# Patient Record
Sex: FEMALE | Race: WHITE | Employment: OTHER | URBAN - NONMETROPOLITAN AREA
[De-identification: names, ages, dates, MRNs, and addresses within clinical notes are randomized per-mention and may not be internally consistent; named-entity substitution may affect disease eponyms.]

---

## 2017-01-31 ENCOUNTER — OFFICE VISIT (OUTPATIENT)
Dept: CARDIOLOGY | Age: 77
End: 2017-01-31

## 2017-01-31 VITALS
DIASTOLIC BLOOD PRESSURE: 70 MMHG | HEIGHT: 58 IN | SYSTOLIC BLOOD PRESSURE: 122 MMHG | RESPIRATION RATE: 16 BRPM | BODY MASS INDEX: 37.41 KG/M2 | HEART RATE: 54 BPM | WEIGHT: 178.2 LBS

## 2017-01-31 DIAGNOSIS — E78.5 HYPERLIPIDEMIA, UNSPECIFIED HYPERLIPIDEMIA TYPE: ICD-10-CM

## 2017-01-31 DIAGNOSIS — I25.10 CORONARY ARTERY DISEASE INVOLVING NATIVE CORONARY ARTERY OF NATIVE HEART WITHOUT ANGINA PECTORIS: Primary | ICD-10-CM

## 2017-01-31 DIAGNOSIS — I10 ESSENTIAL HYPERTENSION: ICD-10-CM

## 2017-01-31 PROCEDURE — 99213 OFFICE O/P EST LOW 20 MIN: CPT | Performed by: INTERNAL MEDICINE

## 2017-01-31 PROCEDURE — 93000 ELECTROCARDIOGRAM COMPLETE: CPT | Performed by: INTERNAL MEDICINE

## 2017-03-20 RX ORDER — ATORVASTATIN CALCIUM 40 MG/1
40 TABLET, FILM COATED ORAL NIGHTLY
Qty: 90 TABLET | Refills: 3 | Status: SHIPPED | OUTPATIENT
Start: 2017-03-20 | End: 2017-08-11 | Stop reason: SDUPTHER

## 2017-08-01 ENCOUNTER — OFFICE VISIT (OUTPATIENT)
Dept: CARDIOLOGY | Age: 77
End: 2017-08-01
Payer: COMMERCIAL

## 2017-08-01 VITALS
WEIGHT: 182.4 LBS | BODY MASS INDEX: 36.77 KG/M2 | DIASTOLIC BLOOD PRESSURE: 76 MMHG | HEIGHT: 59 IN | HEART RATE: 60 BPM | SYSTOLIC BLOOD PRESSURE: 134 MMHG

## 2017-08-01 DIAGNOSIS — I10 ESSENTIAL HYPERTENSION: ICD-10-CM

## 2017-08-01 DIAGNOSIS — I25.10 CORONARY ARTERY DISEASE INVOLVING NATIVE CORONARY ARTERY OF NATIVE HEART WITHOUT ANGINA PECTORIS: Primary | ICD-10-CM

## 2017-08-01 DIAGNOSIS — E78.5 HYPERLIPIDEMIA, UNSPECIFIED HYPERLIPIDEMIA TYPE: ICD-10-CM

## 2017-08-01 PROCEDURE — 99213 OFFICE O/P EST LOW 20 MIN: CPT | Performed by: INTERNAL MEDICINE

## 2017-08-01 PROCEDURE — 93000 ELECTROCARDIOGRAM COMPLETE: CPT | Performed by: INTERNAL MEDICINE

## 2017-08-11 RX ORDER — ATORVASTATIN CALCIUM 40 MG/1
40 TABLET, FILM COATED ORAL NIGHTLY
Qty: 90 TABLET | Refills: 3 | Status: SHIPPED | OUTPATIENT
Start: 2017-08-11 | End: 2018-06-07 | Stop reason: SDUPTHER

## 2017-08-11 RX ORDER — LISINOPRIL 5 MG/1
TABLET ORAL
Qty: 90 TABLET | Refills: 3 | Status: SHIPPED | OUTPATIENT
Start: 2017-08-11 | End: 2017-11-17 | Stop reason: SDUPTHER

## 2017-11-17 RX ORDER — LISINOPRIL 5 MG/1
TABLET ORAL
Qty: 90 TABLET | Refills: 3 | Status: SHIPPED | OUTPATIENT
Start: 2017-11-17 | End: 2018-09-17 | Stop reason: SDUPTHER

## 2018-02-12 ENCOUNTER — OFFICE VISIT (OUTPATIENT)
Dept: CARDIOLOGY | Age: 78
End: 2018-02-12
Payer: COMMERCIAL

## 2018-02-12 VITALS
HEIGHT: 59 IN | BODY MASS INDEX: 35.68 KG/M2 | HEART RATE: 62 BPM | DIASTOLIC BLOOD PRESSURE: 80 MMHG | WEIGHT: 177 LBS | SYSTOLIC BLOOD PRESSURE: 136 MMHG

## 2018-02-12 DIAGNOSIS — I25.10 CORONARY ARTERY DISEASE INVOLVING NATIVE CORONARY ARTERY OF NATIVE HEART WITHOUT ANGINA PECTORIS: Primary | ICD-10-CM

## 2018-02-12 DIAGNOSIS — I10 ESSENTIAL HYPERTENSION: ICD-10-CM

## 2018-02-12 DIAGNOSIS — E78.5 HYPERLIPIDEMIA, UNSPECIFIED HYPERLIPIDEMIA TYPE: ICD-10-CM

## 2018-02-12 PROCEDURE — 93000 ELECTROCARDIOGRAM COMPLETE: CPT | Performed by: INTERNAL MEDICINE

## 2018-02-12 PROCEDURE — 99213 OFFICE O/P EST LOW 20 MIN: CPT | Performed by: INTERNAL MEDICINE

## 2018-02-12 NOTE — PROGRESS NOTES
[facility] Agusto  is doing well from a cardiac standpoint. She takes one flight of stairs in her house several times a day. She also works as at [employer] and walks over there. No chest pain, no dyspnea, no PND, no syncope or pre-syncope, no orthopnea. Past Medical History:   Diagnosis Date    Asteroid hyalosis of right eye     Coronary artery disease     status post acute non-Q wave myocardial infarction 08/23/2012, status post drug eluting stent placement in the LAD and 2 drug eluting stents in the left circumflex artery 08/23/2012.  Diastolic dysfunction     grade 1.     Gastric ulcer with hemorrhage 3/3/15    gastric and esophageal ulcers due to nsaid    Hyperlipidemia     Mild mitral regurgitation     Mild tricuspid regurgitation     Obesity     Vitreous floaters     left eye. Past Surgical History:   Procedure Laterality Date    CARDIAC CATHETERIZATION Left 08/23/2012    left main artery normal, LAD with mid 60 to 70% stenosis, FFR was 0.76, left circumflex artery with mid 90% stenosis with thrombus, RCA with mild irregularities, preserved left ventricular systolic function, ejection fraction estimated around 50%, status post drug eluting stent placement in the LAD and 2 drug eluting stents in the left circumflex artery.  CHOLECYSTECTOMY  1978    ENDOSCOPY, COLON, DIAGNOSTIC  03/05/2015    esophageal/gastric ulcer; few diverticuli    UPPER GASTROINTESTINAL ENDOSCOPY  4/16/2015    healed gastric ulcer       Family History   Problem Relation Age of Onset    Heart Attack Father        ROS: Otherwise 10 systems reviewed and negative. /80   Pulse 62   Ht 4' 11\" (1.499 m)   Wt 177 lb (80.3 kg)   BMI 35.75 kg/m²     Vitals as above. Alert and oriented x 3. No JVD, or carotid bruits. Lungs are clear to auscultation. Heart sounds are regular, normal, no murmur. Abdomen is soft, no tenderness. Extremities No peripheral edema.     EKG: NSR, NL ECG    Meds:    Current Outpatient Prescriptions:     lisinopril (PRINIVIL;ZESTRIL) 5 MG tablet, TAKE 1 TABLET EVERY DAY, Disp: 90 tablet, Rfl: 3    metoprolol tartrate (LOPRESSOR) 25 MG tablet, TAKE 1 TABLET TWICE DAILY, Disp: 180 tablet, Rfl: 3    atorvastatin (LIPITOR) 40 MG tablet, Take 1 tablet by mouth nightly At bedtime. , Disp: 90 tablet, Rfl: 3    aspirin 81 MG tablet, Take 81 mg by mouth daily, Disp: , Rfl:     ferrous sulfate (FE TABS) 325 (65 FE) MG EC tablet, Take 1 tablet by mouth 2 times daily. , Disp: 90 tablet, Rfl: 3    nitroGLYCERIN (NITROSTAT) 0.4 MG SL tablet, Place 1 tablet under the tongue every 5 minutes as needed for Chest pain., Disp: 25 tablet, Rfl: 11    ascorbic acid (VITAMIN C) 500 MG tablet, Take 500 mg by mouth 2 times daily Indications: patient only takes in the Winter With ana paula hips , Disp: , Rfl:     Multiple Vitamins-Minerals (MULTIVITAMIN PO), Take 1 tablet by mouth daily. , Disp: , Rfl:     Recent Labs:  No results for input(s): CHOL, HDL, TRIG in the last 72 hours. Invalid input(s): CHOLHDLR, LDL, LDLCALCU  No results for input(s): NA, K, CL, CO2, BUN, CREATININE in the last 72 hours. Invalid input(s): CA    Assessment and Plan:     -CAD- CINDY to LAD and LCX 08/2012. Continue low dose ASA  -HTN- failry well controlled at this time. Continue current therapy.   -Obesity - encouraged diet, exercise, and discussed weight loss extensively. -Hyperlipidemia- continue statin.  Check Lipid panel and AST/ALT  -History of severe anemia along with gastric and esophageal ulcers 3/2015.  -Patient to see PCP  -RTC 6 months

## 2018-03-09 ENCOUNTER — OFFICE VISIT (OUTPATIENT)
Dept: FAMILY MEDICINE CLINIC | Age: 78
End: 2018-03-09
Payer: MEDICARE

## 2018-03-09 VITALS
HEIGHT: 59 IN | DIASTOLIC BLOOD PRESSURE: 86 MMHG | WEIGHT: 178.4 LBS | HEART RATE: 60 BPM | RESPIRATION RATE: 16 BRPM | BODY MASS INDEX: 35.96 KG/M2 | SYSTOLIC BLOOD PRESSURE: 132 MMHG

## 2018-03-09 DIAGNOSIS — I25.10 CORONARY ARTERY DISEASE INVOLVING NATIVE CORONARY ARTERY OF NATIVE HEART WITHOUT ANGINA PECTORIS: ICD-10-CM

## 2018-03-09 DIAGNOSIS — Z23 NEED FOR PNEUMOCOCCAL VACCINATION: ICD-10-CM

## 2018-03-09 DIAGNOSIS — E66.09 CLASS 1 OBESITY DUE TO EXCESS CALORIES WITHOUT SERIOUS COMORBIDITY IN ADULT, UNSPECIFIED BMI: ICD-10-CM

## 2018-03-09 DIAGNOSIS — Z23 NEED FOR PROPHYLACTIC VACCINATION WITH COMBINED DIPHTHERIA-TETANUS-PERTUSSIS (DTP) VACCINE: ICD-10-CM

## 2018-03-09 DIAGNOSIS — K25.4 CHRONIC GASTRIC ULCER WITH HEMORRHAGE: ICD-10-CM

## 2018-03-09 DIAGNOSIS — E78.00 PURE HYPERCHOLESTEROLEMIA: ICD-10-CM

## 2018-03-09 DIAGNOSIS — I51.89 DIASTOLIC DYSFUNCTION: ICD-10-CM

## 2018-03-09 PROCEDURE — 1090F PRES/ABSN URINE INCON ASSESS: CPT | Performed by: FAMILY MEDICINE

## 2018-03-09 PROCEDURE — 1036F TOBACCO NON-USER: CPT | Performed by: FAMILY MEDICINE

## 2018-03-09 PROCEDURE — G8598 ASA/ANTIPLAT THER USED: HCPCS | Performed by: FAMILY MEDICINE

## 2018-03-09 PROCEDURE — 1123F ACP DISCUSS/DSCN MKR DOCD: CPT | Performed by: FAMILY MEDICINE

## 2018-03-09 PROCEDURE — 4040F PNEUMOC VAC/ADMIN/RCVD: CPT | Performed by: FAMILY MEDICINE

## 2018-03-09 PROCEDURE — G8482 FLU IMMUNIZE ORDER/ADMIN: HCPCS | Performed by: FAMILY MEDICINE

## 2018-03-09 PROCEDURE — G8400 PT W/DXA NO RESULTS DOC: HCPCS | Performed by: FAMILY MEDICINE

## 2018-03-09 PROCEDURE — G8427 DOCREV CUR MEDS BY ELIG CLIN: HCPCS | Performed by: FAMILY MEDICINE

## 2018-03-09 PROCEDURE — 99214 OFFICE O/P EST MOD 30 MIN: CPT | Performed by: FAMILY MEDICINE

## 2018-03-09 PROCEDURE — G8417 CALC BMI ABV UP PARAM F/U: HCPCS | Performed by: FAMILY MEDICINE

## 2018-03-09 ASSESSMENT — ENCOUNTER SYMPTOMS
RESPIRATORY NEGATIVE: 1
EYES NEGATIVE: 1
ALLERGIC/IMMUNOLOGIC NEGATIVE: 1
GASTROINTESTINAL NEGATIVE: 1

## 2018-03-09 ASSESSMENT — PATIENT HEALTH QUESTIONNAIRE - PHQ9
SUM OF ALL RESPONSES TO PHQ9 QUESTIONS 1 & 2: 0
2. FEELING DOWN, DEPRESSED OR HOPELESS: 0
1. LITTLE INTEREST OR PLEASURE IN DOING THINGS: 0
SUM OF ALL RESPONSES TO PHQ QUESTIONS 1-9: 0

## 2018-03-09 NOTE — PROGRESS NOTES
Subjective:      Patient ID: Marge Baker is a 68 y.o. female. HPI  Routine follow up on chronic medical conditions, refills, and review of updated labs. I have reviewed the patient's medical history in detail and updated the computerized patient record. Have not seen her in a few years. No significant medical events, injuries, illness, or surgery. No current concerns or complaints. Past Medical History:   Diagnosis Date    Asteroid hyalosis of right eye     Coronary artery disease     status post acute non-Q wave myocardial infarction 08/23/2012, status post drug eluting stent placement in the LAD and 2 drug eluting stents in the left circumflex artery 08/23/2012.  Diastolic dysfunction     grade 1.     Gastric ulcer with hemorrhage 3/3/15    gastric and esophageal ulcers due to nsaid    Hyperlipidemia     Mild mitral regurgitation     Mild tricuspid regurgitation     Obesity     Vitreous floaters     left eye. Past Surgical History:   Procedure Laterality Date    CARDIAC CATHETERIZATION Left 08/23/2012    left main artery normal, LAD with mid 60 to 70% stenosis, FFR was 0.76, left circumflex artery with mid 90% stenosis with thrombus, RCA with mild irregularities, preserved left ventricular systolic function, ejection fraction estimated around 50%, status post drug eluting stent placement in the LAD and 2 drug eluting stents in the left circumflex artery.      CHOLECYSTECTOMY  1978    ENDOSCOPY, COLON, DIAGNOSTIC  03/05/2015    esophageal/gastric ulcer; few diverticuli    UPPER GASTROINTESTINAL ENDOSCOPY  4/16/2015    healed gastric ulcer       Current Outpatient Prescriptions   Medication Sig Dispense Refill    Multiple Vitamins-Minerals (HAIR SKIN AND NAILS FORMULA PO) Take 3,000 g by mouth      lisinopril (PRINIVIL;ZESTRIL) 5 MG tablet TAKE 1 TABLET EVERY DAY 90 tablet 3    metoprolol tartrate (LOPRESSOR) 25 MG tablet TAKE 1 TABLET TWICE DAILY 180 tablet 3    atorvastatin (LIPITOR) 40 MG tablet Take 1 tablet by mouth nightly At bedtime. 90 tablet 3    aspirin 81 MG tablet Take 81 mg by mouth daily      ferrous sulfate (FE TABS) 325 (65 FE) MG EC tablet Take 1 tablet by mouth 2 times daily. 90 tablet 3    ascorbic acid (VITAMIN C) 500 MG tablet Take 500 mg by mouth 2 times daily Indications: patient only takes in the Winter With ana paula hips       Multiple Vitamins-Minerals (MULTIVITAMIN PO) Take 1 tablet by mouth daily.  nitroGLYCERIN (NITROSTAT) 0.4 MG SL tablet Place 1 tablet under the tongue every 5 minutes as needed for Chest pain. 25 tablet 11     No current facility-administered medications for this visit. Allergies   Allergen Reactions    Codeine Nausea Only     Social History   Substance Use Topics    Smoking status: Never Smoker    Smokeless tobacco: Never Used    Alcohol use No     Family History   Problem Relation Age of Onset    Heart Attack Father            Review of Systems   Constitutional: Negative. HENT: Negative. Eyes: Negative. Respiratory: Negative. Cardiovascular: Negative. Gastrointestinal: Negative. Endocrine: Negative. Genitourinary: Negative. Musculoskeletal: Positive for arthralgias (right shoulder, ). Skin: Negative. Allergic/Immunologic: Negative. Neurological: Negative. Hematological: Negative. Psychiatric/Behavioral: Negative. Objective:   Physical Exam   Constitutional: She is oriented to person, place, and time. She appears well-developed and well-nourished. No distress. HENT:   Head: Normocephalic and atraumatic. Right Ear: External ear normal.   Left Ear: External ear normal.   Mouth/Throat: Oropharynx is clear and moist. No oropharyngeal exudate. Eyes: Conjunctivae and EOM are normal. No scleral icterus. Neck: Neck supple. No thyromegaly present. Cardiovascular: Normal rate, regular rhythm, normal heart sounds and intact distal pulses. No murmur heard.   Pulmonary/Chest: Effort normal and breath sounds normal. No respiratory distress. She has no wheezes. Abdominal: Soft. Bowel sounds are normal. She exhibits no distension. There is no tenderness. There is no rebound. Musculoskeletal: Normal range of motion. She exhibits no edema or tenderness. Neurological: She is alert and oriented to person, place, and time. Skin: Skin is warm and dry. No rash noted. No erythema. Psychiatric: She has a normal mood and affect. Her behavior is normal. Judgment and thought content normal.     /86 (Site: Right Arm, Position: Sitting, Cuff Size: Large Adult)   Pulse 60   Resp 16   Ht 4' 11\" (1.499 m)   Wt 178 lb 6.4 oz (80.9 kg)   LMP  (LMP Unknown)   Breastfeeding? No   BMI 36.03 kg/m²     Assessment:      Encounter Diagnoses   Name Primary?  Need for pneumococcal vaccination     Need for prophylactic vaccination with combined diphtheria-tetanus-pertussis (DTP) vaccine     Chronic gastric ulcer with hemorrhage     Coronary artery disease involving native coronary artery of native heart without angina pectoris     Diastolic dysfunction     Pure hypercholesterolemia     Class 1 obesity due to excess calories without serious comorbidity in adult, unspecified BMI            Plan:      Rec. Pneumonia vaccination/dtap updates. Prior gastric ulcer, likely nsaid related. On asa at present. Asx. No active treatment at present. Will follow up prn concerns. CAD: CINDY to LAD and LCX 08/2012. Plan to cont. Asa, lopressor, statin, ace. Diastolic dysfunction. No sense of decompensation or any recent chf issues. Hyperlipidemia: due for updated levels on lipitor. Cardiologist already has orders for labs. Obesity. Colonoscopy normal 2015    She refuses mammogram.      Discussed dexa scanning, vitamin D and calcium supplementation.

## 2018-03-09 NOTE — PATIENT INSTRUCTIONS
[id number] Healthwise, Incorporated. Care instructions adapted under license by [facility] ([facility]). If you have questions about a medical condition or this instruction, always ask your healthcare professional. [address] any warranty or liability for your use of this information. Patient Education        Learning About High Cholesterol  What is high cholesterol? Cholesterol is a type of fat in your blood. It is needed for many body functions, such as making new cells. Cholesterol is made by your body. It also comes from food you eat. If you have too much cholesterol, it starts to build up in your arteries. This is called hardening of the arteries, or atherosclerosis. High cholesterol raises your risk of a heart attack and stroke. There are different types of cholesterol. LDL is the \"bad\" cholesterol. High LDL can raise your risk for heart disease, heart attack, and stroke. HDL is the \"good\" cholesterol. High HDL is linked with a lower risk for heart disease, heart attack, and stroke. Your cholesterol levels help your doctor find out your risk for having a heart attack or stroke. How can you prevent high cholesterol? A heart-healthy lifestyle can help you prevent high cholesterol. This lifestyle helps lower your risk for a heart attack and stroke. · Eat heart-healthy foods. ¨ Eat fruits, vegetables, whole grains (like oatmeal), dried beans and peas, nuts and seeds, soy products (like tofu), and fat-free or low-fat dairy products. ¨ Replace butter, margarine, and hydrogenated or partially hydrogenated oils with olive and canola oils. (Canola oil margarine without trans fat is fine.)  ¨ Replace red meat with fish, poultry, and soy protein (like tofu). ¨ Limit processed and packaged foods like chips, crackers, and cookies. · Be active. Exercise can improve your cholesterol level. Get at least 30 minutes of exercise on most days of the week. Walking is a good choice.  You also may want to do other activities, such as running, swimming, cycling, or playing tennis or team sports. · Stay at a healthy weight. Lose weight if you need to. · Don't smoke. If you need help quitting, talk to your doctor about stop-smoking programs and medicines. These can increase your chances of quitting for good. How is high cholesterol treated? The goal of treatment is to reduce your chances of having a heart attack or stroke. The goal is not to lower your cholesterol numbers only. · You may make lifestyle changes, such as eating healthy foods, not smoking, losing weight, and being more active. · You may have to take medicine. Follow-up care is a key part of your treatment and safety. Be sure to make and go to all appointments, and call your doctor if you are having problems. It's also a good idea to know your test results and keep a list of the medicines you take. Where can you learn more? Go to https://[email or web address].Citycelebrity. org and sign in to your [employer] account. Enter J807 in the StandDesk box to learn more about \"Learning About High Cholesterol. \"     If you do not have an account, please click on the \"Sign Up Now\" link. Current as of: September 21, 2016  Content Version: 11.5  © 9632-1552 Healthwise, Incorporated. Care instructions adapted under license by [facility] ([facility]). If you have questions about a medical condition or this instruction, always ask your healthcare professional. [place] [address] any warranty or liability for your use of this information.

## 2018-06-07 RX ORDER — ATORVASTATIN CALCIUM 40 MG/1
40 TABLET, FILM COATED ORAL NIGHTLY
Qty: 90 TABLET | Refills: 3 | Status: SHIPPED | OUTPATIENT
Start: 2018-06-07 | End: 2019-03-27 | Stop reason: SDUPTHER

## 2018-07-31 ENCOUNTER — HOSPITAL ENCOUNTER (OUTPATIENT)
Dept: LAB | Age: 78
Setting detail: SPECIMEN
Discharge: HOME | End: 2018-07-31
Payer: MEDICARE

## 2018-07-31 DIAGNOSIS — I25.10 CORONARY ARTERY DISEASE INVOLVING NATIVE CORONARY ARTERY OF NATIVE HEART WITHOUT ANGINA PECTORIS: ICD-10-CM

## 2018-07-31 DIAGNOSIS — E78.00 PURE HYPERCHOLESTEROLEMIA: ICD-10-CM

## 2018-07-31 LAB
ABSOLUTE EOS #: 0.22 K/UL (ref 0–0.4)
ABSOLUTE IMMATURE GRANULOCYTE: NORMAL K/UL (ref 0–0.3)
ABSOLUTE LYMPH #: 2.55 K/UL (ref 1–4.8)
ABSOLUTE MONO #: 0.52 K/UL (ref 0.1–1.2)
ANION GAP SERPL CALCULATED.3IONS-SCNC: 11 MMOL/L (ref 9–17)
BASOPHILS # BLD: 1 % (ref 0–1)
BASOPHILS ABSOLUTE: 0.08 K/UL (ref 0–0.2)
BUN BLDV-MCNC: 16 MG/DL (ref 8–23)
BUN/CREAT BLD: 22 (ref 9–20)
CALCIUM SERPL-MCNC: 9.5 MG/DL (ref 8.6–10.4)
CHLORIDE BLD-SCNC: 103 MMOL/L (ref 98–107)
CHOLESTEROL/HDL RATIO: 2.5
CHOLESTEROL: 128 MG/DL
CO2: 28 MMOL/L (ref 20–31)
CREAT SERPL-MCNC: 0.72 MG/DL (ref 0.5–0.9)
DIFFERENTIAL TYPE: NORMAL
EOSINOPHILS RELATIVE PERCENT: 3 % (ref 1–7)
GFR AFRICAN AMERICAN: >60 ML/MIN
GFR NON-AFRICAN AMERICAN: >60 ML/MIN
GFR SERPL CREATININE-BSD FRML MDRD: ABNORMAL ML/MIN/{1.73_M2}
GFR SERPL CREATININE-BSD FRML MDRD: ABNORMAL ML/MIN/{1.73_M2}
GLUCOSE BLD-MCNC: 109 MG/DL (ref 70–99)
HCT VFR BLD CALC: 39.9 % (ref 36–46)
HDLC SERPL-MCNC: 51 MG/DL
HEMOGLOBIN: 13.7 G/DL (ref 12–16)
IMMATURE GRANULOCYTES: NORMAL %
LDL CHOLESTEROL: 49 MG/DL (ref 0–130)
LYMPHOCYTES # BLD: 37 % (ref 16–46)
MCH RBC QN AUTO: 32.6 PG (ref 26–34)
MCHC RBC AUTO-ENTMCNC: 34.4 G/DL (ref 31–37)
MCV RBC AUTO: 94.9 FL (ref 80–100)
MONOCYTES # BLD: 8 % (ref 4–11)
NRBC AUTOMATED: NORMAL PER 100 WBC
PDW BLD-RTO: 12 % (ref 11–14.5)
PLATELET # BLD: 199 K/UL (ref 140–450)
PLATELET ESTIMATE: NORMAL
PMV BLD AUTO: 8.3 FL (ref 6–12)
POTASSIUM SERPL-SCNC: 4.8 MMOL/L (ref 3.7–5.3)
RBC # BLD: 4.2 M/UL (ref 4–5.2)
RBC # BLD: NORMAL 10*6/UL
SEG NEUTROPHILS: 51 % (ref 43–77)
SEGMENTED NEUTROPHILS ABSOLUTE COUNT: 3.59 K/UL (ref 1.8–7.7)
SODIUM BLD-SCNC: 142 MMOL/L (ref 135–144)
TRIGL SERPL-MCNC: 139 MG/DL
VLDLC SERPL CALC-MCNC: NORMAL MG/DL (ref 1–30)
WBC # BLD: 6.9 K/UL (ref 3.5–11)
WBC # BLD: NORMAL 10*3/UL

## 2018-07-31 PROCEDURE — 80061 LIPID PANEL: CPT

## 2018-07-31 PROCEDURE — 85025 COMPLETE CBC W/AUTO DIFF WBC: CPT

## 2018-07-31 PROCEDURE — 80048 BASIC METABOLIC PNL TOTAL CA: CPT

## 2018-07-31 PROCEDURE — 36415 COLL VENOUS BLD VENIPUNCTURE: CPT

## 2018-09-10 ENCOUNTER — OFFICE VISIT (OUTPATIENT)
Dept: FAMILY MEDICINE CLINIC | Age: 78
End: 2018-09-10
Payer: MEDICARE

## 2018-09-10 VITALS
HEART RATE: 60 BPM | RESPIRATION RATE: 12 BRPM | SYSTOLIC BLOOD PRESSURE: 124 MMHG | WEIGHT: 178.4 LBS | HEIGHT: 59 IN | DIASTOLIC BLOOD PRESSURE: 82 MMHG | BODY MASS INDEX: 35.96 KG/M2

## 2018-09-10 DIAGNOSIS — E78.00 PURE HYPERCHOLESTEROLEMIA: ICD-10-CM

## 2018-09-10 DIAGNOSIS — E66.09 CLASS 1 OBESITY DUE TO EXCESS CALORIES WITHOUT SERIOUS COMORBIDITY IN ADULT, UNSPECIFIED BMI: ICD-10-CM

## 2018-09-10 DIAGNOSIS — Z78.0 MENOPAUSE: ICD-10-CM

## 2018-09-10 DIAGNOSIS — D50.0 IRON DEFICIENCY ANEMIA DUE TO CHRONIC BLOOD LOSS: ICD-10-CM

## 2018-09-10 DIAGNOSIS — I25.10 CORONARY ARTERY DISEASE INVOLVING NATIVE CORONARY ARTERY OF NATIVE HEART WITHOUT ANGINA PECTORIS: ICD-10-CM

## 2018-09-10 DIAGNOSIS — I51.89 DIASTOLIC DYSFUNCTION: ICD-10-CM

## 2018-09-10 DIAGNOSIS — Z23 NEED FOR TDAP VACCINATION: ICD-10-CM

## 2018-09-10 DIAGNOSIS — K25.4 CHRONIC GASTRIC ULCER WITH HEMORRHAGE: Primary | ICD-10-CM

## 2018-09-10 DIAGNOSIS — R73.01 IMPAIRED FASTING BLOOD SUGAR: ICD-10-CM

## 2018-09-10 PROCEDURE — 4040F PNEUMOC VAC/ADMIN/RCVD: CPT | Performed by: FAMILY MEDICINE

## 2018-09-10 PROCEDURE — 99214 OFFICE O/P EST MOD 30 MIN: CPT | Performed by: FAMILY MEDICINE

## 2018-09-10 PROCEDURE — 1123F ACP DISCUSS/DSCN MKR DOCD: CPT | Performed by: FAMILY MEDICINE

## 2018-09-10 PROCEDURE — 1090F PRES/ABSN URINE INCON ASSESS: CPT | Performed by: FAMILY MEDICINE

## 2018-09-10 PROCEDURE — G8598 ASA/ANTIPLAT THER USED: HCPCS | Performed by: FAMILY MEDICINE

## 2018-09-10 PROCEDURE — G8400 PT W/DXA NO RESULTS DOC: HCPCS | Performed by: FAMILY MEDICINE

## 2018-09-10 PROCEDURE — G8427 DOCREV CUR MEDS BY ELIG CLIN: HCPCS | Performed by: FAMILY MEDICINE

## 2018-09-10 PROCEDURE — 1101F PT FALLS ASSESS-DOCD LE1/YR: CPT | Performed by: FAMILY MEDICINE

## 2018-09-10 PROCEDURE — G8417 CALC BMI ABV UP PARAM F/U: HCPCS | Performed by: FAMILY MEDICINE

## 2018-09-10 PROCEDURE — 1036F TOBACCO NON-USER: CPT | Performed by: FAMILY MEDICINE

## 2018-09-10 ASSESSMENT — PATIENT HEALTH QUESTIONNAIRE - PHQ9
2. FEELING DOWN, DEPRESSED OR HOPELESS: 0
1. LITTLE INTEREST OR PLEASURE IN DOING THINGS: 0
SUM OF ALL RESPONSES TO PHQ QUESTIONS 1-9: 0
SUM OF ALL RESPONSES TO PHQ9 QUESTIONS 1 & 2: 0
SUM OF ALL RESPONSES TO PHQ QUESTIONS 1-9: 0

## 2018-09-10 ASSESSMENT — ENCOUNTER SYMPTOMS
EYES NEGATIVE: 1
RESPIRATORY NEGATIVE: 1
ALLERGIC/IMMUNOLOGIC NEGATIVE: 1
GASTROINTESTINAL NEGATIVE: 1

## 2018-09-10 NOTE — PATIENT INSTRUCTIONS
citrus fruits. ¨ Iron pills may cause stomach problems, such as heartburn, nausea, diarrhea, constipation, and cramps. Be sure to drink plenty of fluids, and include fruits, vegetables, and fiber in your diet each day. Iron pills often make your bowel movements dark or green. ¨ If you forget to take an iron pill, do not take a double dose of iron the next time you take a pill. ¨ Keep iron pills out of the reach of small children. An overdose of iron can be very dangerous. · Follow your doctor's advice about eating iron-rich foods. These include red meat, shellfish, poultry, eggs, beans, raisins, whole-grain bread, and leafy green vegetables. · Steam vegetables to help them keep their iron content. When should you call for help? Call 911 anytime you think you may need emergency care. For example, call if:    · You have symptoms of a heart attack. These may include:  ¨ Chest pain or pressure, or a strange feeling in the chest.  ¨ Sweating. ¨ Shortness of breath. ¨ Nausea or vomiting. ¨ Pain, pressure, or a strange feeling in the back, neck, jaw, or upper belly or in one or both shoulders or arms. ¨ Lightheadedness or sudden weakness. ¨ A fast or irregular heartbeat. After you call 911, the  may tell you to chew 1 adult-strength or 2 to 4 low-dose aspirin. Wait for an ambulance. Do not try to drive yourself.     · You passed out (lost consciousness).    Call your doctor now or seek immediate medical care if:    · You have new or increased shortness of breath.     · You are dizzy or lightheaded, or you feel like you may faint.     · Your fatigue and weakness continue or get worse.     · You have any abnormal bleeding, such as:  ¨ Nosebleeds. ¨ Vaginal bleeding that is different (heavier, more frequent, at a different time of the month) than what you are used to. ¨ Bloody or black stools, or rectal bleeding.   ¨ Bloody or pink urine.    Watch closely for changes in your health, and be sure to

## 2018-09-10 NOTE — PROGRESS NOTES
sounds and intact distal pulses. No murmur heard. Pulmonary/Chest: Effort normal and breath sounds normal. No respiratory distress. She has no wheezes. Abdominal: Soft. Bowel sounds are normal. She exhibits no distension. There is no tenderness. There is no rebound. Musculoskeletal: Normal range of motion. She exhibits no edema or tenderness. Neurological: She is alert and oriented to person, place, and time. Skin: Skin is warm and dry. No rash noted. No erythema. Psychiatric: She has a normal mood and affect. Her behavior is normal. Judgment and thought content normal.     /82 (Site: Left Upper Arm, Position: Sitting, Cuff Size: Large Adult)   Pulse 60   Resp 12   Ht 4' 11\" (1.499 m)   Wt 178 lb 6.4 oz (80.9 kg)   LMP  (LMP Unknown)   Breastfeeding?  No   BMI 36.03 kg/m²    Hospital Outpatient Visit on 07/31/2018   Component Date Value Ref Range Status    Cholesterol 07/31/2018 128  <200 mg/dL Final    HDL 07/31/2018 51  >40 mg/dL Final    LDL Cholesterol 07/31/2018 49  0 - 130 mg/dL Final    Chol/HDL Ratio 07/31/2018 2.5  <5 Final    Triglycerides 07/31/2018 139  <150 mg/dL Final    VLDL 07/31/2018 NOT REPORTED  1 - 30 mg/dL Final    WBC 07/31/2018 6.9  3.5 - 11.0 k/uL Final    RBC 07/31/2018 4.20  4.0 - 5.2 m/uL Final    Hemoglobin 07/31/2018 13.7  12.0 - 16.0 g/dL Final    Hematocrit 07/31/2018 39.9  36 - 46 % Final    MCV 07/31/2018 94.9  80 - 100 fL Final    MCH 07/31/2018 32.6  26 - 34 pg Final    MCHC 07/31/2018 34.4  31 - 37 g/dL Final    RDW 07/31/2018 12.0  11.0 - 14.5 % Final    Platelets 90/55/2775 199  140 - 450 k/uL Final    MPV 07/31/2018 8.3  6.0 - 12.0 fL Final    NRBC Automated 07/31/2018 NOT REPORTED  per 100 WBC Final    Differential Type 07/31/2018 NOT REPORTED   Final    Immature Granulocytes 07/31/2018 NOT REPORTED  0 % Final    Absolute Immature Granulocyte 07/31/2018 NOT REPORTED  0.00 - 0.30 k/uL Final    WBC Morphology 07/31/2018 NOT REPORTED present. Will follow up prn concerns. CAD: CINDY to LAD and LCX 08/2012. Plan to cont. Asa, lopressor, statin, ace. Diastolic dysfunction. No sense of decompensation or any recent chf issues. Hyperlipidemia: due for updated levels on lipitor. Cardiologist already has orders for labs. Obesity. Discussed wt. Loss. Trying to avoid worsening bs control. Colonoscopy normal 2015    She refuses mammogram.      Discussed dexa scanning, vitamin D and calcium supplementation. Willing to consider dexa and calcium/vitamin D supplement. History of anemia; more gi bleeding/upper gi ulceration in the past.      Impaired fasting blood sugar.   [facility]

## 2018-09-13 DIAGNOSIS — M85.80 OSTEOPENIA, UNSPECIFIED LOCATION: ICD-10-CM

## 2018-09-13 DIAGNOSIS — M81.0 OSTEOPOROSIS, UNSPECIFIED OSTEOPOROSIS TYPE, UNSPECIFIED PATHOLOGICAL FRACTURE PRESENCE: Primary | ICD-10-CM

## 2018-09-17 ENCOUNTER — OFFICE VISIT (OUTPATIENT)
Dept: CARDIOLOGY | Age: 78
End: 2018-09-17
Payer: MEDICARE

## 2018-09-17 ENCOUNTER — TELEPHONE (OUTPATIENT)
Dept: CARDIOLOGY | Age: 78
End: 2018-09-17

## 2018-09-17 VITALS
HEIGHT: 59 IN | HEART RATE: 54 BPM | SYSTOLIC BLOOD PRESSURE: 170 MMHG | DIASTOLIC BLOOD PRESSURE: 90 MMHG | BODY MASS INDEX: 35.48 KG/M2 | WEIGHT: 176 LBS

## 2018-09-17 DIAGNOSIS — I25.10 CORONARY ARTERY DISEASE INVOLVING NATIVE CORONARY ARTERY OF NATIVE HEART WITHOUT ANGINA PECTORIS: Primary | ICD-10-CM

## 2018-09-17 DIAGNOSIS — I10 ESSENTIAL HYPERTENSION: ICD-10-CM

## 2018-09-17 PROCEDURE — 99213 OFFICE O/P EST LOW 20 MIN: CPT | Performed by: INTERNAL MEDICINE

## 2018-09-17 PROCEDURE — 93000 ELECTROCARDIOGRAM COMPLETE: CPT | Performed by: INTERNAL MEDICINE

## 2018-09-17 RX ORDER — LISINOPRIL 5 MG/1
TABLET ORAL
Qty: 90 TABLET | Refills: 3 | Status: SHIPPED | OUTPATIENT
Start: 2018-09-17 | End: 2019-07-22 | Stop reason: SDUPTHER

## 2018-09-17 RX ORDER — ADHESIVE BANDAGE 3/4"
BANDAGE TOPICAL
Qty: 1 EACH | Refills: 0 | Status: SHIPPED | OUTPATIENT
Start: 2018-09-17 | End: 2019-03-13 | Stop reason: ALTCHOICE

## 2018-09-17 NOTE — TELEPHONE ENCOUNTER
Pt was seen in the office today 9/17/18 with Dr Cora Mendes and she stated that Dr Cora Mendes wanted her to get a home BP machine, so the pt did and the pt took her readings at approximally 12:30pm    137/68- Right Arm   136/73- Left Arm\    Any questions please give pt call back at [phone]

## 2018-09-17 NOTE — PROGRESS NOTES
[address] [place] [address]  is here for follow up. She reports walking 30 minutes on daily basis. No chest pain, no dyspnea, no PND, no syncope or pre-syncope, no orthopnea. BP was normal at PCP 9/10/18. Today it is elevated at 170/90      Past Medical History:   Diagnosis Date    Asteroid hyalosis of right eye     Coronary artery disease     status post acute non-Q wave myocardial infarction 08/23/2012, status post drug eluting stent placement in the LAD and 2 drug eluting stents in the left circumflex artery 08/23/2012.  Diastolic dysfunction     grade 1.     Gastric ulcer with hemorrhage 03/03/2015    gastric and esophageal ulcers due to nsaid    Hyperlipidemia     Mild mitral regurgitation     Mild tricuspid regurgitation     Obesity     Vitreous floaters     left eye. Past Surgical History:   Procedure Laterality Date    CARDIAC CATHETERIZATION Left 08/23/2012    left main artery normal, LAD with mid 60 to 70% stenosis, FFR was 0.76, left circumflex artery with mid 90% stenosis with thrombus, RCA with mild irregularities, preserved left ventricular systolic function, ejection fraction estimated around 50%, status post drug eluting stent placement in the LAD and 2 drug eluting stents in the left circumflex artery.  CHOLECYSTECTOMY  1978    ENDOSCOPY, COLON, DIAGNOSTIC  03/05/2015    esophageal/gastric ulcer; few diverticuli    UPPER GASTROINTESTINAL ENDOSCOPY  4/16/2015    healed gastric ulcer       Family History   Problem Relation Age of Onset    Heart Attack Father        ROS: Otherwise 10 systems reviewed and negative. BP (!) 170/90   Pulse 54   Ht 4' 11\" (1.499 m)   Wt 176 lb (79.8 kg)   LMP  (LMP Unknown)   BMI 35.55 kg/m²     Vitals as above. Alert and oriented x 3. No JVD, or carotid bruits. Lungs are clear to auscultation. Heart sounds are regular, normal, no murmur. Abdomen is soft, no tenderness. Extremities No peripheral edema.     EKG:

## 2018-09-20 ENCOUNTER — HOSPITAL ENCOUNTER (OUTPATIENT)
Dept: BONE DENSITY | Age: 78
Discharge: HOME | End: 2018-09-22
Payer: MEDICARE

## 2018-09-20 ENCOUNTER — NURSE ONLY (OUTPATIENT)
Dept: LAB | Age: 78
End: 2018-09-20

## 2018-09-20 VITALS — SYSTOLIC BLOOD PRESSURE: 172 MMHG | DIASTOLIC BLOOD PRESSURE: 90 MMHG | HEART RATE: 65 BPM

## 2018-09-20 DIAGNOSIS — Z01.30 BP CHECK: Primary | ICD-10-CM

## 2018-09-20 DIAGNOSIS — M81.0 OSTEOPOROSIS, UNSPECIFIED OSTEOPOROSIS TYPE, UNSPECIFIED PATHOLOGICAL FRACTURE PRESENCE: ICD-10-CM

## 2018-09-20 PROCEDURE — 77080 DXA BONE DENSITY AXIAL: CPT

## 2019-03-04 ENCOUNTER — HOSPITAL ENCOUNTER (OUTPATIENT)
Dept: LAB | Age: 79
Discharge: HOME | End: 2019-03-04
Payer: MEDICARE

## 2019-03-04 ENCOUNTER — APPOINTMENT (OUTPATIENT)
Dept: LAB | Age: 79
End: 2019-03-04
Payer: MEDICARE

## 2019-03-04 DIAGNOSIS — R73.01 IMPAIRED FASTING BLOOD SUGAR: ICD-10-CM

## 2019-03-04 DIAGNOSIS — E78.00 PURE HYPERCHOLESTEROLEMIA: ICD-10-CM

## 2019-03-04 DIAGNOSIS — D50.0 IRON DEFICIENCY ANEMIA DUE TO CHRONIC BLOOD LOSS: ICD-10-CM

## 2019-03-04 LAB
ABSOLUTE EOS #: 0.2 K/UL (ref 0–0.4)
ABSOLUTE IMMATURE GRANULOCYTE: NORMAL K/UL (ref 0–0.3)
ABSOLUTE LYMPH #: 2.3 K/UL (ref 1–4.8)
ABSOLUTE MONO #: 0.5 K/UL (ref 0.1–1.2)
ALBUMIN SERPL-MCNC: 4.3 G/DL (ref 3.5–5.2)
ALBUMIN/GLOBULIN RATIO: 1.3 (ref 1–2.5)
ALP BLD-CCNC: 92 U/L (ref 35–104)
ALT SERPL-CCNC: 22 U/L (ref 5–33)
ANION GAP SERPL CALCULATED.3IONS-SCNC: 16 MMOL/L (ref 9–17)
AST SERPL-CCNC: 26 U/L
BASOPHILS # BLD: 1 % (ref 0–1)
BASOPHILS ABSOLUTE: 0.1 K/UL (ref 0–0.2)
BILIRUB SERPL-MCNC: 0.45 MG/DL (ref 0.3–1.2)
BUN BLDV-MCNC: 12 MG/DL (ref 8–23)
BUN/CREAT BLD: 16 (ref 9–20)
CALCIUM SERPL-MCNC: 9.4 MG/DL (ref 8.6–10.4)
CHLORIDE BLD-SCNC: 106 MMOL/L (ref 98–107)
CHOLESTEROL/HDL RATIO: 2.4
CHOLESTEROL: 117 MG/DL
CO2: 24 MMOL/L (ref 20–31)
CREAT SERPL-MCNC: 0.74 MG/DL (ref 0.5–0.9)
DIFFERENTIAL TYPE: NORMAL
EOSINOPHILS RELATIVE PERCENT: 2 % (ref 1–7)
ESTIMATED AVERAGE GLUCOSE: 117 MG/DL
GFR AFRICAN AMERICAN: >60 ML/MIN
GFR NON-AFRICAN AMERICAN: >60 ML/MIN
GFR SERPL CREATININE-BSD FRML MDRD: ABNORMAL ML/MIN/{1.73_M2}
GFR SERPL CREATININE-BSD FRML MDRD: ABNORMAL ML/MIN/{1.73_M2}
GLUCOSE BLD-MCNC: 117 MG/DL (ref 70–99)
HBA1C MFR BLD: 5.7 % (ref 4.8–5.9)
HCT VFR BLD CALC: 43 % (ref 36–46)
HDLC SERPL-MCNC: 49 MG/DL
HEMOGLOBIN: 13.9 G/DL (ref 12–16)
IMMATURE GRANULOCYTES: NORMAL %
LDL CHOLESTEROL: 44 MG/DL (ref 0–130)
LYMPHOCYTES # BLD: 32 % (ref 16–46)
MCH RBC QN AUTO: 31.3 PG (ref 26–34)
MCHC RBC AUTO-ENTMCNC: 32.4 G/DL (ref 31–37)
MCV RBC AUTO: 96.6 FL (ref 80–100)
MONOCYTES # BLD: 7 % (ref 4–11)
NRBC AUTOMATED: NORMAL PER 100 WBC
PDW BLD-RTO: 13.7 % (ref 11–14.5)
PLATELET # BLD: 201 K/UL (ref 140–450)
PLATELET ESTIMATE: NORMAL
PMV BLD AUTO: 7.7 FL (ref 6–12)
POTASSIUM SERPL-SCNC: 4.1 MMOL/L (ref 3.7–5.3)
RBC # BLD: 4.45 M/UL (ref 4–5.2)
RBC # BLD: NORMAL 10*6/UL
SEG NEUTROPHILS: 58 % (ref 43–77)
SEGMENTED NEUTROPHILS ABSOLUTE COUNT: 4.3 K/UL (ref 1.8–7.7)
SODIUM BLD-SCNC: 146 MMOL/L (ref 135–144)
TOTAL PROTEIN: 7.6 G/DL (ref 6.4–8.3)
TRIGL SERPL-MCNC: 119 MG/DL
VLDLC SERPL CALC-MCNC: NORMAL MG/DL (ref 1–30)
WBC # BLD: 7.3 K/UL (ref 3.5–11)
WBC # BLD: NORMAL 10*3/UL

## 2019-03-04 PROCEDURE — 85025 COMPLETE CBC W/AUTO DIFF WBC: CPT

## 2019-03-04 PROCEDURE — 83036 HEMOGLOBIN GLYCOSYLATED A1C: CPT

## 2019-03-04 PROCEDURE — 80053 COMPREHEN METABOLIC PANEL: CPT

## 2019-03-04 PROCEDURE — 80061 LIPID PANEL: CPT

## 2019-03-04 PROCEDURE — 36415 COLL VENOUS BLD VENIPUNCTURE: CPT

## 2019-03-13 ENCOUNTER — OFFICE VISIT (OUTPATIENT)
Dept: FAMILY MEDICINE CLINIC | Age: 79
End: 2019-03-13
Payer: MEDICARE

## 2019-03-13 VITALS
HEIGHT: 59 IN | WEIGHT: 175.93 LBS | BODY MASS INDEX: 35.47 KG/M2 | DIASTOLIC BLOOD PRESSURE: 72 MMHG | HEART RATE: 68 BPM | SYSTOLIC BLOOD PRESSURE: 128 MMHG

## 2019-03-13 DIAGNOSIS — E66.09 CLASS 1 OBESITY DUE TO EXCESS CALORIES WITHOUT SERIOUS COMORBIDITY IN ADULT, UNSPECIFIED BMI: ICD-10-CM

## 2019-03-13 DIAGNOSIS — D50.0 IRON DEFICIENCY ANEMIA DUE TO CHRONIC BLOOD LOSS: ICD-10-CM

## 2019-03-13 DIAGNOSIS — E78.00 PURE HYPERCHOLESTEROLEMIA: ICD-10-CM

## 2019-03-13 DIAGNOSIS — I25.10 CORONARY ARTERY DISEASE INVOLVING NATIVE CORONARY ARTERY OF NATIVE HEART WITHOUT ANGINA PECTORIS: ICD-10-CM

## 2019-03-13 DIAGNOSIS — K25.4 CHRONIC GASTRIC ULCER WITH HEMORRHAGE: Primary | ICD-10-CM

## 2019-03-13 DIAGNOSIS — I51.89 DIASTOLIC DYSFUNCTION: ICD-10-CM

## 2019-03-13 DIAGNOSIS — R73.01 IMPAIRED FASTING BLOOD SUGAR: ICD-10-CM

## 2019-03-13 PROCEDURE — 1036F TOBACCO NON-USER: CPT | Performed by: FAMILY MEDICINE

## 2019-03-13 PROCEDURE — 99214 OFFICE O/P EST MOD 30 MIN: CPT | Performed by: FAMILY MEDICINE

## 2019-03-13 PROCEDURE — G8482 FLU IMMUNIZE ORDER/ADMIN: HCPCS | Performed by: FAMILY MEDICINE

## 2019-03-13 PROCEDURE — 1123F ACP DISCUSS/DSCN MKR DOCD: CPT | Performed by: FAMILY MEDICINE

## 2019-03-13 PROCEDURE — 4040F PNEUMOC VAC/ADMIN/RCVD: CPT | Performed by: FAMILY MEDICINE

## 2019-03-13 PROCEDURE — G8399 PT W/DXA RESULTS DOCUMENT: HCPCS | Performed by: FAMILY MEDICINE

## 2019-03-13 PROCEDURE — 1101F PT FALLS ASSESS-DOCD LE1/YR: CPT | Performed by: FAMILY MEDICINE

## 2019-03-13 PROCEDURE — G8417 CALC BMI ABV UP PARAM F/U: HCPCS | Performed by: FAMILY MEDICINE

## 2019-03-13 PROCEDURE — G8598 ASA/ANTIPLAT THER USED: HCPCS | Performed by: FAMILY MEDICINE

## 2019-03-13 PROCEDURE — 1090F PRES/ABSN URINE INCON ASSESS: CPT | Performed by: FAMILY MEDICINE

## 2019-03-13 PROCEDURE — G8427 DOCREV CUR MEDS BY ELIG CLIN: HCPCS | Performed by: FAMILY MEDICINE

## 2019-03-13 RX ORDER — NITROGLYCERIN 0.4 MG/1
0.4 TABLET SUBLINGUAL EVERY 5 MIN PRN
Qty: 25 TABLET | Refills: 1 | Status: SHIPPED | OUTPATIENT
Start: 2019-03-13 | End: 2022-10-07 | Stop reason: SDUPTHER

## 2019-03-13 ASSESSMENT — ENCOUNTER SYMPTOMS
GASTROINTESTINAL NEGATIVE: 1
RESPIRATORY NEGATIVE: 1
ALLERGIC/IMMUNOLOGIC NEGATIVE: 1
EYES NEGATIVE: 1

## 2019-03-18 ENCOUNTER — OFFICE VISIT (OUTPATIENT)
Dept: CARDIOLOGY | Age: 79
End: 2019-03-18
Payer: MEDICARE

## 2019-03-18 VITALS
HEART RATE: 51 BPM | DIASTOLIC BLOOD PRESSURE: 100 MMHG | WEIGHT: 175 LBS | SYSTOLIC BLOOD PRESSURE: 180 MMHG | HEIGHT: 59 IN | BODY MASS INDEX: 35.28 KG/M2

## 2019-03-18 DIAGNOSIS — I25.10 CORONARY ARTERY DISEASE INVOLVING NATIVE CORONARY ARTERY OF NATIVE HEART WITHOUT ANGINA PECTORIS: Primary | ICD-10-CM

## 2019-03-18 PROCEDURE — G8417 CALC BMI ABV UP PARAM F/U: HCPCS | Performed by: INTERNAL MEDICINE

## 2019-03-18 PROCEDURE — G8399 PT W/DXA RESULTS DOCUMENT: HCPCS | Performed by: INTERNAL MEDICINE

## 2019-03-18 PROCEDURE — G8598 ASA/ANTIPLAT THER USED: HCPCS | Performed by: INTERNAL MEDICINE

## 2019-03-18 PROCEDURE — 1123F ACP DISCUSS/DSCN MKR DOCD: CPT | Performed by: INTERNAL MEDICINE

## 2019-03-18 PROCEDURE — 1101F PT FALLS ASSESS-DOCD LE1/YR: CPT | Performed by: INTERNAL MEDICINE

## 2019-03-18 PROCEDURE — 1036F TOBACCO NON-USER: CPT | Performed by: INTERNAL MEDICINE

## 2019-03-18 PROCEDURE — G8427 DOCREV CUR MEDS BY ELIG CLIN: HCPCS | Performed by: INTERNAL MEDICINE

## 2019-03-18 PROCEDURE — 4040F PNEUMOC VAC/ADMIN/RCVD: CPT | Performed by: INTERNAL MEDICINE

## 2019-03-18 PROCEDURE — 1090F PRES/ABSN URINE INCON ASSESS: CPT | Performed by: INTERNAL MEDICINE

## 2019-03-18 PROCEDURE — G8482 FLU IMMUNIZE ORDER/ADMIN: HCPCS | Performed by: INTERNAL MEDICINE

## 2019-03-18 PROCEDURE — 93000 ELECTROCARDIOGRAM COMPLETE: CPT | Performed by: INTERNAL MEDICINE

## 2019-03-18 PROCEDURE — 99213 OFFICE O/P EST LOW 20 MIN: CPT | Performed by: INTERNAL MEDICINE

## 2019-03-27 RX ORDER — ATORVASTATIN CALCIUM 40 MG/1
TABLET, FILM COATED ORAL
Qty: 90 TABLET | Refills: 3 | Status: SHIPPED | OUTPATIENT
Start: 2019-03-27 | End: 2020-01-15

## 2019-07-22 DIAGNOSIS — I10 ESSENTIAL HYPERTENSION: ICD-10-CM

## 2019-07-22 RX ORDER — LISINOPRIL 5 MG/1
TABLET ORAL
Qty: 90 TABLET | Refills: 3 | Status: SHIPPED | OUTPATIENT
Start: 2019-07-22 | End: 2020-05-11

## 2019-09-09 ENCOUNTER — HOSPITAL ENCOUNTER (OUTPATIENT)
Dept: LAB | Age: 79
Discharge: HOME | End: 2019-09-09
Payer: MEDICARE

## 2019-09-09 DIAGNOSIS — R73.01 IMPAIRED FASTING BLOOD SUGAR: ICD-10-CM

## 2019-09-09 DIAGNOSIS — D50.0 IRON DEFICIENCY ANEMIA DUE TO CHRONIC BLOOD LOSS: ICD-10-CM

## 2019-09-09 DIAGNOSIS — E78.00 PURE HYPERCHOLESTEROLEMIA: ICD-10-CM

## 2019-09-09 LAB
ABSOLUTE EOS #: 0.2 K/UL (ref 0–0.4)
ABSOLUTE IMMATURE GRANULOCYTE: NORMAL K/UL (ref 0–0.3)
ABSOLUTE LYMPH #: 2.5 K/UL (ref 1–4.8)
ABSOLUTE MONO #: 0.5 K/UL (ref 0.1–1.2)
ALBUMIN SERPL-MCNC: 4.5 G/DL (ref 3.5–5.2)
ALBUMIN/GLOBULIN RATIO: 1.4 (ref 1–2.5)
ALP BLD-CCNC: 79 U/L (ref 35–104)
ALT SERPL-CCNC: 22 U/L (ref 5–33)
ANION GAP SERPL CALCULATED.3IONS-SCNC: 12 MMOL/L (ref 9–17)
AST SERPL-CCNC: 24 U/L
BASOPHILS # BLD: 1 % (ref 0–1)
BASOPHILS ABSOLUTE: 0.1 K/UL (ref 0–0.2)
BILIRUB SERPL-MCNC: 0.73 MG/DL (ref 0.3–1.2)
BUN BLDV-MCNC: 12 MG/DL (ref 8–23)
BUN/CREAT BLD: 17 (ref 9–20)
CALCIUM SERPL-MCNC: 9.7 MG/DL (ref 8.6–10.4)
CHLORIDE BLD-SCNC: 105 MMOL/L (ref 98–107)
CHOLESTEROL/HDL RATIO: 2.5
CHOLESTEROL: 127 MG/DL
CO2: 25 MMOL/L (ref 20–31)
CREAT SERPL-MCNC: 0.71 MG/DL (ref 0.5–0.9)
DIFFERENTIAL TYPE: NORMAL
EOSINOPHILS RELATIVE PERCENT: 2 % (ref 1–7)
ESTIMATED AVERAGE GLUCOSE: 114 MG/DL
GFR AFRICAN AMERICAN: >60 ML/MIN
GFR NON-AFRICAN AMERICAN: >60 ML/MIN
GFR SERPL CREATININE-BSD FRML MDRD: ABNORMAL ML/MIN/{1.73_M2}
GFR SERPL CREATININE-BSD FRML MDRD: ABNORMAL ML/MIN/{1.73_M2}
GLUCOSE BLD-MCNC: 119 MG/DL (ref 70–99)
HBA1C MFR BLD: 5.6 % (ref 4.8–5.9)
HCT VFR BLD CALC: 41.8 % (ref 36–46)
HDLC SERPL-MCNC: 51 MG/DL
HEMOGLOBIN: 14 G/DL (ref 12–16)
IMMATURE GRANULOCYTES: NORMAL %
LDL CHOLESTEROL: 45 MG/DL (ref 0–130)
LYMPHOCYTES # BLD: 39 % (ref 16–46)
MCH RBC QN AUTO: 32 PG (ref 26–34)
MCHC RBC AUTO-ENTMCNC: 33.4 G/DL (ref 31–37)
MCV RBC AUTO: 95.9 FL (ref 80–100)
MONOCYTES # BLD: 8 % (ref 4–11)
NRBC AUTOMATED: NORMAL PER 100 WBC
PDW BLD-RTO: 13.3 % (ref 11–14.5)
PLATELET # BLD: 206 K/UL (ref 140–450)
PLATELET ESTIMATE: NORMAL
PMV BLD AUTO: 7.8 FL (ref 6–12)
POTASSIUM SERPL-SCNC: 4.2 MMOL/L (ref 3.7–5.3)
RBC # BLD: 4.36 M/UL (ref 4–5.2)
RBC # BLD: NORMAL 10*6/UL
SEG NEUTROPHILS: 50 % (ref 43–77)
SEGMENTED NEUTROPHILS ABSOLUTE COUNT: 3.2 K/UL (ref 1.8–7.7)
SODIUM BLD-SCNC: 142 MMOL/L (ref 135–144)
TOTAL PROTEIN: 7.7 G/DL (ref 6.4–8.3)
TRIGL SERPL-MCNC: 157 MG/DL
VLDLC SERPL CALC-MCNC: ABNORMAL MG/DL (ref 1–30)
WBC # BLD: 6.4 K/UL (ref 3.5–11)
WBC # BLD: NORMAL 10*3/UL

## 2019-09-09 PROCEDURE — 85025 COMPLETE CBC W/AUTO DIFF WBC: CPT

## 2019-09-09 PROCEDURE — 80053 COMPREHEN METABOLIC PANEL: CPT

## 2019-09-09 PROCEDURE — 36415 COLL VENOUS BLD VENIPUNCTURE: CPT

## 2019-09-09 PROCEDURE — 80061 LIPID PANEL: CPT

## 2019-09-09 PROCEDURE — 83036 HEMOGLOBIN GLYCOSYLATED A1C: CPT

## 2019-09-16 ENCOUNTER — OFFICE VISIT (OUTPATIENT)
Dept: FAMILY MEDICINE CLINIC | Age: 79
End: 2019-09-16
Payer: MEDICARE

## 2019-09-16 VITALS
WEIGHT: 178 LBS | HEIGHT: 59 IN | BODY MASS INDEX: 35.88 KG/M2 | DIASTOLIC BLOOD PRESSURE: 80 MMHG | SYSTOLIC BLOOD PRESSURE: 132 MMHG | HEART RATE: 68 BPM

## 2019-09-16 DIAGNOSIS — E78.00 PURE HYPERCHOLESTEROLEMIA: ICD-10-CM

## 2019-09-16 DIAGNOSIS — I51.89 DIASTOLIC DYSFUNCTION: ICD-10-CM

## 2019-09-16 DIAGNOSIS — E66.09 CLASS 1 OBESITY DUE TO EXCESS CALORIES WITHOUT SERIOUS COMORBIDITY IN ADULT, UNSPECIFIED BMI: ICD-10-CM

## 2019-09-16 DIAGNOSIS — K25.4 CHRONIC GASTRIC ULCER WITH HEMORRHAGE: Primary | ICD-10-CM

## 2019-09-16 DIAGNOSIS — R73.01 IMPAIRED FASTING BLOOD SUGAR: ICD-10-CM

## 2019-09-16 DIAGNOSIS — I25.10 CORONARY ARTERY DISEASE INVOLVING NATIVE CORONARY ARTERY OF NATIVE HEART WITHOUT ANGINA PECTORIS: ICD-10-CM

## 2019-09-16 DIAGNOSIS — D50.9 IRON DEFICIENCY ANEMIA, UNSPECIFIED IRON DEFICIENCY ANEMIA TYPE: ICD-10-CM

## 2019-09-16 PROCEDURE — 1036F TOBACCO NON-USER: CPT | Performed by: FAMILY MEDICINE

## 2019-09-16 PROCEDURE — 1123F ACP DISCUSS/DSCN MKR DOCD: CPT | Performed by: FAMILY MEDICINE

## 2019-09-16 PROCEDURE — 1090F PRES/ABSN URINE INCON ASSESS: CPT | Performed by: FAMILY MEDICINE

## 2019-09-16 PROCEDURE — G8417 CALC BMI ABV UP PARAM F/U: HCPCS | Performed by: FAMILY MEDICINE

## 2019-09-16 PROCEDURE — 4040F PNEUMOC VAC/ADMIN/RCVD: CPT | Performed by: FAMILY MEDICINE

## 2019-09-16 PROCEDURE — G8598 ASA/ANTIPLAT THER USED: HCPCS | Performed by: FAMILY MEDICINE

## 2019-09-16 PROCEDURE — G8427 DOCREV CUR MEDS BY ELIG CLIN: HCPCS | Performed by: FAMILY MEDICINE

## 2019-09-16 PROCEDURE — 99214 OFFICE O/P EST MOD 30 MIN: CPT | Performed by: FAMILY MEDICINE

## 2019-09-16 PROCEDURE — G8399 PT W/DXA RESULTS DOCUMENT: HCPCS | Performed by: FAMILY MEDICINE

## 2019-09-16 RX ORDER — ACETAMINOPHEN 160 MG
1 TABLET,DISINTEGRATING ORAL DAILY
Qty: 90 CAPSULE | Refills: 3 | Status: SHIPPED | OUTPATIENT
Start: 2019-09-16 | End: 2020-10-16 | Stop reason: SDUPTHER

## 2019-09-16 RX ORDER — LANOLIN ALCOHOL/MO/W.PET/CERES
325 CREAM (GRAM) TOPICAL
Qty: 90 TABLET | Refills: 3 | Status: SHIPPED | OUTPATIENT
Start: 2019-09-16 | End: 2020-07-20

## 2019-09-16 RX ORDER — ACETAMINOPHEN 160 MG
TABLET,DISINTEGRATING ORAL
COMMUNITY
End: 2019-09-16 | Stop reason: SDUPTHER

## 2019-09-16 ASSESSMENT — PATIENT HEALTH QUESTIONNAIRE - PHQ9
SUM OF ALL RESPONSES TO PHQ QUESTIONS 1-9: 0
SUM OF ALL RESPONSES TO PHQ9 QUESTIONS 1 & 2: 0
1. LITTLE INTEREST OR PLEASURE IN DOING THINGS: 0
SUM OF ALL RESPONSES TO PHQ QUESTIONS 1-9: 0
2. FEELING DOWN, DEPRESSED OR HOPELESS: 0

## 2019-09-16 ASSESSMENT — ENCOUNTER SYMPTOMS
RESPIRATORY NEGATIVE: 1
GASTROINTESTINAL NEGATIVE: 1
EYES NEGATIVE: 1
ALLERGIC/IMMUNOLOGIC NEGATIVE: 1

## 2019-09-16 NOTE — PROGRESS NOTES
and EOM are normal. No scleral icterus. Neck: Neck supple. No thyromegaly present. Cardiovascular: Normal rate, regular rhythm, normal heart sounds and intact distal pulses. No murmur heard. Pulmonary/Chest: Effort normal and breath sounds normal. No respiratory distress. She has no wheezes. Abdominal: Soft. Bowel sounds are normal. She exhibits no distension. There is no tenderness. There is no rebound. Musculoskeletal: Normal range of motion. She exhibits no edema or tenderness. Neurological: She is alert and oriented to person, place, and time. Skin: Skin is warm and dry. No rash noted. No erythema. Psychiatric: She has a normal mood and affect.  Her behavior is normal. Judgment and thought content normal.     /80 (Site: Right Upper Arm, Position: Sitting, Cuff Size: Large Adult)   Pulse 68   Ht 4' 11.02\" (1.499 m)   Wt 178 lb (80.7 kg)   LMP  (LMP Unknown)   BMI 35.93 kg/m²    Hospital Outpatient Visit on 09/09/2019   Component Date Value Ref Range Status    Hemoglobin A1C 09/09/2019 5.6  4.8 - 5.9 % Final    Estimated Avg Glucose 09/09/2019 114  mg/dL Final    Glucose 09/09/2019 119* 70 - 99 mg/dL Final    BUN 09/09/2019 12  8 - 23 mg/dL Final    CREATININE 09/09/2019 0.71  0.50 - 0.90 mg/dL Final    Bun/Cre Ratio 09/09/2019 17  9 - 20 Final    Calcium 09/09/2019 9.7  8.6 - 10.4 mg/dL Final    Sodium 09/09/2019 142  135 - 144 mmol/L Final    Potassium 09/09/2019 4.2  3.7 - 5.3 mmol/L Final    Chloride 09/09/2019 105  98 - 107 mmol/L Final    CO2 09/09/2019 25  20 - 31 mmol/L Final    Anion Gap 09/09/2019 12  9 - 17 mmol/L Final    Alkaline Phosphatase 09/09/2019 79  35 - 104 U/L Final    ALT 09/09/2019 22  5 - 33 U/L Final    AST 09/09/2019 24  <32 U/L Final    Total Bilirubin 09/09/2019 0.73  0.3 - 1.2 mg/dL Final    Total Protein 09/09/2019 7.7  6.4 - 8.3 g/dL Final    Alb 09/09/2019 4.5  3.5 - 5.2 g/dL Final    Albumin/Globulin Ratio 09/09/2019 1.4  1.0 - 2.5

## 2019-09-30 ENCOUNTER — TELEPHONE (OUTPATIENT)
Dept: FAMILY MEDICINE CLINIC | Age: 79
End: 2019-09-30

## 2019-09-30 ENCOUNTER — OFFICE VISIT (OUTPATIENT)
Dept: CARDIOLOGY | Age: 79
End: 2019-09-30
Payer: MEDICARE

## 2019-09-30 VITALS
WEIGHT: 176 LBS | HEIGHT: 59 IN | BODY MASS INDEX: 35.48 KG/M2 | SYSTOLIC BLOOD PRESSURE: 146 MMHG | DIASTOLIC BLOOD PRESSURE: 86 MMHG | HEART RATE: 56 BPM

## 2019-09-30 DIAGNOSIS — Z98.61 HISTORY OF PTCA: ICD-10-CM

## 2019-09-30 DIAGNOSIS — I25.10 CORONARY ARTERY DISEASE INVOLVING NATIVE CORONARY ARTERY OF NATIVE HEART WITHOUT ANGINA PECTORIS: ICD-10-CM

## 2019-09-30 DIAGNOSIS — I10 ESSENTIAL HYPERTENSION: Primary | ICD-10-CM

## 2019-09-30 DIAGNOSIS — R94.31 ABNORMAL ECG: ICD-10-CM

## 2019-09-30 PROCEDURE — 1036F TOBACCO NON-USER: CPT | Performed by: INTERNAL MEDICINE

## 2019-09-30 PROCEDURE — 1123F ACP DISCUSS/DSCN MKR DOCD: CPT | Performed by: INTERNAL MEDICINE

## 2019-09-30 PROCEDURE — 99214 OFFICE O/P EST MOD 30 MIN: CPT | Performed by: INTERNAL MEDICINE

## 2019-09-30 PROCEDURE — G8417 CALC BMI ABV UP PARAM F/U: HCPCS | Performed by: INTERNAL MEDICINE

## 2019-09-30 PROCEDURE — 4040F PNEUMOC VAC/ADMIN/RCVD: CPT | Performed by: INTERNAL MEDICINE

## 2019-09-30 PROCEDURE — G8427 DOCREV CUR MEDS BY ELIG CLIN: HCPCS | Performed by: INTERNAL MEDICINE

## 2019-09-30 PROCEDURE — G8399 PT W/DXA RESULTS DOCUMENT: HCPCS | Performed by: INTERNAL MEDICINE

## 2019-09-30 PROCEDURE — 93000 ELECTROCARDIOGRAM COMPLETE: CPT | Performed by: INTERNAL MEDICINE

## 2019-09-30 PROCEDURE — G8598 ASA/ANTIPLAT THER USED: HCPCS | Performed by: INTERNAL MEDICINE

## 2019-09-30 PROCEDURE — 1090F PRES/ABSN URINE INCON ASSESS: CPT | Performed by: INTERNAL MEDICINE

## 2019-09-30 RX ORDER — ALENDRONATE SODIUM 70 MG/1
70 TABLET ORAL
Qty: 4 TABLET | Refills: 3 | Status: SHIPPED | OUTPATIENT
Start: 2019-09-30 | End: 2020-10-16 | Stop reason: ALTCHOICE

## 2019-09-30 NOTE — PROGRESS NOTES
bradycardia    Meds:    Current Outpatient Medications:     Cholecalciferol (VITAMIN D3) 2000 units CAPS, Take 1 capsule by mouth daily, Disp: 90 capsule, Rfl: 3    ferrous sulfate (FE TABS) 325 (65 Fe) MG EC tablet, Take 1 tablet by mouth daily (with breakfast), Disp: 90 tablet, Rfl: 3    metoprolol tartrate (LOPRESSOR) 25 MG tablet, TAKE 1 TABLET TWICE DAILY, Disp: 180 tablet, Rfl: 3    lisinopril (PRINIVIL;ZESTRIL) 5 MG tablet, TAKE 1 TABLET EVERY DAY, Disp: 90 tablet, Rfl: 3    atorvastatin (LIPITOR) 40 MG tablet, TAKE 1 TABLET EVERY NIGHT AT BEDTIME, Disp: 90 tablet, Rfl: 3    nitroGLYCERIN (NITROSTAT) 0.4 MG SL tablet, Place 1 tablet under the tongue every 5 minutes as needed for Chest pain, Disp: 25 tablet, Rfl: 1    aspirin 81 MG tablet, Take 81 mg by mouth daily, Disp: , Rfl:     ascorbic acid (VITAMIN C) 500 MG tablet, Take 500 mg by mouth 2 times daily Indications: patient only takes in the Winter With ana paula hips , Disp: , Rfl:     Multiple Vitamins-Minerals (MULTIVITAMIN PO), Take 1 tablet by mouth daily. , Disp: , Rfl:     Recent Labs:  Results for Gunnar Ayers (MRN [id number]) as of 9/30/2019 09:18   Ref. Range 9/9/2019 07:54   Chol/HDL Ratio Latest Ref Range: <5  2.5   Cholesterol Latest Ref Range: <200 mg/dL 127   HDL Cholesterol Latest Ref Range: >40 mg/dL 51   LDL Cholesterol Latest Ref Range: 0 - 130 mg/dL 45   Triglycerides Latest Ref Range: <150 mg/dL 157 (H)   VLDL Latest Ref Range: 1 - 30 mg/dL NOT REPORTED (H)     Assessment and Plan:    -CAD- CINDY to LAD and LCX 08/2012. Continue ASA. Will get Lexiscan stress test to further risk stratify, given she has not had any stress testing since her PCI. She states she cannot walk very far on a treadmill due to hip pain. -HTN- higher in office, but normal at home. Continue current plant.   -Obesity - encouraged diet, exercise, and discussed weight loss extensively.   -Hyperlipidemia- continue statin.   -History of severe anemia along with gastric and esophageal ulcers 3/2015- on iron, with normal hemoglobin on 9/9/19     The patient is to continue heart healthy diet, weight loss and exercise as tolerated. Patient's medications and side effects were discussed. Medication refills were provided if needed. Follow up appointment timing was discussed. All questions and concerns were addressed to patient's satisfaction. The patient is to follow up in 6 months or sooner if necessary. Thank you for allowing me to participate in the care of this patient, please do not hesitate to call if you have any questions. Brielle Kurtz DO, [facility] [place], [address] [facility]  [facility]. [facility]  [phone]

## 2020-01-15 RX ORDER — ATORVASTATIN CALCIUM 40 MG/1
TABLET, FILM COATED ORAL
Qty: 90 TABLET | Refills: 3 | Status: SHIPPED | OUTPATIENT
Start: 2020-01-15 | End: 2020-10-30

## 2020-04-16 ENCOUNTER — OFFICE VISIT (OUTPATIENT)
Dept: FAMILY MEDICINE CLINIC | Age: 80
End: 2020-04-16
Payer: MEDICARE

## 2020-04-16 ENCOUNTER — HOSPITAL ENCOUNTER (OUTPATIENT)
Dept: LAB | Age: 80
Discharge: HOME | End: 2020-04-16
Payer: MEDICARE

## 2020-04-16 VITALS
SYSTOLIC BLOOD PRESSURE: 118 MMHG | HEIGHT: 59 IN | TEMPERATURE: 97.5 F | WEIGHT: 179 LBS | OXYGEN SATURATION: 98 % | HEART RATE: 54 BPM | DIASTOLIC BLOOD PRESSURE: 62 MMHG | BODY MASS INDEX: 36.08 KG/M2

## 2020-04-16 LAB
ABSOLUTE EOS #: 0.13 K/UL (ref 0–0.44)
ABSOLUTE IMMATURE GRANULOCYTE: <0.03 K/UL (ref 0–0.3)
ABSOLUTE LYMPH #: 3.02 K/UL (ref 1.1–3.7)
ABSOLUTE MONO #: 0.61 K/UL (ref 0.1–1.2)
ALBUMIN SERPL-MCNC: 4.5 G/DL (ref 3.5–5.2)
ALBUMIN/GLOBULIN RATIO: 1.4 (ref 1–2.5)
ALP BLD-CCNC: 66 U/L (ref 35–104)
ALT SERPL-CCNC: 20 U/L (ref 5–33)
ANION GAP SERPL CALCULATED.3IONS-SCNC: 12 MMOL/L (ref 9–17)
AST SERPL-CCNC: 26 U/L
BASOPHILS # BLD: 1 % (ref 0–2)
BASOPHILS ABSOLUTE: 0.06 K/UL (ref 0–0.2)
BILIRUB SERPL-MCNC: 0.89 MG/DL (ref 0.3–1.2)
BUN BLDV-MCNC: 13 MG/DL (ref 8–23)
BUN/CREAT BLD: 16 (ref 9–20)
CALCIUM SERPL-MCNC: 9.9 MG/DL (ref 8.6–10.4)
CHLORIDE BLD-SCNC: 103 MMOL/L (ref 98–107)
CHOLESTEROL/HDL RATIO: 2.5
CHOLESTEROL: 125 MG/DL
CO2: 26 MMOL/L (ref 20–31)
CREAT SERPL-MCNC: 0.8 MG/DL (ref 0.5–0.9)
DIFFERENTIAL TYPE: NORMAL
EOSINOPHILS RELATIVE PERCENT: 2 % (ref 1–4)
ESTIMATED AVERAGE GLUCOSE: 117 MG/DL
GFR AFRICAN AMERICAN: >60 ML/MIN
GFR NON-AFRICAN AMERICAN: >60 ML/MIN
GFR SERPL CREATININE-BSD FRML MDRD: ABNORMAL ML/MIN/{1.73_M2}
GFR SERPL CREATININE-BSD FRML MDRD: ABNORMAL ML/MIN/{1.73_M2}
GLUCOSE BLD-MCNC: 115 MG/DL (ref 70–99)
HBA1C MFR BLD: 5.7 % (ref 4.8–5.9)
HCT VFR BLD CALC: 43.5 % (ref 36.3–47.1)
HDLC SERPL-MCNC: 51 MG/DL
HEMOGLOBIN: 14.4 G/DL (ref 11.9–15.1)
IMMATURE GRANULOCYTES: 0 %
LDL CHOLESTEROL: 37 MG/DL (ref 0–130)
LYMPHOCYTES # BLD: 36 % (ref 24–43)
MCH RBC QN AUTO: 32.1 PG (ref 25.2–33.5)
MCHC RBC AUTO-ENTMCNC: 33.1 G/DL (ref 25.2–33.5)
MCV RBC AUTO: 97.1 FL (ref 82.6–102.9)
MONOCYTES # BLD: 7 % (ref 3–12)
NRBC AUTOMATED: 0 PER 100 WBC
PDW BLD-RTO: 12.5 % (ref 11.8–14.4)
PLATELET # BLD: 189 K/UL (ref 138–453)
PLATELET ESTIMATE: NORMAL
PMV BLD AUTO: 9.6 FL (ref 8.1–13.5)
POTASSIUM SERPL-SCNC: 4.8 MMOL/L (ref 3.7–5.3)
RBC # BLD: 4.48 M/UL (ref 3.95–5.11)
RBC # BLD: NORMAL 10*6/UL
SEG NEUTROPHILS: 54 % (ref 36–65)
SEGMENTED NEUTROPHILS ABSOLUTE COUNT: 4.5 K/UL (ref 1.5–8.1)
SODIUM BLD-SCNC: 141 MMOL/L (ref 135–144)
TOTAL PROTEIN: 7.8 G/DL (ref 6.4–8.3)
TRIGL SERPL-MCNC: 184 MG/DL
VLDLC SERPL CALC-MCNC: ABNORMAL MG/DL (ref 1–30)
WBC # BLD: 8.3 K/UL (ref 3.5–11.3)
WBC # BLD: NORMAL 10*3/UL

## 2020-04-16 PROCEDURE — G8417 CALC BMI ABV UP PARAM F/U: HCPCS | Performed by: FAMILY MEDICINE

## 2020-04-16 PROCEDURE — 83036 HEMOGLOBIN GLYCOSYLATED A1C: CPT

## 2020-04-16 PROCEDURE — 1123F ACP DISCUSS/DSCN MKR DOCD: CPT | Performed by: FAMILY MEDICINE

## 2020-04-16 PROCEDURE — 4040F PNEUMOC VAC/ADMIN/RCVD: CPT | Performed by: FAMILY MEDICINE

## 2020-04-16 PROCEDURE — 36415 COLL VENOUS BLD VENIPUNCTURE: CPT

## 2020-04-16 PROCEDURE — G8399 PT W/DXA RESULTS DOCUMENT: HCPCS | Performed by: FAMILY MEDICINE

## 2020-04-16 PROCEDURE — 80061 LIPID PANEL: CPT

## 2020-04-16 PROCEDURE — 85025 COMPLETE CBC W/AUTO DIFF WBC: CPT

## 2020-04-16 PROCEDURE — 80053 COMPREHEN METABOLIC PANEL: CPT

## 2020-04-16 PROCEDURE — G8427 DOCREV CUR MEDS BY ELIG CLIN: HCPCS | Performed by: FAMILY MEDICINE

## 2020-04-16 PROCEDURE — 1090F PRES/ABSN URINE INCON ASSESS: CPT | Performed by: FAMILY MEDICINE

## 2020-04-16 PROCEDURE — 99214 OFFICE O/P EST MOD 30 MIN: CPT | Performed by: FAMILY MEDICINE

## 2020-04-16 PROCEDURE — 1036F TOBACCO NON-USER: CPT | Performed by: FAMILY MEDICINE

## 2020-04-16 PROCEDURE — 99213 OFFICE O/P EST LOW 20 MIN: CPT

## 2020-04-16 ASSESSMENT — PATIENT HEALTH QUESTIONNAIRE - PHQ9
SUM OF ALL RESPONSES TO PHQ9 QUESTIONS 1 & 2: 0
SUM OF ALL RESPONSES TO PHQ QUESTIONS 1-9: 0
1. LITTLE INTEREST OR PLEASURE IN DOING THINGS: 0
SUM OF ALL RESPONSES TO PHQ QUESTIONS 1-9: 0
2. FEELING DOWN, DEPRESSED OR HOPELESS: 0

## 2020-04-16 NOTE — PROGRESS NOTES
Subjective:      Patient ID: Mariel Smith is a 78 y.o. female. Coronary Artery Disease   Risk factors include hyperlipidemia. Hyperlipidemia        Routine follow up on chronic medical conditions, refills, and review of updated labs. I have reviewed the patient's medical history in detail and updated the computerized patient record. No significant medical events, injuries, illness, or surgery. No current concerns or complaints. Compliant with medications. No gi symptoms . No chest pain issues. Good exercise tolerance. Driving actively . Working her own housework. Staying home during covid 19 restrictions. No gi bleeding concerns on review. Past Medical History:   Diagnosis Date    Asteroid hyalosis of right eye     Coronary artery disease     status post acute non-Q wave myocardial infarction 08/23/2012, status post drug eluting stent placement in the LAD and 2 drug eluting stents in the left circumflex artery 08/23/2012.  Diastolic dysfunction     grade 1.     Gastric ulcer with hemorrhage 03/03/2015    gastric and esophageal ulcers due to nsaid    Hyperlipidemia     Mild mitral regurgitation     Mild tricuspid regurgitation     Obesity     Vitreous floaters     left eye. Past Surgical History:   Procedure Laterality Date    CARDIAC CATHETERIZATION Left 08/23/2012    left main artery normal, LAD with mid 60 to 70% stenosis, FFR was 0.76, left circumflex artery with mid 90% stenosis with thrombus, RCA with mild irregularities, preserved left ventricular systolic function, ejection fraction estimated around 50%, status post drug eluting stent placement in the LAD and 2 drug eluting stents in the left circumflex artery.      CHOLECYSTECTOMY  1978    ENDOSCOPY, COLON, DIAGNOSTIC  03/05/2015    esophageal/gastric ulcer; few diverticuli    UPPER GASTROINTESTINAL ENDOSCOPY  4/16/2015    healed gastric ulcer       Current Outpatient Medications   Medication Sig Dispense Refill

## 2020-05-11 RX ORDER — LISINOPRIL 5 MG/1
TABLET ORAL
Qty: 90 TABLET | Refills: 3 | Status: SHIPPED | OUTPATIENT
Start: 2020-05-11 | End: 2021-04-16 | Stop reason: SDUPTHER

## 2020-06-14 ENCOUNTER — OFFICE VISIT (OUTPATIENT)
Dept: PRIMARY CARE CLINIC | Age: 80
End: 2020-06-14
Payer: MEDICARE

## 2020-06-14 VITALS
HEIGHT: 59 IN | OXYGEN SATURATION: 96 % | BODY MASS INDEX: 33.83 KG/M2 | RESPIRATION RATE: 16 BRPM | DIASTOLIC BLOOD PRESSURE: 78 MMHG | HEART RATE: 63 BPM | SYSTOLIC BLOOD PRESSURE: 130 MMHG | TEMPERATURE: 98.8 F | WEIGHT: 167.8 LBS

## 2020-06-14 PROCEDURE — 99212 OFFICE O/P EST SF 10 MIN: CPT

## 2020-06-14 PROCEDURE — 1123F ACP DISCUSS/DSCN MKR DOCD: CPT | Performed by: FAMILY MEDICINE

## 2020-06-14 PROCEDURE — 1090F PRES/ABSN URINE INCON ASSESS: CPT | Performed by: FAMILY MEDICINE

## 2020-06-14 PROCEDURE — G8417 CALC BMI ABV UP PARAM F/U: HCPCS | Performed by: FAMILY MEDICINE

## 2020-06-14 PROCEDURE — 99211 OFF/OP EST MAY X REQ PHY/QHP: CPT

## 2020-06-14 PROCEDURE — G8427 DOCREV CUR MEDS BY ELIG CLIN: HCPCS | Performed by: FAMILY MEDICINE

## 2020-06-14 PROCEDURE — 1036F TOBACCO NON-USER: CPT | Performed by: FAMILY MEDICINE

## 2020-06-14 PROCEDURE — G8399 PT W/DXA RESULTS DOCUMENT: HCPCS | Performed by: FAMILY MEDICINE

## 2020-06-14 PROCEDURE — 99213 OFFICE O/P EST LOW 20 MIN: CPT | Performed by: FAMILY MEDICINE

## 2020-06-14 PROCEDURE — 4040F PNEUMOC VAC/ADMIN/RCVD: CPT | Performed by: FAMILY MEDICINE

## 2020-06-14 NOTE — PATIENT INSTRUCTIONS
Preventing the Spread of Coronavirus Disease 2019 in Homes and Residential Communities   For the most recent information go to [email or web address]    Prevention steps for People with confirmed or suspected COVID-19 (including persons under investigation) who do not need to be hospitalized  and   People with confirmed COVID-19 who were hospitalized and determined to be medically stable to go home    Your healthcare provider and public health staff will evaluate whether you can be cared for at home. If it is determined that you do not need to be hospitalized and can be isolated at home, you will be monitored by staff from your local or state health department. You should follow the prevention steps below until a healthcare provider or local or state health department says you can return to your normal activities. Stay home except to get medical care  People who are mildly ill with COVID-19 are able to isolate at home during their illness. You should restrict activities outside your home, except for getting medical care. Do not go to work, school, or public areas. Avoid using public transportation, ride-sharing, or taxis. Separate yourself from other people and animals in your home  People: As much as possible, you should stay in a specific room and away from other people in your home. Also, you should use a separate bathroom, if available. Animals: You should restrict contact with pets and other animals while you are sick with COVID-19, just like you would around other people. Although there have not been reports of pets or other animals becoming sick with COVID-19, it is still recommended that people sick with COVID-19 limit contact with animals until more information is known about the virus. When possible, have another member of your household care for your animals while you are sick.  If you are sick with COVID-19, avoid contact with your pet, including departments.

## 2020-06-14 NOTE — PROGRESS NOTES
[facility]             [address] [place] [address] [place], [address]                        Telephone [phone]             Fax [phone]       Amaris Alves  1940  MRN:  [id number]  Date of visit:  6/14/2020     Subjective:    Amaris Alves is a [de-identified] y.o.  female who presents to [facility] today (6/14/2020) for evaluation of:  Concern For COVID-19 (Pt advises +exposure to +COVID person Wed; denies any S/S, requesting testing.  )      She states that she took a friend to the doctor on 6/10/2020. He was then admitted to [facility], and he tested positive for Covid-19. She states that she had spent time with this patient over the past couple of weeks also. She denies fever, chills, cough, shortness of breath, nausea, or diarrhea. She denies rash. She denies loss of taste or smell. She denies eye irritation or redness. Current medications are:  Current Outpatient Medications   Medication Sig Dispense Refill    lisinopril (PRINIVIL;ZESTRIL) 5 MG tablet TAKE 1 TABLET EVERY DAY 90 tablet 3    metoprolol tartrate (LOPRESSOR) 25 MG tablet TAKE 1 TABLET TWICE DAILY 180 tablet 3    atorvastatin (LIPITOR) 40 MG tablet TAKE 1 TABLET EVERY NIGHT AT BEDTIME 90 tablet 3    Cholecalciferol (VITAMIN D3) 2000 units CAPS Take 1 capsule by mouth daily 90 capsule 3    ferrous sulfate (FE TABS) 325 (65 Fe) MG EC tablet Take 1 tablet by mouth daily (with breakfast) 90 tablet 3    nitroGLYCERIN (NITROSTAT) 0.4 MG SL tablet Place 1 tablet under the tongue every 5 minutes as needed for Chest pain 25 tablet 1    aspirin 81 MG tablet Take 81 mg by mouth daily      ascorbic acid (VITAMIN C) 500 MG tablet Take 500 mg by mouth 2 times daily Indications: patient only takes in the Winter With ana paula hips       Multiple Vitamins-Minerals (MULTIVITAMIN PO) Take 1 tablet by mouth daily.       alendronate

## 2020-06-15 ENCOUNTER — HOSPITAL ENCOUNTER (OUTPATIENT)
Age: 80
Setting detail: SPECIMEN
Discharge: HOME | End: 2020-06-15
Payer: MEDICARE

## 2020-06-15 ENCOUNTER — TELEPHONE (OUTPATIENT)
Dept: FAMILY MEDICINE CLINIC | Age: 80
End: 2020-06-15

## 2020-06-15 ENCOUNTER — OFFICE VISIT (OUTPATIENT)
Dept: CARDIOLOGY | Age: 80
End: 2020-06-15
Payer: MEDICARE

## 2020-06-15 VITALS
HEART RATE: 58 BPM | HEIGHT: 59 IN | DIASTOLIC BLOOD PRESSURE: 76 MMHG | SYSTOLIC BLOOD PRESSURE: 131 MMHG | BODY MASS INDEX: 33.67 KG/M2 | WEIGHT: 167 LBS

## 2020-06-15 PROCEDURE — U0003 INFECTIOUS AGENT DETECTION BY NUCLEIC ACID (DNA OR RNA); SEVERE ACUTE RESPIRATORY SYNDROME CORONAVIRUS 2 (SARS-COV-2) (CORONAVIRUS DISEASE [COVID-19]), AMPLIFIED PROBE TECHNIQUE, MAKING USE OF HIGH THROUGHPUT TECHNOLOGIES AS DESCRIBED BY CMS-2020-01-R: HCPCS

## 2020-06-15 PROCEDURE — 4040F PNEUMOC VAC/ADMIN/RCVD: CPT | Performed by: INTERNAL MEDICINE

## 2020-06-15 PROCEDURE — 1090F PRES/ABSN URINE INCON ASSESS: CPT | Performed by: INTERNAL MEDICINE

## 2020-06-15 PROCEDURE — 99213 OFFICE O/P EST LOW 20 MIN: CPT | Performed by: INTERNAL MEDICINE

## 2020-06-15 PROCEDURE — 1123F ACP DISCUSS/DSCN MKR DOCD: CPT | Performed by: INTERNAL MEDICINE

## 2020-06-15 PROCEDURE — 99214 OFFICE O/P EST MOD 30 MIN: CPT | Performed by: INTERNAL MEDICINE

## 2020-06-15 PROCEDURE — G8399 PT W/DXA RESULTS DOCUMENT: HCPCS | Performed by: INTERNAL MEDICINE

## 2020-06-15 PROCEDURE — G8427 DOCREV CUR MEDS BY ELIG CLIN: HCPCS | Performed by: INTERNAL MEDICINE

## 2020-06-15 PROCEDURE — 1036F TOBACCO NON-USER: CPT | Performed by: INTERNAL MEDICINE

## 2020-06-15 PROCEDURE — G8417 CALC BMI ABV UP PARAM F/U: HCPCS | Performed by: INTERNAL MEDICINE

## 2020-06-15 NOTE — TELEPHONE ENCOUNTER
Terese from [facility] calling stating they were in touch with a patient who was a close contact with a positive COVID patient who is hospitalized. They sent her out to  to be tested as she said she was developing a cough and  did not complete testing due to limiting testing with asymptomatic patients. Patient states she told doctor she had a cough but notes states no symptoms. Alona Chavez made aware patient can come out to get swabbed through flu clinic.  Flu clinic made aware

## 2020-06-15 NOTE — PROGRESS NOTES
[facility]    CC: Follow up for CAD. HPI:  Tamie Webb  is here for follow up. Admits to Cough. At last visit stress testing was ordered, not done for unclear in clear reason. Tries to walk, but gets hip pain. No active CP. SOB with exertion. Past Medical History:   Diagnosis Date    Asteroid hyalosis of right eye     Coronary artery disease     status post acute non-Q wave myocardial infarction 08/23/2012, status post drug eluting stent placement in the LAD and 2 drug eluting stents in the left circumflex artery 08/23/2012.  Diastolic dysfunction     grade 1.     Gastric ulcer with hemorrhage 03/03/2015    gastric and esophageal ulcers due to nsaid    Hyperlipidemia     Mild mitral regurgitation     Mild tricuspid regurgitation     Obesity     Vitreous floaters     left eye. Past Surgical History:   Procedure Laterality Date    CARDIAC CATHETERIZATION Left 08/23/2012    left main artery normal, LAD with mid 60 to 70% stenosis, FFR was 0.76, left circumflex artery with mid 90% stenosis with thrombus, RCA with mild irregularities, preserved left ventricular systolic function, ejection fraction estimated around 50%, status post drug eluting stent placement in the LAD and 2 drug eluting stents in the left circumflex artery.  CHOLECYSTECTOMY  1978    ENDOSCOPY, COLON, DIAGNOSTIC  03/05/2015    esophageal/gastric ulcer; few diverticuli    UPPER GASTROINTESTINAL ENDOSCOPY  4/16/2015    healed gastric ulcer       Family History   Problem Relation Age of Onset    Heart Attack Father        ROS: Otherwise 10 systems reviewed and negative. /76   Pulse 58   Ht 4' 11\" (1.499 m)   Wt 167 lb (75.8 kg)   LMP  (LMP Unknown)   BMI 33.73 kg/m²     Vitals as above. Alert and oriented x 3. No JVD, or carotid bruits. Lungs are clear to auscultation. Heart sounds are regular, normal, no murmur. Abdomen is soft, no tenderness.   Extremities No peripheral edema.    EKG: previously sinus bradycardia    Meds:    Current Outpatient Medications:     lisinopril (PRINIVIL;ZESTRIL) 5 MG tablet, TAKE 1 TABLET EVERY DAY, Disp: 90 tablet, Rfl: 3    metoprolol tartrate (LOPRESSOR) 25 MG tablet, TAKE 1 TABLET TWICE DAILY, Disp: 180 tablet, Rfl: 3    atorvastatin (LIPITOR) 40 MG tablet, TAKE 1 TABLET EVERY NIGHT AT BEDTIME, Disp: 90 tablet, Rfl: 3    alendronate (FOSAMAX) 70 MG tablet, Take 1 tablet by mouth every 7 days, Disp: 4 tablet, Rfl: 3    Cholecalciferol (VITAMIN D3) 2000 units CAPS, Take 1 capsule by mouth daily, Disp: 90 capsule, Rfl: 3    ferrous sulfate (FE TABS) 325 (65 Fe) MG EC tablet, Take 1 tablet by mouth daily (with breakfast), Disp: 90 tablet, Rfl: 3    nitroGLYCERIN (NITROSTAT) 0.4 MG SL tablet, Place 1 tablet under the tongue every 5 minutes as needed for Chest pain, Disp: 25 tablet, Rfl: 1    aspirin 81 MG tablet, Take 81 mg by mouth daily, Disp: , Rfl:     ascorbic acid (VITAMIN C) 500 MG tablet, Take 500 mg by mouth 2 times daily Indications: patient only takes in the Winter With ana paula hips , Disp: , Rfl:     Multiple Vitamins-Minerals (MULTIVITAMIN PO), Take 1 tablet by mouth daily. , Disp: , Rfl:     Lab Results   Component Value Date    CHOL 125 04/16/2020    CHOL 127 09/09/2019    CHOL 117 03/04/2019     Lab Results   Component Value Date    TRIG 184 (H) 04/16/2020    TRIG 157 (H) 09/09/2019    TRIG 119 03/04/2019     Lab Results   Component Value Date    HDL 51 04/16/2020    HDL 51 09/09/2019    HDL 49 03/04/2019     Lab Results   Component Value Date    LDLCHOLESTEROL 37 04/16/2020    LDLCHOLESTEROL 45 09/09/2019    LDLCHOLESTEROL 44 03/04/2019     Lab Results   Component Value Date    VLDL NOT REPORTED (H) 04/16/2020    VLDL NOT REPORTED (H) 09/09/2019    VLDL NOT REPORTED 03/04/2019     Lab Results   Component Value Date    CHOLHDLRATIO 2.5 04/16/2020    CHOLHDLRATIO 2.5 09/09/2019    CHOLHDLRATIO 2.4 03/04/2019       Assessment and

## 2020-06-18 LAB — SARS-COV-2, NAA: DETECTED

## 2020-06-23 ENCOUNTER — APPOINTMENT (OUTPATIENT)
Dept: GENERAL RADIOLOGY | Age: 80
End: 2020-06-23
Payer: MEDICARE

## 2020-06-23 ENCOUNTER — HOSPITAL ENCOUNTER (EMERGENCY)
Age: 80
Discharge: HOME | End: 2020-06-23
Attending: EMERGENCY MEDICINE
Payer: MEDICARE

## 2020-06-23 VITALS
SYSTOLIC BLOOD PRESSURE: 134 MMHG | DIASTOLIC BLOOD PRESSURE: 63 MMHG | OXYGEN SATURATION: 94 % | WEIGHT: 170 LBS | HEART RATE: 76 BPM | BODY MASS INDEX: 34.34 KG/M2 | TEMPERATURE: 100.1 F | RESPIRATION RATE: 16 BRPM

## 2020-06-23 LAB
-: ABNORMAL
ABSOLUTE EOS #: <0.03 K/UL (ref 0–0.44)
ABSOLUTE IMMATURE GRANULOCYTE: <0.03 K/UL (ref 0–0.3)
ABSOLUTE LYMPH #: 1.55 K/UL (ref 1.1–3.7)
ABSOLUTE MONO #: 0.49 K/UL (ref 0.1–1.2)
AMORPHOUS: ABNORMAL
ANION GAP SERPL CALCULATED.3IONS-SCNC: 16 MMOL/L (ref 9–17)
BACTERIA: ABNORMAL
BASOPHILS # BLD: 0 % (ref 0–2)
BASOPHILS ABSOLUTE: <0.03 K/UL (ref 0–0.2)
BILIRUBIN URINE: NEGATIVE
BUN BLDV-MCNC: 17 MG/DL (ref 8–23)
BUN/CREAT BLD: 22 (ref 9–20)
CALCIUM SERPL-MCNC: 8.4 MG/DL (ref 8.6–10.4)
CASTS UA: ABNORMAL /LPF (ref 0–2)
CHLORIDE BLD-SCNC: 95 MMOL/L (ref 98–107)
CO2: 20 MMOL/L (ref 20–31)
COLOR: ABNORMAL
COMMENT UA: ABNORMAL
CREAT SERPL-MCNC: 0.77 MG/DL (ref 0.5–0.9)
CRYSTALS, UA: ABNORMAL /HPF
DIFFERENTIAL TYPE: ABNORMAL
EOSINOPHILS RELATIVE PERCENT: 0 % (ref 1–4)
EPITHELIAL CELLS UA: ABNORMAL /HPF (ref 0–5)
GFR AFRICAN AMERICAN: >60 ML/MIN
GFR NON-AFRICAN AMERICAN: >60 ML/MIN
GFR SERPL CREATININE-BSD FRML MDRD: ABNORMAL ML/MIN/{1.73_M2}
GFR SERPL CREATININE-BSD FRML MDRD: ABNORMAL ML/MIN/{1.73_M2}
GLUCOSE BLD-MCNC: 133 MG/DL (ref 70–99)
GLUCOSE URINE: NEGATIVE
HCT VFR BLD CALC: 41.5 % (ref 36.3–47.1)
HEMOGLOBIN: 14.1 G/DL (ref 11.9–15.1)
IMMATURE GRANULOCYTES: 0 %
KETONES, URINE: NEGATIVE
LEUKOCYTE ESTERASE, URINE: ABNORMAL
LYMPHOCYTES # BLD: 30 % (ref 24–43)
MCH RBC QN AUTO: 31.4 PG (ref 25.2–33.5)
MCHC RBC AUTO-ENTMCNC: 34 G/DL (ref 25.2–33.5)
MCV RBC AUTO: 92.4 FL (ref 82.6–102.9)
MONOCYTES # BLD: 9 % (ref 3–12)
MUCUS: ABNORMAL
NITRITE, URINE: NEGATIVE
NRBC AUTOMATED: 0 PER 100 WBC
OTHER OBSERVATIONS UA: ABNORMAL
PDW BLD-RTO: 13 % (ref 11.8–14.4)
PH UA: 5.5 (ref 5–6)
PLATELET # BLD: 159 K/UL (ref 138–453)
PLATELET ESTIMATE: ABNORMAL
PMV BLD AUTO: 10 FL (ref 8.1–13.5)
POTASSIUM SERPL-SCNC: 4.4 MMOL/L (ref 3.7–5.3)
PROTEIN UA: ABNORMAL
RBC # BLD: 4.49 M/UL (ref 3.95–5.11)
RBC # BLD: ABNORMAL 10*6/UL
RBC UA: ABNORMAL /HPF (ref 0–4)
RENAL EPITHELIAL, UA: ABNORMAL /HPF
SEG NEUTROPHILS: 61 % (ref 36–65)
SEGMENTED NEUTROPHILS ABSOLUTE COUNT: 3.14 K/UL (ref 1.5–8.1)
SODIUM BLD-SCNC: 131 MMOL/L (ref 135–144)
SPECIFIC GRAVITY UA: 1.01 (ref 1.01–1.02)
TRICHOMONAS: ABNORMAL
TURBIDITY: ABNORMAL
URINE HGB: ABNORMAL
UROBILINOGEN, URINE: NORMAL
WBC # BLD: 5.2 K/UL (ref 3.5–11.3)
WBC # BLD: ABNORMAL 10*3/UL
WBC UA: ABNORMAL /HPF (ref 0–4)
YEAST: ABNORMAL

## 2020-06-23 PROCEDURE — 80048 BASIC METABOLIC PNL TOTAL CA: CPT

## 2020-06-23 PROCEDURE — 81001 URINALYSIS AUTO W/SCOPE: CPT

## 2020-06-23 PROCEDURE — 71045 X-RAY EXAM CHEST 1 VIEW: CPT

## 2020-06-23 PROCEDURE — 87086 URINE CULTURE/COLONY COUNT: CPT

## 2020-06-23 PROCEDURE — 36415 COLL VENOUS BLD VENIPUNCTURE: CPT

## 2020-06-23 PROCEDURE — 99283 EMERGENCY DEPT VISIT LOW MDM: CPT

## 2020-06-23 PROCEDURE — 6370000000 HC RX 637 (ALT 250 FOR IP): Performed by: EMERGENCY MEDICINE

## 2020-06-23 PROCEDURE — 85025 COMPLETE CBC W/AUTO DIFF WBC: CPT

## 2020-06-23 RX ORDER — SULFAMETHOXAZOLE AND TRIMETHOPRIM 800; 160 MG/1; MG/1
1 TABLET ORAL 2 TIMES DAILY
Qty: 6 TABLET | Refills: 0 | Status: ON HOLD | OUTPATIENT
Start: 2020-06-23 | End: 2020-06-30 | Stop reason: HOSPADM

## 2020-06-23 RX ORDER — SULFAMETHOXAZOLE AND TRIMETHOPRIM 800; 160 MG/1; MG/1
1 TABLET ORAL ONCE
Status: COMPLETED | OUTPATIENT
Start: 2020-06-23 | End: 2020-06-23

## 2020-06-23 RX ADMIN — SULFAMETHOXAZOLE AND TRIMETHOPRIM 1 TABLET: 800; 160 TABLET ORAL at 23:22

## 2020-06-24 ENCOUNTER — CARE COORDINATION (OUTPATIENT)
Dept: CARE COORDINATION | Age: 80
End: 2020-06-24

## 2020-06-24 NOTE — ED PROVIDER NOTES
eMERGENCY dEPARTMENT eNCOUnter      Pt Name: Pop Godoy  MRN: [id number]  [place] 1940  Date of evaluation: 6/23/2020      CHIEF COMPLAINT       Chief Complaint   Patient presents with    Fatigue         HISTORY OF PRESENT ILLNESS    Pop Godoy is a [de-identified] y.o. female who presents with not feeling well. Patient states she was tested positive for COVID about a week ago. Says she just does not feel well. No chest pain, no shortness of breath, no belly pain, no fevers no chills         REVIEW OF SYSTEMS       Review of systems are all reviewed and negative except stated above in HPI     [address]    has a past medical history of Asteroid hyalosis of right eye, Coronary artery disease, Diastolic dysfunction, Gastric ulcer with hemorrhage, Hyperlipidemia, Mild mitral regurgitation, Mild tricuspid regurgitation, Obesity, and Vitreous floaters. SURGICAL HISTORY      has a past surgical history that includes Cholecystectomy (1978); Cardiac catheterization (Left, 08/23/2012); Endoscopy, colon, diagnostic (03/05/2015); and Upper gastrointestinal endoscopy (4/16/2015).     CURRENT MEDICATIONS       Discharge Medication List as of 6/23/2020 11:15 PM      CONTINUE these medications which have NOT CHANGED    Details   lisinopril (PRINIVIL;ZESTRIL) 5 MG tablet TAKE 1 TABLET EVERY DAY, Disp-90 tablet, R-3Normal      metoprolol tartrate (LOPRESSOR) 25 MG tablet TAKE 1 TABLET TWICE DAILY, Disp-180 tablet, R-3Normal      atorvastatin (LIPITOR) 40 MG tablet TAKE 1 TABLET EVERY NIGHT AT BEDTIME, Disp-90 tablet, R-3Normal      alendronate (FOSAMAX) 70 MG tablet Take 1 tablet by mouth every 7 days, Disp-4 tablet, R-3Normal      Cholecalciferol (VITAMIN D3) 2000 units CAPS Take 1 capsule by mouth daily, Disp-90 capsule, R-3Normal      ferrous sulfate (FE TABS) 325 (65 Fe) MG EC tablet Take 1 tablet by mouth daily (with breakfast), Disp-90 tablet, R-3Normal      nitroGLYCERIN (NITROSTAT) 0.4 MG SL tablet Place 1 tablet under the tongue every 5 minutes as needed for Chest pain, Disp-25 tablet, R-1Normal      aspirin 81 MG tablet Take 81 mg by mouth daily      ascorbic acid (VITAMIN C) 500 MG tablet Take 500 mg by mouth 2 times daily Indications: patient only takes in the Winter With ana paula hips Historical Med      Multiple Vitamins-Minerals (MULTIVITAMIN PO) Take 1 tablet by mouth daily. ALLERGIES     is allergic to codeine. FAMILY HISTORY     She indicated that the status of her father is unknown.     family history includes Heart Attack in her father. SOCIAL HISTORY      reports that she has never smoked. She has never used smokeless tobacco. She reports current alcohol use. She reports that she does not use drugs. PHYSICAL EXAM     INITIAL VITALS:  weight is 170 lb (77.1 kg). Her tympanic temperature is 100.1 °F (37.8 °C). Her blood pressure is 134/63 and her pulse is 76. Her respiration is 16 and oxygen saturation is 94%. Gen.: Patient is a well-hydrated, nontoxic-appearing elderly female in no apparent distress. HEENT: Head is atraumatic. Mouth shows moist mucous membranes. Neck: Supple. Respiratory: Lung sounds are clear bilateral.  Cardiac: Heart is regular rate and rhythm. GI: Abdomen soft, nontender  Neuro: Patient is no gross focal neurological deficits at bedside exam    DIFFERENTIAL DIAGNOSIS/ MDM:     Viral illness, electrolyte abnormalities, UTI, pneumonia    DIAGNOSTIC RESULTS       RADIOLOGY:   I directly visualized the following  images and reviewed the radiologist interpretations:  XR CHEST PORTABLE   Final Result   Subtle vague peripheral opacities are nonspecific.                LABS:  Labs Reviewed   CBC WITH AUTO DIFFERENTIAL - Abnormal; Notable for the following components:       Result Value    MCHC 34.0 (*)     Eosinophils % 0 (*)     All other components within normal limits   BASIC METABOLIC PANEL - Abnormal; Notable for the following components:    Glucose 133 (*)

## 2020-06-25 LAB
CULTURE: NORMAL
Lab: NORMAL
SPECIMEN DESCRIPTION: NORMAL

## 2020-06-26 ENCOUNTER — HOSPITAL ENCOUNTER (EMERGENCY)
Age: 80
Discharge: ANOTHER HOSPITAL | End: 2020-06-26
Attending: EMERGENCY MEDICINE
Payer: MEDICARE

## 2020-06-26 ENCOUNTER — APPOINTMENT (OUTPATIENT)
Dept: GENERAL RADIOLOGY | Age: 80
End: 2020-06-26
Payer: MEDICARE

## 2020-06-26 ENCOUNTER — TELEPHONE (OUTPATIENT)
Dept: FAMILY MEDICINE CLINIC | Age: 80
End: 2020-06-26

## 2020-06-26 ENCOUNTER — HOSPITAL ENCOUNTER (INPATIENT)
Age: 80
LOS: 4 days | Discharge: HOME | DRG: 177 | End: 2020-06-30
Attending: FAMILY MEDICINE | Admitting: INTERNAL MEDICINE
Payer: MEDICARE

## 2020-06-26 VITALS
HEART RATE: 60 BPM | DIASTOLIC BLOOD PRESSURE: 62 MMHG | HEIGHT: 59 IN | RESPIRATION RATE: 20 BRPM | SYSTOLIC BLOOD PRESSURE: 129 MMHG | TEMPERATURE: 99.1 F | WEIGHT: 170 LBS | OXYGEN SATURATION: 94 % | BODY MASS INDEX: 34.27 KG/M2

## 2020-06-26 PROBLEM — U07.1 PNEUMONIA DUE TO COVID-19 VIRUS: Status: ACTIVE | Noted: 2020-06-26

## 2020-06-26 PROBLEM — J12.82 PNEUMONIA DUE TO COVID-19 VIRUS: Status: ACTIVE | Noted: 2020-06-26

## 2020-06-26 LAB
-: NORMAL
ABSOLUTE EOS #: <0.03 K/UL (ref 0–0.44)
ABSOLUTE IMMATURE GRANULOCYTE: 0.04 K/UL (ref 0–0.3)
ABSOLUTE LYMPH #: 1.48 K/UL (ref 1.1–3.7)
ABSOLUTE MONO #: 0.6 K/UL (ref 0.1–1.2)
ALBUMIN SERPL-MCNC: 3.1 G/DL (ref 3.5–5.2)
ALBUMIN/GLOBULIN RATIO: 1 (ref 1–2.5)
ALP BLD-CCNC: 75 U/L (ref 35–104)
ALT SERPL-CCNC: 63 U/L (ref 5–33)
ANION GAP SERPL CALCULATED.3IONS-SCNC: 15 MMOL/L (ref 9–17)
AST SERPL-CCNC: 83 U/L
BASOPHILS # BLD: 0 % (ref 0–2)
BASOPHILS ABSOLUTE: <0.03 K/UL (ref 0–0.2)
BILIRUB SERPL-MCNC: 0.65 MG/DL (ref 0.3–1.2)
BILIRUBIN DIRECT: 0.25 MG/DL
BILIRUBIN, INDIRECT: 0.4 MG/DL (ref 0–1)
BUN BLDV-MCNC: 11 MG/DL (ref 8–23)
BUN/CREAT BLD: 13 (ref 9–20)
C-REACTIVE PROTEIN: 68.5 MG/L (ref 0–5)
CALCIUM SERPL-MCNC: 8.4 MG/DL (ref 8.6–10.4)
CHLORIDE BLD-SCNC: 98 MMOL/L (ref 98–107)
CO2: 21 MMOL/L (ref 20–31)
CREAT SERPL-MCNC: 0.85 MG/DL (ref 0.5–0.9)
D-DIMER QUANTITATIVE: 2.94 MG/L FEU (ref 0–0.59)
DIFFERENTIAL TYPE: ABNORMAL
EOSINOPHILS RELATIVE PERCENT: 0 % (ref 1–4)
FERRITIN: 4808 UG/L (ref 13–150)
GFR AFRICAN AMERICAN: >60 ML/MIN
GFR NON-AFRICAN AMERICAN: >60 ML/MIN
GFR SERPL CREATININE-BSD FRML MDRD: ABNORMAL ML/MIN/{1.73_M2}
GFR SERPL CREATININE-BSD FRML MDRD: ABNORMAL ML/MIN/{1.73_M2}
GLOBULIN: 3.2 G/DL (ref 1.5–3.8)
GLUCOSE BLD-MCNC: 117 MG/DL (ref 70–99)
HCT VFR BLD CALC: 39.4 % (ref 36.3–47.1)
HEMOGLOBIN: 13.5 G/DL (ref 11.9–15.1)
IMMATURE GRANULOCYTES: 1 %
LACTATE DEHYDROGENASE: 475 U/L (ref 135–214)
LACTIC ACID, SEPSIS WHOLE BLOOD: NORMAL MMOL/L (ref 0.5–1.9)
LACTIC ACID, SEPSIS: 1.8 MMOL/L (ref 0.5–1.9)
LYMPHOCYTES # BLD: 19 % (ref 24–43)
MCH RBC QN AUTO: 31.3 PG (ref 25.2–33.5)
MCHC RBC AUTO-ENTMCNC: 34.3 G/DL (ref 25.2–33.5)
MCV RBC AUTO: 91.4 FL (ref 82.6–102.9)
MONOCYTES # BLD: 8 % (ref 3–12)
NRBC AUTOMATED: 0 PER 100 WBC
PDW BLD-RTO: 13.2 % (ref 11.8–14.4)
PLATELET # BLD: 218 K/UL (ref 138–453)
PLATELET ESTIMATE: ABNORMAL
PMV BLD AUTO: 9.8 FL (ref 8.1–13.5)
POTASSIUM SERPL-SCNC: 4.8 MMOL/L (ref 3.7–5.3)
PROCALCITONIN: 0.06 NG/ML
RBC # BLD: 4.31 M/UL (ref 3.95–5.11)
RBC # BLD: ABNORMAL 10*6/UL
REASON FOR REJECTION: NORMAL
SEG NEUTROPHILS: 73 % (ref 36–65)
SEGMENTED NEUTROPHILS ABSOLUTE COUNT: 5.78 K/UL (ref 1.5–8.1)
SODIUM BLD-SCNC: 134 MMOL/L (ref 135–144)
TOTAL PROTEIN: 6.3 G/DL (ref 6.4–8.3)
TROPONIN INTERP: ABNORMAL
TROPONIN T: ABNORMAL NG/ML
TROPONIN, HIGH SENSITIVITY: 18 NG/L (ref 0–14)
WBC # BLD: 7.9 K/UL (ref 3.5–11.3)
WBC # BLD: ABNORMAL 10*3/UL
ZZ NTE CLEAN UP: ORDERED TEST: NORMAL
ZZ NTE WITH NAME CLEAN UP: SPECIMEN SOURCE: NORMAL

## 2020-06-26 PROCEDURE — 83615 LACTATE (LD) (LDH) ENZYME: CPT

## 2020-06-26 PROCEDURE — 83605 ASSAY OF LACTIC ACID: CPT

## 2020-06-26 PROCEDURE — 36415 COLL VENOUS BLD VENIPUNCTURE: CPT

## 2020-06-26 PROCEDURE — 80048 BASIC METABOLIC PNL TOTAL CA: CPT

## 2020-06-26 PROCEDURE — 6360000002 HC RX W HCPCS: Performed by: FAMILY MEDICINE

## 2020-06-26 PROCEDURE — 2060000000 HC ICU INTERMEDIATE R&B

## 2020-06-26 PROCEDURE — 93005 ELECTROCARDIOGRAM TRACING: CPT | Performed by: EMERGENCY MEDICINE

## 2020-06-26 PROCEDURE — 85379 FIBRIN DEGRADATION QUANT: CPT

## 2020-06-26 PROCEDURE — 80076 HEPATIC FUNCTION PANEL: CPT

## 2020-06-26 PROCEDURE — 99284 EMERGENCY DEPT VISIT MOD MDM: CPT

## 2020-06-26 PROCEDURE — 2580000003 HC RX 258: Performed by: FAMILY MEDICINE

## 2020-06-26 PROCEDURE — 99222 1ST HOSP IP/OBS MODERATE 55: CPT | Performed by: INTERNAL MEDICINE

## 2020-06-26 PROCEDURE — 2580000003 HC RX 258: Performed by: EMERGENCY MEDICINE

## 2020-06-26 PROCEDURE — 99223 1ST HOSP IP/OBS HIGH 75: CPT | Performed by: INTERNAL MEDICINE

## 2020-06-26 PROCEDURE — 82728 ASSAY OF FERRITIN: CPT

## 2020-06-26 PROCEDURE — 6370000000 HC RX 637 (ALT 250 FOR IP): Performed by: EMERGENCY MEDICINE

## 2020-06-26 PROCEDURE — 71045 X-RAY EXAM CHEST 1 VIEW: CPT

## 2020-06-26 PROCEDURE — 84145 PROCALCITONIN (PCT): CPT

## 2020-06-26 PROCEDURE — 84484 ASSAY OF TROPONIN QUANT: CPT

## 2020-06-26 PROCEDURE — 6370000000 HC RX 637 (ALT 250 FOR IP): Performed by: FAMILY MEDICINE

## 2020-06-26 PROCEDURE — 86140 C-REACTIVE PROTEIN: CPT

## 2020-06-26 PROCEDURE — 85025 COMPLETE CBC W/AUTO DIFF WBC: CPT

## 2020-06-26 RX ORDER — ACETAMINOPHEN 325 MG/1
650 TABLET ORAL EVERY 6 HOURS PRN
Status: DISCONTINUED | OUTPATIENT
Start: 2020-06-26 | End: 2020-06-30 | Stop reason: HOSPADM

## 2020-06-26 RX ORDER — ONDANSETRON 2 MG/ML
4 INJECTION INTRAMUSCULAR; INTRAVENOUS EVERY 6 HOURS PRN
Status: DISCONTINUED | OUTPATIENT
Start: 2020-06-26 | End: 2020-06-30 | Stop reason: HOSPADM

## 2020-06-26 RX ORDER — LANOLIN ALCOHOL/MO/W.PET/CERES
325 CREAM (GRAM) TOPICAL
Status: DISCONTINUED | OUTPATIENT
Start: 2020-06-27 | End: 2020-06-30 | Stop reason: HOSPADM

## 2020-06-26 RX ORDER — NICOTINE 21 MG/24HR
1 PATCH, TRANSDERMAL 24 HOURS TRANSDERMAL DAILY PRN
Status: DISCONTINUED | OUTPATIENT
Start: 2020-06-26 | End: 2020-06-30 | Stop reason: HOSPADM

## 2020-06-26 RX ORDER — MAGNESIUM SULFATE 1 G/100ML
1 INJECTION INTRAVENOUS PRN
Status: DISCONTINUED | OUTPATIENT
Start: 2020-06-26 | End: 2020-06-30 | Stop reason: HOSPADM

## 2020-06-26 RX ORDER — ASCORBIC ACID 500 MG
500 TABLET ORAL 2 TIMES DAILY
Status: DISCONTINUED | OUTPATIENT
Start: 2020-06-26 | End: 2020-06-30 | Stop reason: HOSPADM

## 2020-06-26 RX ORDER — POTASSIUM CHLORIDE 20 MEQ/1
40 TABLET, EXTENDED RELEASE ORAL PRN
Status: DISCONTINUED | OUTPATIENT
Start: 2020-06-26 | End: 2020-06-30 | Stop reason: HOSPADM

## 2020-06-26 RX ORDER — LISINOPRIL 5 MG/1
5 TABLET ORAL DAILY
Status: DISCONTINUED | OUTPATIENT
Start: 2020-06-26 | End: 2020-06-30 | Stop reason: HOSPADM

## 2020-06-26 RX ORDER — SODIUM CHLORIDE 0.9 % (FLUSH) 0.9 %
10 SYRINGE (ML) INJECTION PRN
Status: DISCONTINUED | OUTPATIENT
Start: 2020-06-26 | End: 2020-06-30 | Stop reason: HOSPADM

## 2020-06-26 RX ORDER — SODIUM CHLORIDE 0.9 % (FLUSH) 0.9 %
10 SYRINGE (ML) INJECTION EVERY 12 HOURS SCHEDULED
Status: DISCONTINUED | OUTPATIENT
Start: 2020-06-26 | End: 2020-06-30 | Stop reason: HOSPADM

## 2020-06-26 RX ORDER — ACETAMINOPHEN 325 MG/1
650 TABLET ORAL ONCE
Status: COMPLETED | OUTPATIENT
Start: 2020-06-26 | End: 2020-06-26

## 2020-06-26 RX ORDER — ACETAMINOPHEN 650 MG/1
650 SUPPOSITORY RECTAL EVERY 6 HOURS PRN
Status: DISCONTINUED | OUTPATIENT
Start: 2020-06-26 | End: 2020-06-30 | Stop reason: HOSPADM

## 2020-06-26 RX ORDER — DEXAMETHASONE 4 MG/1
4 TABLET ORAL EVERY 12 HOURS SCHEDULED
Status: COMPLETED | OUTPATIENT
Start: 2020-06-27 | End: 2020-06-28

## 2020-06-26 RX ORDER — POLYETHYLENE GLYCOL 3350 17 G/17G
17 POWDER, FOR SOLUTION ORAL DAILY PRN
Status: DISCONTINUED | OUTPATIENT
Start: 2020-06-26 | End: 2020-06-30 | Stop reason: HOSPADM

## 2020-06-26 RX ORDER — OMEGA-3S/DHA/EPA/FISH OIL/D3 300MG-1000
2000 CAPSULE ORAL DAILY
Status: DISCONTINUED | OUTPATIENT
Start: 2020-06-26 | End: 2020-06-30 | Stop reason: HOSPADM

## 2020-06-26 RX ORDER — FUROSEMIDE 10 MG/ML
20 INJECTION INTRAMUSCULAR; INTRAVENOUS ONCE
Status: COMPLETED | OUTPATIENT
Start: 2020-06-27 | End: 2020-06-27

## 2020-06-26 RX ORDER — ATORVASTATIN CALCIUM 40 MG/1
40 TABLET, FILM COATED ORAL DAILY
Status: DISCONTINUED | OUTPATIENT
Start: 2020-06-26 | End: 2020-06-30 | Stop reason: HOSPADM

## 2020-06-26 RX ORDER — POTASSIUM CHLORIDE 7.45 MG/ML
10 INJECTION INTRAVENOUS PRN
Status: DISCONTINUED | OUTPATIENT
Start: 2020-06-26 | End: 2020-06-30 | Stop reason: HOSPADM

## 2020-06-26 RX ORDER — 0.9 % SODIUM CHLORIDE 0.9 %
500 INTRAVENOUS SOLUTION INTRAVENOUS ONCE
Status: COMPLETED | OUTPATIENT
Start: 2020-06-26 | End: 2020-06-26

## 2020-06-26 RX ORDER — PROMETHAZINE HYDROCHLORIDE 25 MG/1
12.5 TABLET ORAL EVERY 6 HOURS PRN
Status: DISCONTINUED | OUTPATIENT
Start: 2020-06-26 | End: 2020-06-30 | Stop reason: HOSPADM

## 2020-06-26 RX ORDER — ASPIRIN 81 MG/1
81 TABLET ORAL DAILY
Status: DISCONTINUED | OUTPATIENT
Start: 2020-06-26 | End: 2020-06-30 | Stop reason: HOSPADM

## 2020-06-26 RX ADMIN — ENOXAPARIN SODIUM 40 MG: 40 INJECTION SUBCUTANEOUS at 22:05

## 2020-06-26 RX ADMIN — CHOLECALCIFEROL TAB 10 MCG (400 UNIT) 2000 UNITS: 10 TAB at 22:07

## 2020-06-26 RX ADMIN — OXYCODONE HYDROCHLORIDE AND ACETAMINOPHEN 500 MG: 500 TABLET ORAL at 22:06

## 2020-06-26 RX ADMIN — Medication 81 MG: at 22:05

## 2020-06-26 RX ADMIN — SODIUM CHLORIDE 500 ML: 9 INJECTION, SOLUTION INTRAVENOUS at 13:30

## 2020-06-26 RX ADMIN — LISINOPRIL 5 MG: 5 TABLET ORAL at 22:06

## 2020-06-26 RX ADMIN — ACETAMINOPHEN 650 MG: 325 TABLET ORAL at 13:26

## 2020-06-26 RX ADMIN — METOPROLOL TARTRATE 25 MG: 25 TABLET ORAL at 22:06

## 2020-06-26 RX ADMIN — DESMOPRESSIN ACETATE 40 MG: 0.2 TABLET ORAL at 22:05

## 2020-06-26 RX ADMIN — SODIUM CHLORIDE, PRESERVATIVE FREE 10 ML: 5 INJECTION INTRAVENOUS at 22:07

## 2020-06-26 ASSESSMENT — ENCOUNTER SYMPTOMS
STRIDOR: 0
DIARRHEA: 0
WHEEZING: 1
VOMITING: 0
NAUSEA: 0
COUGH: 1
BLOOD IN STOOL: 0
ABDOMINAL PAIN: 0
CONSTIPATION: 0
SHORTNESS OF BREATH: 1
EYE DISCHARGE: 0

## 2020-06-26 ASSESSMENT — PAIN SCALES - GENERAL
PAINLEVEL_OUTOF10: 0
PAINLEVEL_OUTOF10: 0

## 2020-06-26 NOTE — ACP (ADVANCE CARE PLANNING)
were unable to breathe on your own, what would your preference be about the use of a ventilator (breathing machine) if it were available to you? \"      Would the patient desire the use of ventilator (breathing machine)?: yes    \"If your health worsens and it becomes clear that your chance of recovery is unlikely, what would your preference be about the use of a ventilator (breathing machine) if it were available to you? \"     Would the patient desire the use of ventilator (breathing machine)?: Yes      Resuscitation  \"CPR works best to restart the heart when there is a sudden event, like a heart attack, in someone who is otherwise healthy. Unfortunately, CPR does not typically restart the heart for people who have serious health conditions or who are very sick. \"    \"In the event your heart stopped as a result of an underlying serious health condition, would you want attempts to be made to restart your heart (answer \"yes\" for attempt to resuscitate) or would you prefer a natural death (answer \"no\" for do not attempt to resuscitate)? \" yes      NOTE: If the patient has a valid advance directive AND now provides care preference(s) that are inconsistent with that prior directive, advise the patient to consider either: creating a new advance directive that complies with state-specific requirements; or, if that is not possible, orally revoking that prior directive in accordance with state-specific requirements, which must be documented in the EHR. [] Yes   [] No   Educated Patient / Christian Hwang regarding differences between Advance Directives and portable DNR orders.     Length of ACP Conversation in minutes:      Conversation Outcomes:  [] ACP discussion completed  [] Existing advance directive reviewed with patient; no changes to patient's previously recorded wishes  [] New Advance Directive completed  [] Portable Do Not Rescitate prepared for Provider review and signature  [] POLST/POST/MOLST/MOST prepared for

## 2020-06-26 NOTE — ED NOTES
Patient was assisted up to [facility]. Patient able to transfer self. Upon return to bed, patient positions self for comfort, SpO2 on room air is noted to be 84%. Within 5 min of returning to bed SpO2 rises to 91% on room air. Patient is aware transport is here for her. Side rails placed up x2, call light is in reach.      Bassam Rowe RN  06/26/20 0016

## 2020-06-26 NOTE — ED PROVIDER NOTES
MCHC 34.3 (H) 25.2 - 33.5 g/dL    RDW 13.2 11.8 - 14.4 %    Platelets 030 925 - 850 k/uL    MPV 9.8 8.1 - 13.5 fL    NRBC Automated 0.0 0.0 per 100 WBC    Differential Type NOT REPORTED     WBC Morphology NOT REPORTED     RBC Morphology NOT REPORTED     Platelet Estimate NOT REPORTED     Seg Neutrophils 73 (H) 36 - 65 %    Lymphocytes 19 (L) 24 - 43 %    Monocytes 8 3 - 12 %    Eosinophils % 0 (L) 1 - 4 %    Basophils 0 0 - 2 %    Immature Granulocytes 1 (H) 0 %    Segs Absolute 5.78 1.50 - 8.10 k/uL    Absolute Lymph # 1.48 1.10 - 3.70 k/uL    Absolute Mono # 0.60 0.10 - 1.20 k/uL    Absolute Eos # <0.03 0.00 - 0.44 k/uL    Basophils Absolute <0.03 0.00 - 0.20 k/uL    Absolute Immature Granulocyte 0.04 0.00 - 0.30 k/uL   D-Dimer, Quantitative   Result Value Ref Range    D-Dimer, Quant 2.94 (H) 0.00 - 0.59 mg/L FEU   Lactate, Sepsis   Result Value Ref Range    Lactic Acid, Sepsis 1.8 0.5 - 1.9 mmol/L    Lactic Acid, Sepsis, Whole Blood NOT REPORTED 0.5 - 1.9 mmol/L   SPECIMEN REJECTION   Result Value Ref Range    Specimen Source ER     Ordered Test BMP,LD,LIVP,TROPI,FERI,PRCAL,CRP     Reason for Rejection Unable to perform testing: Specimen hemolyzed.      - NOT REPORTED    Basic Metabolic Panel   Result Value Ref Range    Glucose 117 (H) 70 - 99 mg/dL    BUN 11 8 - 23 mg/dL    CREATININE 0.85 0.50 - 0.90 mg/dL    Bun/Cre Ratio 13 9 - 20    Calcium 8.4 (L) 8.6 - 10.4 mg/dL    Sodium 134 (L) 135 - 144 mmol/L    Potassium 4.8 3.7 - 5.3 mmol/L    Chloride 98 98 - 107 mmol/L    CO2 21 20 - 31 mmol/L    Anion Gap 15 9 - 17 mmol/L    GFR Non-African American >60 >60 mL/min    GFR African American >60 >60 mL/min    GFR Comment          GFR Staging NOT REPORTED    Lactate Dehydrogenase   Result Value Ref Range     (H) 135 - 214 U/L   Hepatic Function Panel   Result Value Ref Range    Alb 3.1 (L) 3.5 - 5.2 g/dL    Alkaline Phosphatase 75 35 - 104 U/L    ALT 63 (H) 5 - 33 U/L    AST 83 (H) <32 U/L    Total Bilirubin 0.65 0.3 - 1.2 mg/dL    Bilirubin, Direct 0.25 <0.31 mg/dL    Bilirubin, Indirect 0.40 0.00 - 1.00 mg/dL    Total Protein 6.3 (L) 6.4 - 8.3 g/dL    Globulin 3.2 1.5 - 3.8 g/dL    Albumin/Globulin Ratio 1.0 1.0 - 2.5   Troponin   Result Value Ref Range    Troponin, High Sensitivity 18 (H) 0 - 14 ng/L    Troponin T NOT REPORTED <0.03 ng/mL    Troponin Interp NOT REPORTED    EKG 12 Lead   Result Value Ref Range    Ventricular Rate 67 BPM    Atrial Rate 67 BPM    P-R Interval 142 ms    QRS Duration 80 ms    Q-T Interval 394 ms    QTc Calculation (Bazett) 416 ms    P Axis 58 degrees    R Axis 40 degrees    T Axis 28 degrees         EMERGENCY DEPARTMENT COURSE:   Vitals:    Vitals:    06/26/20 1308 06/26/20 1454 06/26/20 1552   BP: 129/62 (!) 112/55 129/62   Pulse: 67 58 60   Resp: 24 20 20   Temp: 100.2 °F (37.9 °C) 99.1 °F (37.3 °C)    TempSrc: Tympanic Tympanic    SpO2: 94% 94% 94%   Weight:  170 lb (77.1 kg)    Height:  4' 11\" (1.499 m)      -------------------------  BP: 129/62, Temp: 99.1 °F (37.3 °C), Pulse: 60, Resp: 20      Re-evaluation Notes    1433  Discussed with pt's daughter. Pt cannot hold up a cup due to weakness. Pt is not eating or taking her medications. Pt lives alone. Family would like her to be transferred to [facility] for possible convalescent blood transfusion. The patient has been hemodynamically stable. There is concerned that she has not been eating or drinking very well and has not been taking her medicines. She could decompensate quickly. I have discussed the case with the hospitalist at [facility]. Nasir, who accepts the patient for transfer. She is transferred in stable condition. CONSULTS:    [address] contacted. [phone]  Discussed with Dr. Trang Andrade at [address] to Covid unit. FINAL IMPRESSION      1.  Pneumonia due to COVID-19 virus          DISPOSITION/PLAN   DISPOSITION        Condition on Disposition    stable    PATIENT

## 2020-06-27 ENCOUNTER — CARE COORDINATION (OUTPATIENT)
Dept: CARE COORDINATION | Age: 80
End: 2020-06-27

## 2020-06-27 LAB
ABO/RH: NORMAL
ANION GAP SERPL CALCULATED.3IONS-SCNC: 13 MMOL/L (ref 9–17)
BUN BLDV-MCNC: 10 MG/DL (ref 8–23)
BUN/CREAT BLD: ABNORMAL (ref 9–20)
CALCIUM SERPL-MCNC: 8.7 MG/DL (ref 8.6–10.4)
CHLORIDE BLD-SCNC: 101 MMOL/L (ref 98–107)
CO2: 21 MMOL/L (ref 20–31)
CREAT SERPL-MCNC: 0.72 MG/DL (ref 0.5–0.9)
GFR AFRICAN AMERICAN: >60 ML/MIN
GFR NON-AFRICAN AMERICAN: >60 ML/MIN
GFR SERPL CREATININE-BSD FRML MDRD: ABNORMAL ML/MIN/{1.73_M2}
GFR SERPL CREATININE-BSD FRML MDRD: ABNORMAL ML/MIN/{1.73_M2}
GLUCOSE BLD-MCNC: 111 MG/DL (ref 70–99)
HCT VFR BLD CALC: 39.7 % (ref 36.3–47.1)
HEMOGLOBIN: 13.5 G/DL (ref 11.9–15.1)
INR BLD: 1
MCH RBC QN AUTO: 31.5 PG (ref 25.2–33.5)
MCHC RBC AUTO-ENTMCNC: 34 G/DL (ref 28.4–34.8)
MCV RBC AUTO: 92.5 FL (ref 82.6–102.9)
NRBC AUTOMATED: 0 PER 100 WBC
PDW BLD-RTO: 13.3 % (ref 11.8–14.4)
PLATELET # BLD: 234 K/UL (ref 138–453)
PMV BLD AUTO: 9.7 FL (ref 8.1–13.5)
POTASSIUM SERPL-SCNC: 4.7 MMOL/L (ref 3.7–5.3)
PROTHROMBIN TIME: 10.3 SEC (ref 9–12)
RBC # BLD: 4.29 M/UL (ref 3.95–5.11)
SODIUM BLD-SCNC: 135 MMOL/L (ref 135–144)
WBC # BLD: 6.3 K/UL (ref 3.5–11.3)

## 2020-06-27 PROCEDURE — 2060000000 HC ICU INTERMEDIATE R&B

## 2020-06-27 PROCEDURE — 6360000002 HC RX W HCPCS: Performed by: INTERNAL MEDICINE

## 2020-06-27 PROCEDURE — 86901 BLOOD TYPING SEROLOGIC RH(D): CPT

## 2020-06-27 PROCEDURE — 86900 BLOOD TYPING SEROLOGIC ABO: CPT

## 2020-06-27 PROCEDURE — 85027 COMPLETE CBC AUTOMATED: CPT

## 2020-06-27 PROCEDURE — 85610 PROTHROMBIN TIME: CPT

## 2020-06-27 PROCEDURE — 80048 BASIC METABOLIC PNL TOTAL CA: CPT

## 2020-06-27 PROCEDURE — 2580000003 HC RX 258: Performed by: FAMILY MEDICINE

## 2020-06-27 PROCEDURE — 99232 SBSQ HOSP IP/OBS MODERATE 35: CPT | Performed by: FAMILY MEDICINE

## 2020-06-27 PROCEDURE — 99233 SBSQ HOSP IP/OBS HIGH 50: CPT | Performed by: INTERNAL MEDICINE

## 2020-06-27 PROCEDURE — 6370000000 HC RX 637 (ALT 250 FOR IP): Performed by: FAMILY MEDICINE

## 2020-06-27 PROCEDURE — 6360000002 HC RX W HCPCS: Performed by: FAMILY MEDICINE

## 2020-06-27 RX ORDER — 0.9 % SODIUM CHLORIDE 0.9 %
20 INTRAVENOUS SOLUTION INTRAVENOUS ONCE
Status: DISCONTINUED | OUTPATIENT
Start: 2020-06-27 | End: 2020-06-30 | Stop reason: HOSPADM

## 2020-06-27 RX ADMIN — FERROUS SULFATE TAB EC 325 MG (65 MG FE EQUIVALENT) 325 MG: 325 (65 FE) TABLET DELAYED RESPONSE at 09:10

## 2020-06-27 RX ADMIN — METOPROLOL TARTRATE 25 MG: 25 TABLET ORAL at 09:11

## 2020-06-27 RX ADMIN — Medication 81 MG: at 09:09

## 2020-06-27 RX ADMIN — OXYCODONE HYDROCHLORIDE AND ACETAMINOPHEN 500 MG: 500 TABLET ORAL at 09:11

## 2020-06-27 RX ADMIN — DESMOPRESSIN ACETATE 40 MG: 0.2 TABLET ORAL at 09:09

## 2020-06-27 RX ADMIN — DEXAMETHASONE 4 MG: 4 TABLET ORAL at 09:10

## 2020-06-27 RX ADMIN — ENOXAPARIN SODIUM 40 MG: 40 INJECTION SUBCUTANEOUS at 09:10

## 2020-06-27 RX ADMIN — CHOLECALCIFEROL TAB 10 MCG (400 UNIT) 2000 UNITS: 10 TAB at 12:16

## 2020-06-27 RX ADMIN — SODIUM CHLORIDE, PRESERVATIVE FREE 10 ML: 5 INJECTION INTRAVENOUS at 09:11

## 2020-06-27 RX ADMIN — METOPROLOL TARTRATE 25 MG: 25 TABLET ORAL at 20:29

## 2020-06-27 RX ADMIN — OXYCODONE HYDROCHLORIDE AND ACETAMINOPHEN 500 MG: 500 TABLET ORAL at 20:29

## 2020-06-27 RX ADMIN — LISINOPRIL 5 MG: 5 TABLET ORAL at 09:10

## 2020-06-27 RX ADMIN — FUROSEMIDE 20 MG: 10 INJECTION, SOLUTION INTRAMUSCULAR; INTRAVENOUS at 06:00

## 2020-06-27 RX ADMIN — DEXAMETHASONE 4 MG: 4 TABLET ORAL at 20:28

## 2020-06-27 RX ADMIN — SODIUM CHLORIDE, PRESERVATIVE FREE 10 ML: 5 INJECTION INTRAVENOUS at 20:29

## 2020-06-27 ASSESSMENT — ENCOUNTER SYMPTOMS
RHINORRHEA: 0
ABDOMINAL PAIN: 0
COUGH: 1
CHEST TIGHTNESS: 0
WHEEZING: 0
NAUSEA: 0
BLOOD IN STOOL: 0
SHORTNESS OF BREATH: 1
VOMITING: 0
DIARRHEA: 0
CONSTIPATION: 0

## 2020-06-27 ASSESSMENT — PAIN SCALES - GENERAL: PAINLEVEL_OUTOF10: 0

## 2020-06-27 NOTE — PROGRESS NOTES
[facility]      Daily Progress Note     Admit Date: 6/26/2020  Bed/Room No.  3027/3027-01  Admitting Physician : Mike Fung MD  Code Status :Full Code  Hospital Day:  LOS: 1 day   Chief Complaint:     Fatigue ,   Malaise     Principal Problem:    Pneumonia due to COVID-19 virus  Active Problems:    Coronary artery disease involving native coronary artery of native heart without angina pectoris    Hyperlipidemia  Resolved Problems:    * No resolved hospital problems. *    Subjective : Interval History/Significant events :  06/27/20    Patient is having fatigue and poor energy and malaise. He is also c/o breathing difficulty . Vitals - Stable afebrile, Tmax 99.1   Labs - normal white count . Nursing notes , Consults notes reviewed. Overnight events and updates discussed with Nursing staff . Background History:         Wilian Cedeno is [de-identified] y.o. female  Who was admitted to the hospital on 6/26/2020 for treatment of Pneumonia due to COVID-19 virus. Patient presented to emergency room at [facility] for fatigue, malaise. She was treated with UTI about 4 days before. She also reported subjective fevers, cough, shortness of breath. Initial evaluation at ER showed temperature 99.1, respiration 20, saturation 94% on room air. Lab evaluation showed elevated d-dimer 1.94, lactic acid 1.8, normal white count, kidney function. Chest x-ray showed bilateral patchy pulmonary opacification concerning for viral Pneumonia. Patient had positive COVID-19 test on 6/15/2020. PMH:  Past Medical History:   Diagnosis Date    Asteroid hyalosis of right eye     Coronary artery disease     status post acute non-Q wave myocardial infarction 08/23/2012, status post drug eluting stent placement in the LAD and 2 drug eluting stents in the left circumflex artery 08/23/2012.     Diastolic dysfunction     grade 1.     Gastric ulcer with hemorrhage 03/03/2015    gastric and esophageal ulcers due to nsaid    Hyperlipidemia     Mild mitral regurgitation     Mild tricuspid regurgitation     Obesity     Vitreous floaters     left eye. Allergies: Allergies   Allergen Reactions    Codeine Nausea Only      Medications :  ascorbic acid, 500 mg, Oral, BID  aspirin, 81 mg, Oral, Daily  atorvastatin, 40 mg, Oral, Daily  vitamin D3, 2,000 Units, Oral, Daily  ferrous sulfate, 325 mg, Oral, Daily with breakfast  lisinopril, 5 mg, Oral, Daily  metoprolol tartrate, 25 mg, Oral, BID  sodium chloride flush, 10 mL, Intravenous, 2 times per day  enoxaparin, 40 mg, Subcutaneous, Daily  dexamethasone, 4 mg, Oral, 2 times per day        Review of Systems   Review of Systems   Constitutional: Positive for appetite change and fatigue. Negative for fever and unexpected weight change. HENT: Negative for congestion, rhinorrhea and sneezing. Eyes: Negative for visual disturbance. Respiratory: Positive for cough and shortness of breath. Negative for chest tightness and wheezing. Cardiovascular: Negative for chest pain and palpitations. Gastrointestinal: Negative for abdominal pain, blood in stool, constipation, diarrhea, nausea and vomiting. Genitourinary: Negative for dysuria, enuresis, frequency and hematuria. Musculoskeletal: Negative for arthralgias and myalgias. Skin: Negative for rash. Neurological: Negative for dizziness, weakness, light-headedness and headaches. Hematological: Does not bruise/bleed easily. Psychiatric/Behavioral: Negative for dysphoric mood and sleep disturbance.      Objective :      Current Vitals : Temp: 99.1 °F (37.3 °C),  Pulse: 63, Resp: 26, BP: (!) 121/55, SpO2: (!) 87 %  Last 24 Hrs Vitals   Patient Vitals for the past 24 hrs:   BP Temp Temp src Pulse Resp SpO2 Height Weight   06/27/20 0438 -- -- -- 63 26 (!) 87 % -- --   06/27/20 0400 (!) 121/55 99.1 °F (37.3 °C) Axillary -- -- -- -- --   06/27/20 0116 -- -- -- 63 23 96 % -- --   06/27/20 0000 (!) 140/58 98.4 °F (36.9 °C) Axillary -- -- -- -- --   06/26/20 2158 (!) 146/61 98.7 °F (37.1 °C) Axillary 69 23 96 % -- --   06/26/20 2045 -- -- -- -- -- -- 4' 11\" (1.499 m) 171 lb 4.8 oz (77.7 kg)     Intake / output   06/26 0701 - 06/27 0700  In: -   Out: 600 [Urine:600]  Physical Exam:  Physical Exam  Vitals signs and nursing note reviewed. Constitutional:       General: She is not in acute distress. Appearance: She is not diaphoretic. Interventions: Nasal cannula in place. HENT:      Head: Normocephalic and atraumatic. Nose:      Right Sinus: No maxillary sinus tenderness or frontal sinus tenderness. Left Sinus: No maxillary sinus tenderness or frontal sinus tenderness. Mouth/Throat:      Pharynx: No oropharyngeal exudate. Eyes:      General: No scleral icterus. Conjunctiva/sclera: Conjunctivae normal.      Pupils: Pupils are equal, round, and reactive to light. Neck:      Musculoskeletal: Full passive range of motion without pain and neck supple. Thyroid: No thyromegaly. Vascular: No JVD. Cardiovascular:      Rate and Rhythm: Normal rate and regular rhythm. Pulses:           Dorsalis pedis pulses are 2+ on the right side and 2+ on the left side. Heart sounds: Normal heart sounds. No murmur. Pulmonary:      Effort: Pulmonary effort is normal.      Breath sounds: Normal breath sounds. No wheezing or rales. Abdominal:      Palpations: Abdomen is soft. There is no mass. Tenderness: There is no abdominal tenderness. Lymphadenopathy:      Head:      Right side of head: No submandibular adenopathy. Left side of head: No submandibular adenopathy. Cervical: No cervical adenopathy. Skin:     General: Skin is warm. Neurological:      Mental Status: She is alert and oriented to person, place, and time. Motor: No tremor. Psychiatric:         Behavior: Behavior is cooperative.            Laboratory findings:    Recent Labs     06/26/20  1318 06/27/20  0600   WBC 7.9 6.3   HGB 13.5 13.5   HCT 39.4 39.7    234   INR  --  1.0     Recent Labs     06/26/20  1400 06/27/20  0600   * 135   K 4.8 4.7   CL 98 101   CO2 21 21   GLUCOSE 117* 111*   BUN 11 10   CREATININE 0.85 0.72   CALCIUM 8.4* 8.7     Recent Labs     06/26/20  1400   PROT 6.3*   LABALBU 3.1*   AST 83*   ALT 63*   *   ALKPHOS 75   BILITOT 0.65   BILIDIR 0.25          Specific Gravity, UA   Date Value Ref Range Status   06/23/2020 1.010 1.010 - 1.025 Final     Protein, UA   Date Value Ref Range Status   06/23/2020 TRACE (A) NEGATIVE Final     RBC, UA   Date Value Ref Range Status   06/23/2020 0 TO 4 0 - 4 /HPF Final     Bacteria, UA   Date Value Ref Range Status   06/23/2020 1+ (A) None Final     Nitrite, Urine   Date Value Ref Range Status   06/23/2020 NEGATIVE NEGATIVE Final     WBC, UA   Date Value Ref Range Status   06/23/2020 10 TO 25 0 - 4 /HPF Final     Leukocyte Esterase, Urine   Date Value Ref Range Status   06/23/2020 1+ (A) NEGATIVE Final       Imaging / Clinical Data :-   Xr Chest Portable    Result Date: 6/26/2020  Patchy bilateral perihilar pulmonary opacification, concerning for pneumonia including viral pneumonia. Xr Chest Portable    Result Date: 6/23/2020  Subtle vague peripheral opacities are nonspecific. Clinical Course : gradually improving  Assessment and Plan  :        1. Pneumonia secondary to COVID-19 with acute resp failure with hypoxia - continue dexamethasone, ID consult. may be candidate for plasma /remdesevir. 2. Hyperlipidemia  3. CAD s/p CINDY x2 in LCx in 2012 - aspirin and lopressor and Lipitor. Continue to monitor vitals , Intake / output ,  Cell count , HGB , Kidney function, oxygenation  as indicated . Plan and updates discussed with patient ,  answers  explained to satisfaction.    Plan discussed with Staff D[place]     (Please note that portions of this note were completed with a voice recognition program. Efforts were

## 2020-06-27 NOTE — CONSULTS
[facility] - Initial Consult Note  Today's Date and Time: 6/26/2020, 8:38 PM    Impression :   · Fever  · COVID 19 infection  · Shortness of breath  · Hypoxia with physical activity  · Diarrhea    Recommendations:   · Monitor off antibiotics  · Will review options for COVID care on 6-27-20    Medical Decision Making/Summary/Discussion:6/26/2020     ·   Infection Control Recommendations   · [place] Precautions  · Contact Isolation   · Airborne isolation  · Droplet plus Isolation    Antimicrobial Stewardship Recommendations     · Discontinuation of therapy  Coordination of Outpatient Care:   · Estimated Length of IV antimicrobials: None  · Patient will need Midline Catheter Insertion: No  · Patient will need PICC line Insertion:No  · Patient will need: Home IV , [place],  SNF,  LTAC:TBD  · Patient will need outpatient wound care:No    Chief complaint/reason for consultation:   · COVID 19  · Diarrhea      History of Present Illness:   Kole Ontiveros is a [de-identified]y.o.-year-old  female who was initially admitted on 6/26/2020. Patient seen at the request of Demar Elliott. INITIAL HISTORY:   Patient previously seen on 6-14-20 in [place] because of exposure to a person with COVID 19. She had taken a friend to a physician's visit on 6-10-20. Later her friend was admitted to [facility] with COVID 19. She was asymptomatic at that point. Her COVID test on 6-15-20 was positive. She remained in home quarantine until she started to feel poorly on 6-23-10 when she was seen in ER at [facility]. She was felt she had a UTI and was given Bactrim. On 6-26-20 she again was seen in the ER at [place] with more SOB and diarrhea. She was then transferred to [facility]. At [facility] she is relatively stable but her 02 sat drops to 84 % with walking to the bathroom on room air. At rest her 02 sat is 91%. . She will likely need 02 per nasal cannula.     CXR: 6-26-20: Patchy bilateral infiltrates compatible with COVID pneumonia      Labs, X rays reviewed: 6/26/2020    BUN: 11  Cr: 0.85    WBC: 7.9  Hb:13.5  Plat: 218    Cultures:  Urine:  ·   Blood:  ·   Sputum :  ·   Wound:  ·   CXR: 6-26-20: Patchy bilateral infiltrates    COVID test:  · 6-15-20: Positive    Discussed with patient, RN,. I have personally reviewed the past medical history, past surgical history, medications, social history, and family history, and I have updated the database accordingly. Past Medical History:     Past Medical History:   Diagnosis Date    Asteroid hyalosis of right eye     Coronary artery disease     status post acute non-Q wave myocardial infarction 08/23/2012, status post drug eluting stent placement in the LAD and 2 drug eluting stents in the left circumflex artery 08/23/2012.  Diastolic dysfunction     grade 1.     Gastric ulcer with hemorrhage 03/03/2015    gastric and esophageal ulcers due to nsaid    Hyperlipidemia     Mild mitral regurgitation     Mild tricuspid regurgitation     Obesity     Vitreous floaters     left eye. Past Surgical  History:     Past Surgical History:   Procedure Laterality Date    CARDIAC CATHETERIZATION Left 08/23/2012    left main artery normal, LAD with mid 60 to 70% stenosis, FFR was 0.76, left circumflex artery with mid 90% stenosis with thrombus, RCA with mild irregularities, preserved left ventricular systolic function, ejection fraction estimated around 50%, status post drug eluting stent placement in the LAD and 2 drug eluting stents in the left circumflex artery.      CHOLECYSTECTOMY  1978    ENDOSCOPY, COLON, DIAGNOSTIC  03/05/2015    esophageal/gastric ulcer; few diverticuli    UPPER GASTROINTESTINAL ENDOSCOPY  4/16/2015    healed gastric ulcer       Medications:      ascorbic acid  500 mg Oral BID    aspirin  81 mg Oral Daily    atorvastatin  40 mg Oral Daily    vitamin D3  2,000 Units Oral Daily    [START ON 6/27/2020] ferrous sulfate  325 mg Oral Daily with breakfast    lisinopril  5 mg Oral Daily    metoprolol tartrate  25 mg Oral BID    sodium chloride flush  10 mL Intravenous 2 times per day    enoxaparin  40 mg Subcutaneous Daily       Social History:     Social History     Socioeconomic History    Marital status: Single     Spouse name: Not on file    Number of children: Not on file    Years of education: Not on file    Highest education level: Not on file   Occupational History    Not on file   Social Needs    Financial resource strain: Not on file    Food insecurity     Worry: Not on file     Inability: Not on file    Transportation needs     Medical: Not on file     Non-medical: Not on file   Tobacco Use    Smoking status: Never Smoker    Smokeless tobacco: Never Used   Substance and Sexual Activity    Alcohol use: Yes     Comment: occasionally    Drug use: No    Sexual activity: Not on file   Lifestyle    Physical activity     Days per week: Not on file     Minutes per session: Not on file    Stress: Not on file   Relationships    Social connections     Talks on phone: Not on file     Gets together: Not on file     Attends Religion service: Not on file     Active member of club or organization: Not on file     Attends meetings of clubs or organizations: Not on file     Relationship status: Not on file    Intimate partner violence     Fear of current or ex partner: Not on file     Emotionally abused: Not on file     Physically abused: Not on file     Forced sexual activity: Not on file   Other Topics Concern    Not on file   Social History Narrative    Not on file       Family History:     Family History   Problem Relation Age of Onset    Heart Attack Father         Allergies:   Codeine     Review of Systems:   Constitutional: No fevers or chills. No systemic complaints  Head: No headaches  Eyes: No double vision or blurry vision. No conjunctival inflammation. ENT: No sore throat or runny nose. . No hearing loss, tinnitus or vertigo. Cardiovascular: No chest pain or palpitations. Shortness of breath. PANG  Lung: shortness of breath, nocough. No sputum production  Abdomen: No nausea, vomiting. Reports diarrhea. Genitourinary: No increased urinary frequency, or dysuria. No hematuria. No suprapubic or CVA pain  Musculoskeletal: No muscle aches or pains. No joint effusions, swelling or deformities  Hematologic: No bleeding or bruising. Neurologic: No headache, weakness, numbness, or tingling. Integument: No rash, no ulcers. Psychiatric: No depression. Endocrine: No polyuria, no polydipsia, no polyphagia. Physical Examination :   No data found. General Appearance: Awake, alert, and in no apparent distress  Head:  Normocephalic, no trauma  Eyes: Pupils equal, round, reactive to light and accommodation; extraocular movements intact; sclera anicteric; conjunctivae pink. No embolic phenomena. ENT: Oropharynx clear, without erythema, exudate, or thrush. No tenderness of sinuses. Mouth/throat: mucosa pink and moist. No lesions. Dentition in good repair. Neck:Supple, without lymphadenopathy. Thyroid normal, No bruits. Pulmonary/Chest: Clear to auscultation, without wheezes, rales, or rhonchi. No dullness to percussion. Desaturates with activity at room air. Cardiovascular: Regular rate and rhythm without murmurs, rubs, or gallops. Abdomen: Soft, non tender. Bowel sounds normal. No organomegaly  All four Extremities: No cyanosis, clubbing, edema, or effusions. Neurologic: No gross sensory or motor deficits. Skin: Warm and dry with good turgor. No signs of peripheral arterial or venous insufficiency. No ulcerations. No open wounds.     Medical Decision Making -Laboratory:   I have independently reviewed/ordered the following labs:    CBC with Differential:   Recent Labs     06/23/20  2245 06/26/20  1318   WBC 5.2 7.9   HGB 14.1 13.5   HCT 41.5 39.4    218   LYMPHOPCT 30 19*   MONOPCT 9 8     BMP:   Recent Labs 06/23/20  2245 06/26/20  1400   * 134*   K 4.4 4.8   CL 95* 98   CO2 20 21   BUN 17 11   CREATININE 0.77 0.85     Hepatic Function Panel:   Recent Labs     06/26/20  1400   PROT 6.3*   LABALBU 3.1*   BILIDIR 0.25   IBILI 0.40   BILITOT 0.65   ALKPHOS 75   ALT 63*   AST 83*     No results for input(s): RPR in the last 72 hours. No results for input(s): HIV in the last 72 hours. No results for input(s): BC in the last 72 hours. Lab Results   Component Value Date    MUCUS NOT REPORTED 06/23/2020    RBC 4.31 06/26/2020    TRICHOMONAS NOT REPORTED 06/23/2020    WBC 7.9 06/26/2020    YEAST NOT REPORTED 06/23/2020    TURBIDITY NOT REPORTED 06/23/2020     Lab Results   Component Value Date    CREATININE 0.85 06/26/2020    GLUCOSE 117 06/26/2020       Medical Decision Making-Imaging:     EXAMINATION:   ONE XRAY VIEW OF THE CHEST       6/26/2020 1:52 pm       COMPARISON:   06/23/2014.       HISTORY:   ORDERING SYSTEM PROVIDED HISTORY: dyspnea   TECHNOLOGIST PROVIDED HISTORY:   dyspnea   Reason for Exam: Fever   Acuity: Acute   Type of Exam: Initial       FINDINGS:   The cardiomediastinal silhouette is stable.  Retrocardiac hiatal hernia is   again noted.  There are stable patchy areas of opacification in the perihilar   region bilaterally.  No new areas of involvement.  No pleural fluid or   evidence of overt failure.           Impression   Patchy bilateral perihilar pulmonary opacification, concerning for pneumonia   including viral pneumonia.          Medical Decision [id number]:       Medical Decision Making-Other:     Note:  · Labs, medications, radiologic studies were reviewed with personal review of films  · Large amounts of data were reviewed  · Discussed with nursing Staff, Discharge planner  · Infection Control and Prevention measures reviewed  · All prior entries were reviewed  · Administer medications as ordered  · Prognosis: Guarded  · Discharge planning reviewed  · Follow up as outpatient. Thank you for allowing us to participate in the care of this patient. Please call with questions.     Perez Rowe MD  Pager: [phone] - Office: [phone]

## 2020-06-27 NOTE — FLOWSHEET NOTE
Missael Jimenez RN from Dr. Dillon العراقي office reviewed with patient's daughter risks and benefits of convalescent plasma donation. All questions asked and answered by patient after blood donation consent and information of trail read to patient as patient was having a difficult time reading forms. Patient made aware that at any time, she could not consent to receiving plasma donation if she chose to do so. Patient verbalized understanding of all risks, potential benefits, and her rights to accept or refuse treatment. Forms signed by patient and witnessed by Mary Fuentes and then given to [facility] for processing. Patient made aware that it could be 12-24 hours before she will receive plasma donation.

## 2020-06-27 NOTE — PROGRESS NOTES
[facility] - Progress Note    Today's Date and Time: 6/27/2020, 3:24 PM    Impression :   · Fever  · COVID 19 infection  · Shortness of breath  · Hypoxia with physical activity  · Diarrhea    Recommendations:   · Monitor off antibiotics  · Will review options for immune plasma with patient    Medical Decision Making/Summary/Discussion:6/27/2020     ·   Infection Control Recommendations   · Universal Precautions  · Airborne isolation  · Droplet plus Isolation    Antimicrobial Stewardship Recommendations     · Discontinuation of therapy  Coordination of Outpatient Care:   · Estimated Length of IV antimicrobials: None  · Patient will need Midline Catheter Insertion: No  · Patient will need PICC line Insertion:No  · Patient will need: Home IV , [place],  SNF,  LTAC:TBD  · Patient will need outpatient wound care:No    Chief complaint/reason for consultation:   · COVID 19  · Diarrhea      History of Present Illness:   Lawson Carranza is a [de-identified]y.o.-year-old  female who was initially admitted on 6/26/2020. Patient seen at the request of Jenniffer Mueller. INITIAL HISTORY:   Patient previously seen on 6-14-20 in [place] because of exposure to a person with COVID 19. She had taken a friend to a physician's visit on 6-10-20. Later her friend was admitted to [facility] with COVID 19. She was asymptomatic at that point. Her COVID test on 6-15-20 was positive. She remained in home quarantine until she started to feel poorly on 6-23-10 when she was seen in ER at [facility]. She was felt she had a UTI and was given Bactrim. On 6-26-20 she again was seen in the ER at [facility] with more SOB and diarrhea. She was then transferred to [facility]. At [facility] she is relatively stable but her 02 sat drops to 84 % with walking to the bathroom on room air. At rest her 02 sat is 91%. . She will likely need 02 per nasal cannula.     CXR: 6-26-20: Patchy bilateral infiltrates compatible with COVID pneumonia    CURRENT EVALUATION :6/27/2020     Patient evaluated and examined in the ICU. Afebrile  VS stable    On nasal 02 3 L/min  RR 20+  O2 sat 88%      Labs, X rays reviewed: 6/27/2020    BUN: 11  Cr: 0.85    WBC: 7.9  Hb:13.5  Plat: 218    Cultures:  Urine:  ·   Blood:  ·   Sputum :  ·   Wound:  ·   CXR: 6-26-20: Patchy bilateral infiltrates    COVID test:  · 6-15-20: Positive    Discussed with patient, RN,. I have personally reviewed the past medical history, past surgical history, medications, social history, and family history, and I have updated the database accordingly. Past Medical History:     Past Medical History:   Diagnosis Date    Asteroid hyalosis of right eye     Coronary artery disease     status post acute non-Q wave myocardial infarction 08/23/2012, status post drug eluting stent placement in the LAD and 2 drug eluting stents in the left circumflex artery 08/23/2012.  Diastolic dysfunction     grade 1.     Gastric ulcer with hemorrhage 03/03/2015    gastric and esophageal ulcers due to nsaid    Hyperlipidemia     Mild mitral regurgitation     Mild tricuspid regurgitation     Obesity     Vitreous floaters     left eye. Past Surgical  History:     Past Surgical History:   Procedure Laterality Date    CARDIAC CATHETERIZATION Left 08/23/2012    left main artery normal, LAD with mid 60 to 70% stenosis, FFR was 0.76, left circumflex artery with mid 90% stenosis with thrombus, RCA with mild irregularities, preserved left ventricular systolic function, ejection fraction estimated around 50%, status post drug eluting stent placement in the LAD and 2 drug eluting stents in the left circumflex artery.      CHOLECYSTECTOMY  1978    ENDOSCOPY, COLON, DIAGNOSTIC  03/05/2015    esophageal/gastric ulcer; few diverticuli    UPPER GASTROINTESTINAL ENDOSCOPY  4/16/2015    healed gastric ulcer       Medications:      ascorbic acid  500 mg Oral BID    aspirin  81 mg Oral Daily    atorvastatin  40 mg Oral Daily    vitamin D3  2,000 Units Oral Daily    ferrous sulfate  325 mg Oral Daily with breakfast    lisinopril  5 mg Oral Daily    metoprolol tartrate  25 mg Oral BID    sodium chloride flush  10 mL Intravenous 2 times per day    enoxaparin  40 mg Subcutaneous Daily    dexamethasone  4 mg Oral 2 times per day       Social History:     Social History     Socioeconomic History    Marital status: Single     Spouse name: Not on file    Number of children: Not on file    Years of education: Not on file    Highest education level: Not on file   Occupational History    Not on file   Social Needs    Financial resource strain: Not on file    Food insecurity     Worry: Not on file     Inability: Not on file    Transportation needs     Medical: Not on file     Non-medical: Not on file   Tobacco Use    Smoking status: Never Smoker    Smokeless tobacco: Never Used   Substance and Sexual Activity    Alcohol use: Yes     Comment: occasionally    Drug use: No    Sexual activity: Not on file   Lifestyle    Physical activity     Days per week: Not on file     Minutes per session: Not on file    Stress: Not on file   Relationships    Social connections     Talks on phone: Not on file     Gets together: Not on file     Attends Mosque service: Not on file     Active member of club or organization: Not on file     Attends meetings of clubs or organizations: Not on file     Relationship status: Not on file    Intimate partner violence     Fear of current or ex partner: Not on file     Emotionally abused: Not on file     Physically abused: Not on file     Forced sexual activity: Not on file   Other Topics Concern    Not on file   Social History Narrative    Not on file       Family History:     Family History   Problem Relation Age of Onset    Heart Attack Father         Allergies:   Codeine     Review of Systems:   Constitutional: No fevers or chills.  No systemic complaints  Head: No headaches  Eyes: No double vision or blurry vision. No conjunctival inflammation. ENT: No sore throat or runny nose. . No hearing loss, tinnitus or vertigo. Cardiovascular: No chest pain or palpitations. Shortness of breath. PANG  Lung: shortness of breath, nocough. No sputum production  Abdomen: No nausea, vomiting. Reports diarrhea. Genitourinary: No increased urinary frequency, or dysuria. No hematuria. No suprapubic or CVA pain  Musculoskeletal: No muscle aches or pains. No joint effusions, swelling or deformities  Hematologic: No bleeding or bruising. Neurologic: No headache, weakness, numbness, or tingling. Integument: No rash, no ulcers. Psychiatric: No depression. Endocrine: No polyuria, no polydipsia, no polyphagia. Physical Examination :     Patient Vitals for the past 8 hrs:   BP Temp Temp src Pulse Resp SpO2   06/27/20 0811 (!) 109/48 97.7 °F (36.5 °C) Oral 66 28 (!) 88 %     General Appearance: Awake, alert, and in no apparent distress  Head:  Normocephalic, no trauma  Eyes: Pupils equal, round, reactive to light and accommodation; extraocular movements intact; sclera anicteric; conjunctivae pink. No embolic phenomena. ENT: Oropharynx clear, without erythema, exudate, or thrush. No tenderness of sinuses. Mouth/throat: mucosa pink and moist. No lesions. Dentition in good repair. Neck:Supple, without lymphadenopathy. Thyroid normal, No bruits. Pulmonary/Chest: Clear to auscultation, without wheezes, rales, or rhonchi. No dullness to percussion. Desaturates with activity at room air. Cardiovascular: Regular rate and rhythm without murmurs, rubs, or gallops. Abdomen: Soft, non tender. Bowel sounds normal. No organomegaly  All four Extremities: No cyanosis, clubbing, edema, or effusions. Neurologic: No gross sensory or motor deficits. Skin: Warm and dry with good turgor. No signs of peripheral arterial or venous insufficiency. No ulcerations. No open wounds.     Medical amounts of data were reviewed  · Discussed with nursing Staff, Discharge planner  · Infection Control and Prevention measures reviewed  · All prior entries were reviewed  · Administer medications as ordered  · Prognosis: Guarded  · Discharge planning reviewed  · Follow up as outpatient. Thank you for allowing us to participate in the care of this patient. Please call with questions.     Emma Nieves MD  Pager: [phone] - Office: [phone]

## 2020-06-27 NOTE — PLAN OF CARE
Problem: Airway Clearance - Ineffective  Goal: Achieve or maintain patent airway  6/27/2020 0952 by Bunny Asencio RN  Outcome: Ongoing  6/26/2020 2315 by Luli Rader RN  Outcome: Ongoing     Problem: Gas Exchange - Impaired  Goal: Absence of hypoxia  6/27/2020 0952 by Bunny Asencio RN  Outcome: Ongoing  6/26/2020 2315 by Luli Rader RN  Outcome: Ongoing  Goal: Promote optimal lung function  6/27/2020 0952 by Bunny Asencio RN  Outcome: Ongoing  6/26/2020 2315 by Luli Rader RN  Outcome: Ongoing     Problem: Breathing Pattern - Ineffective  Goal: Ability to achieve and maintain a regular respiratory rate  6/27/2020 0952 by Bunny Asencio RN  Outcome: Ongoing  6/26/2020 2315 by Luli Rader RN  Outcome: Ongoing     Problem:  Body Temperature -  Risk of, Imbalanced  Goal: Ability to maintain a body temperature within defined limits  6/27/2020 0952 by Bunny Asencio RN  Outcome: Ongoing  6/26/2020 2315 by Luli Rader RN  Outcome: Ongoing  Goal: Will regain or maintain usual level of consciousness  6/27/2020 0952 by Bunny Asencio RN  Outcome: Ongoing  6/26/2020 2315 by Luli Rader RN  Outcome: Ongoing  Goal: Complications related to the disease process, condition or treatment will be avoided or minimized  6/27/2020 0952 by Bunny Asencio RN  Outcome: Ongoing  6/26/2020 2315 by Luli Rader RN  Outcome: Ongoing

## 2020-06-27 NOTE — H&P
[facility]    HISTORY AND PHYSICAL EXAMINATION            Date:   6/26/2020  Patient name:  Grayson Robison  Date of admission:  6/26/2020  7:58 PM  MRN:   [id number]  Account:  [de-identified]  YOB: 1940  PCP:    River Boudreaux MD  Room:   3027/3027-01  Code Status:    Full Code    Chief Complaint:     [id number]-[id number]     History Obtained From:     patient, electronic medical record    History of Present Illness:     Grayson Robison is a [de-identified] y.o. Non-/non  female who presents with COVID-19 and fatigue and is admitted to the hospital for the management of covid-19     Patient returns to [facility] Emergency room for the concern of not feeling well. Patient is known COVID -23 Positive and was just recently treated and released from the emergency room secondary to fatigue and UTI on 6/23/2020. She had presented to the emergency room with her family with subjective fevers, and diarrhea and was starting to feel more short of breath. The family brought her to the emergency room because she was becoming more fatigued. At the time of evaluation in the emergency room she denied chest pain or abdominal pain. The work up in the emergency room showed  Laboratories with a hemogram without acute leukocytosis or anemias, her lymphocytes were 19. Her ddimer was 2.94, her lactic acid was 1.8. Her metabolic panel was  With a sodium of 134, potassium of 4.8, chloride of 98, co2 of 21, bun of 11, creat of 0.85. LD of 475, alt of 63, ast 83. High sensitive troponin was 18. EKG showed Sinus 67 with no ischemia. No morphologic change compared to September 2019. Axis 40, , QRS 80, . Vitals were 129/62-60, resp 20 on room air and temp of 99.1. Family wanted patient admitted secondary to weakness as they are concern she is not eating or drinking or taking her medications. She had taken a friend to a physician's visit on 6-10-20.  Later her friend was admitted to [facility] with COVID 19. She was asymptomatic at that point Her COVID test on 6-15-20 was positive. Past Medical History:     Past Medical History:   Diagnosis Date    Asteroid hyalosis of right eye     Coronary artery disease     status post acute non-Q wave myocardial infarction 08/23/2012, status post drug eluting stent placement in the LAD and 2 drug eluting stents in the left circumflex artery 08/23/2012.  Diastolic dysfunction     grade 1.     Gastric ulcer with hemorrhage 03/03/2015    gastric and esophageal ulcers due to nsaid    Hyperlipidemia     Mild mitral regurgitation     Mild tricuspid regurgitation     Obesity     Vitreous floaters     left eye. Past Surgical History:     Past Surgical History:   Procedure Laterality Date    CARDIAC CATHETERIZATION Left 08/23/2012    left main artery normal, LAD with mid 60 to 70% stenosis, FFR was 0.76, left circumflex artery with mid 90% stenosis with thrombus, RCA with mild irregularities, preserved left ventricular systolic function, ejection fraction estimated around 50%, status post drug eluting stent placement in the LAD and 2 drug eluting stents in the left circumflex artery.  CHOLECYSTECTOMY  1978    ENDOSCOPY, COLON, DIAGNOSTIC  03/05/2015    esophageal/gastric ulcer; few diverticuli    UPPER GASTROINTESTINAL ENDOSCOPY  4/16/2015    healed gastric ulcer        Medications Prior to Admission:     Prior to Admission medications    Medication Sig Start Date End Date Taking?  Authorizing Provider   sulfamethoxazole-trimethoprim (BACTRIM DS) 800-160 MG per tablet Take 1 tablet by mouth 2 times daily for 3 days 6/23/20 6/26/20  Carlos Oro MD   lisinopril (PRINIVIL;ZESTRIL) 5 MG tablet TAKE 1 TABLET EVERY DAY 5/11/20   Emma Martins DO   metoprolol tartrate (LOPRESSOR) 25 MG tablet TAKE 1 TABLET TWICE DAILY 5/11/20   Emma Martins DO   atorvastatin (LIPITOR) 40 MG tablet TAKE 1 TABLET EVERY NIGHT AT BEDTIME 1/15/20   Jose Eduardo Mckeon MD   alendronate (FOSAMAX) 70 MG tablet Take 1 tablet by mouth every 7 days 9/30/19   Makeda Altamirano MD   Cholecalciferol (VITAMIN D3) 2000 units CAPS Take 1 capsule by mouth daily 9/16/19   Makeda Altamirano MD   ferrous sulfate (FE TABS) 325 (65 Fe) MG EC tablet Take 1 tablet by mouth daily (with breakfast) 9/16/19   Makeda Altamirano MD   nitroGLYCERIN (NITROSTAT) 0.4 MG SL tablet Place 1 tablet under the tongue every 5 minutes as needed for Chest pain 3/13/19   Makeda Altamirano MD   aspirin 81 MG tablet Take 81 mg by mouth daily    Historical Provider, MD   ascorbic acid (VITAMIN C) 500 MG tablet Take 500 mg by mouth 2 times daily Indications: patient only takes in the Winter With ana paula hips     Historical Provider, MD   Multiple Vitamins-Minerals (MULTIVITAMIN PO) Take 1 tablet by mouth daily. Historical Provider, MD        Allergies:     Codeine    Social History:     Tobacco:    reports that she has never smoked. She has never used smokeless tobacco.  Alcohol:      reports current alcohol use. Drug Use:  reports no history of drug use. Family History:     Family History   Problem Relation Age of Onset    Heart Attack Father        Review of Systems:     Positive and Negative as described in HPI. Review of Systems   Constitutional: Positive for fatigue. Negative for chills, diaphoresis and fever. HENT: Negative for congestion and hearing loss. Eyes: Negative for discharge. Respiratory: Positive for cough, shortness of breath and wheezing. Negative for stridor. Cardiovascular: Negative for chest pain, palpitations and leg swelling. Gastrointestinal: Negative for abdominal pain, blood in stool, constipation, diarrhea, nausea and vomiting. Endocrine: Negative for cold intolerance. Genitourinary: Negative for dysuria and frequency. Musculoskeletal: Negative for myalgias. Skin: Negative for rash. Neurological: Negative for dizziness, seizures and headaches. Psychiatric/Behavioral: The patient is not nervous/anxious. Physical Exam:   BP (!) 146/61   Pulse 69   Temp 98.7 °F (37.1 °C) (Axillary)   Resp 23   LMP  (LMP Unknown)   SpO2 96%   Temp (24hrs), Av.3 °F (37.4 °C), Min:98.7 °F (37.1 °C), Max:100.2 °F (37.9 °C)    No results for input(s): POCGLU in the last 72 hours. No intake or output data in the 24 hours ending 207    Physical Exam  Vitals signs and nursing note reviewed. Constitutional:       General: She is not in acute distress. Appearance: She is well-developed. She is not diaphoretic. HENT:      Head: Normocephalic and atraumatic. Right Ear: Hearing normal.      Left Ear: Hearing normal.      Nose: Nose normal. No rhinorrhea. Eyes:      General: Lids are normal.      Extraocular Movements:      Right eye: Normal extraocular motion. Left eye: Normal extraocular motion. Conjunctiva/sclera: Conjunctivae normal.      Right eye: Right conjunctiva is not injected. Left eye: Left conjunctiva is not injected. Pupils: Pupils are equal, round, and reactive to light. Pupils are equal.      Right eye: Pupil is reactive. Left eye: Pupil is reactive. Neck:      Musculoskeletal: Neck supple. Thyroid: No thyromegaly. Vascular: No carotid bruit. Trachea: Trachea and phonation normal. No tracheal deviation. Cardiovascular:      Rate and Rhythm: Normal rate and regular rhythm. Pulses: Normal pulses. Heart sounds: Normal heart sounds. No murmur. Pulmonary:      Effort: Pulmonary effort is normal. No respiratory distress. Breath sounds: No stridor. Wheezing, rhonchi and rales present. No decreased breath sounds. Abdominal:      General: Bowel sounds are normal. There is no distension. Palpations: Abdomen is soft. There is no mass. Tenderness: There is no abdominal tenderness. There is no guarding. Musculoskeletal:         General: No tenderness.    Skin: General: Skin is warm and dry. Findings: No erythema, lesion or rash. Neurological:      Mental Status: She is alert and oriented to person, place, and time. She is not disoriented. Cranial Nerves: No cranial nerve deficit. Psychiatric:         Speech: Speech normal.         Behavior: Behavior normal. Behavior is cooperative.          Investigations:      Laboratory Testing:  Recent Results (from the past 24 hour(s))   EKG 12 Lead    Collection Time: 06/26/20  1:06 PM   Result Value Ref Range    Ventricular Rate 67 BPM    Atrial Rate 67 BPM    P-R Interval 142 ms    QRS Duration 80 ms    Q-T Interval 394 ms    QTc Calculation (Bazett) 416 ms    P Axis 58 degrees    R Axis 40 degrees    T Axis 28 degrees   CBC Auto Differential    Collection Time: 06/26/20  1:18 PM   Result Value Ref Range    WBC 7.9 3.5 - 11.3 k/uL    RBC 4.31 3.95 - 5.11 m/uL    Hemoglobin 13.5 11.9 - 15.1 g/dL    Hematocrit 39.4 36.3 - 47.1 %    MCV 91.4 82.6 - 102.9 fL    MCH 31.3 25.2 - 33.5 pg    MCHC 34.3 (H) 25.2 - 33.5 g/dL    RDW 13.2 11.8 - 14.4 %    Platelets 705 429 - 451 k/uL    MPV 9.8 8.1 - 13.5 fL    NRBC Automated 0.0 0.0 per 100 WBC    Differential Type NOT REPORTED     WBC Morphology NOT REPORTED     RBC Morphology NOT REPORTED     Platelet Estimate NOT REPORTED     Seg Neutrophils 73 (H) 36 - 65 %    Lymphocytes 19 (L) 24 - 43 %    Monocytes 8 3 - 12 %    Eosinophils % 0 (L) 1 - 4 %    Basophils 0 0 - 2 %    Immature Granulocytes 1 (H) 0 %    Segs Absolute 5.78 1.50 - 8.10 k/uL    Absolute Lymph # 1.48 1.10 - 3.70 k/uL    Absolute Mono # 0.60 0.10 - 1.20 k/uL    Absolute Eos # <0.03 0.00 - 0.44 k/uL    Basophils Absolute <0.03 0.00 - 0.20 k/uL    Absolute Immature Granulocyte 0.04 0.00 - 0.30 k/uL   D-Dimer, Quantitative    Collection Time: 06/26/20  1:18 PM   Result Value Ref Range    D-Dimer, Quant 2.94 (H) 0.00 - 0.59 mg/L FEU   Lactate, Sepsis    Collection Time: 06/26/20  1:18 PM   Result Value Ref Range Lactic Acid, Sepsis 1.8 0.5 - 1.9 mmol/L    Lactic Acid, Sepsis, Whole Blood NOT REPORTED 0.5 - 1.9 mmol/L   SPECIMEN REJECTION    Collection Time: 06/26/20  1:18 PM   Result Value Ref Range    Specimen Source ER     Ordered Test BMP,LD,LIVP,TROPI,FERI,PRCAL,CRP     Reason for Rejection Unable to perform testing: Specimen hemolyzed.      - NOT REPORTED    Basic Metabolic Panel    Collection Time: 06/26/20  2:00 PM   Result Value Ref Range    Glucose 117 (H) 70 - 99 mg/dL    BUN 11 8 - 23 mg/dL    CREATININE 0.85 0.50 - 0.90 mg/dL    Bun/Cre Ratio 13 9 - 20    Calcium 8.4 (L) 8.6 - 10.4 mg/dL    Sodium 134 (L) 135 - 144 mmol/L    Potassium 4.8 3.7 - 5.3 mmol/L    Chloride 98 98 - 107 mmol/L    CO2 21 20 - 31 mmol/L    Anion Gap 15 9 - 17 mmol/L    GFR Non-African American >60 >60 mL/min    GFR African American >60 >60 mL/min    GFR Comment          GFR Staging NOT REPORTED    C-Reactive Protein    Collection Time: 06/26/20  2:00 PM   Result Value Ref Range    CRP 68.5 (H) 0.0 - 5.0 mg/L   Ferritin    Collection Time: 06/26/20  2:00 PM   Result Value Ref Range    Ferritin 4,808 (H) 13 - 150 ug/L   Lactate Dehydrogenase    Collection Time: 06/26/20  2:00 PM   Result Value Ref Range     (H) 135 - 214 U/L   Hepatic Function Panel    Collection Time: 06/26/20  2:00 PM   Result Value Ref Range    Alb 3.1 (L) 3.5 - 5.2 g/dL    Alkaline Phosphatase 75 35 - 104 U/L    ALT 63 (H) 5 - 33 U/L    AST 83 (H) <32 U/L    Total Bilirubin 0.65 0.3 - 1.2 mg/dL    Bilirubin, Direct 0.25 <0.31 mg/dL    Bilirubin, Indirect 0.40 0.00 - 1.00 mg/dL    Total Protein 6.3 (L) 6.4 - 8.3 g/dL    Globulin 3.2 1.5 - 3.8 g/dL    Albumin/Globulin Ratio 1.0 1.0 - 2.5   Procalcitonin    Collection Time: 06/26/20  2:00 PM   Result Value Ref Range    Procalcitonin 0.06 <0.09 ng/mL   Troponin    Collection Time: 06/26/20  2:00 PM   Result Value Ref Range    Troponin, High Sensitivity 18 (H) 0 - 14 ng/L    Troponin T NOT REPORTED <0.03 ng/mL Troponin Interp NOT REPORTED        Imaging/Diagnostics:  Xr Chest Portable    Result Date: 6/26/2020  Patchy bilateral perihilar pulmonary opacification, concerning for pneumonia including viral pneumonia. Xr Chest Portable    Result Date: 6/23/2020  Subtle vague peripheral opacities are nonspecific. Assessment :      Hospital Problems           Last Modified POA    * (Principal) Pneumonia due to COVID-19 virus 6/26/2020 Yes    Coronary artery disease involving native coronary artery of native heart without angina pectoris 6/26/2020 Yes    Hyperlipidemia 6/26/2020 Yes          Plan:     Patient status inpatient in the Progressive Unit/Step down    1. Acute hypoxemic respiratory failure secondary to CO VID 19 pneumonia continue oxygen monitor off antibiotics per infectious disease,    2. Supportive treatment with the fever. 3. No acute signs of fluid overload or exacerbation of heart failure  4. Continue to trend the troponin. Most likely secondary,   5. GI proph  6. DVT proph- watch closely has she has had bleeding ulcers in the past.   7. Fluid overload give 1 dose of IV Lasix  8. Give oral steroid for CO VID 19 pneumonia    Consultations:   IP CONSULT TO INFECTIOUS DISEASES     Patient is admitted as inpatient status because of co-morbidities listed above, severity of signs and symptoms as outlined, requirement for current medical therapies and most importantly because of direct risk to patient if care not provided in a hospital setting.     Marilu Pro MD  6/26/2020  11:07 PM    Copy sent to Dr. Cyndi Kennedy MD

## 2020-06-27 NOTE — PLAN OF CARE
[facility] 19    Second Visit Note  For more detailed information please refer to the progress note of the day      6/27/2020    5:17 PM    Name:   Quique Harman  MRN:     [id number]     [place]:      [de-identified]   Room:   [facility] Day:  1  Admit Date:  6/26/2020  7:58 PM    PCP:   Kesha Altman MD  Code Status:  Full Code      Pt vitals were reviewed   New labs were reviewed   Patient was seen    Updated plan :     1. Called and talked to patients daughter Laura Harris. Patient has been having malaise and cough and breathing difficulty for 9  days. Daughter was updated. Still waiting for patient to make decision about plasma transfusion. Daughter reports that patients boyfriend has COVID and got plasma and did well. She is going to talk to her mother.        Porfirio Grullon MD  6/27/2020  5:17 PM

## 2020-06-28 LAB
BLD PROD TYP BPU: NORMAL
DISPENSE STATUS BLOOD BANK: NORMAL
EKG ATRIAL RATE: 67 BPM
EKG P AXIS: 58 DEGREES
EKG P-R INTERVAL: 142 MS
EKG Q-T INTERVAL: 394 MS
EKG QRS DURATION: 80 MS
EKG QTC CALCULATION (BAZETT): 416 MS
EKG R AXIS: 40 DEGREES
EKG T AXIS: 28 DEGREES
EKG VENTRICULAR RATE: 67 BPM
TRANSFUSION STATUS: NORMAL
UNIT DIVISION: 0
UNIT NUMBER: NORMAL

## 2020-06-28 PROCEDURE — 6360000002 HC RX W HCPCS: Performed by: FAMILY MEDICINE

## 2020-06-28 PROCEDURE — 99233 SBSQ HOSP IP/OBS HIGH 50: CPT | Performed by: INTERNAL MEDICINE

## 2020-06-28 PROCEDURE — 6360000002 HC RX W HCPCS: Performed by: INTERNAL MEDICINE

## 2020-06-28 PROCEDURE — 2580000003 HC RX 258: Performed by: FAMILY MEDICINE

## 2020-06-28 PROCEDURE — 2060000000 HC ICU INTERMEDIATE R&B

## 2020-06-28 PROCEDURE — 99232 SBSQ HOSP IP/OBS MODERATE 35: CPT | Performed by: FAMILY MEDICINE

## 2020-06-28 PROCEDURE — 6370000000 HC RX 637 (ALT 250 FOR IP): Performed by: FAMILY MEDICINE

## 2020-06-28 RX ADMIN — SODIUM CHLORIDE, PRESERVATIVE FREE 10 ML: 5 INJECTION INTRAVENOUS at 08:27

## 2020-06-28 RX ADMIN — Medication 81 MG: at 08:27

## 2020-06-28 RX ADMIN — CHOLECALCIFEROL TAB 10 MCG (400 UNIT) 2000 UNITS: 10 TAB at 08:25

## 2020-06-28 RX ADMIN — METOPROLOL TARTRATE 25 MG: 25 TABLET ORAL at 08:26

## 2020-06-28 RX ADMIN — ENOXAPARIN SODIUM 40 MG: 40 INJECTION SUBCUTANEOUS at 08:27

## 2020-06-28 RX ADMIN — DESMOPRESSIN ACETATE 40 MG: 0.2 TABLET ORAL at 08:27

## 2020-06-28 RX ADMIN — SODIUM CHLORIDE, PRESERVATIVE FREE 10 ML: 5 INJECTION INTRAVENOUS at 20:32

## 2020-06-28 RX ADMIN — OXYCODONE HYDROCHLORIDE AND ACETAMINOPHEN 500 MG: 500 TABLET ORAL at 20:27

## 2020-06-28 RX ADMIN — LISINOPRIL 5 MG: 5 TABLET ORAL at 08:26

## 2020-06-28 RX ADMIN — METOPROLOL TARTRATE 25 MG: 25 TABLET ORAL at 20:27

## 2020-06-28 RX ADMIN — FERROUS SULFATE TAB EC 325 MG (65 MG FE EQUIVALENT) 325 MG: 325 (65 FE) TABLET DELAYED RESPONSE at 08:25

## 2020-06-28 RX ADMIN — DEXAMETHASONE 4 MG: 4 TABLET ORAL at 08:27

## 2020-06-28 RX ADMIN — OXYCODONE HYDROCHLORIDE AND ACETAMINOPHEN 500 MG: 500 TABLET ORAL at 08:26

## 2020-06-28 ASSESSMENT — ENCOUNTER SYMPTOMS
WHEEZING: 0
VOMITING: 0
CHEST TIGHTNESS: 0
DIARRHEA: 0
SHORTNESS OF BREATH: 1
CONSTIPATION: 0
BLOOD IN STOOL: 0
RHINORRHEA: 0
COUGH: 1
NAUSEA: 0
ABDOMINAL PAIN: 0

## 2020-06-28 NOTE — RESEARCH
Clinical Research Services      Patient Name: Junior Phillips  MRN: [id number]  [place]: 1940  Date of evaluation: 6/27/2020  Time of evaluation: 1830  Reason for evaluation:  Screening    [x]  Asked by Dr. Davis Chakraborty to evaluate the patient for Expanded Access to Convalescent Plasma for the Treatment of Patients with COVID-19     Protocol # [id number]     IND# [id number]       NCT# [id number]  :  James Carias MD through the [address] [place] [address]    [x]  Patient met eligibility criteria  [x]  Patient read study consent. Questions answered. Consent signed by patient. [x]  A copy of the signed consents given to the patient/family  [x]  Blood bank notified  [x]  Patient educated on study medication indication and side effects. [x]  Patient verbalizes understanding. [x]  The patient will receive 1 unit of ABO compatible COVID-19 convalescent plasma over 1-2 hours.      Additional Comments:      [address] [place] [address] Nurse  For questions page or perfect serve Dr. Davis Chakraborty  or Dr. Rayo Appiah or Dr. Christina Walls or the research nurse at [phone]    Saud Dean MD

## 2020-06-28 NOTE — PLAN OF CARE
Problem: Airway Clearance - Ineffective  Goal: Achieve or maintain patent airway  Outcome: Ongoing     Problem: Gas Exchange - Impaired  Goal: Absence of hypoxia  Outcome: Ongoing     Problem: Gas Exchange - Impaired  Goal: Promote optimal lung function  Outcome: Ongoing     Problem: Breathing Pattern - Ineffective  Goal: Ability to achieve and maintain a regular respiratory rate  Outcome: Ongoing     Problem: Body Temperature -  Risk of, Imbalanced  Goal: Ability to maintain a body temperature within defined limits  Outcome: Ongoing     Problem: Body Temperature -  Risk of, Imbalanced  Goal: Will regain or maintain usual level of consciousness  Outcome: Ongoing     Problem:  Body Temperature -  Risk of, Imbalanced  Goal: Complications related to the disease process, condition or treatment will be avoided or minimized  Outcome: Ongoing     Problem: Isolation Precautions - Risk of Spread of Infection  Goal: Prevent transmission of infection  Outcome: Ongoing     Problem: Nutrition Deficits  Goal: Optimize nutrtional status  Outcome: Ongoing     Problem: Risk for Fluid Volume Deficit  Goal: Maintain normal heart rhythm  Outcome: Ongoing     Problem: Risk for Fluid Volume Deficit  Goal: Maintain normal serum potassium, sodium, calcium, phosphorus, and pH  Outcome: Ongoing     Problem: Loneliness or Risk for Loneliness  Goal: Demonstrate positive use of time alone when socialization is not possible  Outcome: Ongoing     Problem: Fatigue  Goal: Verbalize increase energy and improved vitality  Outcome: Ongoing     Problem: Patient Education: Go to Patient Education Activity  Goal: Patient/Family Education  Outcome: Ongoing     Problem: Falls - Risk of:  Goal: Will remain free from falls  Description: Will remain free from falls  Outcome: Ongoing     Problem: Falls - Risk of:  Goal: Absence of physical injury  Description: Absence of physical injury  Outcome: Ongoing

## 2020-06-28 NOTE — PROGRESS NOTES
[facility] - Progress Note    Today's Date and Time: 6/28/2020, 2:35 PM    Impression :   · Fever  · COVID 19 infection  · Shortness of breath  · Hypoxia with physical activity  · Diarrhea  · Patient provided informed consent to receive immune plasma on 6/27/20    Recommendations:   · Monitor off antibiotics    Medical Decision Making/Summary/Discussion:6/28/2020     ·   Infection Control Recommendations   · Universal Precautions  · Airborne isolation  · Droplet plus Isolation    Antimicrobial Stewardship Recommendations     · Discontinuation of therapy  Coordination of Outpatient Care:   · Estimated Length of IV antimicrobials: None  · Patient will need Midline Catheter Insertion: No  · Patient will need PICC line Insertion:No  · Patient will need: Home IV , [place],  SNF,  LTAC:TBD  · Patient will need outpatient wound care:No    Chief complaint/reason for consultation:   · COVID 19  · Diarrhea      History of Present Illness:   Chandrika Medina is a [de-identified]y.o.-year-old  female who was initially admitted on 6/26/2020. Patient seen at the request of Jeremy Miller. INITIAL HISTORY:   Patient previously seen on 6-14-20 in [place] because of exposure to a person with COVID 19. She had taken a friend to a physician's visit on 6-10-20. Later her friend was admitted to [facility] with COVID 19. She was asymptomatic at that point. Her COVID test on 6-15-20 was positive. She remained in home quarantine until she started to feel poorly on 6-23-10 when she was seen in ER at [facility]. She was felt she had a UTI and was given Bactrim. On 6-26-20 she again was seen in the ER at [facility] with more SOB and diarrhea. She was then transferred to [facility]. At [facility] she is relatively stable but her 02 sat drops to 84 % with walking to the bathroom on room air. At rest her 02 sat is 91%. . She will likely need 02 per nasal cannula.     CXR: 6-26-20: Patchy bilateral infiltrates compatible with COVID pneumonia    CURRENT EVALUATION :6/28/2020     Patient evaluated and examined in the ICU. Afebrile  VS stable    On nasal 02 3-->5 L/min  RR 20+  O2 sat 88-->92%    Immune plasma was discussed with the patient on 6/27/20. She provided informed consent for the administration of the immune plasma. Labs, X rays reviewed: 6/28/2020    BUN: 11-->10  Cr: 0.85-->0.72    WBC: 7.9--> 6.3  Hb:13.5  Plat: 218--> 234    Ferritin 4808    Cultures:  Urine:  ·   Blood:  ·   Sputum :  ·   Wound:  ·   CXR: 6-26-20: Patchy bilateral infiltrates    COVID test:  · 6-15-20: Positive    Discussed with patient, RN,. I have personally reviewed the past medical history, past surgical history, medications, social history, and family history, and I have updated the database accordingly. Past Medical History:     Past Medical History:   Diagnosis Date    Asteroid hyalosis of right eye     Coronary artery disease     status post acute non-Q wave myocardial infarction 08/23/2012, status post drug eluting stent placement in the LAD and 2 drug eluting stents in the left circumflex artery 08/23/2012.  Diastolic dysfunction     grade 1.     Gastric ulcer with hemorrhage 03/03/2015    gastric and esophageal ulcers due to nsaid    Hyperlipidemia     Mild mitral regurgitation     Mild tricuspid regurgitation     Obesity     Patient in clinical research study 06/27/2020    Expanded Access to Convalescent Plasma for COVID-19 Estimated date of completion 7/30/2020    Vitreous floaters     left eye.         Past Surgical  History:     Past Surgical History:   Procedure Laterality Date    CARDIAC CATHETERIZATION Left 08/23/2012    left main artery normal, LAD with mid 60 to 70% stenosis, FFR was 0.76, left circumflex artery with mid 90% stenosis with thrombus, RCA with mild irregularities, preserved left ventricular systolic function, ejection fraction estimated around 50%, status post drug eluting stent placement in the LAD and 2 drug eluting stents in the left circumflex artery.      CHOLECYSTECTOMY  1978    ENDOSCOPY, COLON, DIAGNOSTIC  03/05/2015    esophageal/gastric ulcer; few diverticuli    UPPER GASTROINTESTINAL ENDOSCOPY  4/16/2015    healed gastric ulcer       Medications:      sodium chloride  20 mL Intravenous Once    ascorbic acid  500 mg Oral BID    aspirin  81 mg Oral Daily    atorvastatin  40 mg Oral Daily    vitamin D3  2,000 Units Oral Daily    ferrous sulfate  325 mg Oral Daily with breakfast    lisinopril  5 mg Oral Daily    metoprolol tartrate  25 mg Oral BID    sodium chloride flush  10 mL Intravenous 2 times per day    enoxaparin  40 mg Subcutaneous Daily       Social History:     Social History     Socioeconomic History    Marital status: Single     Spouse name: Not on file    Number of children: Not on file    Years of education: Not on file    Highest education level: Not on file   Occupational History    Not on file   Social Needs    Financial resource strain: Not on file    Food insecurity     Worry: Not on file     Inability: Not on file    Transportation needs     Medical: Not on file     Non-medical: Not on file   Tobacco Use    Smoking status: Never Smoker    Smokeless tobacco: Never Used   Substance and Sexual Activity    Alcohol use: Yes     Comment: occasionally    Drug use: No    Sexual activity: Not on file   Lifestyle    Physical activity     Days per week: Not on file     Minutes per session: Not on file    Stress: Not on file   Relationships    Social connections     Talks on phone: Not on file     Gets together: Not on file     Attends Jain service: Not on file     Active member of club or organization: Not on file     Attends meetings of clubs or organizations: Not on file     Relationship status: Not on file    Intimate partner violence     Fear of current or ex partner: Not on file     Emotionally abused: Not on file     Physically abused: Not on file     Forced sexual activity: Not on file   Other Topics Concern    Not on file   Social History Narrative    Not on file       Family History:     Family History   Problem Relation Age of Onset    Heart Attack Father         Allergies:   Codeine     Review of Systems:   Constitutional: No fevers or chills. No systemic complaints  Head: No headaches  Eyes: No double vision or blurry vision. No conjunctival inflammation. ENT: No sore throat or runny nose. . No hearing loss, tinnitus or vertigo. Cardiovascular: No chest pain or palpitations. Shortness of breath. PANG  Lung: shortness of breath, nocough. No sputum production  Abdomen: No nausea, vomiting. Reports diarrhea. Genitourinary: No increased urinary frequency, or dysuria. No hematuria. No suprapubic or CVA pain  Musculoskeletal: No muscle aches or pains. No joint effusions, swelling or deformities  Hematologic: No bleeding or bruising. Neurologic: No headache, weakness, numbness, or tingling. Integument: No rash, no ulcers. Psychiatric: No depression. Endocrine: No polyuria, no polydipsia, no polyphagia. Physical Examination :     Patient Vitals for the past 8 hrs:   BP Temp Temp src Pulse Resp SpO2   06/28/20 1214 (!) 116/54 97.8 °F (36.6 °C) Oral 60 20 92 %   06/28/20 0825 (!) 107/46 -- -- -- -- --   06/28/20 0756 (!) 107/46 97.5 °F (36.4 °C) Oral 70 22 (!) 88 %     General Appearance: Awake, alert, and in no apparent distress  Head:  Normocephalic, no trauma  Eyes: Pupils equal, round, reactive to light and accommodation; extraocular movements intact; sclera anicteric; conjunctivae pink. No embolic phenomena. ENT: Oropharynx clear, without erythema, exudate, or thrush. No tenderness of sinuses. Mouth/throat: mucosa pink and moist. No lesions. Dentition in good repair. Neck:Supple, without lymphadenopathy. Thyroid normal, No bruits. Pulmonary/Chest: Clear to auscultation, without wheezes, rales, or rhonchi.   No dullness to

## 2020-06-28 NOTE — CARE COORDINATION
Case Management Initial Discharge [address],             Spoke with pt over the phone  to discuss discharge plans. Information verified: address, contacts, phone number, , insurance Yes    Emergency Contact/Next of Kin name & number:Terese Puentes, daughter [phone] and Edith Roach , child, [phone]    PCP: Sheila Pike MD  Date of last visit: 6 mo-1 yr ago. Last appt cancelled due to Covid 19    Insurance Provider: [facility]    Discharge Planning    Living Arrangements:  Alone   Support Systems:  Children, Family Members    Home has 1 story  1 stairs to climb to get into front door    Patient able to perform ADL's:Independent    Current Services (outpatient & in home) none  DME equipment: none  DME provider: n/a    Receiving oral anticoagulation therapy? No    If indicated:   Physician managing anticoagulation treatment: n/a  Where does patient obtain lab work for ATC treatment? n/a      Potential Assistance Needed:  N/A    Patient agreeable to home care: No  [place] of choice provided:  n/a    Prior SNF/Rehab Placement and Facility: none  Agreeable to SNF/Rehab: No  [place] of choice provided: n/a     Evaluation: no    Expected Discharge date:  20    Patient expects to be discharged to:  home with family support  Follow Up Appointment: Best Day/ Time:      Transportation provider: family  Transportation arrangements needed for discharge: No    Readmission Risk              Risk of Unplanned Readmission:        12             Does patient have a readmission risk score greater than 14?: No  If yes, follow-up appointment must be made within 7 days of discharge.      Goals of Care: Return home with restored energy,fatigue resolving      Discharge Plan: Home with family support, follow progression and needs, COVID 19 +  Full code        Electronically signed by Rony Lux RN on 20 at 12:39 PM EDT

## 2020-06-28 NOTE — PROGRESS NOTES
[facility]      Daily Progress Note     Admit Date: 6/26/2020  Bed/Room No.  3027/3027-01  Admitting Physician : Randy Robins MD  Code Status :[address] Day:  LOS: 2 days   Chief Complaint:     Fatigue. Malaise     Principal Problem:    Pneumonia due to COVID-19 virus  Active Problems:    Coronary artery disease involving native coronary artery of native heart without angina pectoris    Hyperlipidemia  Resolved Problems:    * No resolved hospital problems. *    Subjective : Interval History/Significant events :  06/28/20    Patient is alert and oriented . she remains on supplemental oxygen @ 4LPM   Vitals - Stable afebrile, Tmax 99.1   Labs - normal white count . Nursing notes , Consults notes reviewed. Overnight events and updates discussed with Nursing staff . Background History:         Paz Chen is [de-identified] y.o. female  Who was admitted to the hospital on 6/26/2020 for treatment of Pneumonia due to COVID-19 virus. Patient presented to emergency room at [facility] for fatigue, malaise. She was treated with UTI about 4 days before. She also reported subjective fevers, cough, shortness of breath. Initial evaluation at ER showed temperature 99.1, respiration 20, saturation 94% on room air. Lab evaluation showed elevated d-dimer 1.94, lactic acid 1.8, normal white count, kidney function. Chest x-ray showed bilateral patchy pulmonary opacification concerning for viral Pneumonia. Patient had positive COVID-19 test on 6/15/2020. PMH:  Past Medical History:   Diagnosis Date    Asteroid hyalosis of right eye     Coronary artery disease     status post acute non-Q wave myocardial infarction 08/23/2012, status post drug eluting stent placement in the LAD and 2 drug eluting stents in the left circumflex artery 08/23/2012.     Diastolic dysfunction     grade 1.     Gastric ulcer with hemorrhage 03/03/2015    gastric and esophageal ulcers due to nsaid    Hyperlipidemia     Mild mitral regurgitation     Mild tricuspid regurgitation     Obesity     Patient in clinical research study 06/27/2020    Expanded Access to Convalescent Plasma for COVID-19 Estimated date of completion 7/30/2020    Vitreous floaters     left eye. Allergies: Allergies   Allergen Reactions    Codeine Nausea Only      Medications :  sodium chloride, 20 mL, Intravenous, Once  ascorbic acid, 500 mg, Oral, BID  aspirin, 81 mg, Oral, Daily  atorvastatin, 40 mg, Oral, Daily  vitamin D3, 2,000 Units, Oral, Daily  ferrous sulfate, 325 mg, Oral, Daily with breakfast  lisinopril, 5 mg, Oral, Daily  metoprolol tartrate, 25 mg, Oral, BID  sodium chloride flush, 10 mL, Intravenous, 2 times per day  enoxaparin, 40 mg, Subcutaneous, Daily        Review of Systems   Review of Systems   Constitutional: Positive for appetite change and fatigue. Negative for fever and unexpected weight change. HENT: Negative for congestion, rhinorrhea and sneezing. Eyes: Negative for visual disturbance. Respiratory: Positive for cough and shortness of breath. Negative for chest tightness and wheezing. Cardiovascular: Negative for chest pain and palpitations. Gastrointestinal: Negative for abdominal pain, blood in stool, constipation, diarrhea, nausea and vomiting. Genitourinary: Negative for dysuria, enuresis, frequency and hematuria. Musculoskeletal: Negative for arthralgias and myalgias. Skin: Negative for rash. Neurological: Negative for dizziness, weakness, light-headedness and headaches. Hematological: Does not bruise/bleed easily. Psychiatric/Behavioral: Negative for dysphoric mood and sleep disturbance.      Objective :      Current Vitals : Temp: 98.4 °F (36.9 °C),  Pulse: 63, Resp: 18, BP: (!) 107/46, SpO2: 93 %  Last 24 Hrs Vitals   Patient Vitals for the past 24 hrs:   BP Temp Temp src Pulse Resp SpO2 Height Weight   06/28/20 0825 (!) 107/46 -- -- -- -- -- -- --   06/28/20 0225 -- -- -- 63 18 93 % -- --   06/28/20 0215 (!) 148/68 98.4 °F (36.9 °C) Oral 64 19 94 % -- --   06/28/20 0145 (!) 153/68 97.8 °F (36.6 °C) Oral 63 17 95 % -- --   06/28/20 0115 (!) 145/65 98 °F (36.7 °C) Oral 65 17 94 % -- --   06/28/20 0100 (!) 149/66 97.9 °F (36.6 °C) Oral 66 18 92 % -- --   06/28/20 0050 (!) 140/60 98.1 °F (36.7 °C) Oral 67 21 91 % -- --   06/28/20 0045 -- 97.9 °F (36.6 °C) Oral -- -- -- -- --   06/28/20 0040 (!) 151/72 97.6 °F (36.4 °C) Oral 68 21 93 % -- --   06/28/20 0035 (!) 151/63 98.1 °F (36.7 °C) Oral 67 22 92 % -- --   06/28/20 0020 (!) 147/74 98 °F (36.7 °C) Oral 60 20 93 % -- --   06/27/20 2045 -- -- -- 63 17 94 % -- --   06/27/20 2025 (!) 115/57 97.9 °F (36.6 °C) Oral 68 19 91 % -- --   06/27/20 2000 -- -- -- 66 -- -- -- --   06/27/20 1822 122/72 97.7 °F (36.5 °C) Oral 73 20 95 % -- --   06/27/20 1600 (!) 100/46 -- -- 59 22 90 % -- --   06/27/20 1536 -- -- -- -- -- -- 4' 11\" (1.499 m) 172 lb 13.5 oz (78.4 kg)   06/27/20 1531 (!) 101/59 -- -- 63 20 92 % -- --   06/27/20 1200 (!) 118/49 -- -- 60 20 90 % -- --     Intake / output   06/27 0701 - 06/28 0700  In: 945.3 [P.O.:580; I.V.:234]  Out: 1550 [Urine:1550]  Physical Exam:  Physical Exam  Vitals signs and nursing note reviewed. Constitutional:       General: She is not in acute distress. Appearance: She is ill-appearing. She is not diaphoretic. Interventions: Nasal cannula in place. HENT:      Head: Normocephalic and atraumatic. Nose:      Right Sinus: No maxillary sinus tenderness or frontal sinus tenderness. Left Sinus: No maxillary sinus tenderness or frontal sinus tenderness. Mouth/Throat:      Pharynx: No oropharyngeal exudate. Eyes:      General: No scleral icterus. Conjunctiva/sclera: Conjunctivae normal.      Pupils: Pupils are equal, round, and reactive to light. Neck:      Musculoskeletal: Full passive range of motion without pain and neck supple. Thyroid: No thyromegaly.       Vascular: No JVD.   Cardiovascular:      Rate and Rhythm: Normal rate and regular rhythm. Pulses:           Dorsalis pedis pulses are 2+ on the right side and 2+ on the left side. Heart sounds: Normal heart sounds. No murmur. Pulmonary:      Effort: Pulmonary effort is normal.      Breath sounds: Normal breath sounds. No wheezing or rales. Abdominal:      Palpations: Abdomen is soft. There is no mass. Tenderness: There is no abdominal tenderness. Lymphadenopathy:      Head:      Right side of head: No submandibular adenopathy. Left side of head: No submandibular adenopathy. Cervical: No cervical adenopathy. Skin:     General: Skin is warm. Neurological:      Mental Status: She is alert and oriented to person, place, and time. Motor: No tremor. Psychiatric:         Behavior: Behavior is cooperative.            Laboratory findings:    Recent Labs     06/26/20  1318 06/27/20  0600   WBC 7.9 6.3   HGB 13.5 13.5   HCT 39.4 39.7    234   INR  --  1.0     Recent Labs     06/26/20  1400 06/27/20  0600   * 135   K 4.8 4.7   CL 98 101   CO2 21 21   GLUCOSE 117* 111*   BUN 11 10   CREATININE 0.85 0.72   CALCIUM 8.4* 8.7     Recent Labs     06/26/20  1400   PROT 6.3*   LABALBU 3.1*   AST 83*   ALT 63*   *   ALKPHOS 75   BILITOT 0.65   BILIDIR 0.25          Specific Gravity, UA   Date Value Ref Range Status   06/23/2020 1.010 1.010 - 1.025 Final     Protein, UA   Date Value Ref Range Status   06/23/2020 TRACE (A) NEGATIVE Final     RBC, UA   Date Value Ref Range Status   06/23/2020 0 TO 4 0 - 4 /HPF Final     Bacteria, UA   Date Value Ref Range Status   06/23/2020 1+ (A) None Final     Nitrite, Urine   Date Value Ref Range Status   06/23/2020 NEGATIVE NEGATIVE Final     WBC, UA   Date Value Ref Range Status   06/23/2020 10 TO 25 0 - 4 /HPF Final     Leukocyte Esterase, Urine   Date Value Ref Range Status   06/23/2020 1+ (A) NEGATIVE Final       Imaging / Clinical Data :-   Xr Chest Portable    Result Date: 6/26/2020  Patchy bilateral perihilar pulmonary opacification, concerning for pneumonia including viral pneumonia. Xr Chest Portable    Result Date: 6/23/2020  Subtle vague peripheral opacities are nonspecific. Clinical Course : gradually improving  Assessment and Plan  :        1. Pneumonia secondary to COVID-19 with acute resp failure with hypoxia - continue dexamethasone, plan for  for plasma. 2. Hyperlipidemia - Lipitor. 3. CAD s/p CINDY x2 in LCx in 2012 - aspirin and lopressor and Lipitor. Continue to monitor vitals , Intake / output ,  Cell count , HGB , Kidney function, oxygenation  as indicated . Plan and updates discussed with patient ,  answers  explained to satisfaction.    Plan discussed with Staff Tr Case RN     (Please note that portions of this note were completed with a voice recognition program. Efforts were made to edit the dictations but occasionally words are mis-transcribed.)      Mayo Faust MD  6/28/2020

## 2020-06-29 LAB
ABSOLUTE EOS #: <0.03 K/UL (ref 0–0.44)
ABSOLUTE IMMATURE GRANULOCYTE: 0.06 K/UL (ref 0–0.3)
ABSOLUTE LYMPH #: 1.51 K/UL (ref 1.1–3.7)
ABSOLUTE MONO #: 0.53 K/UL (ref 0.1–1.2)
ANION GAP SERPL CALCULATED.3IONS-SCNC: 16 MMOL/L (ref 9–17)
BASOPHILS # BLD: 0 % (ref 0–2)
BASOPHILS ABSOLUTE: <0.03 K/UL (ref 0–0.2)
BUN BLDV-MCNC: 21 MG/DL (ref 8–23)
BUN/CREAT BLD: ABNORMAL (ref 9–20)
CALCIUM SERPL-MCNC: 8.9 MG/DL (ref 8.6–10.4)
CHLORIDE BLD-SCNC: 101 MMOL/L (ref 98–107)
CO2: 20 MMOL/L (ref 20–31)
CREAT SERPL-MCNC: 0.65 MG/DL (ref 0.5–0.9)
DIFFERENTIAL TYPE: ABNORMAL
EOSINOPHILS RELATIVE PERCENT: 0 % (ref 1–4)
GFR AFRICAN AMERICAN: >60 ML/MIN
GFR NON-AFRICAN AMERICAN: >60 ML/MIN
GFR SERPL CREATININE-BSD FRML MDRD: ABNORMAL ML/MIN/{1.73_M2}
GFR SERPL CREATININE-BSD FRML MDRD: ABNORMAL ML/MIN/{1.73_M2}
GLUCOSE BLD-MCNC: 130 MG/DL (ref 70–99)
HCT VFR BLD CALC: 40.9 % (ref 36.3–47.1)
HEMOGLOBIN: 13.5 G/DL (ref 11.9–15.1)
IMMATURE GRANULOCYTES: 1 %
LYMPHOCYTES # BLD: 15 % (ref 24–43)
MCH RBC QN AUTO: 30.8 PG (ref 25.2–33.5)
MCHC RBC AUTO-ENTMCNC: 33 G/DL (ref 28.4–34.8)
MCV RBC AUTO: 93.2 FL (ref 82.6–102.9)
MONOCYTES # BLD: 5 % (ref 3–12)
NRBC AUTOMATED: 0 PER 100 WBC
PDW BLD-RTO: 12.5 % (ref 11.8–14.4)
PLATELET # BLD: 330 K/UL (ref 138–453)
PLATELET ESTIMATE: ABNORMAL
PMV BLD AUTO: 9.9 FL (ref 8.1–13.5)
POTASSIUM SERPL-SCNC: 5 MMOL/L (ref 3.7–5.3)
RBC # BLD: 4.39 M/UL (ref 3.95–5.11)
RBC # BLD: ABNORMAL 10*6/UL
SEG NEUTROPHILS: 79 % (ref 36–65)
SEGMENTED NEUTROPHILS ABSOLUTE COUNT: 7.81 K/UL (ref 1.5–8.1)
SODIUM BLD-SCNC: 137 MMOL/L (ref 135–144)
WBC # BLD: 9.9 K/UL (ref 3.5–11.3)
WBC # BLD: ABNORMAL 10*3/UL

## 2020-06-29 PROCEDURE — 99233 SBSQ HOSP IP/OBS HIGH 50: CPT | Performed by: INTERNAL MEDICINE

## 2020-06-29 PROCEDURE — 6370000000 HC RX 637 (ALT 250 FOR IP): Performed by: FAMILY MEDICINE

## 2020-06-29 PROCEDURE — 2060000000 HC ICU INTERMEDIATE R&B

## 2020-06-29 PROCEDURE — 6360000002 HC RX W HCPCS: Performed by: FAMILY MEDICINE

## 2020-06-29 PROCEDURE — 2580000003 HC RX 258: Performed by: FAMILY MEDICINE

## 2020-06-29 PROCEDURE — 85025 COMPLETE CBC W/AUTO DIFF WBC: CPT

## 2020-06-29 PROCEDURE — 80048 BASIC METABOLIC PNL TOTAL CA: CPT

## 2020-06-29 PROCEDURE — 97535 SELF CARE MNGMENT TRAINING: CPT

## 2020-06-29 PROCEDURE — 97162 PT EVAL MOD COMPLEX 30 MIN: CPT

## 2020-06-29 PROCEDURE — 97530 THERAPEUTIC ACTIVITIES: CPT

## 2020-06-29 PROCEDURE — 97166 OT EVAL MOD COMPLEX 45 MIN: CPT

## 2020-06-29 PROCEDURE — 99232 SBSQ HOSP IP/OBS MODERATE 35: CPT | Performed by: FAMILY MEDICINE

## 2020-06-29 RX ORDER — FAMOTIDINE 20 MG/1
20 TABLET, FILM COATED ORAL 2 TIMES DAILY
Status: DISCONTINUED | OUTPATIENT
Start: 2020-06-29 | End: 2020-06-30 | Stop reason: HOSPADM

## 2020-06-29 RX ORDER — DEXAMETHASONE 4 MG/1
4 TABLET ORAL EVERY 12 HOURS SCHEDULED
Status: DISCONTINUED | OUTPATIENT
Start: 2020-06-29 | End: 2020-06-30 | Stop reason: HOSPADM

## 2020-06-29 RX ADMIN — OXYCODONE HYDROCHLORIDE AND ACETAMINOPHEN 500 MG: 500 TABLET ORAL at 09:57

## 2020-06-29 RX ADMIN — FAMOTIDINE 20 MG: 20 TABLET, FILM COATED ORAL at 20:10

## 2020-06-29 RX ADMIN — LISINOPRIL 5 MG: 5 TABLET ORAL at 09:58

## 2020-06-29 RX ADMIN — Medication 81 MG: at 09:56

## 2020-06-29 RX ADMIN — FERROUS SULFATE TAB EC 325 MG (65 MG FE EQUIVALENT) 325 MG: 325 (65 FE) TABLET DELAYED RESPONSE at 09:57

## 2020-06-29 RX ADMIN — ENOXAPARIN SODIUM 40 MG: 40 INJECTION SUBCUTANEOUS at 20:10

## 2020-06-29 RX ADMIN — CHOLECALCIFEROL TAB 10 MCG (400 UNIT) 2000 UNITS: 10 TAB at 09:58

## 2020-06-29 RX ADMIN — ENOXAPARIN SODIUM 40 MG: 40 INJECTION SUBCUTANEOUS at 09:56

## 2020-06-29 RX ADMIN — SODIUM CHLORIDE, PRESERVATIVE FREE 10 ML: 5 INJECTION INTRAVENOUS at 09:58

## 2020-06-29 RX ADMIN — METOPROLOL TARTRATE 25 MG: 25 TABLET ORAL at 20:10

## 2020-06-29 RX ADMIN — OXYCODONE HYDROCHLORIDE AND ACETAMINOPHEN 500 MG: 500 TABLET ORAL at 20:10

## 2020-06-29 RX ADMIN — SODIUM CHLORIDE, PRESERVATIVE FREE 10 ML: 5 INJECTION INTRAVENOUS at 20:15

## 2020-06-29 RX ADMIN — DESMOPRESSIN ACETATE 40 MG: 0.2 TABLET ORAL at 09:56

## 2020-06-29 RX ADMIN — DEXAMETHASONE 4 MG: 4 TABLET ORAL at 20:10

## 2020-06-29 ASSESSMENT — ENCOUNTER SYMPTOMS
DIARRHEA: 0
COUGH: 1
NAUSEA: 0
ABDOMINAL PAIN: 0
VOMITING: 0
CHEST TIGHTNESS: 0
SHORTNESS OF BREATH: 1
BLOOD IN STOOL: 0
CONSTIPATION: 0
WHEEZING: 0
RHINORRHEA: 0

## 2020-06-29 NOTE — PROGRESS NOTES
services?: Yes  Response To Previous Treatment: Not applicable  Family / Caregiver Present: No  Follows Commands: Within Functional Limits  Subjective  Subjective: RN and pt in agreement for PT eval; pt seated in bedside chair upon PT arrival on 5L NC, pt very pleasant and cooperative throughout session  Pain Screening  Patient Currently in Pain: Denies  Vital Signs  Patient Currently in Pain: Denies       Orientation  Orientation  Overall Orientation Status: Within Functional Limits  Social/Functional History  Social/Functional History  Lives With: Alone  Type of Home: House  Home Layout: Two level, Able to Live on Main level with bedroom/bathroom, Laundry in basement  Home Access: Stairs to enter without rails  Entrance Stairs - Number of Steps: 2 small ones  Bathroom Shower/Tub: Walk-in shower  Bathroom Toilet: Handicap height  Bathroom Equipment: Tub transfer bench, Grab bars in shower  Home Equipment: (pt reported no use of DME At baseline)  ADL Assistance: [address] [place] [address] Avenue: Independent  Homemaking Responsibilities: Yes  Meal Prep Responsibility: Primary  Laundry Responsibility: Primary  Cleaning Responsibility: Primary  Ambulation Assistance: Independent  Transfer Assistance: Independent  Active : Yes  Mode of Transportation: Car  Leisure & Hobbies: \"most anything\"  Cognition   Cognition  Overall Cognitive Status: WFL    Objective  Joint Mobility  Spine: WFL  ROM RLE: WFL  ROM LLE: WFL  ROM RUE: See OT assessment- PT/ OT coeval  ROM LUE: See OT assessment- PT/ OT coeval  Strength RLE  Strength RLE: WFL  Strength LLE  Strength LLE: WFL  Strength RUE  Comment: See OT assessment- PT/ OT coeval  Strength LUE  Comment: See OT assessment- PT/ OT coeval      Sensation  Overall Sensation Status: WFL(pt denies numbness/tingling)  Bed mobility  Comment: HEATHER- pt seated in bedside chair upon PT arrival, retired to bedside chair upon writer's exit  Transfers  Sit to Stand: Contact guard assistance  Stand to sit: Contact guard assistance  Ambulation  Ambulation?: Yes  Ambulation 1  Surface: level tile  Device: No Device  Other Apparatus: O2(5L)  Assistance: Contact guard assistance  Quality of Gait: Mild unsteadiness; antalgic gait; decreased step length LLE  Gait Deviations: Slow Margot  Distance: 40ft  Comments: Pt demonstrates shortness of breath throughout mobility. SpO2 to 86% following ambulation. Pt educated on pursed lip breathing with good return demo, SpO2 returning to 90% following 1 minute seated rest break.    Stairs/Curb  Stairs?: No     Balance  Posture: Fair  Sitting - Static: Good;-  Sitting - Dynamic: Fair;+  Standing - Static: Fair;+  Standing - Dynamic: Fair;+  Comments: Standing balance assessed w/ no AD        Plan   Plan  Times per week: 3-5x/week  Current Treatment Recommendations: Strengthening, Transfer Training, Endurance Training, Patient/Caregiver Education & Training, ROM, Balance Training, Gait Training, Home Exercise Program, Functional Mobility Training, Stair training, Safety Education & Training  Safety Devices  Type of devices: Gait belt, Call light within reach, Nurse notified, Patient at risk for falls, Left in chair  Restraints  Initially in place: No  AM-PAC Score     AM-PAC Inpatient Mobility without Stair Climbing Raw Score : 17 (06/29/20 1517)  AM-PAC Inpatient without Stair Climbing T-Scale Score : 48.47 (06/29/20 1517)  Mobility Inpatient CMS 0-100% Score: 32.72 (06/29/20 1517)  Mobility Inpatient without Stair CMS G-Code Modifier : Inell Elli (06/29/20 1517)       Goals  Short term goals  Time Frame for Short term goals: 14  Short term goal 1: Pt to perform bed mobility SBA  Short term goal 2: Demonstrate functional transfers w/ supervision  Short term goal 3: Ambulate 150ft w/ no AD with supervision  Short term goal 4: Tolerate 30 minutes of therapy to demo increased endurance   Short term goal 5: Ascend/descend 2 stairs with no hand rail SBA  Patient Goals Patient goals :  To go home       Therapy Time   Individual Concurrent Group Co-treatment   Time In 1426         Time Out 1448         Minutes 22         Timed Code Treatment Minutes: 9 Minutes       Mil Kay PT

## 2020-06-29 NOTE — PROGRESS NOTES
[facility] - Progress Note    Today's Date and Time: 6/29/2020, 11:05 AM    Impression :   · Fever  · COVID 19 infection  · Shortness of breath  · Hypoxia with physical activity  · Diarrhea  · Patient provided immune plasma on 6/28/20. No AE noted    Recommendations:   · Monitor off antibiotics    Medical Decision Making/Summary/Discussion:6/29/2020     ·   Infection Control Recommendations   · Universal Precautions  · Airborne isolation  · Droplet plus Isolation    Antimicrobial Stewardship Recommendations     · Discontinuation of therapy  Coordination of Outpatient Care:   · Estimated Length of IV antimicrobials: None  · Patient will need Midline Catheter Insertion: No  · Patient will need PICC line Insertion:No  · Patient will need: Home IV , [place],  SNF,  LTAC:TBD  · Patient will need outpatient wound care:No    Chief complaint/reason for consultation:   · COVID 19  · Diarrhea      History of Present Illness:   Evelyne Pinon is a [de-identified]y.o.-year-old  female who was initially admitted on 6/26/2020. Patient seen at the request of Ross Short. INITIAL HISTORY:   Patient previously seen on 6-14-20 in [place] because of exposure to a person with COVID 19. She had taken a friend to a physician's visit on 6-10-20. Later her friend was admitted to [facility] with COVID 19. She was asymptomatic at that point. Her COVID test on 6-15-20 was positive. She remained in home quarantine until she started to feel poorly on 6-23-10 when she was seen in ER at [facility]. She was felt she had a UTI and was given Bactrim. On 6-26-20 she again was seen in the ER at [facility] with more SOB and diarrhea. She was then transferred to [facility]. At [facility] she is relatively stable but her 02 sat drops to 84 % with walking to the bathroom on room air. At rest her 02 sat is 91%. . She will likely need 02 per nasal cannula.     CXR: 6-26-20: Patchy bilateral infiltrates post drug eluting stent placement in the LAD and 2 drug eluting stents in the left circumflex artery.      CHOLECYSTECTOMY  1978    ENDOSCOPY, COLON, DIAGNOSTIC  03/05/2015    esophageal/gastric ulcer; few diverticuli    UPPER GASTROINTESTINAL ENDOSCOPY  4/16/2015    healed gastric ulcer       Medications:      sodium chloride  20 mL Intravenous Once    ascorbic acid  500 mg Oral BID    aspirin  81 mg Oral Daily    atorvastatin  40 mg Oral Daily    vitamin D3  2,000 Units Oral Daily    ferrous sulfate  325 mg Oral Daily with breakfast    lisinopril  5 mg Oral Daily    metoprolol tartrate  25 mg Oral BID    sodium chloride flush  10 mL Intravenous 2 times per day    enoxaparin  40 mg Subcutaneous Daily       Social History:     Social History     Socioeconomic History    Marital status: Single     Spouse name: Not on file    Number of children: Not on file    Years of education: Not on file    Highest education level: Not on file   Occupational History    Not on file   Social Needs    Financial resource strain: Not on file    Food insecurity     Worry: Not on file     Inability: Not on file    Transportation needs     Medical: Not on file     Non-medical: Not on file   Tobacco Use    Smoking status: Never Smoker    Smokeless tobacco: Never Used   Substance and Sexual Activity    Alcohol use: Yes     Comment: occasionally    Drug use: No    Sexual activity: Not on file   Lifestyle    Physical activity     Days per week: Not on file     Minutes per session: Not on file    Stress: Not on file   Relationships    Social connections     Talks on phone: Not on file     Gets together: Not on file     Attends Islam service: Not on file     Active member of club or organization: Not on file     Attends meetings of clubs or organizations: Not on file     Relationship status: Not on file    Intimate partner violence     Fear of current or ex partner: Not on file     Emotionally abused: Not on file     Physically abused: Not on file     Forced sexual activity: Not on file   Other Topics Concern    Not on file   Social History Narrative    Not on file       Family History:     Family History   Problem Relation Age of Onset    Heart Attack Father         Allergies:   Codeine     Review of Systems:   Constitutional: No fevers or chills. No systemic complaints. Reports feeling better  Head: No headaches  Eyes: No double vision or blurry vision. No conjunctival inflammation. ENT: No sore throat or runny nose. . No hearing loss, tinnitus or vertigo. Cardiovascular: No chest pain or palpitations. Shortness of breath. PANG  Lung: shortness of breath, nocough. No sputum production  Abdomen: No nausea, vomiting. Reports diarrhea. Genitourinary: No increased urinary frequency, or dysuria. No hematuria. No suprapubic or CVA pain  Musculoskeletal: No muscle aches or pains. No joint effusions, swelling or deformities  Hematologic: No bleeding or bruising. Neurologic: No headache, weakness, numbness, or tingling. Integument: No rash, no ulcers. Psychiatric: No depression. Endocrine: No polyuria, no polydipsia, no polyphagia. Physical Examination :     Patient Vitals for the past 8 hrs:   Weight   06/29/20 0449 167 lb 8.8 oz (76 kg)     General Appearance: Awake, alert, and in no apparent distress  Head:  Normocephalic, no trauma  Eyes: Pupils equal, round, reactive to light and accommodation; extraocular movements intact; sclera anicteric; conjunctivae pink. No embolic phenomena. ENT: Oropharynx clear, without erythema, exudate, or thrush. No tenderness of sinuses. Mouth/throat: mucosa pink and moist. No lesions. Dentition in good repair. Neck:Supple, without lymphadenopathy. Thyroid normal, No bruits. Pulmonary/Chest: Clear to auscultation, without wheezes, rales, or rhonchi. No dullness to percussion. Desaturates with activity at room air.   Cardiovascular: Regular rate and rhythm without murmurs, rubs, or gallops. Abdomen: Soft, non tender. Bowel sounds normal. No organomegaly  All four Extremities: No cyanosis, clubbing, edema, or effusions. Neurologic: No gross sensory or motor deficits. Skin: Warm and dry with good turgor. No signs of peripheral arterial or venous insufficiency. No ulcerations. No open wounds. Medical Decision Making -Laboratory:   I have independently reviewed/ordered the following labs:    CBC with Differential:   Recent Labs     06/26/20  1318 06/27/20  0600 06/29/20  0458   WBC 7.9 6.3 9.9   HGB 13.5 13.5 13.5   HCT 39.4 39.7 40.9    234 330   LYMPHOPCT 19*  --  15*   MONOPCT 8  --  5     BMP:   Recent Labs     06/27/20  0600 06/29/20  0458    137   K 4.7 5.0    101   CO2 21 20   BUN 10 21   CREATININE 0.72 0.65     Hepatic Function Panel:   Recent Labs     06/26/20  1400   PROT 6.3*   LABALBU 3.1*   BILIDIR 0.25   IBILI 0.40   BILITOT 0.65   ALKPHOS 75   ALT 63*   AST 83*     No results for input(s): RPR in the last 72 hours. No results for input(s): HIV in the last 72 hours. No results for input(s): BC in the last 72 hours.   Lab Results   Component Value Date    MUCUS NOT REPORTED 06/23/2020    RBC 4.39 06/29/2020    TRICHOMONAS NOT REPORTED 06/23/2020    WBC 9.9 06/29/2020    YEAST NOT REPORTED 06/23/2020    TURBIDITY NOT REPORTED 06/23/2020     Lab Results   Component Value Date    CREATININE 0.65 06/29/2020    GLUCOSE 130 06/29/2020       Medical Decision Making-Imaging:     EXAMINATION:   ONE XRAY VIEW OF THE CHEST       6/26/2020 1:52 pm       COMPARISON:   06/23/2014.       HISTORY:   ORDERING SYSTEM PROVIDED HISTORY: dyspnea   TECHNOLOGIST PROVIDED HISTORY:   dyspnea   Reason for Exam: Fever   Acuity: Acute   Type of Exam: Initial       FINDINGS:   The cardiomediastinal silhouette is stable.  Retrocardiac hiatal hernia is   again noted.  There are stable patchy areas of opacification in the perihilar   region bilaterally.  No new areas of involvement.  No pleural fluid or   evidence of overt failure.           Impression   Patchy bilateral perihilar pulmonary opacification, concerning for pneumonia   including viral pneumonia. Medical Decision [id number]-Bbeyw:       Medical Decision Making-Other:     Note:  · Labs, medications, radiologic studies were reviewed with personal review of films  · Large amounts of data were reviewed  · Discussed with nursing Staff, Discharge planner  · Infection Control and Prevention measures reviewed  · All prior entries were reviewed  · Administer medications as ordered  · Prognosis: Guarded  · Discharge planning reviewed  · Follow up as outpatient. Thank you for allowing us to participate in the care of this patient. Please call with questions.     Mayito Wallace MD  Pager: [phone] - Office: [phone]

## 2020-06-29 NOTE — PROGRESS NOTES
Occupational Therapy   Occupational Therapy Initial Assessment  Date: 2020   Patient Name: Grayson Robison  MRN: [id number]     : 1940    Date of Service: 2020    Discharge Recommendations:    No therapy recommended at discharge. Assessment   Performance deficits / Impairments: Decreased functional mobility ; Decreased endurance;Decreased ADL status; Decreased high-level IADLs  Treatment Diagnosis: COVID   Prognosis: Good  Decision Making: Low Complexity  Patient Education: pt ed on POC, purpose of eval, importance of movement, safety during functional transfers/functiona lmobility, pursed lip breathing tech. good return   REQUIRES OT FOLLOW UP: Yes  Activity Tolerance  Activity Tolerance: Patient Tolerated treatment well  Safety Devices  Safety Devices in place: Yes  Type of devices: Gait belt;Call light within reach; Left in chair;Nurse notified  Restraints  Initially in place: No           Patient Diagnosis(es): There were no encounter diagnoses. has a past medical history of Asteroid hyalosis of right eye, Coronary artery disease, Diastolic dysfunction, Gastric ulcer with hemorrhage, Hyperlipidemia, Mild mitral regurgitation, Mild tricuspid regurgitation, Obesity, Patient in clinical research study, and Vitreous floaters. has a past surgical history that includes Cholecystectomy (); Cardiac catheterization (Left, 2012); Endoscopy, colon, diagnostic (2015); and Upper gastrointestinal endoscopy (2015). Treatment Diagnosis: COVID       Restrictions  Restrictions/Precautions  Restrictions/Precautions: General Precautions, Contact Precautions  Required Braces or Orthoses?: No  Position Activity Restriction  Other position/activity restrictions: up with assist     Subjective   General  Patient assessed for rehabilitation services?: Yes  Family / Caregiver Present: No  Diagnosis: COVID   General Comment  Comments: RN ok'd for therapy this afternoon.  pt agreeable to participate in session and cooperative/pleasant throughout   Patient Currently in Pain: Denies    Oxygen Therapy  O2 Device: Nasal cannula  O2 Flow Rate (L/min): 4 L/min    Social/Functional History  Social/Functional History  Lives With: Alone  Type of Home: House  Home Layout: Two level, Able to Live on Main level with bedroom/bathroom, Laundry in basement  Home Access: Stairs to enter without rails  Entrance Stairs - Number of Steps: 2 small ones  Bathroom Shower/Tub: Walk-in shower  Bathroom Toilet: Handicap height  Bathroom Equipment: Tub transfer bench, Grab bars in shower  Home Equipment: (pt reported no use of DME At baseline)  ADL Assistance: [facility] [facility] [address]: Independent  Homemaking Responsibilities: Yes  Meal Prep Responsibility: Primary  Laundry Responsibility: Primary  Cleaning Responsibility: Primary  Ambulation Assistance: Independent  Transfer Assistance: Independent  Active : Yes  Mode of Transportation: [employer]: \"most anything\"       Objective   Vision: Impaired  Vision Exceptions: Wears glasses at all times  Hearing: Within functional limits    Orientation  Overall Orientation Status: Within Functional Limits     Balance  Sitting Balance: Independent(~18 minutes in chair )  Standing Balance: Stand by assistance  Standing Balance  Time: ~3 minutes   Activity: pt completed functional mobility to doorway and back to chair   Comment: pt with no LOB, but O2 dropped to 85% after functional mobility.  once pt seated in chair and focused on breathing O2 increased to 91%  ADL  Feeding: Modified independent   Grooming: Modified independent   UE Bathing: Supervision  LE Bathing: Stand by assistance  UE Dressing: Supervision  LE Dressing: Stand by assistance(pt donned/doffed sock while sitting in chair )  Toileting: Stand by assistance  Tone RUE  RUE Tone: Normotonic  Tone LUE  LUE Tone: Normotonic  Coordination  Movements Are Fluid And Coordinated: Yes     Bed mobility  Comment: pt

## 2020-06-29 NOTE — PROGRESS NOTES
[facility]      Daily Progress Note     Admit Date: 6/26/2020  Bed/Room No.  3027/3027-01  Admitting Physician : Guerry Hodgkins, MD  Code Status :[address] Day:  LOS: 3 days   Chief Complaint:     Fatigue. Malaise     Principal Problem:    Pneumonia due to COVID-19 virus  Active Problems:    Coronary artery disease involving native coronary artery of native heart without angina pectoris    Hyperlipidemia  Resolved Problems:    * No resolved hospital problems. *    Subjective : Interval History/Significant events :  06/29/20    Patient is alert and oriented . she remains on supplemental oxygen @ 5 LPM .  She has no new complaints. Denies any fever, chills. Patient still has fatigue. Appetite poor. She received 1 unit of convalescent plasma on 6/28/2020. Vitals - Stable afebrile, Tmax 99.1   Labs - normal white count . Nursing notes , Consults notes reviewed. Overnight events and updates discussed with Nursing staff . Background History:         Kasia Mix is [de-identified] y.o. female  Who was admitted to the hospital on 6/26/2020 for treatment of Pneumonia due to COVID-19 virus. Patient presented to emergency room at [facility] for fatigue, malaise. She was treated with UTI about 4 days before. She also reported subjective fevers, cough, shortness of breath. Initial evaluation at ER showed temperature 99.1, respiration 20, saturation 94% on room air. Lab evaluation showed elevated d-dimer 1.94, lactic acid 1.8, normal white count, kidney function. Chest x-ray showed bilateral patchy pulmonary opacification concerning for viral Pneumonia. Patient had positive COVID-19 test on 6/15/2020. Patient received 1 unit convalescent plasma on 6/28/2020. She was treated with supplemental oxygen and Decadron for acute respiratory failure with hypoxia secondary to pneumonia.        PMH:  Past Medical History:   Diagnosis Date    Asteroid hyalosis of right eye     Coronary artery disease     status post acute non-Q wave myocardial infarction 08/23/2012, status post drug eluting stent placement in the LAD and 2 drug eluting stents in the left circumflex artery 08/23/2012.  Diastolic dysfunction     grade 1.     Gastric ulcer with hemorrhage 03/03/2015    gastric and esophageal ulcers due to nsaid    Hyperlipidemia     Mild mitral regurgitation     Mild tricuspid regurgitation     Obesity     Patient in clinical research study 06/27/2020    Expanded Access to Convalescent Plasma for COVID-19 Estimated date of completion 7/30/2020    Vitreous floaters     left eye. Allergies: Allergies   Allergen Reactions    Codeine Nausea Only      Medications :  sodium chloride, 20 mL, Intravenous, Once  ascorbic acid, 500 mg, Oral, BID  aspirin, 81 mg, Oral, Daily  atorvastatin, 40 mg, Oral, Daily  vitamin D3, 2,000 Units, Oral, Daily  ferrous sulfate, 325 mg, Oral, Daily with breakfast  lisinopril, 5 mg, Oral, Daily  metoprolol tartrate, 25 mg, Oral, BID  sodium chloride flush, 10 mL, Intravenous, 2 times per day  enoxaparin, 40 mg, Subcutaneous, Daily        Review of Systems   Review of Systems   Constitutional: Positive for appetite change and fatigue. Negative for fever and unexpected weight change. HENT: Negative for congestion, rhinorrhea and sneezing. Eyes: Negative for visual disturbance. Respiratory: Positive for cough and shortness of breath. Negative for chest tightness and wheezing. Cardiovascular: Negative for chest pain and palpitations. Gastrointestinal: Negative for abdominal pain, blood in stool, constipation, diarrhea, nausea and vomiting. Genitourinary: Negative for dysuria, enuresis, frequency and hematuria. Musculoskeletal: Negative for arthralgias and myalgias. Skin: Negative for rash. Neurological: Negative for dizziness, weakness, light-headedness and headaches. Hematological: Does not bruise/bleed easily.    Psychiatric/Behavioral: Negative for dysphoric mood and sleep disturbance. Objective :      Current Vitals : Temp: 98.1 °F (36.7 °C),  Pulse: 62, Resp: 19, BP: (!) 143/64, SpO2: 98 %  Last 24 Hrs Vitals   Patient Vitals for the past 24 hrs:   BP Temp Temp src Pulse Resp SpO2 Weight   06/29/20 0449 -- -- -- -- -- -- 167 lb 8.8 oz (76 kg)   06/29/20 0015 (!) 143/64 98.1 °F (36.7 °C) Oral 62 19 98 % --   06/28/20 2015 (!) 140/74 97.9 °F (36.6 °C) Oral 63 20 95 % --   06/28/20 2000 -- -- -- 62 -- -- --   06/28/20 1528 (!) 120/54 97.8 °F (36.6 °C) Oral 60 19 94 % --   06/28/20 1214 (!) 116/54 97.8 °F (36.6 °C) Oral 60 20 92 % --     Intake / output   06/28 0701 - 06/29 0700  In: 876 [P.O.:876]  Out: 1300 [Urine:1300]  Physical Exam:  Physical Exam  Vitals signs and nursing note reviewed. Constitutional:       General: She is not in acute distress. Appearance: She is ill-appearing. She is not diaphoretic. Interventions: Nasal cannula in place. HENT:      Head: Normocephalic and atraumatic. Nose:      Right Sinus: No maxillary sinus tenderness or frontal sinus tenderness. Left Sinus: No maxillary sinus tenderness or frontal sinus tenderness. Mouth/Throat:      Pharynx: No oropharyngeal exudate. Eyes:      General: No scleral icterus. Conjunctiva/sclera: Conjunctivae normal.      Pupils: Pupils are equal, round, and reactive to light. Neck:      Musculoskeletal: Full passive range of motion without pain and neck supple. Thyroid: No thyromegaly. Vascular: No JVD. Cardiovascular:      Rate and Rhythm: Normal rate and regular rhythm. Pulses:           Dorsalis pedis pulses are 2+ on the right side and 2+ on the left side. Heart sounds: Normal heart sounds. No murmur. Pulmonary:      Effort: Pulmonary effort is normal.      Breath sounds: Normal breath sounds. No wheezing or rales. Abdominal:      Palpations: Abdomen is soft. There is no mass. Tenderness:  There is no abdominal tenderness. Lymphadenopathy:      Head:      Right side of head: No submandibular adenopathy. Left side of head: No submandibular adenopathy. Cervical: No cervical adenopathy. Skin:     General: Skin is warm. Neurological:      Mental Status: She is alert and oriented to person, place, and time. Motor: No tremor. Psychiatric:         Behavior: Behavior is cooperative. Laboratory findings:    Recent Labs     06/26/20  1318 06/27/20  0600 06/29/20  0458   WBC 7.9 6.3 9.9   HGB 13.5 13.5 13.5   HCT 39.4 39.7 40.9    234 330   INR  --  1.0  --      Recent Labs     06/26/20  1400 06/27/20  0600 06/29/20  0458   * 135 137   K 4.8 4.7 5.0   CL 98 101 101   CO2 21 21 20   GLUCOSE 117* 111* 130*   BUN 11 10 21   CREATININE 0.85 0.72 0.65   CALCIUM 8.4* 8.7 8.9     Recent Labs     06/26/20  1400   PROT 6.3*   LABALBU 3.1*   AST 83*   ALT 63*   *   ALKPHOS 75   BILITOT 0.65   BILIDIR 0.25          Specific Gravity, UA   Date Value Ref Range Status   06/23/2020 1.010 1.010 - 1.025 Final     Protein, UA   Date Value Ref Range Status   06/23/2020 TRACE (A) NEGATIVE Final     RBC, UA   Date Value Ref Range Status   06/23/2020 0 TO 4 0 - 4 /HPF Final     Bacteria, UA   Date Value Ref Range Status   06/23/2020 1+ (A) None Final     Nitrite, Urine   Date Value Ref Range Status   06/23/2020 NEGATIVE NEGATIVE Final     WBC, UA   Date Value Ref Range Status   06/23/2020 10 TO 25 0 - 4 /HPF Final     Leukocyte Esterase, Urine   Date Value Ref Range Status   06/23/2020 1+ (A) NEGATIVE Final       Imaging / Clinical Data :-   Xr Chest Portable    Result Date: 6/26/2020  Patchy bilateral perihilar pulmonary opacification, concerning for pneumonia including viral pneumonia. Xr Chest Portable    Result Date: 6/23/2020  Subtle vague peripheral opacities are nonspecific. Clinical Course : gradually improving  Assessment and Plan  :        1.  Pneumonia secondary to COVID-19 with acute resp failure with hypoxia - s/p 1 unit convalescent  plasma transfusion. 2. Hyperlipidemia - Lipitor. 3. CAD s/p CINDY x2 in LCx in 2012 - aspirin and lopressor and Lipitor. PT OT   Continue to monitor vitals , Intake / output ,  Cell count , HGB , Kidney function, oxygenation  as indicated . Plan and updates discussed with patient ,  answers  explained to satisfaction.    Plan discussed with Staff  Kaylee Austin RN     (Please note that portions of this note were completed with a voice recognition program. Efforts were made to edit the dictations but occasionally words are mis-transcribed.)      Sarika Arellano MD  6/29/2020

## 2020-06-29 NOTE — PLAN OF CARE
Problem: Airway Clearance - Ineffective  Goal: Achieve or maintain patent airway  6/28/2020 2110 by Wesley Solano RN  Outcome: Ongoing  6/28/2020 1713 by Yrn Encinas RN  Outcome: Ongoing     Problem: Gas Exchange - Impaired  Goal: Absence of hypoxia  6/28/2020 2110 by Wesley Solano RN  Outcome: Ongoing  6/28/2020 1713 by Yrn Encinas RN  Outcome: Ongoing  Goal: Promote optimal lung function  6/28/2020 2110 by Wesley Solano RN  Outcome: Ongoing  6/28/2020 1713 by Yrn Encinas RN  Outcome: Ongoing     Problem: Breathing Pattern - Ineffective  Goal: Ability to achieve and maintain a regular respiratory rate  6/28/2020 2110 by Wesley Solano RN  Outcome: Ongoing  6/28/2020 1713 by Yrn Encinas RN  Outcome: Ongoing     Problem:  Body Temperature -  Risk of, Imbalanced  Goal: Ability to maintain a body temperature within defined limits  6/28/2020 2110 by Wesley Solano RN  Outcome: Ongoing  6/28/2020 1713 by Yrn Encinas RN  Outcome: Ongoing  Goal: Will regain or maintain usual level of consciousness  6/28/2020 2110 by Wesley Solano RN  Outcome: Ongoing  6/28/2020 1713 by Yrn Encinas RN  Outcome: Ongoing  Goal: Complications related to the disease process, condition or treatment will be avoided or minimized  6/28/2020 2110 by Wesley Solano RN  Outcome: Ongoing  6/28/2020 1713 by Yrn Encinas RN  Outcome: Ongoing     Problem: Isolation Precautions - Risk of Spread of Infection  Goal: Prevent transmission of infection  6/28/2020 2110 by Wesley Solano RN  Outcome: Ongoing  6/28/2020 1713 by Yrn Encinas RN  Outcome: Ongoing     Problem: Nutrition Deficits  Goal: Optimize nutrtional status  6/28/2020 2110 by Wesley Solano RN  Outcome: Ongoing  6/28/2020 1713 by Yrn Encinas RN  Outcome: Ongoing     Problem: Risk for Fluid Volume Deficit  Goal: Maintain normal heart rhythm  6/28/2020 2110 by Wesley Solano RN  Outcome: Ongoing  6/28/2020 1713 by

## 2020-06-29 NOTE — RESEARCH
Clinical Research Services          Patient Name: Chinyere Gonzales  MRN: [id number]  [place]: 1940  Date of evaluation: 06/28/2020  Time of evaluation: 0800  Reason for evaluation:      [x]  Expanded Access to Convalescent Plasma for the Treatment of Patients with COVID-19  Protocol # [id number]     IND# [id number]       NCT# [id number]        [x]  On 06/28/2020 at [id number] [place] [address],[address] the subject received 1 unit of ABO compatible COVID-19 convalescent plasma. [x]  Chart reviewed and after discussion with the subjects RN, no adverse reactions were noted from the convalescent plasma. This was all reviewed with Dr. Javed Soliz.   [x]  Subject continues in follow-up     Rosenda Alves RN     Clinical Research Nurse  For questions page or perfect serve Dr. Javed Soliz, Dr. Sundeep Grossman, Dr. Janie Weston, Dr Haritha Jenkins, Dr. Adele Chauhan or contact the research nurse at [phone]    Rob Heaton MD

## 2020-06-29 NOTE — PROGRESS NOTES
Pharmacy Note  COVID-19 Anticoagulation Adjustment    Lawson Carranza is a [de-identified] y.o. female. Pharmacist assessment of anticoagulation in a SARS-CoV-2 positive patient. Recent Labs     06/27/20  0600 06/29/20  0458   BUN 10 21       Recent Labs     06/27/20  0600 06/29/20  0458   CREATININE 0.72 0.65     No results for input(s): DDIMER in the last 72 hours. CrCl cannot be calculated (Unknown ideal weight.). Height:   Ht Readings from Last 1 Encounters:   06/27/20 4' 11\" (1.499 m)     Weight:  Wt Readings from Last 1 Encounters:   06/29/20 167 lb 8.8 oz (76 kg)     BMI:  Body mass index is 33.84 kg/m².       Per the [facility] Anticoagulation Algorithm for COVID-19 positive or clinically-suspected patients, the following adjustment has been made per P&T Guidelines:             Lovenox 40mg Daily to BID    Thank you,  Que Weldon PharmD BCPS  6/29/2020 2:57 PM

## 2020-06-30 VITALS
TEMPERATURE: 98.9 F | RESPIRATION RATE: 18 BRPM | WEIGHT: 167.55 LBS | DIASTOLIC BLOOD PRESSURE: 63 MMHG | SYSTOLIC BLOOD PRESSURE: 130 MMHG | HEART RATE: 68 BPM | BODY MASS INDEX: 33.78 KG/M2 | OXYGEN SATURATION: 97 % | HEIGHT: 59 IN

## 2020-06-30 LAB
SARS-COV-2, PCR: ABNORMAL
SARS-COV-2, RAPID: ABNORMAL
SARS-COV-2: DETECTED
SOURCE: ABNORMAL

## 2020-06-30 PROCEDURE — 6370000000 HC RX 637 (ALT 250 FOR IP): Performed by: FAMILY MEDICINE

## 2020-06-30 PROCEDURE — 97110 THERAPEUTIC EXERCISES: CPT

## 2020-06-30 PROCEDURE — 99232 SBSQ HOSP IP/OBS MODERATE 35: CPT | Performed by: FAMILY MEDICINE

## 2020-06-30 PROCEDURE — U0003 INFECTIOUS AGENT DETECTION BY NUCLEIC ACID (DNA OR RNA); SEVERE ACUTE RESPIRATORY SYNDROME CORONAVIRUS 2 (SARS-COV-2) (CORONAVIRUS DISEASE [COVID-19]), AMPLIFIED PROBE TECHNIQUE, MAKING USE OF HIGH THROUGHPUT TECHNOLOGIES AS DESCRIBED BY CMS-2020-01-R: HCPCS

## 2020-06-30 PROCEDURE — 97530 THERAPEUTIC ACTIVITIES: CPT

## 2020-06-30 PROCEDURE — 2580000003 HC RX 258: Performed by: FAMILY MEDICINE

## 2020-06-30 PROCEDURE — 99233 SBSQ HOSP IP/OBS HIGH 50: CPT | Performed by: INTERNAL MEDICINE

## 2020-06-30 PROCEDURE — 6360000002 HC RX W HCPCS: Performed by: FAMILY MEDICINE

## 2020-06-30 RX ADMIN — SODIUM CHLORIDE, PRESERVATIVE FREE 10 ML: 5 INJECTION INTRAVENOUS at 08:08

## 2020-06-30 RX ADMIN — Medication 81 MG: at 08:08

## 2020-06-30 RX ADMIN — FAMOTIDINE 20 MG: 20 TABLET, FILM COATED ORAL at 08:08

## 2020-06-30 RX ADMIN — DEXAMETHASONE 4 MG: 4 TABLET ORAL at 08:08

## 2020-06-30 RX ADMIN — METOPROLOL TARTRATE 25 MG: 25 TABLET ORAL at 08:10

## 2020-06-30 RX ADMIN — LISINOPRIL 5 MG: 5 TABLET ORAL at 08:09

## 2020-06-30 RX ADMIN — DESMOPRESSIN ACETATE 40 MG: 0.2 TABLET ORAL at 08:08

## 2020-06-30 RX ADMIN — CHOLECALCIFEROL TAB 10 MCG (400 UNIT) 2000 UNITS: 10 TAB at 08:07

## 2020-06-30 RX ADMIN — ENOXAPARIN SODIUM 40 MG: 40 INJECTION SUBCUTANEOUS at 08:09

## 2020-06-30 RX ADMIN — OXYCODONE HYDROCHLORIDE AND ACETAMINOPHEN 500 MG: 500 TABLET ORAL at 08:07

## 2020-06-30 RX ADMIN — FERROUS SULFATE TAB EC 325 MG (65 MG FE EQUIVALENT) 325 MG: 325 (65 FE) TABLET DELAYED RESPONSE at 08:08

## 2020-06-30 ASSESSMENT — PAIN SCALES - GENERAL
PAINLEVEL_OUTOF10: 0
PAINLEVEL_OUTOF10: 0

## 2020-06-30 ASSESSMENT — ENCOUNTER SYMPTOMS
SHORTNESS OF BREATH: 0
VOMITING: 0
NAUSEA: 0
COUGH: 0
WHEEZING: 0
CHEST TIGHTNESS: 0
CONSTIPATION: 0
DIARRHEA: 0
ABDOMINAL PAIN: 0
RHINORRHEA: 0
BLOOD IN STOOL: 0

## 2020-06-30 NOTE — PROGRESS NOTES
Order received for pt discharge. RN discussed paperwork and medication regimen with pt. All questions and concerns answered by RN. Pt taken off unit via wheelchair, with all belongings.

## 2020-06-30 NOTE — PROGRESS NOTES
Physical Therapy  Facility/Department: [facility]  Daily Treatment Note  NAME: Janet Osborn  : 1940  MRN: [id number]    Date of Service: 2020    Discharge Recommendations:    Further therapy recommended at discharge. PT Equipment Recommendations  Equipment Needed: No(pt has AD's at home)    Assessment   Body structures, Functions, Activity limitations: Decreased functional mobility ; Decreased balance;Decreased endurance  Assessment: Pt ambulated 40ft w/ no AD CGA. Pt would benefit from continued skilled acute care therapy to address endurance deficits and return pt to prior level of independence. Prognosis: Good  PT Education: Goals;PT Role;Plan of Care;General Safety;Home Exercise Program;Pressure Relief; Injury Prevention  Patient Education: Pursed lip breathing  REQUIRES PT FOLLOW UP: Yes  Activity Tolerance  Activity Tolerance: Patient limited by endurance     Patient Diagnosis(es): There were no encounter diagnoses. has a past medical history of Asteroid hyalosis of right eye, Coronary artery disease, Diastolic dysfunction, Gastric ulcer with hemorrhage, Hyperlipidemia, Mild mitral regurgitation, Mild tricuspid regurgitation, Obesity, Patient in clinical research study, and Vitreous floaters. has a past surgical history that includes Cholecystectomy (); Cardiac catheterization (Left, 2012); Endoscopy, colon, diagnostic (2015); and Upper gastrointestinal endoscopy (2015). Restrictions  Restrictions/Precautions  Restrictions/Precautions: General Precautions, Contact Precautions  Required Braces or Orthoses?: No  Position Activity Restriction  Other position/activity restrictions: up with assist      Subjective   General  Chart Reviewed: Yes  Response To Previous Treatment: Patient with no complaints from previous session.   Family / Caregiver Present: No  Subjective  Subjective: RN and pt in agreement for PT today; pt seated in bedside chair upon arrival, pt very pleasant and cooperative throughout session  Pain Screening  Patient Currently in Pain: Denies  Vital Signs  Patient Currently in Pain: Denies       Orientation  Orientation  Overall Orientation Status: Within Normal Limits    Objective   Bed mobility  Supine to Sit: Stand by assistance  Sit to Supine: Stand by assistance  Transfers  Sit to Stand: Contact guard assistance  Stand to sit: Contact guard assistance  Comment: used RW  Ambulation  Ambulation?: Yes  Ambulation 1  Surface: level tile  Device: No Device  Assistance: Contact guard assistance  Quality of Gait: Mild unsteadiness; decreased step length LLE  Gait Deviations: Slow Margot  Distance: 40ft  Stairs/Curb  Stairs?: No  Neuromuscular Education  Neuromuscular Comments: Balance ther ex  Balance  Posture: Good  Sitting - Static: Good;-  Sitting - Dynamic: Fair;+  Standing - Static: Fair;+  Standing - Dynamic: Fair;+  Comments: Standing balance assessed w/ no AD  Exercises  Straight Leg Raise: Standing BLE x5  Hip Flexion: Seated BLE x10  ;  Standing BLE x5  Hip Abduction: Seated BLE x10  Knee Long Arc Quad: Seated BLE x10  Ankle Pumps: Seated BLE x10  Other exercises  Other exercises?: Yes  Other exercises 1: Satnding balance Ex's: tandem stance 30\"x 3,  SLS 10\"x 3       Goals  Short term goals  Time Frame for Short term goals: 14  Short term goal 1: Pt to perform bed mobility SBA  Short term goal 2: Demonstrate functional transfers w/ supervision  Short term goal 3: Ambulate 150ft w/ no AD with supervision  Short term goal 4: Tolerate 30 minutes of therapy to demo increased endurance   Short term goal 5: Ascend/descend 2 stairs with no hand rail SBA  Patient Goals   Patient goals :  To go home    Plan    Plan  Times per week: 3-5x/week  Current Treatment Recommendations: Strengthening, Transfer Training, Endurance Training, Patient/Caregiver Education & Training, ROM, Balance Training, Gait Training, Home Exercise Program, Functional Mobility Training, Stair training, Safety Education & Training  Safety Devices  Type of devices: Gait belt, Call light within reach, Nurse notified, Patient at risk for falls, Left in chair  Restraints  Initially in place: No     Therapy Time   Individual Concurrent Group Co-treatment   Time In 1305         Time Out 1328         Minutes 23         Timed Code Treatment Minutes: [address], [facility]

## 2020-06-30 NOTE — PROGRESS NOTES
hyalosis of right eye     Coronary artery disease     status post acute non-Q wave myocardial infarction 08/23/2012, status post drug eluting stent placement in the LAD and 2 drug eluting stents in the left circumflex artery 08/23/2012.  Diastolic dysfunction     grade 1.     Gastric ulcer with hemorrhage 03/03/2015    gastric and esophageal ulcers due to nsaid    Hyperlipidemia     Mild mitral regurgitation     Mild tricuspid regurgitation     Obesity     Patient in clinical research study 06/27/2020    Expanded Access to Convalescent Plasma for COVID-19 Estimated date of completion 7/30/2020    Vitreous floaters     left eye. Allergies: Allergies   Allergen Reactions    Codeine Nausea Only      Medications :  enoxaparin, 40 mg, Subcutaneous, BID  famotidine, 20 mg, Oral, BID  dexamethasone, 4 mg, Oral, 2 times per day  sodium chloride, 20 mL, Intravenous, Once  ascorbic acid, 500 mg, Oral, BID  aspirin, 81 mg, Oral, Daily  atorvastatin, 40 mg, Oral, Daily  vitamin D3, 2,000 Units, Oral, Daily  ferrous sulfate, 325 mg, Oral, Daily with breakfast  lisinopril, 5 mg, Oral, Daily  metoprolol tartrate, 25 mg, Oral, BID  sodium chloride flush, 10 mL, Intravenous, 2 times per day        Review of Systems   Review of Systems   Constitutional: Positive for appetite change and fatigue. Negative for fever and unexpected weight change. HENT: Negative for congestion, rhinorrhea and sneezing. Eyes: Negative for visual disturbance. Respiratory: Negative for cough, chest tightness, shortness of breath and wheezing. Cardiovascular: Negative for chest pain and palpitations. Gastrointestinal: Negative for abdominal pain, blood in stool, constipation, diarrhea, nausea and vomiting. Genitourinary: Negative for dysuria, enuresis, frequency and hematuria. Musculoskeletal: Negative for arthralgias and myalgias. Skin: Negative for rash.    Neurological: Negative for dizziness, weakness, light-headedness and headaches. Hematological: Does not bruise/bleed easily. Psychiatric/Behavioral: Negative for dysphoric mood and sleep disturbance. Objective :      Current Vitals : Temp: 98.9 °F (37.2 °C),  Pulse: 68, Resp: 18, BP: 130/63, SpO2: 97 %  Last 24 Hrs Vitals   Patient Vitals for the past 24 hrs:   BP Temp Temp src Pulse Resp SpO2   06/30/20 1200 130/63 -- -- -- -- --   06/30/20 0807 (!) 129/97 -- -- 68 -- --   06/30/20 0430 120/60 98.9 °F (37.2 °C) Oral 61 18 97 %   06/29/20 2010 (!) 119/53 98.6 °F (37 °C) Oral 70 22 95 %   06/29/20 2000 -- -- -- 65 -- --     Intake / output   No intake/output data recorded. Physical Exam:  Physical Exam  Vitals signs and nursing note reviewed. Constitutional:       General: She is not in acute distress. Appearance: She is ill-appearing. She is not diaphoretic. Interventions: Nasal cannula in place. HENT:      Head: Normocephalic and atraumatic. Nose:      Right Sinus: No maxillary sinus tenderness or frontal sinus tenderness. Left Sinus: No maxillary sinus tenderness or frontal sinus tenderness. Mouth/Throat:      Pharynx: No oropharyngeal exudate. Eyes:      General: No scleral icterus. Conjunctiva/sclera: Conjunctivae normal.      Pupils: Pupils are equal, round, and reactive to light. Neck:      Musculoskeletal: Full passive range of motion without pain and neck supple. Thyroid: No thyromegaly. Vascular: No JVD. Cardiovascular:      Rate and Rhythm: Normal rate and regular rhythm. Pulses:           Dorsalis pedis pulses are 2+ on the right side and 2+ on the left side. Heart sounds: Normal heart sounds. No murmur. Pulmonary:      Effort: Pulmonary effort is normal.      Breath sounds: Normal breath sounds. No wheezing or rales. Abdominal:      Palpations: Abdomen is soft. There is no mass. Tenderness: There is no abdominal tenderness.    Lymphadenopathy:      Head:      Right side of head: No submandibular adenopathy. Left side of head: No submandibular adenopathy. Cervical: No cervical adenopathy. Skin:     General: Skin is warm. Neurological:      Mental Status: She is alert and oriented to person, place, and time. Motor: No tremor. Psychiatric:         Behavior: Behavior is cooperative. Laboratory findings:    Recent Labs     06/29/20  0458   WBC 9.9   HGB 13.5   HCT 40.9        Recent Labs     06/29/20  0458      K 5.0      CO2 20   GLUCOSE 130*   BUN 21   CREATININE 0.65   CALCIUM 8.9     No results for input(s): PROT, LABALBU, LABA1C, Y4UMJTC, D2FELSP, FT4, TSH, AST, ALT, LDH, GGT, ALKPHOS, BILITOT, BILIDIR, AMMONIA, AMYLASE, LIPASE, LACTATE, CHOL, HDL, LDLCHOLESTEROL, CHOLHDLRATIO, TRIG, VLDL, BNP, TROPONINI, CKTOTAL, CKMB, CKMBINDEX, RF, CLARE in the last 72 hours. Specific Gravity, UA   Date Value Ref Range Status   06/23/2020 1.010 1.010 - 1.025 Final     Protein, UA   Date Value Ref Range Status   06/23/2020 TRACE (A) NEGATIVE Final     RBC, UA   Date Value Ref Range Status   06/23/2020 0 TO 4 0 - 4 /HPF Final     Bacteria, UA   Date Value Ref Range Status   06/23/2020 1+ (A) None Final     Nitrite, Urine   Date Value Ref Range Status   06/23/2020 NEGATIVE NEGATIVE Final     WBC, UA   Date Value Ref Range Status   06/23/2020 10 TO 25 0 - 4 /HPF Final     Leukocyte Esterase, Urine   Date Value Ref Range Status   06/23/2020 1+ (A) NEGATIVE Final       Imaging / Clinical Data :-   Xr Chest Portable    Result Date: 6/26/2020  Patchy bilateral perihilar pulmonary opacification, concerning for pneumonia including viral pneumonia. Xr Chest Portable    Result Date: 6/23/2020  Subtle vague peripheral opacities are nonspecific. Clinical Course : gradually improving  Assessment and Plan  :        1. Pneumonia secondary to COVID-19 with acute resp failure with hypoxia - s/p 1 unit convalescent  plasma transfusion.    2. Hyperlipidemia - Lipitor. 3. CAD s/p CINDY x2 in LCx in 2012 - aspirin and lopressor and Lipitor. Discharge planning home. Continue to monitor vitals , Intake / output ,  Cell count , HGB , Kidney function, oxygenation  as indicated . Plan and updates discussed with patient ,  answers  explained to satisfaction.    Plan discussed with Staff  Taylor Castillo RN     (Please note that portions of this note were completed with a voice recognition program. Efforts were made to edit the dictations but occasionally words are mis-transcribed.)      Nick May MD  6/30/2020

## 2020-06-30 NOTE — PROGRESS NOTES
[facility] - Progress Note    Today's Date and Time: 6/30/2020, 10:11 AM    Impression :   · Fever  · COVID 19 infection  · Shortness of breath  · Hypoxia with physical activity  · Diarrhea  · Patient provided immune plasma on 6/28/20. No AE noted    Recommendations:   · Monitor off antibiotics    Medical Decision Making/Summary/Discussion:6/30/2020     ·   Infection Control Recommendations   · Universal Precautions  · Airborne isolation  · Droplet plus Isolation    Antimicrobial Stewardship Recommendations     · Discontinuation of therapy  Coordination of Outpatient Care:   · Estimated Length of IV antimicrobials: None  · Patient will need Midline Catheter Insertion: No  · Patient will need PICC line Insertion:No  · Patient will need: Home IV , [place],  SNF,  LTAC:TBD  · Patient will need outpatient wound care:No    Chief complaint/reason for consultation:   · COVID 19  · Diarrhea      History of Present Illness:   Lilly Patten is a [de-identified]y.o.-year-old  female who was initially admitted on 6/26/2020. Patient seen at the request of Ct Sanchez. INITIAL HISTORY:   Patient previously seen on 6-14-20 in [place] because of exposure to a person with COVID 19. She had taken a friend to a physician's visit on 6-10-20. Later her friend was admitted to [facility] with COVID 19. She was asymptomatic at that point. Her COVID test on 6-15-20 was positive. She remained in home quarantine until she started to feel poorly on 6-23-10 when she was seen in ER at [facility]. She was felt she had a UTI and was given Bactrim. On 6-26-20 she again was seen in the ER at [place] with more SOB and diarrhea. She was then transferred to [place] [address]. At [place] [address] she is relatively stable but her 02 sat drops to 84 % with walking to the bathroom on room air. At rest her 02 sat is 91%. . She will likely need 02 per nasal cannula.     CXR: 6-26-20: Patchy bilateral infiltrates compatible with COVID pneumonia    Patient provided immune plasma on 6/28/20. No AE noted    CURRENT EVALUATION :6/30/2020     Patient evaluated and examined in the ICU. Afebrile  VS stable    On nasal 02 3-->5->4 L/min  RR 20+  O2 sat 88-->92->94->97->94%    Patient provided immune plasma on 6/28/20. No AE noted. Tolerated it well. Labs, X rays reviewed: 6/30/2020    BUN: 11-->10->21  Cr: 0.85-->0.72->0.65    WBC: 7.9-->6.3->9.9  Hb:13.5  Plat: 218--> 234->330    Ferritin 4808    Cultures:  Urine:  ·   Blood:  ·   Sputum :  ·   Wound:  ·   CXR: 6-26-20: Patchy bilateral infiltrates    COVID test:  · 6-15-20: Positive  · 6-30-20: Ordered    Discussed with patient, RN,. I have personally reviewed the past medical history, past surgical history, medications, social history, and family history, and I have updated the database accordingly. Past Medical History:     Past Medical History:   Diagnosis Date    Asteroid hyalosis of right eye     Coronary artery disease     status post acute non-Q wave myocardial infarction 08/23/2012, status post drug eluting stent placement in the LAD and 2 drug eluting stents in the left circumflex artery 08/23/2012.  Diastolic dysfunction     grade 1.     Gastric ulcer with hemorrhage 03/03/2015    gastric and esophageal ulcers due to nsaid    Hyperlipidemia     Mild mitral regurgitation     Mild tricuspid regurgitation     Obesity     Patient in clinical research study 06/27/2020    Expanded Access to Convalescent Plasma for COVID-19 Estimated date of completion 7/30/2020    Vitreous floaters     left eye.         Past Surgical  History:     Past Surgical History:   Procedure Laterality Date    CARDIAC CATHETERIZATION Left 08/23/2012    left main artery normal, LAD with mid 60 to 70% stenosis, FFR was 0.76, left circumflex artery with mid 90% stenosis with thrombus, RCA with mild irregularities, preserved left ventricular systolic function, ejection fraction estimated around 50%, status post drug eluting stent placement in the LAD and 2 drug eluting stents in the left circumflex artery.      CHOLECYSTECTOMY  1978    ENDOSCOPY, COLON, DIAGNOSTIC  03/05/2015    esophageal/gastric ulcer; few diverticuli    UPPER GASTROINTESTINAL ENDOSCOPY  4/16/2015    healed gastric ulcer       Medications:      enoxaparin  40 mg Subcutaneous BID    famotidine  20 mg Oral BID    dexamethasone  4 mg Oral 2 times per day    sodium chloride  20 mL Intravenous Once    ascorbic acid  500 mg Oral BID    aspirin  81 mg Oral Daily    atorvastatin  40 mg Oral Daily    vitamin D3  2,000 Units Oral Daily    ferrous sulfate  325 mg Oral Daily with breakfast    lisinopril  5 mg Oral Daily    metoprolol tartrate  25 mg Oral BID    sodium chloride flush  10 mL Intravenous 2 times per day       Social History:     Social History     Socioeconomic History    Marital status: Single     Spouse name: Not on file    Number of children: Not on file    Years of education: Not on file    Highest education level: Not on file   Occupational History    Not on file   Social Needs    Financial resource strain: Not on file    Food insecurity     Worry: Not on file     Inability: Not on file    Transportation needs     Medical: Not on file     Non-medical: Not on file   Tobacco Use    Smoking status: Never Smoker    Smokeless tobacco: Never Used   Substance and Sexual Activity    Alcohol use: Yes     Comment: occasionally    Drug use: No    Sexual activity: Not on file   Lifestyle    Physical activity     Days per week: Not on file     Minutes per session: Not on file    Stress: Not on file   Relationships    Social connections     Talks on phone: Not on file     Gets together: Not on file     Attends [employer] service: Not on file     Active member of club or organization: Not on file     Attends meetings of clubs or organizations: Not on file     Relationship status: Not on file   Siva Ely Intimate partner violence     Fear of current or ex partner: Not on file     Emotionally abused: Not on file     Physically abused: Not on file     Forced sexual activity: Not on file   Other Topics Concern    Not on file   Social History Narrative    Not on file       Family History:     Family History   Problem Relation Age of Onset    Heart Attack Father         Allergies:   Codeine     Review of Systems:   Constitutional: No fevers or chills. No systemic complaints. Reports feeling better  Head: No headaches  Eyes: No double vision or blurry vision. No conjunctival inflammation. ENT: No sore throat or runny nose. . No hearing loss, tinnitus or vertigo. Cardiovascular: No chest pain or palpitations. Shortness of breath. PANG  Lung: shortness of breath, nocough. No sputum production  Abdomen: No nausea, vomiting. Reports diarrhea. Genitourinary: No increased urinary frequency, or dysuria. No hematuria. No suprapubic or CVA pain  Musculoskeletal: No muscle aches or pains. No joint effusions, swelling or deformities  Hematologic: No bleeding or bruising. Neurologic: No headache, weakness, numbness, or tingling. Integument: No rash, no ulcers. Psychiatric: No depression. Endocrine: No polyuria, no polydipsia, no polyphagia. Physical Examination :     Patient Vitals for the past 8 hrs:   BP Temp Temp src Pulse Resp SpO2   06/30/20 0807 (!) 129/97 -- -- 68 -- --   06/30/20 0430 120/60 98.9 °F (37.2 °C) Oral 61 18 97 %     General Appearance: Awake, alert, and in no apparent distress  Head:  Normocephalic, no trauma  Eyes: Pupils equal, round, reactive to light and accommodation; extraocular movements intact; sclera anicteric; conjunctivae pink. No embolic phenomena. ENT: Oropharynx clear, without erythema, exudate, or thrush. No tenderness of sinuses. Mouth/throat: mucosa pink and moist. No lesions. Dentition in good repair. Neck:Supple, without lymphadenopathy. Thyroid normal, No bruits.   Pulmonary/Chest: Clear to auscultation, without wheezes, rales, or rhonchi. No dullness to percussion. Desaturates with activity at room air. Cardiovascular: Regular rate and rhythm without murmurs, rubs, or gallops. Abdomen: Soft, non tender. Bowel sounds normal. No organomegaly  All four Extremities: No cyanosis, clubbing, edema, or effusions. Neurologic: No gross sensory or motor deficits. Skin: Warm and dry with good turgor. No signs of peripheral arterial or venous insufficiency. No ulcerations. No open wounds. Medical Decision Making -Laboratory:   I have independently reviewed/ordered the following labs:    CBC with Differential:   Recent Labs     06/29/20  0458   WBC 9.9   HGB 13.5   HCT 40.9      LYMPHOPCT 15*   MONOPCT 5     BMP:   Recent Labs     06/29/20  0458      K 5.0      CO2 20   BUN 21   CREATININE 0.65     Hepatic Function Panel:   No results for input(s): PROT, LABALBU, BILIDIR, IBILI, BILITOT, ALKPHOS, ALT, AST in the last 72 hours. No results for input(s): RPR in the last 72 hours. No results for input(s): HIV in the last 72 hours. No results for input(s): BC in the last 72 hours.   Lab Results   Component Value Date    MUCUS NOT REPORTED 06/23/2020    RBC 4.39 06/29/2020    TRICHOMONAS NOT REPORTED 06/23/2020    WBC 9.9 06/29/2020    YEAST NOT REPORTED 06/23/2020    TURBIDITY NOT REPORTED 06/23/2020     Lab Results   Component Value Date    CREATININE 0.65 06/29/2020    GLUCOSE 130 06/29/2020       Medical Decision Making-Imaging:     EXAMINATION:   ONE XRAY VIEW OF THE CHEST       6/26/2020 1:52 pm       COMPARISON:   06/23/2014.       HISTORY:   ORDERING SYSTEM PROVIDED HISTORY: dyspnea   TECHNOLOGIST PROVIDED HISTORY:   dyspnea   Reason for Exam: Fever   Acuity: Acute   Type of Exam: Initial       FINDINGS:   The cardiomediastinal silhouette is stable.  Retrocardiac hiatal hernia is   again noted.  There are stable patchy areas of opacification in the perihilar   region bilaterally.  No new areas of involvement.  No pleural fluid or   evidence of overt failure.           Impression   Patchy bilateral perihilar pulmonary opacification, concerning for pneumonia   including viral pneumonia. Medical Decision Ngqjnx-[id number]-Xiytq:       Medical Decision Making-Other:     Note:  · Labs, medications, radiologic studies were reviewed with personal review of films  · Large amounts of data were reviewed  · Discussed with nursing Staff, Discharge planner  · Infection Control and Prevention measures reviewed  · All prior entries were reviewed  · Administer medications as ordered  · Prognosis: Guarded  · Discharge planning reviewed  · Follow up as outpatient. Thank you for allowing us to participate in the care of this patient. Please call with questions.     Geronimo Ibarra MD  Pager: [phone] - Office: [phone]

## 2020-07-01 ENCOUNTER — TELEPHONE (OUTPATIENT)
Dept: FAMILY MEDICINE CLINIC | Age: 80
End: 2020-07-01

## 2020-07-01 NOTE — TELEPHONE ENCOUNTER
[facility] Transitions Initial Follow Up Call    Call within 2 business days of discharge: yes    Patient: Lilly Patten Patient : 1940 MRN: [id number]    RARS: Readmission Risk Score: 14       Spoke with: Meena Martinez    Discharge department/facility: [facility]    Non-face-to-face services provided:  Scheduled appointment with [facility]    Follow Up  Future Appointments   Date Time Provider Catherine Traore   10/16/2020  8:00 AM [facility]   2020  1:30 PM DO ADRIEN Morales [facility]       Vanessa Epperson LPN

## 2020-07-01 NOTE — DISCHARGE SUMMARY
[facility]      Discharge Summary     Patient ID: Chinyere Gonzales  :  1940   MRN: [id number]     ACCOUNT:  [de-identified]   Patient Location : [address] [place] [address]  Patient's PCP: Francia Lim MD  Admit Date: 2020   Discharge Date: 2020     Length of Stay: 4  Code Status:  Prior  Admitting Physician: Brooklynn Najera MD  Discharge Physician: Marge Mckeon MD     Active Discharge Diagnosis :     Primary Problem  Pneumonia due to [address] Problems    Diagnosis Date Noted    Pneumonia due to COVID-19 virus [U07.1, J12.89] 2020    Hyperlipidemia [E78.5]     Coronary artery disease involving native coronary artery of native heart without angina pectoris [I25.10] 2013       Admission Condition:  poor     Discharged Condition: stable    Hospital Stay:     Hospital Course:  Chinyere Gonzales is a [de-identified] y.o. female who was admitted for the management of   Pneumonia due to COVID-19 virus . Patient presented to emergency room at [facility] for fatigue, malaise. She was treated with UTI about 4 days before. She also reported subjective fevers, cough, shortness of breath. Initial evaluation at ER showed temperature 99.1, respiration 20, saturation 94% on room air. Lab evaluation showed elevated d-dimer 1.94, lactic acid 1.8, normal white count, kidney function. Chest x-ray showed bilateral patchy pulmonary opacification concerning for viral Pneumonia. Patient had positive COVID-19 test on 6/15/2020. Patient received 1 unit convalescent plasma on 2020. She was treated with supplemental oxygen and Decadron for acute respiratory failure with hypoxia secondary to pneumonia. Significant therapeutic interventions:   1. Pneumonia secondary to COVID-19 with acute resp failure with hypoxia - treated with  1 unit convalescent  plasma transfusion , oxygen supplement and supportive care . 2. Hyperlipidemia - Lipitor.    3. CAD POST HOSPITALIZATION/Incidental Findings: COVID pneumonia     Diet: regular diet    Activity: As tolerated    Instructions to Patient: return of symptoms worsen. Discharge Medications:      Medication List      CONTINUE taking these medications    alendronate 70 MG tablet  Commonly known as:  Fosamax  Take 1 tablet by mouth every 7 days     ascorbic acid 500 MG tablet  Commonly known as:  VITAMIN C     aspirin 81 MG tablet     atorvastatin 40 MG tablet  Commonly known as:  LIPITOR  TAKE 1 TABLET EVERY NIGHT AT BEDTIME     ferrous sulfate 325 (65 Fe) MG EC tablet  Commonly known as:  Fe Tabs  Take 1 tablet by mouth daily (with breakfast)     lisinopril 5 MG tablet  Commonly known as:  PRINIVIL;ZESTRIL  TAKE 1 TABLET EVERY DAY     metoprolol tartrate 25 MG tablet  Commonly known as:  LOPRESSOR  TAKE 1 TABLET TWICE DAILY     MULTIVITAMIN PO     nitroGLYCERIN 0.4 MG SL tablet  Commonly known as:  NITROSTAT  Place 1 tablet under the tongue every 5 minutes as needed for Chest pain     Vitamin D3 50 MCG (2000 UT) Caps  Take 1 capsule by mouth daily        STOP taking these medications    sulfamethoxazole-trimethoprim 800-160 MG per tablet  Commonly known as: Bactrim DS            Time Spent on discharge is  33 mins in patient examination, evaluation, counseling as well as medication reconciliation, prescriptions for required medications, discharge plan and follow up. Electronically signed by   Maddie Traore MD  7/1/2020        Thank you Dr. Ash Infante MD for the opportunity to be involved in this patient's care.

## 2020-07-02 ENCOUNTER — TELEPHONE (OUTPATIENT)
Dept: FAMILY MEDICINE CLINIC | Age: 80
End: 2020-07-02

## 2020-07-02 NOTE — TELEPHONE ENCOUNTER
Spoke with daughter-she feels mother is O2 deprived and depressed.   Appointment Monday - will continue to monitor over weekend-if condition deteriorates they will take her to ER or will keep appointment on Monday

## 2020-07-02 NOTE — TELEPHONE ENCOUNTER
Raya Freire called in regards to her sister calling this AM about pt and they have not heard anything yet. They would like to hear from a nurse asap.

## 2020-07-02 NOTE — TELEPHONE ENCOUNTER
Patient tested positive for Covid 19 admitted and discharged from [facility]. Patients daughter is very worried about depression. Patient keeps saying she is lonely and sad. Stephanie Jenkins would like a call back at [phone]. Patient was discharged on 6/30/20.

## 2020-07-06 ENCOUNTER — TELEPHONE (OUTPATIENT)
Dept: FAMILY MEDICINE CLINIC | Age: 80
End: 2020-07-06

## 2020-07-06 ENCOUNTER — OFFICE VISIT (OUTPATIENT)
Dept: FAMILY MEDICINE CLINIC | Age: 80
End: 2020-07-06
Payer: MEDICARE

## 2020-07-06 VITALS
SYSTOLIC BLOOD PRESSURE: 116 MMHG | DIASTOLIC BLOOD PRESSURE: 66 MMHG | TEMPERATURE: 98.9 F | WEIGHT: 160 LBS | HEIGHT: 59 IN | BODY MASS INDEX: 32.25 KG/M2 | OXYGEN SATURATION: 95 % | HEART RATE: 59 BPM

## 2020-07-06 PROCEDURE — 1090F PRES/ABSN URINE INCON ASSESS: CPT | Performed by: FAMILY MEDICINE

## 2020-07-06 PROCEDURE — 1123F ACP DISCUSS/DSCN MKR DOCD: CPT | Performed by: FAMILY MEDICINE

## 2020-07-06 PROCEDURE — G8399 PT W/DXA RESULTS DOCUMENT: HCPCS | Performed by: FAMILY MEDICINE

## 2020-07-06 PROCEDURE — G8427 DOCREV CUR MEDS BY ELIG CLIN: HCPCS | Performed by: FAMILY MEDICINE

## 2020-07-06 PROCEDURE — 99214 OFFICE O/P EST MOD 30 MIN: CPT | Performed by: FAMILY MEDICINE

## 2020-07-06 PROCEDURE — 1111F DSCHRG MED/CURRENT MED MERGE: CPT | Performed by: FAMILY MEDICINE

## 2020-07-06 PROCEDURE — 1036F TOBACCO NON-USER: CPT | Performed by: FAMILY MEDICINE

## 2020-07-06 PROCEDURE — G8417 CALC BMI ABV UP PARAM F/U: HCPCS | Performed by: FAMILY MEDICINE

## 2020-07-06 PROCEDURE — 4040F PNEUMOC VAC/ADMIN/RCVD: CPT | Performed by: FAMILY MEDICINE

## 2020-07-06 ASSESSMENT — ENCOUNTER SYMPTOMS
RHINORRHEA: 0
WHEEZING: 0
RESPIRATORY NEGATIVE: 1
EYES NEGATIVE: 1
COUGH: 0
GASTROINTESTINAL NEGATIVE: 1
SHORTNESS OF BREATH: 0
SORE THROAT: 0
ALLERGIC/IMMUNOLOGIC NEGATIVE: 1

## 2020-07-06 NOTE — TELEPHONE ENCOUNTER
Patients daughter notified patient will need to be seen in [facility] due to COVID-19 infection. Patients daughter verbalized understanding.

## 2020-07-06 NOTE — PROGRESS NOTES
Post-Discharge Transitional Care Management Services or Hospital Follow Up      Grayson Ricki   YOB: 1940    Date of Office Visit:  7/6/2020  Date of Hospital Admission: 6/26/20  Date of Hospital Discharge: 6/30/20  Readmission Risk Score(high >=14%.  Medium >=10%):Readmission Risk Score: 14      Care management risk score Rising risk (score 2-5) and Complex Care (Scores >=6): 1     Non face to face  following discharge, date last encounter closed (first attempt may have been earlier): *No documented post hospital discharge outreach found in the last 14 days *No documented post hospital discharge outreach found in the last 14 days    Call initiated 2 business days of discharge: *No response recorded in the last 14 days     Patient Active Problem List   Diagnosis    Coronary artery disease involving native coronary artery of native heart without angina pectoris    Hyperlipidemia with target LDL less than 70    Chronic diastolic heart failure ([facility] [address]    Obese    Anemia    GI bleed    Shingles    Esophageal ulcer    Gastric ulcer    Gastric ulcer with hemorrhage    Coronary artery disease    Diastolic dysfunction    Hyperlipidemia    Obesity    Pneumonia due to COVID-19 virus       Allergies   Allergen Reactions    Codeine Nausea Only       Medications listed as ordered at the time of discharge from hospital   [address] Medication Instructions EAU:[id number]    Printed on:07/06/20 1211   Medication Information                      alendronate (FOSAMAX) 70 MG tablet  Take 1 tablet by mouth every 7 days             ascorbic acid (VITAMIN C) 500 MG tablet  Take 500 mg by mouth 2 times daily Indications: patient only takes in the Winter With ana paula hips              aspirin 81 MG tablet  Take 81 mg by mouth daily             atorvastatin (LIPITOR) 40 MG tablet  TAKE 1 TABLET EVERY NIGHT AT BEDTIME             Cholecalciferol (VITAMIN D3) 2000 units CAPS  Take 1 capsule by mouth daily             ferrous sulfate (FE TABS) 325 (65 Fe) MG EC tablet  Take 1 tablet by mouth daily (with breakfast)             lisinopril (PRINIVIL;ZESTRIL) 5 MG tablet  TAKE 1 TABLET EVERY DAY             metoprolol tartrate (LOPRESSOR) 25 MG tablet  TAKE 1 TABLET TWICE DAILY             Multiple Vitamins-Minerals (MULTIVITAMIN PO)  Take 1 tablet by mouth daily. nitroGLYCERIN (NITROSTAT) 0.4 MG SL tablet  Place 1 tablet under the tongue every 5 minutes as needed for Chest pain                   Medications marked \"taking\" at this time  Outpatient Medications Marked as Taking for the 7/6/20 encounter (Office Visit) with Serina Allen MD   Medication Sig Dispense Refill    lisinopril (PRINIVIL;ZESTRIL) 5 MG tablet TAKE 1 TABLET EVERY DAY 90 tablet 3    metoprolol tartrate (LOPRESSOR) 25 MG tablet TAKE 1 TABLET TWICE DAILY 180 tablet 3    atorvastatin (LIPITOR) 40 MG tablet TAKE 1 TABLET EVERY NIGHT AT BEDTIME 90 tablet 3    alendronate (FOSAMAX) 70 MG tablet Take 1 tablet by mouth every 7 days 4 tablet 3    Cholecalciferol (VITAMIN D3) 2000 units CAPS Take 1 capsule by mouth daily 90 capsule 3    ferrous sulfate (FE TABS) 325 (65 Fe) MG EC tablet Take 1 tablet by mouth daily (with breakfast) 90 tablet 3    nitroGLYCERIN (NITROSTAT) 0.4 MG SL tablet Place 1 tablet under the tongue every 5 minutes as needed for Chest pain 25 tablet 1    aspirin 81 MG tablet Take 81 mg by mouth daily      ascorbic acid (VITAMIN C) 500 MG tablet Take 500 mg by mouth 2 times daily Indications: patient only takes in the Winter With ana paula hips       Multiple Vitamins-Minerals (MULTIVITAMIN PO) Take 1 tablet by mouth daily.           Medications patient taking as of now reconciled against medications ordered at time of hospital discharge: Yes    Chief Complaint   Patient presents with   [address] from [facility]     COVID-19 infection 6/26-6/30 pneumonia        HPI  Scheduled transitional care visit for follow up on recent hospitalization for pneumonia due to Cocid 19 virus. Presented to ED with fatigue and malaise. Treated about 4 days prior for uti. Subjective report of fever , cough, sob. Low grade temp . At 99.1, sats 94%. coivd 19 test positive 6/15/20. She received 1 unit of convalescent plasma on 6/28/20. Treated with supplemental oxygen and decadron for acute resp. Failure with hypoxia secondary to pneumonia. Not intubated. Able to be discharged home where she lives alone. Inpatient course: Discharge summary reviewed- see chart. Interval history/Current status: improved. Feeling good/back to baseline at present. A little more weakness noted due to not eating as much during illness. No cough or fever to report. Not feeling sob, even with mask on today. Review of Systems   Constitutional: Positive for fatigue. Negative for fever. HENT: Negative. Negative for congestion, rhinorrhea and sore throat. Eyes: Negative. Respiratory: Negative. Negative for cough, shortness of breath and wheezing. Cardiovascular: Negative. Gastrointestinal: Negative. Endocrine: Negative. Genitourinary: Negative. Musculoskeletal: Negative. Skin: Negative. Allergic/Immunologic: Negative. Neurological: Positive for weakness (generalized. she feels lack of eating in the last few weeks. ). Hematological: Negative. Psychiatric/Behavioral: Negative. Vitals:    07/06/20 1059   BP: 116/66   Site: Left Upper Arm   Position: Sitting   Cuff Size: Large Adult   Pulse: 59   Temp: 98.9 °F (37.2 °C)   TempSrc: Tympanic   SpO2: 95%   Weight: 160 lb (72.6 kg)   Height: 4' 11\" (1.499 m)     Body mass index is 32.32 kg/m².    Wt Readings from Last 3 Encounters:   07/06/20 160 lb (72.6 kg)   06/29/20 167 lb 8.8 oz (76 kg)   06/26/20 170 lb (77.1 kg)     BP Readings from Last 3 Encounters:   07/06/20 116/66   06/30/20 130/63   06/26/20 129/62       Physical Exam  Constitutional:       General: She is not in acute distress. Appearance: Normal appearance. She is well-developed. She is obese. She is not toxic-appearing. HENT:      Head: Normocephalic and atraumatic. Right Ear: External ear normal.      Left Ear: External ear normal.      Mouth/Throat:      Pharynx: No oropharyngeal exudate. Eyes:      General: No scleral icterus. Conjunctiva/sclera: Conjunctivae normal.   Neck:      Musculoskeletal: Neck supple. Thyroid: No thyromegaly. Cardiovascular:      Rate and Rhythm: Normal rate and regular rhythm. Heart sounds: Normal heart sounds. No murmur. Pulmonary:      Effort: Pulmonary effort is normal. No respiratory distress. Breath sounds: Normal breath sounds. No wheezing. Abdominal:      General: Bowel sounds are normal. There is no distension. Palpations: Abdomen is soft. Tenderness: There is no abdominal tenderness. There is no rebound. Musculoskeletal: Normal range of motion. General: No tenderness. Skin:     General: Skin is warm and dry. Findings: Bruising (left arm with mulitple bruises, likely blood draws ) present. No erythema or rash. Neurological:      Mental Status: She is alert and oriented to person, place, and time. Psychiatric:         Behavior: Behavior normal.         Thought Content: Thought content normal.         Judgment: Judgment normal.     /66 (Site: Left Upper Arm, Position: Sitting, Cuff Size: Large Adult)   Pulse 59   Temp 98.9 °F (37.2 °C) (Tympanic)   Ht 4' 11\" (1.499 m)   Wt 160 lb (72.6 kg)   LMP  (LMP Unknown)   SpO2 95%   BMI 32.32 kg/m²         ASSESMENT/Plan:  Encounter Diagnosis   Name Primary?  Pneumonia due to COVID-19 virus Yes     She did ok with supplemental oxygen and a unit of convalescent plasma. She seems to be back towards baseline and feeling well at present. Discussed keeping safe at home and away from other people another 2 weeks or so.       Plan updated cxr in one week to assure clearance of bilateral perihilar pulmonary opacification.       Medical Decision Making: low complexity

## 2020-07-13 ENCOUNTER — HOSPITAL ENCOUNTER (OUTPATIENT)
Dept: GENERAL RADIOLOGY | Age: 80
Discharge: HOME | End: 2020-07-15
Payer: MEDICARE

## 2020-07-13 PROCEDURE — 71046 X-RAY EXAM CHEST 2 VIEWS: CPT

## 2020-07-20 ENCOUNTER — TELEPHONE (OUTPATIENT)
Dept: FAMILY MEDICINE CLINIC | Age: 80
End: 2020-07-20

## 2020-07-20 RX ORDER — LANOLIN ALCOHOL/MO/W.PET/CERES
325 CREAM (GRAM) TOPICAL
Qty: 90 TABLET | Refills: 3 | Status: SHIPPED | OUTPATIENT
Start: 2020-07-20 | End: 2021-05-03

## 2020-07-20 NOTE — TELEPHONE ENCOUNTER
Terese calling stating pt was tested positive for Covid and now they would like pt re-tested to see if she's negative, questioning how they can get this done, please advise at above number.

## 2020-07-21 ENCOUNTER — OFFICE VISIT (OUTPATIENT)
Dept: PRIMARY CARE CLINIC | Age: 80
End: 2020-07-21
Payer: MEDICARE

## 2020-07-21 ENCOUNTER — HOSPITAL ENCOUNTER (OUTPATIENT)
Age: 80
Setting detail: SPECIMEN
Discharge: HOME | End: 2020-07-21
Payer: MEDICARE

## 2020-07-21 VITALS
BODY MASS INDEX: 33.38 KG/M2 | HEIGHT: 59 IN | DIASTOLIC BLOOD PRESSURE: 84 MMHG | HEART RATE: 62 BPM | SYSTOLIC BLOOD PRESSURE: 136 MMHG | TEMPERATURE: 98.2 F | WEIGHT: 165.6 LBS | OXYGEN SATURATION: 98 % | RESPIRATION RATE: 16 BRPM

## 2020-07-21 PROCEDURE — U0003 INFECTIOUS AGENT DETECTION BY NUCLEIC ACID (DNA OR RNA); SEVERE ACUTE RESPIRATORY SYNDROME CORONAVIRUS 2 (SARS-COV-2) (CORONAVIRUS DISEASE [COVID-19]), AMPLIFIED PROBE TECHNIQUE, MAKING USE OF HIGH THROUGHPUT TECHNOLOGIES AS DESCRIBED BY CMS-2020-01-R: HCPCS

## 2020-07-21 PROCEDURE — G8399 PT W/DXA RESULTS DOCUMENT: HCPCS | Performed by: FAMILY MEDICINE

## 2020-07-21 PROCEDURE — 1123F ACP DISCUSS/DSCN MKR DOCD: CPT | Performed by: FAMILY MEDICINE

## 2020-07-21 PROCEDURE — 1111F DSCHRG MED/CURRENT MED MERGE: CPT | Performed by: FAMILY MEDICINE

## 2020-07-21 PROCEDURE — G8427 DOCREV CUR MEDS BY ELIG CLIN: HCPCS | Performed by: FAMILY MEDICINE

## 2020-07-21 PROCEDURE — 99212 OFFICE O/P EST SF 10 MIN: CPT

## 2020-07-21 PROCEDURE — 1090F PRES/ABSN URINE INCON ASSESS: CPT | Performed by: FAMILY MEDICINE

## 2020-07-21 PROCEDURE — 99213 OFFICE O/P EST LOW 20 MIN: CPT | Performed by: FAMILY MEDICINE

## 2020-07-21 PROCEDURE — 1036F TOBACCO NON-USER: CPT | Performed by: FAMILY MEDICINE

## 2020-07-21 PROCEDURE — 4040F PNEUMOC VAC/ADMIN/RCVD: CPT | Performed by: FAMILY MEDICINE

## 2020-07-21 PROCEDURE — G8417 CALC BMI ABV UP PARAM F/U: HCPCS | Performed by: FAMILY MEDICINE

## 2020-07-21 ASSESSMENT — PATIENT HEALTH QUESTIONNAIRE - PHQ9
SUM OF ALL RESPONSES TO PHQ QUESTIONS 1-9: 0
1. LITTLE INTEREST OR PLEASURE IN DOING THINGS: 0
SUM OF ALL RESPONSES TO PHQ QUESTIONS 1-9: 0
2. FEELING DOWN, DEPRESSED OR HOPELESS: 0
SUM OF ALL RESPONSES TO PHQ9 QUESTIONS 1 & 2: 0

## 2020-07-21 NOTE — PROGRESS NOTES
[facility]             [address] [facility], [place], [address]                        Telephone [phone]             Fax [phone]       Perry Tellez  1940  MRN:  [id number]  Date of visit:  7/21/2020     Subjective:    Perry Tellez is a [de-identified] y.o.  female who presents to [facility] today (7/21/2020) for evaluation of: Other (hada positive result of Covid on 6/30/20 ,recheck ,no symptoms)      She is here today for a Covid-19 test.  She had a positive test on 6/15/2020. She was hospitalized at [facility] 6/26/2020-6/30/2020 for treatment of pneumonia. She received 1 unit of convalescent plasma. She was treated with supplemental oxygen and Decadron. She did not require intubation. She had another Covid-19 test on 6/30/2020 that was positive. She is feeling well currently. She denies shortness of breath. She denies fever. She states that her energy level is improving. She requests a Covid-19 test.  She is a poor historian. The history is obtained from the progress notes in the electronic medical record.       Current medications are:  Current Outpatient Medications   Medication Sig Dispense Refill    ferrous sulfate (FE TABS 325) 325 (65 Fe) MG EC tablet TAKE 1 TABLET BY MOUTH DAILY (WITH BREAKFAST) 90 tablet 3    lisinopril (PRINIVIL;ZESTRIL) 5 MG tablet TAKE 1 TABLET EVERY DAY 90 tablet 3    metoprolol tartrate (LOPRESSOR) 25 MG tablet TAKE 1 TABLET TWICE DAILY 180 tablet 3    atorvastatin (LIPITOR) 40 MG tablet TAKE 1 TABLET EVERY NIGHT AT BEDTIME 90 tablet 3    alendronate (FOSAMAX) 70 MG tablet Take 1 tablet by mouth every 7 days 4 tablet 3    Cholecalciferol (VITAMIN D3) 2000 units CAPS Take 1 capsule by mouth daily 90 capsule 3    nitroGLYCERIN (NITROSTAT) 0.4 MG SL tablet Place 1 tablet under the tongue every 5 minutes as needed for Chest pain 25 tablet 1    aspirin 81 MG tablet Take 81 mg by mouth daily      ascorbic acid (VITAMIN C) 500 MG tablet Take 500 mg by mouth 2 times daily Indications: patient only takes in the Winter With ana paula hips       Multiple Vitamins-Minerals (MULTIVITAMIN PO) Take 1 tablet by mouth daily. No current facility-administered medications for this visit. She is allergic to codeine. She has the following problem list:  Patient Active Problem List   Diagnosis    Coronary artery disease involving native coronary artery of native heart without angina pectoris    Hyperlipidemia with target LDL less than 70    Chronic diastolic heart failure ([address]    Obese    Anemia    GI bleed    Shingles    Esophageal ulcer    Gastric ulcer    Gastric ulcer with hemorrhage    Coronary artery disease    Diastolic dysfunction    Hyperlipidemia    Obesity    Pneumonia due to COVID-19 virus        She  reports that she has never smoked. She has never used smokeless tobacco.      Objective:    Vitals:    07/21/20 0943   BP: 136/84   Pulse: 62   Resp: 16   Temp: 98.2 °F (36.8 °C)   TempSrc: Tympanic   SpO2: 98%   Weight: 165 lb 9.6 oz (75.1 kg)   Height: 4' 11\" (1.499 m)      SpO2: 98 %       Body mass index is 33.45 kg/m². Obese elderly  female healthy-appearing, alert and cooperative. Oropharynx has no erythema. There is no exudate. Neck supple. No adenopathy. Chest:  Normal expansion. Clear to auscultation. No rales, rhonchi, or wheezing. Respirations are not labored. Heart sounds are normal.  Regular rate and rhythm without murmur, gallop or rub. Assessment & Plan:    History of 2019 novel coronavirus disease (COVID-19)  I discussed with the patient that as her symptoms are improving, she does not need another test.  She requested a test today. - Covid-19 Ambulatory; Future--an oropharyngeal was obtained.     She was advised that the most cautious approach is to assume that she may have Covid-19, and to stay isolated at home. Printed information regarding Learning How To Care for Someone Who Has COVID-19 was provided to the patient with her after visit summary.           (Please note that portions of this note were completed with a voice-recognition program. Efforts were made to edit the dictation but occasionally words are mis-transcribed.)

## 2020-07-21 NOTE — PATIENT INSTRUCTIONS
Patient Education        Learning How To Care for Someone Who Has COVID-19  Things to know  Most people who get COVID-19 will recover with time and home care. Here are some things to know if you're caring for someone who's sick. · Treat the symptoms. Common symptoms include a fever, coughing, and feeling short of breath. Urge the person to get extra rest and drink plenty of fluids to replace fluids lost from fever. To reduce a fever, offer acetaminophen (such as Tylenol). It may also help with muscle aches. Read and follow all instructions on the label. · Watch for signs that the illness is getting worse. The person may need medical care if they're getting sicker (for example, if it's hard to breathe). But call the doctor's office before you go. They can tell you what to do. Call 911 or emergency services if the person has any of these symptoms:  ? Severe trouble breathing or shortness of breath. ? Constant pain or pressure in their chest.  ? Confusion, or trouble thinking clearly. ? A blue tint to their lips or face. Some people are more likely to get very sick and need medical care. Call the doctor as soon as symptoms start if the person you're caring for is over 72, smokes, or has a serious health problem, like asthma, heart disease, diabetes, or an immune system problem. · Protect yourself and others. The virus spreads easily from person to person, so take extra care to avoid catching or spreading the infection. ? Keep the sick person away from others as much as you can. § Have the person stay in one room. If you can, give them their own bathroom to use. § Have only one person take care of them. Keep other people--and pets--out of the sickroom. § Have the person wear a cloth face cover around other people. This includes when anyone is in the room with them or if they leave their room (for example, to go to the bathroom).  If the face cover makes it harder for the sick person to breathe, the other person should wear a face cover. § Don't share personal items. These include dishes, cups, towels, and bedding. ? Wash your hands often and well. Use soap and water, and scrub for at least 20 seconds. This is especially important after you've been around the sick person or touched things they've touched. If soap and water aren't handy, use a hand  with at least 60% alcohol. ? Avoid touching your mouth, nose, and eyes. ? Take care with the person's laundry. It's okay to wash the sick person's laundry with yours. If you have them, wear disposable gloves when handling their dirty laundry, and wash your hands well after you touch it. Wash items in the warmest water allowed for the fabric type, and dry them completely. ? Clean high-touch items every day and anytime the sick person touches them. These include doorknobs, light switches, toilets, counters, and remote controls. Use a household disinfectant or a homemade bleach solution. (Follow the directions on the label.) If the sick person has their own room, have them disinfect it every day. ? Limit visitors to your home. To help protect family and friends, stay in touch with them only by phone or computer. Current as of: May 8, 2020               Content Version: 12.5  © 2006-2020 [employer]. Care instructions adapted under license by [facility] ([facility]). If you have questions about a medical condition or this instruction, always ask your healthcare professional. [place] [address] any warranty or liability for your use of this information.

## 2020-07-27 LAB — SARS-COV-2, NAA: NOT DETECTED

## 2020-08-17 ENCOUNTER — HOSPITAL ENCOUNTER (OUTPATIENT)
Dept: GENERAL RADIOLOGY | Age: 80
Discharge: HOME | End: 2020-08-19
Payer: MEDICARE

## 2020-08-17 PROCEDURE — 74022 RADEX COMPL AQT ABD SERIES: CPT

## 2020-09-09 ENCOUNTER — TELEPHONE (OUTPATIENT)
Dept: CARDIOLOGY | Age: 80
End: 2020-09-09

## 2020-09-22 ENCOUNTER — HOSPITAL ENCOUNTER (OUTPATIENT)
Dept: NUCLEAR MEDICINE | Age: 80
Discharge: HOME | End: 2020-09-24
Payer: MEDICARE

## 2020-09-22 ENCOUNTER — HOSPITAL ENCOUNTER (OUTPATIENT)
Dept: NON INVASIVE DIAGNOSTICS | Age: 80
Discharge: HOME | End: 2020-09-22
Payer: MEDICARE

## 2020-09-22 PROCEDURE — 93017 CV STRESS TEST TRACING ONLY: CPT

## 2020-09-22 PROCEDURE — A9500 TC99M SESTAMIBI: HCPCS | Performed by: INTERNAL MEDICINE

## 2020-09-22 PROCEDURE — 6360000002 HC RX W HCPCS: Performed by: INTERNAL MEDICINE

## 2020-09-22 PROCEDURE — 3430000000 HC RX DIAGNOSTIC RADIOPHARMACEUTICAL: Performed by: INTERNAL MEDICINE

## 2020-09-22 PROCEDURE — 78452 HT MUSCLE IMAGE SPECT MULT: CPT

## 2020-09-22 RX ADMIN — TETRAKIS(2-METHOXYISOBUTYLISOCYANIDE)COPPER(I) TETRAFLUOROBORATE 30 MILLICURIE: 1 INJECTION, POWDER, LYOPHILIZED, FOR SOLUTION INTRAVENOUS at 11:33

## 2020-09-22 RX ADMIN — TETRAKIS(2-METHOXYISOBUTYLISOCYANIDE)COPPER(I) TETRAFLUOROBORATE 10 MILLICURIE: 1 INJECTION, POWDER, LYOPHILIZED, FOR SOLUTION INTRAVENOUS at 10:05

## 2020-09-22 RX ADMIN — REGADENOSON 0.4 MG: 0.08 INJECTION, SOLUTION INTRAVENOUS at 11:31

## 2020-09-22 NOTE — PROGRESS NOTES
Patient Name:  Alicia Clancy MRN:  [id number]   :  1940  Age:  [de-identified] y.o. Sex: female   Ordering Provider:   Jeff Cooley DO  Referring Provider:   Primary Care Provider:  Makeda Altamirano MD     Indications: Dyspnea on exertion, Coronary artery disease    Medications:     Current Outpatient Medications:     ferrous sulfate (FE TABS 325) 325 (65 Fe) MG EC tablet, TAKE 1 TABLET BY MOUTH DAILY (WITH BREAKFAST), Disp: 90 tablet, Rfl: 3    lisinopril (PRINIVIL;ZESTRIL) 5 MG tablet, TAKE 1 TABLET EVERY DAY, Disp: 90 tablet, Rfl: 3    metoprolol tartrate (LOPRESSOR) 25 MG tablet, TAKE 1 TABLET TWICE DAILY, Disp: 180 tablet, Rfl: 3    atorvastatin (LIPITOR) 40 MG tablet, TAKE 1 TABLET EVERY NIGHT AT BEDTIME, Disp: 90 tablet, Rfl: 3    alendronate (FOSAMAX) 70 MG tablet, Take 1 tablet by mouth every 7 days, Disp: 4 tablet, Rfl: 3    Cholecalciferol (VITAMIN D3) 2000 units CAPS, Take 1 capsule by mouth daily, Disp: 90 capsule, Rfl: 3    nitroGLYCERIN (NITROSTAT) 0.4 MG SL tablet, Place 1 tablet under the tongue every 5 minutes as needed for Chest pain, Disp: 25 tablet, Rfl: 1    aspirin 81 MG tablet, Take 81 mg by mouth daily, Disp: , Rfl:     ascorbic acid (VITAMIN C) 500 MG tablet, Take 500 mg by mouth 2 times daily Indications: patient only takes in the Winter With ana paula hips , Disp: , Rfl:     Multiple Vitamins-Minerals (MULTIVITAMIN PO), Take 1 tablet by mouth daily. , Disp: , Rfl:     Current Facility-Administered Medications:     regadenoson (LEXISCAN) injection 0.4 mg, 0.4 mg, Intravenous, Once, Jessica Looney MD    Facility-Administered Medications Ordered in Other Encounters:     technetium sestamibi (CARDIOLITE) injection 10 millicurie, 10 millicurie, Intravenous, ONCE PRN, Leo Martins DO    technetium sestamibi (CARDIOLITE) injection 30 millicurie, 30 millicurie, Intravenous, ONCE PRN, Leo Martins DO ----------------------------------------------------------------------------------------------------------                Lexiscan 0.4 mg injected over 10 seconds. IV Cardiolite injected 20 seconds post Lexiscan injection. Heart Rate:  60  Resting Blood Pressure:  182/82   ----------------------------------------------------------------------------------------------------------     HR BP   1 min 78 181/83   2 min     3 min 74 163/75   4 min     5 min 70 166/75   6 min     7 min 69 150/72   8 min     9 min 68 152/72   10 min       Symptoms:  Chest Pain:  No      Nausea:  No     Headache:  No    Shortness of Breath:  No     Other:  No      Electronically signed by Moriah Valente RN on 9/22/20 at 11:21 AM EDT    ----------------------------------------------------------------------------------------------------------  Resting EKG:  NSR    Arrhythmias:  NONE     EKG Changes:  NONE     Maximum Changes:       Leads with maximum changes:       EKG returned to baseline  minutes in recovery. Interpretation:        STRESS EKG NEGATIVE FOR ISCHEMIA . NUCLEAR SCAN TO FOLLOW        Supervising Physician:  DR. Rigoberto Escudero

## 2020-09-23 NOTE — PROCEDURES
What Type Of Note Output Would You Prefer (Optional)?: Bullet Format
Has Your Skin Lesion Been Treated?: been treated
Mild anterolateral ischemia. 2.  LVEF 80%.         Rj Phlegm    D: 09/23/2020 15:42:42       T: 09/23/2020 15:43:43     DARYL/MICHAEL_NÉSTOR  Job#: [id number]     Doc#: Unknown    CC:  Chirag Martins DO
Is This A New Presentation, Or A Follow-Up?: Skin Lesion
Additional History: States spot was treated with LN2 and appeared 2 weeks later

## 2020-09-24 ENCOUNTER — TELEPHONE (OUTPATIENT)
Dept: CARDIOLOGY | Age: 80
End: 2020-09-24

## 2020-09-24 NOTE — TELEPHONE ENCOUNTER
Patient notified of stress results and will wait for Dr Joe Freeman review.  Advised to go to ER for any concerns

## 2020-09-25 RX ORDER — ISOSORBIDE MONONITRATE 30 MG/1
30 TABLET, EXTENDED RELEASE ORAL DAILY
Qty: 90 TABLET | Refills: 1 | Status: SHIPPED | OUTPATIENT
Start: 2020-09-25 | End: 2021-02-05

## 2020-09-25 NOTE — TELEPHONE ENCOUNTER
Okay lets monitor for any symptoms  Reviewed images personally, not very convincing for anterolateral ischemia. Add imdur 30 mg po qd. Call if any CPRosa Bailey

## 2020-10-16 ENCOUNTER — OFFICE VISIT (OUTPATIENT)
Dept: FAMILY MEDICINE CLINIC | Age: 80
End: 2020-10-16
Payer: MEDICARE

## 2020-10-16 VITALS
HEIGHT: 59 IN | DIASTOLIC BLOOD PRESSURE: 68 MMHG | BODY MASS INDEX: 33.47 KG/M2 | WEIGHT: 166 LBS | HEART RATE: 68 BPM | SYSTOLIC BLOOD PRESSURE: 118 MMHG

## 2020-10-16 PROCEDURE — 1036F TOBACCO NON-USER: CPT | Performed by: FAMILY MEDICINE

## 2020-10-16 PROCEDURE — 99213 OFFICE O/P EST LOW 20 MIN: CPT

## 2020-10-16 PROCEDURE — G8427 DOCREV CUR MEDS BY ELIG CLIN: HCPCS | Performed by: FAMILY MEDICINE

## 2020-10-16 PROCEDURE — 1123F ACP DISCUSS/DSCN MKR DOCD: CPT | Performed by: FAMILY MEDICINE

## 2020-10-16 PROCEDURE — G8484 FLU IMMUNIZE NO ADMIN: HCPCS | Performed by: FAMILY MEDICINE

## 2020-10-16 PROCEDURE — G8417 CALC BMI ABV UP PARAM F/U: HCPCS | Performed by: FAMILY MEDICINE

## 2020-10-16 PROCEDURE — 99214 OFFICE O/P EST MOD 30 MIN: CPT | Performed by: FAMILY MEDICINE

## 2020-10-16 PROCEDURE — G8399 PT W/DXA RESULTS DOCUMENT: HCPCS | Performed by: FAMILY MEDICINE

## 2020-10-16 PROCEDURE — 4040F PNEUMOC VAC/ADMIN/RCVD: CPT | Performed by: FAMILY MEDICINE

## 2020-10-16 PROCEDURE — 1090F PRES/ABSN URINE INCON ASSESS: CPT | Performed by: FAMILY MEDICINE

## 2020-10-16 RX ORDER — ALENDRONATE SODIUM 70 MG/1
70 TABLET ORAL
Qty: 4 TABLET | Refills: 3 | Status: SHIPPED | OUTPATIENT
Start: 2020-10-16 | End: 2020-12-14

## 2020-10-16 RX ORDER — ACETAMINOPHEN 160 MG
1 TABLET,DISINTEGRATING ORAL DAILY
Qty: 90 CAPSULE | Refills: 3 | Status: SHIPPED | OUTPATIENT
Start: 2020-10-16 | End: 2021-09-09 | Stop reason: SDUPTHER

## 2020-10-16 ASSESSMENT — PATIENT HEALTH QUESTIONNAIRE - PHQ9
2. FEELING DOWN, DEPRESSED OR HOPELESS: 0
SUM OF ALL RESPONSES TO PHQ QUESTIONS 1-9: 0
SUM OF ALL RESPONSES TO PHQ QUESTIONS 1-9: 0
1. LITTLE INTEREST OR PLEASURE IN DOING THINGS: 0
SUM OF ALL RESPONSES TO PHQ QUESTIONS 1-9: 0
SUM OF ALL RESPONSES TO PHQ9 QUESTIONS 1 & 2: 0

## 2020-10-16 ASSESSMENT — ENCOUNTER SYMPTOMS
RESPIRATORY NEGATIVE: 1
EYES NEGATIVE: 1
GASTROINTESTINAL NEGATIVE: 1
ALLERGIC/IMMUNOLOGIC NEGATIVE: 1

## 2020-10-16 NOTE — PROGRESS NOTES
Subjective:      Patient ID: Randell Bryant is a [de-identified] y.o. female. Coronary Artery Disease   Risk factors include hyperlipidemia. Hyperlipidemia     Hypertension        Routine follow up on chronic medical conditions, refills, and review of updated labs. I have reviewed the patient's medical history in detail and updated the computerized patient record. No significant medical events, injuries, illness, or surgery. No current concerns or complaints. Compliant with medications. No gi symptoms . No chest pain issues. Good exercise tolerance. Driving actively . Working her own housework. Staying home during covid 19 restrictions. No gi bleeding concerns on review. Vision stable. Past Medical History:   Diagnosis Date    Asteroid hyalosis of right eye     Coronary artery disease     status post acute non-Q wave myocardial infarction 08/23/2012, status post drug eluting stent placement in the LAD and 2 drug eluting stents in the left circumflex artery 08/23/2012.  Diastolic dysfunction     grade 1.     Gastric ulcer with hemorrhage 03/03/2015    gastric and esophageal ulcers due to nsaid    Hyperlipidemia     Mild mitral regurgitation     Mild tricuspid regurgitation     Obesity     Patient in clinical research study 06/27/2020    Expanded Access to [address] for COVID-19 date of completed 6/30/2020    Vitreous floaters     left eye. Past Surgical History:   Procedure Laterality Date    CARDIAC CATHETERIZATION Left 08/23/2012    left main artery normal, LAD with mid 60 to 70% stenosis, FFR was 0.76, left circumflex artery with mid 90% stenosis with thrombus, RCA with mild irregularities, preserved left ventricular systolic function, ejection fraction estimated around 50%, status post drug eluting stent placement in the LAD and 2 drug eluting stents in the left circumflex artery.      CHOLECYSTECTOMY  1978    ENDOSCOPY, COLON, DIAGNOSTIC  03/05/2015    esophageal/gastric ulcer; few diverticuli    UPPER GASTROINTESTINAL ENDOSCOPY  4/16/2015    healed gastric ulcer       Current Outpatient Medications   Medication Sig Dispense Refill    isosorbide mononitrate (IMDUR) 30 MG extended release tablet Take 1 tablet by mouth daily 90 tablet 1    ferrous sulfate (FE TABS 325) 325 (65 Fe) MG EC tablet TAKE 1 TABLET BY MOUTH DAILY (WITH BREAKFAST) 90 tablet 3    lisinopril (PRINIVIL;ZESTRIL) 5 MG tablet TAKE 1 TABLET EVERY DAY 90 tablet 3    metoprolol tartrate (LOPRESSOR) 25 MG tablet TAKE 1 TABLET TWICE DAILY 180 tablet 3    atorvastatin (LIPITOR) 40 MG tablet TAKE 1 TABLET EVERY NIGHT AT BEDTIME 90 tablet 3    Cholecalciferol (VITAMIN D3) 2000 units CAPS Take 1 capsule by mouth daily 90 capsule 3    nitroGLYCERIN (NITROSTAT) 0.4 MG SL tablet Place 1 tablet under the tongue every 5 minutes as needed for Chest pain 25 tablet 1    aspirin 81 MG tablet Take 81 mg by mouth daily      ascorbic acid (VITAMIN C) 500 MG tablet Take 500 mg by mouth 2 times daily Indications: patient only takes in the Winter With ana paula hips       Multiple Vitamins-Minerals (MULTIVITAMIN PO) Take 1 tablet by mouth daily. No current facility-administered medications for this visit. Allergies   Allergen Reactions    Codeine Nausea Only     Social History     Tobacco Use    Smoking status: Never Smoker    Smokeless tobacco: Never Used   Substance Use Topics    Alcohol use: Yes     Comment: occasionally    Drug use: No     Family History   Problem Relation Age of Onset    Heart Attack Father            Review of Systems   Constitutional: Negative. HENT: Negative. Eyes: Negative. Respiratory: Negative. Cardiovascular: Negative. Gastrointestinal: Negative. Endocrine: Negative. Genitourinary: Negative. Musculoskeletal: Negative for arthralgias. Skin: Negative. Allergic/Immunologic: Negative. Neurological: Negative. Hematological: Negative. Psychiatric/Behavioral: Negative. Objective:   Physical Exam  Constitutional:       General: She is not in acute distress. Appearance: She is well-developed. HENT:      Head: Normocephalic and atraumatic. Right Ear: External ear normal.      Left Ear: External ear normal.      Mouth/Throat:      Pharynx: No oropharyngeal exudate. Eyes:      General: No scleral icterus. Conjunctiva/sclera: Conjunctivae normal.   Neck:      Musculoskeletal: Neck supple. Thyroid: No thyromegaly. Cardiovascular:      Rate and Rhythm: Normal rate and regular rhythm. Heart sounds: Normal heart sounds. No murmur. Pulmonary:      Effort: Pulmonary effort is normal. No respiratory distress. Breath sounds: Normal breath sounds. No wheezing. Abdominal:      General: Bowel sounds are normal. There is no distension. Palpations: Abdomen is soft. Tenderness: There is no abdominal tenderness. There is no rebound. Musculoskeletal: Normal range of motion. General: No tenderness. Skin:     General: Skin is warm and dry. Findings: No erythema or rash. Neurological:      Mental Status: She is alert and oriented to person, place, and time. Psychiatric:         Behavior: Behavior normal.         Thought Content: Thought content normal.         Judgment: Judgment normal.       /68 (Site: Right Upper Arm, Position: Sitting, Cuff Size: Large Adult)   Pulse 68   Ht 4' 10.5\" (1.486 m)   Wt 166 lb (75.3 kg)   LMP  (LMP Unknown)   BMI 34.10 kg/m²    Down 13 lbs over interval.   Labs pending draw. Assessment:       Encounter Diagnoses   Name Primary?     Chronic gastric ulcer with hemorrhage Yes    Coronary artery disease involving native coronary artery of native heart without angina pectoris     Diastolic dysfunction     Pure hypercholesterolemia     Class 1 obesity due to excess calories without serious comorbidity in adult, unspecified BMI     Osteopenia, unspecified location     Iron deficiency anemia, unspecified iron deficiency anemia type     Impaired fasting blood sugar              Plan:        Prior gastric ulcer, likely nsaid related. On asa at present. Asx. No active treatment at present. Will follow up prn concerns. No active treatment at present beyond avoiding nsaids. CAD: CINDY to LAD and LCX 08/2012. Stable/asx. Plan to cont. Asa, lopressor, statin, ace inhibitor. Diastolic dysfunction. No sense of decompensation or any recent chf issues. No peripheral edema or sob . Hyperlipidemia:  Doing well on lipitor. Cont. Current dosing. Obesity. Discussed wt. Loss. Trying to avoid worsening bs control. Colonoscopy normal 2015    She refuses mammogram.  No concerns on home self breast exams. Osteopenia per dexa 9/20/18:  Consider prolia twice a year. Willing to start fosamax. Side effects discussed. History of anemia; more gi bleeding/upper gi ulceration in the past.  Remains asx/normal range. Updated labs pending. Impaired fasting blood sugar. 119.  a1c reassuring at 5.6%. Cont. Dietary discretion. Updates pending. Planning to get labs today prior to going home. 6 month follow up. Flu shot received.

## 2020-10-30 RX ORDER — ATORVASTATIN CALCIUM 40 MG/1
TABLET, FILM COATED ORAL
Qty: 90 TABLET | Refills: 3 | Status: SHIPPED | OUTPATIENT
Start: 2020-10-30 | End: 2021-09-09 | Stop reason: SDUPTHER

## 2020-12-07 ENCOUNTER — OFFICE VISIT (OUTPATIENT)
Dept: CARDIOLOGY | Age: 80
End: 2020-12-07
Payer: MEDICARE

## 2020-12-07 VITALS
HEART RATE: 64 BPM | SYSTOLIC BLOOD PRESSURE: 143 MMHG | DIASTOLIC BLOOD PRESSURE: 67 MMHG | WEIGHT: 160 LBS | BODY MASS INDEX: 32.25 KG/M2 | HEIGHT: 59 IN

## 2020-12-07 PROCEDURE — 99214 OFFICE O/P EST MOD 30 MIN: CPT | Performed by: INTERNAL MEDICINE

## 2020-12-07 PROCEDURE — 93005 ELECTROCARDIOGRAM TRACING: CPT | Performed by: INTERNAL MEDICINE

## 2020-12-07 PROCEDURE — 4040F PNEUMOC VAC/ADMIN/RCVD: CPT | Performed by: INTERNAL MEDICINE

## 2020-12-07 PROCEDURE — 93010 ELECTROCARDIOGRAM REPORT: CPT | Performed by: INTERNAL MEDICINE

## 2020-12-07 PROCEDURE — G8427 DOCREV CUR MEDS BY ELIG CLIN: HCPCS | Performed by: INTERNAL MEDICINE

## 2020-12-07 PROCEDURE — G8417 CALC BMI ABV UP PARAM F/U: HCPCS | Performed by: INTERNAL MEDICINE

## 2020-12-07 PROCEDURE — G8484 FLU IMMUNIZE NO ADMIN: HCPCS | Performed by: INTERNAL MEDICINE

## 2020-12-07 PROCEDURE — 1090F PRES/ABSN URINE INCON ASSESS: CPT | Performed by: INTERNAL MEDICINE

## 2020-12-07 PROCEDURE — G8399 PT W/DXA RESULTS DOCUMENT: HCPCS | Performed by: INTERNAL MEDICINE

## 2020-12-07 PROCEDURE — 1036F TOBACCO NON-USER: CPT | Performed by: INTERNAL MEDICINE

## 2020-12-07 PROCEDURE — 1123F ACP DISCUSS/DSCN MKR DOCD: CPT | Performed by: INTERNAL MEDICINE

## 2020-12-07 NOTE — PROGRESS NOTES
[facility]    CC: Follow up for CAD. HPI:  Hitesh Albright  is here for follow up. Denies any cp, sob, orthopnea, pnd, le edema. No nitro use. Past Medical History:   Diagnosis Date    Asteroid hyalosis of right eye     Coronary artery disease     status post acute non-Q wave myocardial infarction 08/23/2012, status post drug eluting stent placement in the LAD and 2 drug eluting stents in the left circumflex artery 08/23/2012.  Diastolic dysfunction     grade 1.     Gastric ulcer with hemorrhage 03/03/2015    gastric and esophageal ulcers due to nsaid    Hyperlipidemia     Mild mitral regurgitation     Mild tricuspid regurgitation     Obesity     Patient in clinical research study 06/27/2020    Expanded Access to [address] for COVID-19 date of completed 6/30/2020    Vitreous floaters     left eye. Past Surgical History:   Procedure Laterality Date    CARDIAC CATHETERIZATION Left 08/23/2012    left main artery normal, LAD with mid 60 to 70% stenosis, FFR was 0.76, left circumflex artery with mid 90% stenosis with thrombus, RCA with mild irregularities, preserved left ventricular systolic function, ejection fraction estimated around 50%, status post drug eluting stent placement in the LAD and 2 drug eluting stents in the left circumflex artery.  CHOLECYSTECTOMY  1978    ENDOSCOPY, COLON, DIAGNOSTIC  03/05/2015    esophageal/gastric ulcer; few diverticuli    UPPER GASTROINTESTINAL ENDOSCOPY  4/16/2015    healed gastric ulcer       Family History   Problem Relation Age of Onset    Heart Attack Father        REVIEW OF SYSTEMS:    · Constitutional: there has been no unanticipated weight loss. There's been No change in energy level, No change in activity level. · Eyes: No visual changes or diplopia. No scleral icterus. · ENT: No Headaches, hearing loss or vertigo. No mouth sores or sore throat.   · Cardiovascular: AS HPI  · Respiratory: AS HPI  · Gastrointestinal: No abdominal pain, appetite loss, blood in stools. No change in bowel or bladder habits. · Genitourinary: No dysuria, trouble voiding, or hematuria. · Musculoskeletal:  No gait disturbance, No weakness or joint complaints. · Integumentary: No rash or pruritis. · Neurological: No headache, diplopia, change in muscle strength, numbness or tingling. No change in gait, balance, coordination, mood, affect, memory, mentation, behavior. · Psychiatric: No new anxiety or depression. · Endocrine: No temperature intolerance. No excessive thirst, fluid intake, or urination. No tremor. · Hematologic/Lymphatic: No abnormal bruising or bleeding, blood clots or swollen lymph nodes. · Allergic/Immunologic: No nasal congestion or hives. Physical exam:    BP (!) 143/67   Pulse 64   Ht 4' 10.5\" (1.486 m)   Wt 160 lb (72.6 kg)   LMP  (LMP Unknown)   BMI 32.87 kg/m²     Vitals as above. Alert and oriented x 3. No JVD, or carotid bruits. Lungs are clear to auscultation. Heart sounds are regular, normal, no murmur. Abdomen is soft, no tenderness. Extremities No peripheral edema.     Meds:    Current Outpatient Medications:     atorvastatin (LIPITOR) 40 MG tablet, TAKE 1 TABLET AT BEDTIME, Disp: 90 tablet, Rfl: 3    Cholecalciferol (VITAMIN D3) 50 MCG (2000 UT) CAPS, Take 1 capsule by mouth daily, Disp: 90 capsule, Rfl: 3    alendronate (FOSAMAX) 70 MG tablet, Take 1 tablet by mouth every 7 days, Disp: 4 tablet, Rfl: 3    isosorbide mononitrate (IMDUR) 30 MG extended release tablet, Take 1 tablet by mouth daily, Disp: 90 tablet, Rfl: 1    ferrous sulfate (FE TABS 325) 325 (65 Fe) MG EC tablet, TAKE 1 TABLET BY MOUTH DAILY (WITH BREAKFAST), Disp: 90 tablet, Rfl: 3    lisinopril (PRINIVIL;ZESTRIL) 5 MG tablet, TAKE 1 TABLET EVERY DAY, Disp: 90 tablet, Rfl: 3    metoprolol tartrate (LOPRESSOR) 25 MG tablet, TAKE 1 TABLET TWICE DAILY, Disp: 180 tablet, Rfl: 3    nitroGLYCERIN (NITROSTAT) 0.4 MG SL tablet, Place 1 tablet under the tongue every 5 minutes as needed for Chest pain, Disp: 25 tablet, Rfl: 1    aspirin 81 MG tablet, Take 81 mg by mouth daily, Disp: , Rfl:     ascorbic acid (VITAMIN C) 500 MG tablet, Take 500 mg by mouth 2 times daily Indications: patient only takes in the Winter With ana paula hips , Disp: , Rfl:     Multiple Vitamins-Minerals (MULTIVITAMIN PO), Take 1 tablet by mouth daily. , Disp: , Rfl:     LABS  Lab Results   Component Value Date    CHOL 125 04/16/2020    TRIG 184 (H) 04/16/2020    HDL 51 04/16/2020    LDLCHOLESTEROL 37 04/16/2020    VLDL NOT REPORTED (H) 04/16/2020    CHOLHDLRATIO 2.5 04/16/2020       Lab Results   Component Value Date     06/29/2020    K 5.0 06/29/2020     06/29/2020    CO2 20 06/29/2020    BUN 21 06/29/2020    CREATININE 0.65 06/29/2020    GLUCOSE 130 (H) 06/29/2020    CALCIUM 8.9 06/29/2020    PROT 6.3 (L) 06/26/2020    LABALBU 3.1 (L) 06/26/2020    BILITOT 0.65 06/26/2020    ALKPHOS 75 06/26/2020    AST 83 (H) 06/26/2020    ALT 63 (H) 06/26/2020    LABGLOM >60 06/29/2020    GFRAA >60 06/29/2020    GLOB 3.2 06/26/2020       EKG: Sinus  Rhythm   WITHIN NORMAL     Nuclear Stress 9/20:  IMPRESSION:  1. Mild anterolateral ischemia. 2.  LVEF 80%. Assessment and Plan:    -CAD- CINDY to LAD and LCX 08/2012- stable without significant angina. Continue ASA, statin, BB, imdur.   -Reviewed stress test 9/20- possible mild anterolateral ischemia, reviewed images, not that convincing. -COVID 19 in June 202- with some memory issues since then. -HTN-stable. Continue current plant.   -Obesity - encouraged diet, exercise, and discussed weight loss extensively. -Hyperlipidemia- LDL 37 on 4/20 continue statin.   -History of severe anemia along with gastric and esophageal ulcers 3/2015-     The patient is to continue heart healthy diet, weight loss and exercise as tolerated. Patient's medications and side effects were discussed.  Medication refills were provided if needed. Follow up appointment timing was discussed. All questions and concerns were addressed to patient's satisfaction. The patient is to follow up in 6 months or sooner if necessary. Thank you for allowing me to participate in the care of this patient, please do not hesitate to call if you have any questions. Abeba Finch DO, [facility] [place], [address] [facility]  [facility]  [phone]

## 2020-12-14 NOTE — TELEPHONE ENCOUNTER
Mildred Mays called requesting a refill of the below medication which has been pended for you:     Requested Prescriptions     Pending Prescriptions Disp Refills    alendronate (FOSAMAX) 70 MG tablet [Pharmacy Med Name: ALENDRONATE SODIUM 70 MG Tablet] 12 tablet 0     Sig: TAKE 1 TABLET BY MOUTH EVERY 7 DAYS       Last Appointment Date: 10/16/2020  Next Appointment Date: 4/16/2021    Allergies   Allergen Reactions    Codeine Nausea Only

## 2020-12-18 RX ORDER — ALENDRONATE SODIUM 70 MG/1
70 TABLET ORAL
Qty: 12 TABLET | Refills: 0 | Status: SHIPPED | OUTPATIENT
Start: 2020-12-18 | End: 2021-02-12

## 2021-01-06 ENCOUNTER — OFFICE VISIT (OUTPATIENT)
Dept: OBGYN | Age: 81
End: 2021-01-06
Payer: MEDICARE

## 2021-01-06 VITALS
DIASTOLIC BLOOD PRESSURE: 88 MMHG | BODY MASS INDEX: 32.95 KG/M2 | HEART RATE: 88 BPM | WEIGHT: 157 LBS | RESPIRATION RATE: 20 BRPM | SYSTOLIC BLOOD PRESSURE: 138 MMHG | HEIGHT: 58 IN

## 2021-01-06 DIAGNOSIS — N95.0 POSTMENOPAUSAL BLEEDING: Primary | ICD-10-CM

## 2021-01-06 PROCEDURE — G8427 DOCREV CUR MEDS BY ELIG CLIN: HCPCS | Performed by: OBSTETRICS & GYNECOLOGY

## 2021-01-06 PROCEDURE — G8399 PT W/DXA RESULTS DOCUMENT: HCPCS | Performed by: OBSTETRICS & GYNECOLOGY

## 2021-01-06 PROCEDURE — G8417 CALC BMI ABV UP PARAM F/U: HCPCS | Performed by: OBSTETRICS & GYNECOLOGY

## 2021-01-06 PROCEDURE — 1036F TOBACCO NON-USER: CPT | Performed by: OBSTETRICS & GYNECOLOGY

## 2021-01-06 PROCEDURE — 99203 OFFICE O/P NEW LOW 30 MIN: CPT | Performed by: OBSTETRICS & GYNECOLOGY

## 2021-01-06 PROCEDURE — G8484 FLU IMMUNIZE NO ADMIN: HCPCS | Performed by: OBSTETRICS & GYNECOLOGY

## 2021-01-06 PROCEDURE — 99214 OFFICE O/P EST MOD 30 MIN: CPT

## 2021-01-06 PROCEDURE — 1123F ACP DISCUSS/DSCN MKR DOCD: CPT | Performed by: OBSTETRICS & GYNECOLOGY

## 2021-01-06 PROCEDURE — 4040F PNEUMOC VAC/ADMIN/RCVD: CPT | Performed by: OBSTETRICS & GYNECOLOGY

## 2021-01-06 PROCEDURE — 1090F PRES/ABSN URINE INCON ASSESS: CPT | Performed by: OBSTETRICS & GYNECOLOGY

## 2021-01-06 NOTE — PROGRESS NOTES
Subjective:      Patient ID: Cherri Yañez  is a [de-identified] y.o. y.o. female. P3  A postmenopausal patient that had some dark brownish discharge or possibly bleeding for a couple of days however no pain. She has multiple chronic conditions mainly heart problem and she is on medication for that but not on any blood thinner. She denies any GI or  symptoms otherwise. Vaginal Bleeding  This is a new problem. The current episode started in the past 7 days. The problem occurs intermittently. The problem has been waxing and waning. Nothing aggravates the symptoms. She has tried nothing for the symptoms. The treatment provided no relief. Chief Complaint   Patient presents with    Vaginal Bleeding     one episode of bleeding yesterday     Family History   Problem Relation Age of Onset    Heart Attack Father      Past Medical History:   Diagnosis Date    Asteroid hyalosis of right eye     Coronary artery disease     status post acute non-Q wave myocardial infarction 08/23/2012, status post drug eluting stent placement in the LAD and 2 drug eluting stents in the left circumflex artery 08/23/2012.  Diastolic dysfunction     grade 1.     Gastric ulcer with hemorrhage 03/03/2015    gastric and esophageal ulcers due to nsaid    Hyperlipidemia     Mild mitral regurgitation     Mild tricuspid regurgitation     Obesity     Patient in clinical research study 06/27/2020    Expanded Access to [address] for COVID-19 date of completed 6/30/2020    Vitreous floaters     left eye.       Past Surgical History:   Procedure Laterality Date    CARDIAC CATHETERIZATION Left 08/23/2012 left main artery normal, LAD with mid 60 to 70% stenosis, FFR was 0.76, left circumflex artery with mid 90% stenosis with thrombus, RCA with mild irregularities, preserved left ventricular systolic function, ejection fraction estimated around 50%, status post drug eluting stent placement in the LAD and 2 drug eluting stents in the left circumflex artery.  CHOLECYSTECTOMY  1978    ENDOSCOPY, COLON, DIAGNOSTIC  03/05/2015    esophageal/gastric ulcer; few diverticuli    UPPER GASTROINTESTINAL ENDOSCOPY  4/16/2015    healed gastric ulcer      reports that she has never smoked. She has never used smokeless tobacco. She reports current alcohol use. She reports that she does not use drugs.   Allergies   Allergen Reactions    Codeine Nausea Only       Current Outpatient Medications:     alendronate (FOSAMAX) 70 MG tablet, TAKE 1 TABLET BY MOUTH EVERY 7 DAYS, Disp: 12 tablet, Rfl: 0    atorvastatin (LIPITOR) 40 MG tablet, TAKE 1 TABLET AT BEDTIME, Disp: 90 tablet, Rfl: 3    Cholecalciferol (VITAMIN D3) 50 MCG (2000 UT) CAPS, Take 1 capsule by mouth daily, Disp: 90 capsule, Rfl: 3    isosorbide mononitrate (IMDUR) 30 MG extended release tablet, Take 1 tablet by mouth daily, Disp: 90 tablet, Rfl: 1    ferrous sulfate (FE TABS 325) 325 (65 Fe) MG EC tablet, TAKE 1 TABLET BY MOUTH DAILY (WITH BREAKFAST), Disp: 90 tablet, Rfl: 3    lisinopril (PRINIVIL;ZESTRIL) 5 MG tablet, TAKE 1 TABLET EVERY DAY, Disp: 90 tablet, Rfl: 3    metoprolol tartrate (LOPRESSOR) 25 MG tablet, TAKE 1 TABLET TWICE DAILY, Disp: 180 tablet, Rfl: 3    nitroGLYCERIN (NITROSTAT) 0.4 MG SL tablet, Place 1 tablet under the tongue every 5 minutes as needed for Chest pain, Disp: 25 tablet, Rfl: 1    aspirin 81 MG tablet, Take 81 mg by mouth daily, Disp: , Rfl:     ascorbic acid (VITAMIN C) 500 MG tablet, Take 500 mg by mouth 2 times daily Indications: patient only takes in the Winter With ana paula hips , Disp: , Rfl:   Multiple Vitamins-Minerals (MULTIVITAMIN PO), Take 1 tablet by mouth daily. , Disp: , Rfl:       Review of Systems   Genitourinary: Positive for vaginal bleeding. All other systems reviewed and are negative. Breast ROS: negative    Objective:   /88 (Site: Left Upper Arm, Position: Sitting, Cuff Size: Medium Adult)   Pulse 88   Resp 20   Ht 4' 10\" (1.473 m)   Wt 157 lb (71.2 kg)   LMP  (LMP Unknown)   BMI 32.81 kg/m²     Physical Exam  Vitals signs and nursing note reviewed. Exam conducted with a chaperone present. Constitutional:       General: She is not in acute distress. Appearance: Normal appearance. She is not ill-appearing. Eyes:      Conjunctiva/sclera: Conjunctivae normal.      Pupils: Pupils are equal, round, and reactive to light. Neck:      Musculoskeletal: Normal range of motion and neck supple. Pulmonary:      Effort: Pulmonary effort is normal. No respiratory distress. Neurological:      General: No focal deficit present. Mental Status: She is alert and oriented to person, place, and time. Psychiatric:         Mood and Affect: Mood normal.         Behavior: Behavior normal.       Breast exam: WNL, No skin retraction, dimpling or skin discoloration. No nippledischarge. Any bleeding or pain withintercourse: No      Do you do self breast exams: Yes    Assessment:      Diagnosis Orders   1.  Postmenopausal bleeding  US PELVIS COMPLETE NON-OB TRANSABDOMINAL AND TRANSVAGINAL           Plan:     Orders Placed This Encounter   Procedures    US PELVIS COMPLETE NON-OB TRANSABDOMINAL AND TRANSVAGINAL     Standing Status:   Future     Standing Expiration Date:   1/6/2022 We will get pelvic ultrasound and if the endometrial thickness is increased then will have to get an endometrial biopsy however if it is thin endometrial lining then will perform vaginal exam for any local reason for the bleeding and will order a may be a CT scan or MRI to check to see if there is any other source of the problem       Herman Gallegos MD

## 2021-01-12 ENCOUNTER — OFFICE VISIT (OUTPATIENT)
Dept: OBGYN | Age: 81
End: 2021-01-12
Payer: MEDICARE

## 2021-01-12 ENCOUNTER — HOSPITAL ENCOUNTER (OUTPATIENT)
Dept: ULTRASOUND IMAGING | Age: 81
Discharge: HOME | End: 2021-01-14
Payer: MEDICARE

## 2021-01-12 ENCOUNTER — HOSPITAL ENCOUNTER (OUTPATIENT)
Dept: LAB | Age: 81
Discharge: HOME | End: 2021-01-12
Payer: MEDICARE

## 2021-01-12 VITALS
HEIGHT: 58 IN | DIASTOLIC BLOOD PRESSURE: 86 MMHG | HEART RATE: 76 BPM | SYSTOLIC BLOOD PRESSURE: 134 MMHG | BODY MASS INDEX: 33.17 KG/M2 | WEIGHT: 158 LBS | RESPIRATION RATE: 20 BRPM

## 2021-01-12 DIAGNOSIS — Z01.818 PRE-OP TESTING: ICD-10-CM

## 2021-01-12 DIAGNOSIS — N95.0 POSTMENOPAUSAL BLEEDING: Primary | ICD-10-CM

## 2021-01-12 DIAGNOSIS — N84.0 ENDOMETRIAL POLYP: ICD-10-CM

## 2021-01-12 DIAGNOSIS — N95.0 POSTMENOPAUSAL BLEEDING: ICD-10-CM

## 2021-01-12 LAB
-: NORMAL
ABSOLUTE EOS #: 0.08 K/UL (ref 0–0.44)
ABSOLUTE IMMATURE GRANULOCYTE: <0.03 K/UL (ref 0–0.3)
ABSOLUTE LYMPH #: 2.64 K/UL (ref 1.1–3.7)
ABSOLUTE MONO #: 0.55 K/UL (ref 0.1–1.2)
ANION GAP SERPL CALCULATED.3IONS-SCNC: 11 MMOL/L (ref 9–17)
BASOPHILS # BLD: 1 % (ref 0–2)
BASOPHILS ABSOLUTE: 0.06 K/UL (ref 0–0.2)
BUN BLDV-MCNC: 12 MG/DL (ref 8–23)
BUN/CREAT BLD: 23 (ref 9–20)
CALCIUM SERPL-MCNC: 9.5 MG/DL (ref 8.6–10.4)
CHLORIDE BLD-SCNC: 102 MMOL/L (ref 98–107)
CO2: 23 MMOL/L (ref 20–31)
CREAT SERPL-MCNC: 0.53 MG/DL (ref 0.5–0.9)
DIFFERENTIAL TYPE: NORMAL
EOSINOPHILS RELATIVE PERCENT: 1 % (ref 1–4)
GFR AFRICAN AMERICAN: >60 ML/MIN
GFR NON-AFRICAN AMERICAN: >60 ML/MIN
GFR SERPL CREATININE-BSD FRML MDRD: ABNORMAL ML/MIN/{1.73_M2}
GFR SERPL CREATININE-BSD FRML MDRD: ABNORMAL ML/MIN/{1.73_M2}
GLUCOSE BLD-MCNC: 116 MG/DL (ref 70–99)
HCT VFR BLD CALC: 46.1 % (ref 36.3–47.1)
HEMOGLOBIN: 14.8 G/DL (ref 11.9–15.1)
IMMATURE GRANULOCYTES: 0 %
LYMPHOCYTES # BLD: 30 % (ref 24–43)
MCH RBC QN AUTO: 32 PG (ref 25.2–33.5)
MCHC RBC AUTO-ENTMCNC: 32.1 G/DL (ref 25.2–33.5)
MCV RBC AUTO: 99.6 FL (ref 82.6–102.9)
MONOCYTES # BLD: 6 % (ref 3–12)
NRBC AUTOMATED: 0 PER 100 WBC
PDW BLD-RTO: 13.1 % (ref 11.8–14.4)
PLATELET # BLD: 193 K/UL (ref 138–453)
PLATELET ESTIMATE: NORMAL
PMV BLD AUTO: 10 FL (ref 8.1–13.5)
POTASSIUM SERPL-SCNC: 4.2 MMOL/L (ref 3.7–5.3)
RBC # BLD: 4.63 M/UL (ref 3.95–5.11)
RBC # BLD: NORMAL 10*6/UL
REASON FOR REJECTION: NORMAL
SEG NEUTROPHILS: 62 % (ref 36–65)
SEGMENTED NEUTROPHILS ABSOLUTE COUNT: 5.4 K/UL (ref 1.5–8.1)
SODIUM BLD-SCNC: 136 MMOL/L (ref 135–144)
WBC # BLD: 8.8 K/UL (ref 3.5–11.3)
WBC # BLD: NORMAL 10*3/UL
ZZ NTE CLEAN UP: ORDERED TEST: NORMAL
ZZ NTE WITH NAME CLEAN UP: SPECIMEN SOURCE: NORMAL

## 2021-01-12 PROCEDURE — 85025 COMPLETE CBC W/AUTO DIFF WBC: CPT

## 2021-01-12 PROCEDURE — 99213 OFFICE O/P EST LOW 20 MIN: CPT | Performed by: OBSTETRICS & GYNECOLOGY

## 2021-01-12 PROCEDURE — G8484 FLU IMMUNIZE NO ADMIN: HCPCS | Performed by: OBSTETRICS & GYNECOLOGY

## 2021-01-12 PROCEDURE — 4040F PNEUMOC VAC/ADMIN/RCVD: CPT | Performed by: OBSTETRICS & GYNECOLOGY

## 2021-01-12 PROCEDURE — 36415 COLL VENOUS BLD VENIPUNCTURE: CPT

## 2021-01-12 PROCEDURE — G8399 PT W/DXA RESULTS DOCUMENT: HCPCS | Performed by: OBSTETRICS & GYNECOLOGY

## 2021-01-12 PROCEDURE — 99215 OFFICE O/P EST HI 40 MIN: CPT

## 2021-01-12 PROCEDURE — 1090F PRES/ABSN URINE INCON ASSESS: CPT | Performed by: OBSTETRICS & GYNECOLOGY

## 2021-01-12 PROCEDURE — G8417 CALC BMI ABV UP PARAM F/U: HCPCS | Performed by: OBSTETRICS & GYNECOLOGY

## 2021-01-12 PROCEDURE — 76856 US EXAM PELVIC COMPLETE: CPT

## 2021-01-12 PROCEDURE — 1036F TOBACCO NON-USER: CPT | Performed by: OBSTETRICS & GYNECOLOGY

## 2021-01-12 PROCEDURE — G8427 DOCREV CUR MEDS BY ELIG CLIN: HCPCS | Performed by: OBSTETRICS & GYNECOLOGY

## 2021-01-12 PROCEDURE — 80048 BASIC METABOLIC PNL TOTAL CA: CPT

## 2021-01-12 PROCEDURE — 1123F ACP DISCUSS/DSCN MKR DOCD: CPT | Performed by: OBSTETRICS & GYNECOLOGY

## 2021-01-12 NOTE — PROGRESS NOTES
Subjective:      Patient ID: Edis Wallace  is a [de-identified] y.o. y.o. female. A postmenopausal patient that had some dark brownish discharge or possibly bleeding for a couple of days however no pain. She has multiple chronic conditions mainly heart problem and she is on medication for that but not on any blood thinner. She denies any GI or  symptoms otherwise. Ultrasound: Endometrial polyp with endometrial thickness of 25 mm. Vaginal Bleeding  This is a new problem. The current episode started in the past 7 days. The problem occurs intermittently. The problem has been unchanged. Nothing aggravates the symptoms. She has tried nothing for the symptoms. The treatment provided no relief. Chief Complaint   Patient presents with    Vaginal Bleeding     continues with the vaginal bleeding      Family History   Problem Relation Age of Onset    Heart Attack Father      Past Medical History:   Diagnosis Date    Asteroid hyalosis of right eye     Coronary artery disease     status post acute non-Q wave myocardial infarction 08/23/2012, status post drug eluting stent placement in the LAD and 2 drug eluting stents in the left circumflex artery 08/23/2012.  Diastolic dysfunction     grade 1.     Gastric ulcer with hemorrhage 03/03/2015    gastric and esophageal ulcers due to nsaid    Hyperlipidemia     Mild mitral regurgitation     Mild tricuspid regurgitation     Obesity     Patient in clinical research study 06/27/2020    Expanded Access to [address] for COVID-19 date of completed 6/30/2020    Vitreous floaters     left eye.       Past Surgical History:   Procedure Laterality Date    CARDIAC CATHETERIZATION Left 08/23/2012 left main artery normal, LAD with mid 60 to 70% stenosis, FFR was 0.76, left circumflex artery with mid 90% stenosis with thrombus, RCA with mild irregularities, preserved left ventricular systolic function, ejection fraction estimated around 50%, status post drug eluting stent placement in the LAD and 2 drug eluting stents in the left circumflex artery.  CHOLECYSTECTOMY  1978    ENDOSCOPY, COLON, DIAGNOSTIC  03/05/2015    esophageal/gastric ulcer; few diverticuli    UPPER GASTROINTESTINAL ENDOSCOPY  4/16/2015    healed gastric ulcer      reports that she has never smoked. She has never used smokeless tobacco. She reports current alcohol use. She reports that she does not use drugs.   Allergies   Allergen Reactions    Codeine Nausea Only       Current Outpatient Medications:     alendronate (FOSAMAX) 70 MG tablet, TAKE 1 TABLET BY MOUTH EVERY 7 DAYS, Disp: 12 tablet, Rfl: 0    atorvastatin (LIPITOR) 40 MG tablet, TAKE 1 TABLET AT BEDTIME, Disp: 90 tablet, Rfl: 3    Cholecalciferol (VITAMIN D3) 50 MCG (2000 UT) CAPS, Take 1 capsule by mouth daily, Disp: 90 capsule, Rfl: 3    isosorbide mononitrate (IMDUR) 30 MG extended release tablet, Take 1 tablet by mouth daily, Disp: 90 tablet, Rfl: 1    ferrous sulfate (FE TABS 325) 325 (65 Fe) MG EC tablet, TAKE 1 TABLET BY MOUTH DAILY (WITH BREAKFAST), Disp: 90 tablet, Rfl: 3    lisinopril (PRINIVIL;ZESTRIL) 5 MG tablet, TAKE 1 TABLET EVERY DAY, Disp: 90 tablet, Rfl: 3    metoprolol tartrate (LOPRESSOR) 25 MG tablet, TAKE 1 TABLET TWICE DAILY, Disp: 180 tablet, Rfl: 3    nitroGLYCERIN (NITROSTAT) 0.4 MG SL tablet, Place 1 tablet under the tongue every 5 minutes as needed for Chest pain, Disp: 25 tablet, Rfl: 1    aspirin 81 MG tablet, Take 81 mg by mouth daily, Disp: , Rfl:     ascorbic acid (VITAMIN C) 500 MG tablet, Take 500 mg by mouth 2 times daily Indications: patient only takes in the Winter With ana paula hips , Disp: , Rfl:   Multiple Vitamins-Minerals (MULTIVITAMIN PO), Take 1 tablet by mouth daily. , Disp: , Rfl:       Review of Systems   Genitourinary: Positive for vaginal bleeding. All other systems reviewed and are negative. Breast ROS: negative for breast lumps    Objective:   /86 (Site: Left Upper Arm, Position: Sitting, Cuff Size: Medium Adult)   Pulse 76   Resp 20   Ht 4' 10\" (1.473 m)   Wt 158 lb (71.7 kg)   LMP  (LMP Unknown)   BMI 33.02 kg/m²     Physical Exam  Vitals signs and nursing note reviewed. Exam conducted with a chaperone present. Constitutional:       General: She is not in acute distress. Appearance: Normal appearance. She is not ill-appearing. HENT:      Head: Normocephalic. Eyes:      Conjunctiva/sclera: Conjunctivae normal.      Pupils: Pupils are equal, round, and reactive to light. Neck:      Musculoskeletal: Normal range of motion and neck supple. Pulmonary:      Effort: Pulmonary effort is normal. No respiratory distress. Abdominal:      Palpations: Abdomen is soft. Tenderness: There is no abdominal tenderness. Genitourinary:     General: Normal vulva. Vagina: No vaginal discharge. Skin:     General: Skin is warm. Neurological:      General: No focal deficit present. Mental Status: She is alert and oriented to person, place, and time. Psychiatric:         Mood and Affect: Mood normal.         Behavior: Behavior normal.       Breast exam: WNL, No skin retraction, dimpling or skin discoloration. No nippledischarge. Any bleeding or pain withintercourse: No      Do you do self breast exams: Yes    Assessment:      Diagnosis Orders   1. Postmenopausal bleeding     2. Endometrial polyp             Plan:   No orders of the defined types were placed in this encounter. A full discussion with the patient and her daughter about the findings of the ultrasound the need for hysteroscopic polypectomy D&C all risk, benefits and possible complication discussed in details the procedure will be done after clearance for surgery for hysteroscopic polypectomy D&C next week.           Denise Vasquez MD

## 2021-01-13 ENCOUNTER — PRE-PROCEDURE TELEPHONE (OUTPATIENT)
Dept: PREADMISSION TESTING | Age: 81
End: 2021-01-13

## 2021-01-13 NOTE — TELEPHONE ENCOUNTER
Spoke with daughter and confirmed a covid swab appt for Jan 15th at 0900. Instructions provided, verbalizes understanding.

## 2021-01-15 ENCOUNTER — HOSPITAL ENCOUNTER (OUTPATIENT)
Dept: PREADMISSION TESTING | Age: 81
Setting detail: SPECIMEN
Discharge: HOME | End: 2021-01-19
Payer: MEDICARE

## 2021-01-15 DIAGNOSIS — Z11.59 ENCOUNTER FOR SCREENING FOR OTHER VIRAL DISEASES: Primary | ICD-10-CM

## 2021-01-15 PROCEDURE — U0003 INFECTIOUS AGENT DETECTION BY NUCLEIC ACID (DNA OR RNA); SEVERE ACUTE RESPIRATORY SYNDROME CORONAVIRUS 2 (SARS-COV-2) (CORONAVIRUS DISEASE [COVID-19]), AMPLIFIED PROBE TECHNIQUE, MAKING USE OF HIGH THROUGHPUT TECHNOLOGIES AS DESCRIBED BY CMS-2020-01-R: HCPCS

## 2021-01-16 LAB — SARS-COV-2, NAA: NOT DETECTED

## 2021-01-17 ENCOUNTER — TELEPHONE (OUTPATIENT)
Dept: PRIMARY CARE CLINIC | Age: 81
End: 2021-01-17

## 2021-01-19 ENCOUNTER — PREP FOR PROCEDURE (OUTPATIENT)
Dept: OBGYN | Age: 81
End: 2021-01-19

## 2021-01-19 ENCOUNTER — ANESTHESIA EVENT (OUTPATIENT)
Dept: OPERATING ROOM | Age: 81
End: 2021-01-19
Payer: MEDICARE

## 2021-01-19 ENCOUNTER — TELEPHONE (OUTPATIENT)
Dept: OBGYN | Age: 81
End: 2021-01-19

## 2021-01-19 RX ORDER — SODIUM CHLORIDE 0.9 % (FLUSH) 0.9 %
10 SYRINGE (ML) INJECTION EVERY 12 HOURS SCHEDULED
Status: CANCELLED | OUTPATIENT
Start: 2021-01-19

## 2021-01-19 RX ORDER — SODIUM CHLORIDE, SODIUM LACTATE, POTASSIUM CHLORIDE, CALCIUM CHLORIDE 600; 310; 30; 20 MG/100ML; MG/100ML; MG/100ML; MG/100ML
INJECTION, SOLUTION INTRAVENOUS CONTINUOUS
Status: CANCELLED | OUTPATIENT
Start: 2021-01-19

## 2021-01-19 RX ORDER — SODIUM CHLORIDE 0.9 % (FLUSH) 0.9 %
10 SYRINGE (ML) INJECTION PRN
Status: CANCELLED | OUTPATIENT
Start: 2021-01-19

## 2021-01-19 NOTE — TELEPHONE ENCOUNTER
[facility]    Pre-Operative Evaluation/Consultation    Name:  Lauren Bentley                                         Age:  [de-identified] y.o. MRN:  [id number]       :  1940   Date:  2021         Sex: female    There were no encounter diagnoses. Surgeon:  Dr. Shanell Lopez  Procedure (Planned):  D&C Hysteroscopy Polypectomy  Date Scheduled surgery: 2021    Attending : No att. providers found    Primary Physician: Artie Drummond  Cardiologist: Evin Umanzor    Type of Anesthesia Requested: General    Patient Medical history: Allergies   Allergen Reactions    Codeine Nausea Only     Patient Active Problem List   Diagnosis    Coronary artery disease involving native coronary artery of native heart without angina pectoris    Hyperlipidemia with target LDL less than 70    Chronic diastolic heart failure (HCC)    Anemia    GI bleed    Shingles    Esophageal ulcer    Gastric ulcer    Gastric ulcer with hemorrhage    Coronary artery disease    Diastolic dysfunction    Hyperlipidemia    Obesity    Pneumonia due to COVID-19 virus    Postmenopausal bleeding    Endometrial polyp     Past Medical History:   Diagnosis Date    Asteroid hyalosis of right eye     Coronary artery disease     status post acute non-Q wave myocardial infarction 2012, status post drug eluting stent placement in the LAD and 2 drug eluting stents in the left circumflex artery 2012.  Diastolic dysfunction     grade 1.     Gastric ulcer with hemorrhage 2015    gastric and esophageal ulcers due to nsaid    Hyperlipidemia     Mild mitral regurgitation     Mild tricuspid regurgitation     Obesity     Patient in clinical research study 2020    Expanded Access to [address] for COVID-19 date of completed 2020    Vitreous floaters     left eye.       Past Surgical History:   Procedure Laterality Date    CARDIAC CATHETERIZATION Left 2012    left main artery normal, LAD with mid 60 to 70% stenosis, FFR was 0.76, left circumflex artery with mid 90% stenosis with thrombus, RCA with mild irregularities, preserved left ventricular systolic function, ejection fraction estimated around 50%, status post drug eluting stent placement in the LAD and 2 drug eluting stents in the left circumflex artery.  CHOLECYSTECTOMY  1978    ENDOSCOPY, COLON, DIAGNOSTIC  03/05/2015    esophageal/gastric ulcer; few diverticuli    UPPER GASTROINTESTINAL ENDOSCOPY  4/16/2015    healed gastric ulcer     Social History     Tobacco Use    Smoking status: Never Smoker    Smokeless tobacco: Never Used   Substance Use Topics    Alcohol use: Yes     Comment: occasionally    Drug use: No     Medications:  Current Outpatient Medications   Medication Sig Dispense Refill    alendronate (FOSAMAX) 70 MG tablet TAKE 1 TABLET BY MOUTH EVERY 7 DAYS 12 tablet 0    atorvastatin (LIPITOR) 40 MG tablet TAKE 1 TABLET AT BEDTIME 90 tablet 3    Cholecalciferol (VITAMIN D3) 50 MCG (2000 UT) CAPS Take 1 capsule by mouth daily 90 capsule 3    isosorbide mononitrate (IMDUR) 30 MG extended release tablet Take 1 tablet by mouth daily 90 tablet 1    ferrous sulfate (FE TABS 325) 325 (65 Fe) MG EC tablet TAKE 1 TABLET BY MOUTH DAILY (WITH BREAKFAST) 90 tablet 3    lisinopril (PRINIVIL;ZESTRIL) 5 MG tablet TAKE 1 TABLET EVERY DAY 90 tablet 3    metoprolol tartrate (LOPRESSOR) 25 MG tablet TAKE 1 TABLET TWICE DAILY 180 tablet 3    nitroGLYCERIN (NITROSTAT) 0.4 MG SL tablet Place 1 tablet under the tongue every 5 minutes as needed for Chest pain 25 tablet 1    aspirin 81 MG tablet Take 81 mg by mouth daily      ascorbic acid (VITAMIN C) 500 MG tablet Take 500 mg by mouth 2 times daily Indications: patient only takes in the Winter With ana paula hips       Multiple Vitamins-Minerals (MULTIVITAMIN PO) Take 1 tablet by mouth daily. No current facility-administered medications for this visit.       Scheduled Meds:  Continuous Infusions:  PRN Meds:. Prior to Admission medications    Medication Sig Start Date End Date Taking? Authorizing Provider   alendronate (FOSAMAX) 70 MG tablet TAKE 1 TABLET BY MOUTH EVERY 7 DAYS 12/18/20   Erica Barcenas MD   atorvastatin (LIPITOR) 40 MG tablet TAKE 1 TABLET AT BEDTIME 10/30/20   Gege Parker MD   Cholecalciferol (VITAMIN D3) 50 MCG (2000 UT) CAPS Take 1 capsule by mouth daily 10/16/20   Erica Barcenas MD   isosorbide mononitrate (IMDUR) 30 MG extended release tablet Take 1 tablet by mouth daily 9/25/20   Leo Martins DO   ferrous sulfate (FE TABS 325) 325 (65 Fe) MG EC tablet TAKE 1 TABLET BY MOUTH DAILY (WITH BREAKFAST) 7/20/20   Erica Barcenas MD   lisinopril (PRINIVIL;ZESTRIL) 5 MG tablet TAKE 1 TABLET EVERY DAY 5/11/20   Emma Martins DO   metoprolol tartrate (LOPRESSOR) 25 MG tablet TAKE 1 TABLET TWICE DAILY 5/11/20   Emma Martins DO   nitroGLYCERIN (NITROSTAT) 0.4 MG SL tablet Place 1 tablet under the tongue every 5 minutes as needed for Chest pain 3/13/19   Erica Barcenas MD   aspirin 81 MG tablet Take 81 mg by mouth daily    Historical Provider, MD   ascorbic acid (VITAMIN C) 500 MG tablet Take 500 mg by mouth 2 times daily Indications: patient only takes in the Winter With ana paula hips     Historical Provider, MD   Multiple Vitamins-Minerals (MULTIVITAMIN PO) Take 1 tablet by mouth daily. Historical Provider, MD     Vital Signs (Current) [unfilled]    Weight:   Wt Readings from Last 1 Encounters:   01/12/21 158 lb (71.7 kg)     Height:   Ht Readings from Last 1 Encounters:   01/12/21 4' 10\" (1.473 m)      BMI:  There is no height or weight on file to calculate BMI. Estimated body mass index is 33.02 kg/m² as calculated from the following:    Height as of 1/12/21: 4' 10\" (1.473 m). Weight as of 1/12/21: 158 lb (71.7 kg). body mass index is unknown because there is no height or weight on file.     Cardiac Clearance: Patient just was seen in December and note states they will monitor   Medical Clearance:None   Appointment for surgery Clearance scheduled for:None     Preoperative Testing: These are the current and completed labs:  CBC:   Lab Results   Component Value Date    WBC 8.8 01/12/2021    RBC 4.63 01/12/2021    HGB 14.8 01/12/2021    HCT 46.1 01/12/2021    MCV 99.6 01/12/2021    RDW 13.1 01/12/2021     01/12/2021     CMP:   Lab Results   Component Value Date     01/12/2021    K 4.2 01/12/2021     01/12/2021    CO2 23 01/12/2021    BUN 12 01/12/2021    CREATININE 0.53 01/12/2021    GFRAA >60 01/12/2021    LABGLOM >60 01/12/2021    GLUCOSE 116 01/12/2021    PROT 6.3 06/26/2020    CALCIUM 9.5 01/12/2021    BILITOT 0.65 06/26/2020    ALKPHOS 75 06/26/2020    AST 83 06/26/2020    ALT 63 06/26/2020     POC Tests: No results for input(s): POCGLU, POCNA, POCK, POCCL, POCBUN, POCHEMO, POCHCT in the last 72 hours.   Coags    Lab Results   Component Value Date    PROTIME 10.3 06/27/2020    INR 1.0 06/27/2020    APTT 26.7 51/71/2527     HCG (If Applicable) No results found for: PREGTESTUR, PREGSERUM, HCG, HCGQUANT   ABGs No results found for: PHART, PO2ART, RDD5OJB, SMP9XIX, BEART, P5JNJJWU   Type & Screen (If Applicable)  No results found for: Edmond Mola    Additional ordered pre-operative testing:  [x]CBC    []ABG      [x] BMP   []URINALYSIS   []CMP    []HCG   []COAGS PT/INR  []T&C  []LFTs   []TYPE AND SCREEN    [] EKG  [] Chest X-Ray   [] Other Radiology    [] Sent to Hospitalist None  [] Sent to Anesthesia for your review: yes   [] Additional Orders: None     Comments:None   Requests: No Special requests    Signed: Que Dillon LPN 8/12/9094 34:01 AM

## 2021-01-19 NOTE — H&P
Pre-operative Visit    Javi Dickinson  1940 1/19/2021  Primary Care Physician: Michelle Byrd MD    HISTORY & PHYSICAL      1/19/2021       HOSPITAL: [facility]    HPI: Javi Dickinson is a [de-identified] y.o. female No obstetric history on file. For Hysteroscopic polypectomy with  D&C  HPI    No chief complaint on file. No diagnosis found. Patient Active Problem List   Diagnosis    Coronary artery disease involving native coronary artery of native heart without angina pectoris    Hyperlipidemia with target LDL less than 70    Chronic diastolic heart failure (HCC)    Anemia    GI bleed    Shingles    Esophageal ulcer    Gastric ulcer    Gastric ulcer with hemorrhage    Coronary artery disease    Diastolic dysfunction    Hyperlipidemia    Obesity    Pneumonia due to COVID-19 virus    Postmenopausal bleeding    Endometrial polyp       OB History   No obstetric history on file. Past Medical History:   Diagnosis Date    Asteroid hyalosis of right eye     Coronary artery disease     status post acute non-Q wave myocardial infarction 08/23/2012, status post drug eluting stent placement in the LAD and 2 drug eluting stents in the left circumflex artery 08/23/2012.  Diastolic dysfunction     grade 1.     Gastric ulcer with hemorrhage 03/03/2015    gastric and esophageal ulcers due to nsaid    Hyperlipidemia     Mild mitral regurgitation     Mild tricuspid regurgitation     Obesity     Patient in clinical research study 06/27/2020    Expanded Access to [address] for COVID-19 date of completed 6/30/2020    Vitreous floaters     left eye.         Past Surgical History:   Procedure Laterality Date    CARDIAC CATHETERIZATION Left 08/23/2012    left main artery normal, LAD with mid 60 to 70% stenosis, FFR was 0.76, left circumflex artery with mid 90% stenosis with thrombus, RCA with mild irregularities, preserved left ventricular systolic function, ejection fraction estimated around 50%, status post drug eluting stent placement in the LAD and 2 drug eluting stents in the left circumflex artery.      CHOLECYSTECTOMY  1978    ENDOSCOPY, COLON, DIAGNOSTIC  03/05/2015    esophageal/gastric ulcer; few diverticuli    UPPER GASTROINTESTINAL ENDOSCOPY  4/16/2015    healed gastric ulcer       Social History     Socioeconomic History    Marital status: Single     Spouse name: Not on file    Number of children: Not on file    Years of education: Not on file    Highest education level: Not on file   Occupational History    Not on file   Social Needs    Financial resource strain: Not on file    Food insecurity     Worry: Not on file     Inability: Not on file    Transportation needs     Medical: Not on file     Non-medical: Not on file   Tobacco Use    Smoking status: Never Smoker    Smokeless tobacco: Never Used   Substance and Sexual Activity    Alcohol use: Yes     Comment: occasionally    Drug use: No    Sexual activity: Not on file   Lifestyle    Physical activity     Days per week: Not on file     Minutes per session: Not on file    Stress: Not on file   Relationships    Social connections     Talks on phone: Not on file     Gets together: Not on file     Attends [employer] service: Not on file     Active member of club or organization: Not on file     Attends meetings of clubs or organizations: Not on file     Relationship status: Not on file    Intimate partner violence     Fear of current or ex partner: Not on file     Emotionally abused: Not on file     Physically abused: Not on file     Forced sexual activity: Not on file   Other Topics Concern    Not on file   Social History Narrative    Not on file         MEDICATIONS:  Current Outpatient Medications   Medication Sig Dispense Refill    alendronate (FOSAMAX) 70 MG tablet TAKE 1 TABLET BY MOUTH EVERY 7 DAYS 12 tablet 0    atorvastatin (LIPITOR) 40 MG tablet TAKE 1 TABLET AT BEDTIME 90 tablet 3    Cholecalciferol (VITAMIN D3) 50 MCG (2000 UT) CAPS Take 1 capsule by mouth daily 90 capsule 3    isosorbide mononitrate (IMDUR) 30 MG extended release tablet Take 1 tablet by mouth daily 90 tablet 1    ferrous sulfate (FE TABS 325) 325 (65 Fe) MG EC tablet TAKE 1 TABLET BY MOUTH DAILY (WITH BREAKFAST) 90 tablet 3    lisinopril (PRINIVIL;ZESTRIL) 5 MG tablet TAKE 1 TABLET EVERY DAY 90 tablet 3    metoprolol tartrate (LOPRESSOR) 25 MG tablet TAKE 1 TABLET TWICE DAILY 180 tablet 3    nitroGLYCERIN (NITROSTAT) 0.4 MG SL tablet Place 1 tablet under the tongue every 5 minutes as needed for Chest pain 25 tablet 1    aspirin 81 MG tablet Take 81 mg by mouth daily      ascorbic acid (VITAMIN C) 500 MG tablet Take 500 mg by mouth 2 times daily Indications: patient only takes in the Winter With ana paula hips       Multiple Vitamins-Minerals (MULTIVITAMIN PO) Take 1 tablet by mouth daily. No current facility-administered medications for this visit. ALLERGIES:  Allergies as of 01/19/2021 - Review Complete 01/12/2021   Allergen Reaction Noted    Codeine Nausea Only 01/06/2015         REVIEW OF SYSTEMS:  Review of Systems   Genitourinary: Positive for vaginal bleeding. All other systems reviewed and are negative. PHYSICAL EXAM:  Physical Exam  Vitals signs and nursing note reviewed. Exam conducted with a chaperone present. Constitutional:       General: She is not in acute distress. Appearance: Normal appearance. She is not ill-appearing. Eyes:      Conjunctiva/sclera: Conjunctivae normal.      Pupils: Pupils are equal, round, and reactive to light. Neck:      Musculoskeletal: Normal range of motion and neck supple. Cardiovascular:      Rate and Rhythm: Normal rate and regular rhythm. Pulses: Normal pulses. Heart sounds: Normal heart sounds. No murmur. Pulmonary:      Effort: Pulmonary effort is normal. No respiratory distress. Breath sounds: Normal breath sounds. No wheezing.    Abdominal: Palpations: Abdomen is soft. Musculoskeletal: Normal range of motion. General: No deformity. Skin:     General: Skin is warm. Neurological:      General: No focal deficit present. Mental Status: She is alert and oriented to person, place, and time. Psychiatric:         Mood and Affect: Mood normal.         Behavior: Behavior normal.           Diagnostics:  Us Pelvis Complete Non-ob Transabdominal And Transvaginal    Result Date: 1/12/2021  EXAMINATION: TRANSABDOMINAL AND TRANSVAGINAL PELVIC ULTRASOUND 1/12/2021 TECHNIQUE: Transabdominal and transvaginal pelvic ultrasound was performed. COMPARISON: None HISTORY: ORDERING SYSTEM PROVIDED HISTORY: Postmenopausal bleeding TECHNOLOGIST PROVIDED HISTORY: Reason for Exam: post menopausal bleeding Acuity: Acute Type of Exam: Initial FINDINGS: Measurements: Uterus:  6.8 x 3.8 x 3.2 cm Endometrial stripe:  25 mm Right Ovary:  1.7 x 1.2 x 1.1 cm Left Ovary:  2.5 x 1.7 x 1.7 cm Ultrasound Findings: Uterus: Uterus demonstrates normal myometrial echotexture. Endometrial stripe: Abnormal thickening with possible polyp measuring 1.9 x 1.9 x 1.6 cm. Right Ovary: Right ovary is within normal limits. Left Ovary:  Left ovary is within normal limits. Free Fluid: No evidence of free fluid. Abnormal endometrial stripe thickening with possible polyp measuring 1.9 x 1.9 x 1.6 cm. Endometrial sampling is suggested.            Labs:  Results for orders placed or performed during the hospital encounter of 01/15/21   COVID-19 Ambulatory    Specimen: Nasopharyngeal Swab   Result Value Ref Range    SARS-CoV-2, JEANINE Not Detected Not Detected           ASSESSMENT:     For Hysteroscopic polypectomy with  D&C    Patient Active Problem List    Diagnosis Date Noted    Endometrial polyp 01/12/2021    Postmenopausal bleeding 01/06/2021    Pneumonia due to COVID-19 virus 06/26/2020    Coronary artery disease      status post acute non-Q wave myocardial infarction 08/23/2012, status post drug eluting stent placement in the LAD and 2 drug eluting stents in the left circumflex artery 08/23/2012.  Diastolic dysfunction      grade 1.       Hyperlipidemia     Obesity     Esophageal ulcer 03/17/2015    Gastric ulcer 03/17/2015    Anemia 03/03/2015    GI bleed 03/03/2015    Shingles 03/03/2015    Gastric ulcer with hemorrhage 03/03/2015     gastric and esophageal ulcers due to nsaid      Coronary artery disease involving native coronary artery of native heart without angina pectoris 12/13/2013    Hyperlipidemia with target LDL less than 70 12/13/2013     replace inactive diagnosis      Chronic diastolic heart failure ([facility] [address] 12/13/2013          Plan:   For Hysteroscopic polypectomy with  D&C    Counseling: The patient was counseled on all options both medical and surgical, conservative as well as definitive. She has elected to proceed with the procedure as stated above. The patient was counseled on the procedure. The patients orders and labs have been completed. The history and physical as well as all supporting surgical documentation will be forwarded to the pre-operative holding area. Consent was explained and signed. All questions were answered. Specifically, the patient was informed of the possibility of anesthesia complications, the risk of bleeding (including possibility of blood transfusion), the risk of postoperative infection, the risk of damage to adjacent organs (incuding but not limited to the bowel and bladder) that could require more surgery than expected. The patient is aware that this procedure may not alleviate her symptoms. That there may be a necessity for a second surgery and that there may be an incomplete removal of abnormal tissue. The patient was counseled at length about the risks of roman Covid-19 during their perioperative period and any recovery window from their procedure.   The patient was made aware that roman Covid-19  may worsen their prognosis for recovering from their procedure  and lend to a higher morbidity and/or mortality risk. All material risks, benefits, and reasonable alternatives including postponing the procedure were discussed. The patient does wish to proceed with the procedure at this time.

## 2021-01-19 NOTE — H&P (VIEW-ONLY)
left main artery normal, LAD with mid 60 to 70% stenosis, FFR was 0.76, left circumflex artery with mid 90% stenosis with thrombus, RCA with mild irregularities, preserved left ventricular systolic function, ejection fraction estimated around 50%, status post drug eluting stent placement in the LAD and 2 drug eluting stents in the left circumflex artery.      CHOLECYSTECTOMY  1978    ENDOSCOPY, COLON, DIAGNOSTIC  03/05/2015    esophageal/gastric ulcer; few diverticuli    UPPER GASTROINTESTINAL ENDOSCOPY  4/16/2015    healed gastric ulcer       Social History     Socioeconomic History    Marital status: Single     Spouse name: Not on file    Number of children: Not on file    Years of education: Not on file    Highest education level: Not on file   Occupational History    Not on file   Social Needs    Financial resource strain: Not on file    Food insecurity     Worry: Not on file     Inability: Not on file    Transportation needs     Medical: Not on file     Non-medical: Not on file   Tobacco Use    Smoking status: Never Smoker    Smokeless tobacco: Never Used   Substance and Sexual Activity    Alcohol use: Yes     Comment: occasionally    Drug use: No    Sexual activity: Not on file   Lifestyle    Physical activity     Days per week: Not on file     Minutes per session: Not on file    Stress: Not on file   Relationships    Social connections     Talks on phone: Not on file     Gets together: Not on file     Attends Christian service: Not on file     Active member of club or organization: Not on file     Attends meetings of clubs or organizations: Not on file     Relationship status: Not on file    Intimate partner violence     Fear of current or ex partner: Not on file     Emotionally abused: Not on file     Physically abused: Not on file     Forced sexual activity: Not on file   Other Topics Concern    Not on file   Social History Narrative    Not on file         MEDICATIONS: Current Outpatient Medications   Medication Sig Dispense Refill    alendronate (FOSAMAX) 70 MG tablet TAKE 1 TABLET BY MOUTH EVERY 7 DAYS 12 tablet 0    atorvastatin (LIPITOR) 40 MG tablet TAKE 1 TABLET AT BEDTIME 90 tablet 3    Cholecalciferol (VITAMIN D3) 50 MCG (2000 UT) CAPS Take 1 capsule by mouth daily 90 capsule 3    isosorbide mononitrate (IMDUR) 30 MG extended release tablet Take 1 tablet by mouth daily 90 tablet 1    ferrous sulfate (FE TABS 325) 325 (65 Fe) MG EC tablet TAKE 1 TABLET BY MOUTH DAILY (WITH BREAKFAST) 90 tablet 3    lisinopril (PRINIVIL;ZESTRIL) 5 MG tablet TAKE 1 TABLET EVERY DAY 90 tablet 3    metoprolol tartrate (LOPRESSOR) 25 MG tablet TAKE 1 TABLET TWICE DAILY 180 tablet 3    nitroGLYCERIN (NITROSTAT) 0.4 MG SL tablet Place 1 tablet under the tongue every 5 minutes as needed for Chest pain 25 tablet 1    aspirin 81 MG tablet Take 81 mg by mouth daily      ascorbic acid (VITAMIN C) 500 MG tablet Take 500 mg by mouth 2 times daily Indications: patient only takes in the Winter With ana paula hips       Multiple Vitamins-Minerals (MULTIVITAMIN PO) Take 1 tablet by mouth daily. No current facility-administered medications for this visit. ALLERGIES:  Allergies as of 01/19/2021 - Review Complete 01/12/2021   Allergen Reaction Noted    Codeine Nausea Only 01/06/2015         REVIEW OF SYSTEMS:  Review of Systems   Genitourinary: Positive for vaginal bleeding. All other systems reviewed and are negative. PHYSICAL EXAM:  Physical Exam  Vitals signs and nursing note reviewed. Exam conducted with a chaperone present. Constitutional:       General: She is not in acute distress. Appearance: Normal appearance. She is not ill-appearing. Eyes:      Conjunctiva/sclera: Conjunctivae normal.      Pupils: Pupils are equal, round, and reactive to light. Neck:      Musculoskeletal: Normal range of motion and neck supple.    Cardiovascular: Rate and Rhythm: Normal rate and regular rhythm. Pulses: Normal pulses. Heart sounds: Normal heart sounds. No murmur. Pulmonary:      Effort: Pulmonary effort is normal. No respiratory distress. Breath sounds: Normal breath sounds. No wheezing. Abdominal:      Palpations: Abdomen is soft. Musculoskeletal: Normal range of motion. General: No deformity. Skin:     General: Skin is warm. Neurological:      General: No focal deficit present. Mental Status: She is alert and oriented to person, place, and time. Psychiatric:         Mood and Affect: Mood normal.         Behavior: Behavior normal.           Diagnostics:  Us Pelvis Complete Non-ob Transabdominal And Transvaginal    Result Date: 1/12/2021  EXAMINATION: TRANSABDOMINAL AND TRANSVAGINAL PELVIC ULTRASOUND 1/12/2021 TECHNIQUE: Transabdominal and transvaginal pelvic ultrasound was performed. COMPARISON: None HISTORY: ORDERING SYSTEM PROVIDED HISTORY: Postmenopausal bleeding TECHNOLOGIST PROVIDED HISTORY: Reason for Exam: post menopausal bleeding Acuity: Acute Type of Exam: Initial FINDINGS: Measurements: Uterus:  6.8 x 3.8 x 3.2 cm Endometrial stripe:  25 mm Right Ovary:  1.7 x 1.2 x 1.1 cm Left Ovary:  2.5 x 1.7 x 1.7 cm Ultrasound Findings: Uterus: Uterus demonstrates normal myometrial echotexture. Endometrial stripe: Abnormal thickening with possible polyp measuring 1.9 x 1.9 x 1.6 cm. Right Ovary: Right ovary is within normal limits. Left Ovary:  Left ovary is within normal limits. Free Fluid: No evidence of free fluid. Abnormal endometrial stripe thickening with possible polyp measuring 1.9 x 1.9 x 1.6 cm. Endometrial sampling is suggested.            Labs:  Results for orders placed or performed during the hospital encounter of 01/15/21   COVID-19 Ambulatory    Specimen: Nasopharyngeal Swab   Result Value Ref Range    SARS-CoV-2, JEANINE Not Detected Not Detected           ASSESSMENT: For Hysteroscopic polypectomy with  D&C    Patient Active Problem List    Diagnosis Date Noted    Endometrial polyp 01/12/2021    Postmenopausal bleeding 01/06/2021    Pneumonia due to COVID-19 virus 06/26/2020    Coronary artery disease      status post acute non-Q wave myocardial infarction 08/23/2012, status post drug eluting stent placement in the LAD and 2 drug eluting stents in the left circumflex artery 08/23/2012.  Diastolic dysfunction      grade 1.       Hyperlipidemia     Obesity     Esophageal ulcer 03/17/2015    Gastric ulcer 03/17/2015    Anemia 03/03/2015    GI bleed 03/03/2015    Shingles 03/03/2015    Gastric ulcer with hemorrhage 03/03/2015     gastric and esophageal ulcers due to nsaid      Coronary artery disease involving native coronary artery of native heart without angina pectoris 12/13/2013    Hyperlipidemia with target LDL less than 70 12/13/2013     replace inactive diagnosis      Chronic diastolic heart failure ([facility] [address] 12/13/2013          Plan:   For Hysteroscopic polypectomy with  D&C    Counseling: The patient was counseled on all options both medical and surgical, conservative as well as definitive. She has elected to proceed with the procedure as stated above. The patient was counseled on the procedure. The patients orders and labs have been completed. The history and physical as well as all supporting surgical documentation will be forwarded to the pre-operative holding area. Consent was explained and signed. All questions were answered. Specifically, the patient was informed of the possibility of anesthesia complications, the risk of bleeding (including possibility of blood transfusion), the risk of postoperative infection, the risk of damage to adjacent organs (incuding but not limited to the bowel and bladder) that could require more surgery than expected. The patient is aware that this procedure may not alleviate her symptoms. That there may be a necessity for a second surgery and that there may be an incomplete removal of abnormal tissue. The patient was counseled at length about the risks of roman Covid-19 during their perioperative period and any recovery window from their procedure. The patient was made aware that roman Covid-19  may worsen their prognosis for recovering from their procedure  and lend to a higher morbidity and/or mortality risk. All material risks, benefits, and reasonable alternatives including postponing the procedure were discussed. The patient does wish to proceed with the procedure at this time.

## 2021-01-20 ENCOUNTER — ANESTHESIA (OUTPATIENT)
Dept: OPERATING ROOM | Age: 81
End: 2021-01-20
Payer: MEDICARE

## 2021-01-20 ENCOUNTER — HOSPITAL ENCOUNTER (OUTPATIENT)
Age: 81
Setting detail: OUTPATIENT SURGERY
Discharge: HOME | End: 2021-01-20
Attending: OBSTETRICS & GYNECOLOGY | Admitting: OBSTETRICS & GYNECOLOGY
Payer: MEDICARE

## 2021-01-20 VITALS
TEMPERATURE: 97.4 F | OXYGEN SATURATION: 99 % | HEART RATE: 53 BPM | HEIGHT: 58 IN | DIASTOLIC BLOOD PRESSURE: 75 MMHG | BODY MASS INDEX: 33.54 KG/M2 | RESPIRATION RATE: 16 BRPM | SYSTOLIC BLOOD PRESSURE: 167 MMHG | WEIGHT: 159.8 LBS

## 2021-01-20 VITALS
RESPIRATION RATE: 12 BRPM | TEMPERATURE: 96.4 F | SYSTOLIC BLOOD PRESSURE: 128 MMHG | DIASTOLIC BLOOD PRESSURE: 60 MMHG | OXYGEN SATURATION: 94 %

## 2021-01-20 PROCEDURE — 7100000001 HC PACU RECOVERY - ADDTL 15 MIN: Performed by: OBSTETRICS & GYNECOLOGY

## 2021-01-20 PROCEDURE — 7100000010 HC PHASE II RECOVERY - FIRST 15 MIN: Performed by: OBSTETRICS & GYNECOLOGY

## 2021-01-20 PROCEDURE — 3700000001 HC ADD 15 MINUTES (ANESTHESIA): Performed by: OBSTETRICS & GYNECOLOGY

## 2021-01-20 PROCEDURE — 88341 IMHCHEM/IMCYTCHM EA ADD ANTB: CPT

## 2021-01-20 PROCEDURE — 58558 HYSTEROSCOPY BIOPSY: CPT | Performed by: OBSTETRICS & GYNECOLOGY

## 2021-01-20 PROCEDURE — 88342 IMHCHEM/IMCYTCHM 1ST ANTB: CPT

## 2021-01-20 PROCEDURE — 6360000002 HC RX W HCPCS: Performed by: NURSE ANESTHETIST, CERTIFIED REGISTERED

## 2021-01-20 PROCEDURE — 2500000003 HC RX 250 WO HCPCS: Performed by: NURSE ANESTHETIST, CERTIFIED REGISTERED

## 2021-01-20 PROCEDURE — 2709999900 HC NON-CHARGEABLE SUPPLY: Performed by: OBSTETRICS & GYNECOLOGY

## 2021-01-20 PROCEDURE — 6360000002 HC RX W HCPCS: Performed by: OBSTETRICS & GYNECOLOGY

## 2021-01-20 PROCEDURE — 3600000013 HC SURGERY LEVEL 3 ADDTL 15MIN: Performed by: OBSTETRICS & GYNECOLOGY

## 2021-01-20 PROCEDURE — 88305 TISSUE EXAM BY PATHOLOGIST: CPT

## 2021-01-20 PROCEDURE — 7100000011 HC PHASE II RECOVERY - ADDTL 15 MIN: Performed by: OBSTETRICS & GYNECOLOGY

## 2021-01-20 PROCEDURE — 2580000003 HC RX 258: Performed by: OBSTETRICS & GYNECOLOGY

## 2021-01-20 PROCEDURE — 3700000000 HC ANESTHESIA ATTENDED CARE: Performed by: OBSTETRICS & GYNECOLOGY

## 2021-01-20 PROCEDURE — 7100000000 HC PACU RECOVERY - FIRST 15 MIN: Performed by: OBSTETRICS & GYNECOLOGY

## 2021-01-20 PROCEDURE — 3600000003 HC SURGERY LEVEL 3 BASE: Performed by: OBSTETRICS & GYNECOLOGY

## 2021-01-20 RX ORDER — OXYCODONE HYDROCHLORIDE AND ACETAMINOPHEN 5; 325 MG/1; MG/1
1 TABLET ORAL EVERY 4 HOURS PRN
Status: CANCELLED | OUTPATIENT
Start: 2021-01-20

## 2021-01-20 RX ORDER — KETOROLAC TROMETHAMINE 30 MG/ML
INJECTION, SOLUTION INTRAMUSCULAR; INTRAVENOUS PRN
Status: DISCONTINUED | OUTPATIENT
Start: 2021-01-20 | End: 2021-01-20 | Stop reason: SDUPTHER

## 2021-01-20 RX ORDER — SODIUM CHLORIDE 0.9 % (FLUSH) 0.9 %
10 SYRINGE (ML) INJECTION PRN
Status: CANCELLED | OUTPATIENT
Start: 2021-01-20

## 2021-01-20 RX ORDER — PROMETHAZINE HYDROCHLORIDE 25 MG/1
12.5 TABLET ORAL EVERY 6 HOURS PRN
Status: CANCELLED | OUTPATIENT
Start: 2021-01-20

## 2021-01-20 RX ORDER — ONDANSETRON 2 MG/ML
INJECTION INTRAMUSCULAR; INTRAVENOUS PRN
Status: DISCONTINUED | OUTPATIENT
Start: 2021-01-20 | End: 2021-01-20 | Stop reason: SDUPTHER

## 2021-01-20 RX ORDER — ONDANSETRON 2 MG/ML
4 INJECTION INTRAMUSCULAR; INTRAVENOUS EVERY 6 HOURS PRN
Status: CANCELLED | OUTPATIENT
Start: 2021-01-20

## 2021-01-20 RX ORDER — SODIUM CHLORIDE, SODIUM LACTATE, POTASSIUM CHLORIDE, CALCIUM CHLORIDE 600; 310; 30; 20 MG/100ML; MG/100ML; MG/100ML; MG/100ML
INJECTION, SOLUTION INTRAVENOUS CONTINUOUS
Status: DISCONTINUED | OUTPATIENT
Start: 2021-01-20 | End: 2021-01-20 | Stop reason: HOSPADM

## 2021-01-20 RX ORDER — MORPHINE SULFATE 4 MG/ML
4 INJECTION, SOLUTION INTRAMUSCULAR; INTRAVENOUS
Status: DISCONTINUED | OUTPATIENT
Start: 2021-01-20 | End: 2021-01-20 | Stop reason: HOSPADM

## 2021-01-20 RX ORDER — OXYCODONE HYDROCHLORIDE AND ACETAMINOPHEN 5; 325 MG/1; MG/1
2 TABLET ORAL EVERY 4 HOURS PRN
Status: CANCELLED | OUTPATIENT
Start: 2021-01-20

## 2021-01-20 RX ORDER — SODIUM CHLORIDE 0.9 % (FLUSH) 0.9 %
10 SYRINGE (ML) INJECTION EVERY 12 HOURS SCHEDULED
Status: CANCELLED | OUTPATIENT
Start: 2021-01-20

## 2021-01-20 RX ORDER — DEXAMETHASONE SODIUM PHOSPHATE 10 MG/ML
INJECTION INTRAMUSCULAR; INTRAVENOUS PRN
Status: DISCONTINUED | OUTPATIENT
Start: 2021-01-20 | End: 2021-01-20 | Stop reason: SDUPTHER

## 2021-01-20 RX ORDER — FENTANYL CITRATE 50 UG/ML
INJECTION, SOLUTION INTRAMUSCULAR; INTRAVENOUS PRN
Status: DISCONTINUED | OUTPATIENT
Start: 2021-01-20 | End: 2021-01-20 | Stop reason: SDUPTHER

## 2021-01-20 RX ORDER — ACETAMINOPHEN 325 MG/1
650 TABLET ORAL EVERY 4 HOURS PRN
Status: CANCELLED | OUTPATIENT
Start: 2021-01-20

## 2021-01-20 RX ORDER — MORPHINE SULFATE 2 MG/ML
2 INJECTION, SOLUTION INTRAMUSCULAR; INTRAVENOUS
Status: DISCONTINUED | OUTPATIENT
Start: 2021-01-20 | End: 2021-01-20 | Stop reason: HOSPADM

## 2021-01-20 RX ORDER — PROPOFOL 10 MG/ML
INJECTION, EMULSION INTRAVENOUS PRN
Status: DISCONTINUED | OUTPATIENT
Start: 2021-01-20 | End: 2021-01-20 | Stop reason: SDUPTHER

## 2021-01-20 RX ORDER — IBUPROFEN 600 MG/1
600 TABLET ORAL EVERY 8 HOURS PRN
Qty: 15 TABLET | Refills: 0 | Status: SHIPPED | OUTPATIENT
Start: 2021-01-20 | End: 2021-02-18 | Stop reason: ALTCHOICE

## 2021-01-20 RX ORDER — LIDOCAINE HYDROCHLORIDE 20 MG/ML
INJECTION, SOLUTION EPIDURAL; INFILTRATION; INTRACAUDAL; PERINEURAL PRN
Status: DISCONTINUED | OUTPATIENT
Start: 2021-01-20 | End: 2021-01-20 | Stop reason: SDUPTHER

## 2021-01-20 RX ADMIN — FENTANYL CITRATE 50 MCG: 50 INJECTION, SOLUTION INTRAMUSCULAR; INTRAVENOUS at 07:31

## 2021-01-20 RX ADMIN — SODIUM CHLORIDE, POTASSIUM CHLORIDE, SODIUM LACTATE AND CALCIUM CHLORIDE: 600; 310; 30; 20 INJECTION, SOLUTION INTRAVENOUS at 07:28

## 2021-01-20 RX ADMIN — ONDANSETRON 4 MG: 2 INJECTION INTRAMUSCULAR; INTRAVENOUS at 07:43

## 2021-01-20 RX ADMIN — LIDOCAINE HYDROCHLORIDE 100 MG: 20 INJECTION, SOLUTION EPIDURAL; INFILTRATION; INTRACAUDAL; PERINEURAL at 07:31

## 2021-01-20 RX ADMIN — SODIUM CHLORIDE, POTASSIUM CHLORIDE, SODIUM LACTATE AND CALCIUM CHLORIDE: 600; 310; 30; 20 INJECTION, SOLUTION INTRAVENOUS at 07:17

## 2021-01-20 RX ADMIN — Medication 2 G: at 07:40

## 2021-01-20 RX ADMIN — PROPOFOL 150 MG: 10 INJECTION, EMULSION INTRAVENOUS at 07:31

## 2021-01-20 RX ADMIN — KETOROLAC TROMETHAMINE 15 MG: 30 INJECTION, SOLUTION INTRAMUSCULAR; INTRAVENOUS at 07:44

## 2021-01-20 RX ADMIN — DEXAMETHASONE SODIUM PHOSPHATE 10 MG: 10 INJECTION INTRAMUSCULAR; INTRAVENOUS at 07:43

## 2021-01-20 ASSESSMENT — PULMONARY FUNCTION TESTS
PIF_VALUE: 15
PIF_VALUE: 6
PIF_VALUE: 4
PIF_VALUE: 11
PIF_VALUE: 3
PIF_VALUE: 5
PIF_VALUE: 4
PIF_VALUE: 17
PIF_VALUE: 1
PIF_VALUE: 7
PIF_VALUE: 10
PIF_VALUE: 5
PIF_VALUE: 0
PIF_VALUE: 0
PIF_VALUE: 7
PIF_VALUE: 11
PIF_VALUE: 16
PIF_VALUE: 11
PIF_VALUE: 6
PIF_VALUE: 15
PIF_VALUE: 11
PIF_VALUE: 6

## 2021-01-20 ASSESSMENT — PAIN SCALES - WONG BAKER: WONGBAKER_NUMERICALRESPONSE: 2

## 2021-01-20 ASSESSMENT — PAIN SCALES - GENERAL
PAINLEVEL_OUTOF10: 0
PAINLEVEL_OUTOF10: 0

## 2021-01-20 ASSESSMENT — PAIN DESCRIPTION - LOCATION: LOCATION: PERINEUM

## 2021-01-20 ASSESSMENT — PAIN DESCRIPTION - PAIN TYPE: TYPE: SURGICAL PAIN

## 2021-01-20 NOTE — TELEPHONE ENCOUNTER
Case competed today. Pt was OK for CLARE. ..., BUT In order to properly review these patients for Surgery, we need to see these preoperative requests at least a week prior to surgery.  Sending this patient for CLARE review 20 hrs prior to procedure is unacceptable    MEÑO Mathis - CRNA,MSN,1/20/2021 8:49 AM  Chief Anesthetist  [facility]

## 2021-01-20 NOTE — OP NOTE
Operative Note      Patient: Daphnie Ram  YOB: 1940  MRN: [id number]    Date of Procedure: 1/20/2021    Pre-Op Diagnosis: posty menopausal bleeding    Post-Op Diagnosis: Same  + endometrial polyp     Procedure(s):  D & C HYSTEROSCOPY Polypectomy    Surgeon(s):  Phares Landau, MD    Assistant:   * No surgical staff found *    Anesthesia: General    Estimated Blood Loss (mL): Minimal    Complications: None    Specimens:   ID Type Source Tests Collected by Time Destination   A : ENDOMETRIAL CURETTINGS AND ENDOMETRIAL POLYP Tissue Endometrium SURGICAL PATHOLOGY Phares Landau, MD 1/20/2021 0746        Implants:  * No implants in log *      Drains: * No LDAs found *    Findings: 2 cm polyp arising from 6 O'Clock, removed and endometrium curetted    Detailed Description of Procedure:   Taken to the operating room where she was given MAC anesthesia without any problems. Patient was prepped and draped in the usual fashion and the bladder was emptied under aseptic condition with in and out catheterization. The anterior lip of the cervix was grasped with double-tooth tenaculum and the uterus was sounded to 7 cm. Gentle cervical dilatation was done to #6 Hegar dilator and the hysteroscope was introduced with the aid of saline solution showing the endometrial cavity well. There was a 2 cm polyp arising from 6 o'clock position and the rest of the uterine cavity was normal upper fundus was normal and the both tubal ostia could be seen normal without any other pathology. A scissors was introduced through the hysteroscope and the polyp was cut at its base. The hysteroscope was then removed and a medium curette introduced curetting the endometrial cavity and removing the polyp together with the curetting and sent to pathology. Patient tolerated this procedure very well and was taken to recovery room in satisfactory condition.     Electronically signed by Colleen Brumfield MD on 1/20/2021 at 8:08 AM

## 2021-01-20 NOTE — DISCHARGE SUMMARY
Surgery Discharge Summary     Patient Identification  Stephanie Austin is a [de-identified] y.o. female. :  1940  Admit Date:  2021    Discharge date:   No discharge date for patient encounter. Disposition: home    Discharge Diagnoses:   Patient Active Problem List   Diagnosis    Coronary artery disease involving native coronary artery of native heart without angina pectoris    Hyperlipidemia with target LDL less than 70    Chronic diastolic heart failure (HCC)    Anemia    GI bleed    Shingles    Esophageal ulcer    Gastric ulcer    Gastric ulcer with hemorrhage    Coronary artery disease    Diastolic dysfunction    Hyperlipidemia    Obesity    Pneumonia due to COVID-19 virus    Postmenopausal bleeding    Endometrial polyp       Condition on discharge: stable    Consults: none    Surgery: Hysteroscopic polypectomy with D&C    Patient Instructions: Activity: no heavy lifting, pushing, pulling for 6 weeks, no driving for 2 weeks or while on analgesics  Diet: As tolerated  Follow-up with gyn in 2 weeks. See pre-printed instructions in chart and given to patient upon discharge.     Discharge Medications:      Jamie Lombardi [facility] Medication Instructions CP    Printed on:21 [id number]   Medication Information                      alendronate (FOSAMAX) 70 MG tablet  TAKE 1 TABLET BY MOUTH EVERY 7 DAYS             ascorbic acid (VITAMIN C) 500 MG tablet  Take 500 mg by mouth 2 times daily Indications: patient only takes in the Winter With ana paula hips              aspirin 81 MG tablet  Take 81 mg by mouth daily             atorvastatin (LIPITOR) 40 MG tablet  TAKE 1 TABLET AT BEDTIME             Cholecalciferol (VITAMIN D3) 50 MCG ( UT) CAPS  Take 1 capsule by mouth daily             ferrous sulfate (FE TABS 325) 325 (65 Fe) MG EC tablet  TAKE 1 TABLET BY MOUTH DAILY (WITH BREAKFAST)             ibuprofen (ADVIL;MOTRIN) 600 MG tablet  Take 1 tablet by mouth every 8 hours as needed for Pain             isosorbide mononitrate (IMDUR) 30 MG extended release tablet  Take 1 tablet by mouth daily             lisinopril (PRINIVIL;ZESTRIL) 5 MG tablet  TAKE 1 TABLET EVERY DAY             metoprolol tartrate (LOPRESSOR) 25 MG tablet  TAKE 1 TABLET TWICE DAILY             Multiple Vitamins-Minerals (MULTIVITAMIN PO)  Take 1 tablet by mouth daily. nitroGLYCERIN (NITROSTAT) 0.4 MG SL tablet  Place 1 tablet under the tongue every 5 minutes as needed for Chest pain                  HPI and Hospital Course:   [de-identified] y.o. female presented on 1/20/2021 for  Hysteroscopic polypectomy with D&C. Hospital course was unremarkable. On day of discharge pt was tolerating regular diet, pain controlled with oral medications and ambulating without difficulty.       Electronically signed by Ananda Mays MD on 1/20/2021 at 8:07 AM

## 2021-01-20 NOTE — ANESTHESIA PRE PROCEDURE
Department of Anesthesiology  Preprocedure Note       Name:  Tawanda Gilliam   Age:  [de-identified] y.o.  :  1940                                          MRN:  [id number]         Date:  2021      Surgeon: Marysol Hua):  Elena Mittal MD    Procedure: Procedure(s):  D & C HYSTEROSCOPY Polypectomy (call daughter Guy Price [phone])    Medications prior to admission:   Prior to Admission medications    Medication Sig Start Date End Date Taking? Authorizing Provider   alendronate (FOSAMAX) 70 MG tablet TAKE 1 TABLET BY MOUTH EVERY 7 DAYS 20  Yes Erica Barcenas MD   atorvastatin (LIPITOR) 40 MG tablet TAKE 1 TABLET AT BEDTIME 10/30/20  Yes Gege Parker MD   Cholecalciferol (VITAMIN D3) 50 MCG ( UT) CAPS Take 1 capsule by mouth daily 10/16/20  Yes Erica Barcenas MD   isosorbide mononitrate (IMDUR) 30 MG extended release tablet Take 1 tablet by mouth daily 20  Yes Leo Martins DO   ferrous sulfate (FE TABS 325) 325 (65 Fe) MG EC tablet TAKE 1 TABLET BY MOUTH DAILY (WITH BREAKFAST) 20  Yes Erica Barcenas MD   lisinopril (PRINIVIL;ZESTRIL) 5 MG tablet TAKE 1 TABLET EVERY DAY 20  Yes Emma Martins DO   metoprolol tartrate (LOPRESSOR) 25 MG tablet TAKE 1 TABLET TWICE DAILY 20  Yes Emma Martins DO   ascorbic acid (VITAMIN C) 500 MG tablet Take 500 mg by mouth 2 times daily Indications: patient only takes in the Winter With ana paula hips    Yes Historical Provider, MD   Multiple Vitamins-Minerals (MULTIVITAMIN PO) Take 1 tablet by mouth daily. Yes Historical Provider, MD   nitroGLYCERIN (NITROSTAT) 0.4 MG SL tablet Place 1 tablet under the tongue every 5 minutes as needed for Chest pain 3/13/19   Erica Barcenas MD   aspirin 81 MG tablet Take 81 mg by mouth daily    Historical Provider, MD       Current medications:    No current facility-administered medications for this encounter. Allergies:     Allergies   Allergen Reactions    Codeine Nausea Only       Problem List: Patient Active Problem List   Diagnosis Code    Coronary artery disease involving native coronary artery of native heart without angina pectoris I25.10    Hyperlipidemia with target LDL less than 70 E78.5    Chronic diastolic heart failure (HCC) I50.32    Anemia D64.9    GI bleed K92.2    Shingles B02.9    Esophageal ulcer K22.10    Gastric ulcer K25.9    Gastric ulcer with hemorrhage K25.4    Coronary artery disease L21.54    Diastolic dysfunction T75.99    Hyperlipidemia E78.5    Obesity E66.9    Pneumonia due to COVID-19 virus U07.1, J12.82    Postmenopausal bleeding N95.0    Endometrial polyp N84.0       Past Medical History:        Diagnosis Date    Asteroid hyalosis of right eye     Coronary artery disease     status post acute non-Q wave myocardial infarction 08/23/2012, status post drug eluting stent placement in the LAD and 2 drug eluting stents in the left circumflex artery 08/23/2012.  Diastolic dysfunction     grade 1.     Gastric ulcer with hemorrhage 03/03/2015    gastric and esophageal ulcers due to nsaid    Hyperlipidemia     Mild mitral regurgitation     Mild tricuspid regurgitation     Obesity     Patient in clinical research study 06/27/2020    Expanded Access to [address] for COVID-19 date of completed 6/30/2020    Vitreous floaters     left eye. Past Surgical History:        Procedure Laterality Date    CARDIAC CATHETERIZATION Left 08/23/2012    left main artery normal, LAD with mid 60 to 70% stenosis, FFR was 0.76, left circumflex artery with mid 90% stenosis with thrombus, RCA with mild irregularities, preserved left ventricular systolic function, ejection fraction estimated around 50%, status post drug eluting stent placement in the LAD and 2 drug eluting stents in the left circumflex artery.      CHOLECYSTECTOMY  1978    ENDOSCOPY, COLON, DIAGNOSTIC  03/05/2015    esophageal/gastric ulcer; few diverticuli  UPPER GASTROINTESTINAL ENDOSCOPY  4/16/2015    healed gastric ulcer       Social History:    Social History     Tobacco Use    Smoking status: Never Smoker    Smokeless tobacco: Never Used   Substance Use Topics    Alcohol use: Yes     Comment: occasionally                                Counseling given: Not Answered      Vital Signs (Current):   Vitals:    01/20/21 0710   BP: (!) 175/79   Pulse: 61   Resp: 14   Temp: 36.7 °C (98.1 °F)   TempSrc: Oral   SpO2: 96%   Weight: 159 lb 12.8 oz (72.5 kg)   Height: 4' 10\" (1.473 m)                                              BP Readings from Last 3 Encounters:   01/20/21 (!) 175/79   01/12/21 134/86   01/06/21 138/88       NPO Status:                                                                                 BMI:   Wt Readings from Last 3 Encounters:   01/20/21 159 lb 12.8 oz (72.5 kg)   01/12/21 158 lb (71.7 kg)   01/06/21 157 lb (71.2 kg)     Body mass index is 33.4 kg/m². CBC:   Lab Results   Component Value Date    WBC 8.8 01/12/2021    RBC 4.63 01/12/2021    HGB 14.8 01/12/2021    HCT 46.1 01/12/2021    MCV 99.6 01/12/2021    RDW 13.1 01/12/2021     01/12/2021       CMP:   Lab Results   Component Value Date     01/12/2021    K 4.2 01/12/2021     01/12/2021    CO2 23 01/12/2021    BUN 12 01/12/2021    CREATININE 0.53 01/12/2021    GFRAA >60 01/12/2021    LABGLOM >60 01/12/2021    GLUCOSE 116 01/12/2021    PROT 6.3 06/26/2020    CALCIUM 9.5 01/12/2021    BILITOT 0.65 06/26/2020    ALKPHOS 75 06/26/2020    AST 83 06/26/2020    ALT 63 06/26/2020       POC Tests: No results for input(s): POCGLU, POCNA, POCK, POCCL, POCBUN, POCHEMO, POCHCT in the last 72 hours.     Coags:   Lab Results   Component Value Date    PROTIME 10.3 06/27/2020    INR 1.0 06/27/2020    APTT 26.7 08/24/2012       HCG (If Applicable): No results found for: PREGTESTUR, PREGSERUM, HCG, HCGQUANT ABGs: No results found for: PHART, PO2ART, NUU3YLY, CPA3VXY, BEART, T1EWNKHO     Type & Screen (If Applicable):  No results found for: LABABO, LABRH    Drug/Infectious Status (If Applicable):  No results found for: HIV, HEPCAB    COVID-19 Screening (If Applicable):   Lab Results   Component Value Date    COVID19 Not Detected 01/15/2021         Anesthesia Evaluation    Airway: Mallampati: II  TM distance: >3 FB   Neck ROM: full  Mouth opening: > = 3 FB Dental: normal exam         Pulmonary:normal exam                               Cardiovascular:    (+) hypertension:, CAD:,       ECG reviewed        Stress test reviewed  Cleared by cardiology              Neuro/Psych:   Negative Neuro/Psych ROS              GI/Hepatic/Renal:   (+) PUD,           Endo/Other: Negative Endo/Other ROS                    Abdominal:           Vascular: negative vascular ROS. Anesthesia Plan      general     ASA 3       Induction: intravenous. MIPS: Postoperative opioids intended and Prophylactic antiemetics administered. Anesthetic plan and risks discussed with patient.                       Cece Zambrano, APRN - CRNA   1/20/2021

## 2021-01-22 LAB — SURGICAL PATHOLOGY REPORT: NORMAL

## 2021-01-25 ENCOUNTER — TELEPHONE (OUTPATIENT)
Dept: GYNECOLOGIC ONCOLOGY | Age: 81
End: 2021-01-25

## 2021-01-25 DIAGNOSIS — C54.1 ENDOMETRIAL CANCER (HCC): Primary | ICD-10-CM

## 2021-01-28 ENCOUNTER — IMMUNIZATION (OUTPATIENT)
Dept: LAB | Age: 81
End: 2021-01-28
Payer: MEDICARE

## 2021-01-28 PROCEDURE — 91301 COVID-19, MODERNA VACCINE 100MCG/0.5ML DOSE: CPT

## 2021-02-03 ENCOUNTER — OFFICE VISIT (OUTPATIENT)
Dept: GYNECOLOGIC ONCOLOGY | Age: 81
End: 2021-02-03
Payer: MEDICARE

## 2021-02-03 VITALS
HEIGHT: 59 IN | DIASTOLIC BLOOD PRESSURE: 75 MMHG | SYSTOLIC BLOOD PRESSURE: 152 MMHG | WEIGHT: 160.6 LBS | HEART RATE: 64 BPM | OXYGEN SATURATION: 96 % | BODY MASS INDEX: 32.38 KG/M2

## 2021-02-03 DIAGNOSIS — C54.1 ENDOMETRIAL CANCER (HCC): Primary | ICD-10-CM

## 2021-02-03 DIAGNOSIS — Z01.811 PRE-OP CHEST EXAM: ICD-10-CM

## 2021-02-03 PROCEDURE — G8427 DOCREV CUR MEDS BY ELIG CLIN: HCPCS | Performed by: OBSTETRICS & GYNECOLOGY

## 2021-02-03 PROCEDURE — 1036F TOBACCO NON-USER: CPT | Performed by: OBSTETRICS & GYNECOLOGY

## 2021-02-03 PROCEDURE — 1090F PRES/ABSN URINE INCON ASSESS: CPT | Performed by: OBSTETRICS & GYNECOLOGY

## 2021-02-03 PROCEDURE — G8417 CALC BMI ABV UP PARAM F/U: HCPCS | Performed by: OBSTETRICS & GYNECOLOGY

## 2021-02-03 PROCEDURE — 1123F ACP DISCUSS/DSCN MKR DOCD: CPT | Performed by: OBSTETRICS & GYNECOLOGY

## 2021-02-03 PROCEDURE — G8484 FLU IMMUNIZE NO ADMIN: HCPCS | Performed by: OBSTETRICS & GYNECOLOGY

## 2021-02-03 PROCEDURE — G8399 PT W/DXA RESULTS DOCUMENT: HCPCS | Performed by: OBSTETRICS & GYNECOLOGY

## 2021-02-03 PROCEDURE — 99205 OFFICE O/P NEW HI 60 MIN: CPT | Performed by: OBSTETRICS & GYNECOLOGY

## 2021-02-03 PROCEDURE — 4040F PNEUMOC VAC/ADMIN/RCVD: CPT | Performed by: OBSTETRICS & GYNECOLOGY

## 2021-02-03 NOTE — PATIENT INSTRUCTIONS
You may take \"over the counter\" paint relievers that do not contain aspirin such as acetaminophen (Tylenol) or Ibuprofen (Motrin or Advil). Do not exceed the daily recommended dose. Note:  If the Pain is not relieved by pain medication, becomes worse, or you have difficulty breathing, call our office. Incision Care:  · Inspect your incision(s) daily. · A small amount of blood or clear drainage from the incisions is normal and not a cause for concern. · Bruising around your incision sites is common and not a cause for concern. · Your incisions may become itchy for a few days. This is part of the normal healing process. · As your incisions heal, they will change in color and may become numb for several weeks. · If you have small dressings or band-aids, they may be removed 24 hours. · If you have steri-strips (small adhesive strips) in place, they will peel and fall off. If they do not fall off within 10 days, carefully peel them off. · If you have stitches, they will dissolve on their own. · If you have staples, they will be removed at the post-operative visit. Note:  If you notice any redness, heave drainage, or bleeding from your incisions, please call our office at [phone]. Nutrition:  You may resume the diet you had prior to surgery. Drink 6-8 glasses of water daily. Bowel Function: For the first several days after surgery, the bowel is usually less active. You may not have a regular bowel movement right away, pending on pre-op and bowel pain or pain medication use. Narcotic pain medications (Percocet, Vicodin, Hydrocodone, or Oxycodone) will increase constipation. Regular bowel movement may be less frequent. If constipation should occur:  · Drink more fluids  · Continue to take a stool softener such as Colace until constipation resolves.   · Take a mild laxative such as Milk of Magnesia    Swelling: Mild abdominal swelling can occur following surgery due to slowing of the bowels and gas used to distend the abdomen during surgery. Swelling of the hands and lower extremities is common due to fluids given during surgery. Swelling of the face can occur due to positioning during surgery. If you have selling of the calves that is persistent or associated with redness, call our office. Activity:  If is normal to feel tired for a few days after surgery. Listen to your body and do not overdo it. · Walking is encouraged immediately after surgery, as tolerated. You should NOT be bedridden after surgery as continued movement will prevent prolonged recovery times due to \"deconditioning\". ·  If you could climb stairs un-aided prior to surgery you may resume climbing stairs after discharge following your procedure. · No strenuous activities such as heavy lifting (greater than 10 pounds or a gallon of milk), pushing or pulling for six weeks. · You may slowly increase your cardiovascular exercise after 2 weeks. Abdominal exercise should be avoided for 6 weeks. · Do Not drive while taking prescription pain medication or if your level of discomfort could inhibit your ability to operate a motor vehicle safely. · You may shower the day after surgery. Pat incisions dry. Do not rub incisions with washcloth or towel. Keep your incisions as dry as possible. · You may take a bath after six weeks. You must also wait 6 weeks to go into a swimming pool, hot tub, or the ocean. Vaginal Bleeding:  Light vaginal bleeding, spotting, or brown discharge for up to 6 weeks is common. Note: If you have heavy, bright red vaginal bleeding, call our office. Bleeding that fills a pad in one hour is considered to be heavy bleeding.     Sexual [place]: Avoid placing anything in the vagina for 8 weeks (ie. .Tampons, Douching, and Sexual intercourse). Vaginal closure disruption can occur if sexual intercourse is resumed before healing is complete and will require additional surgery, which can lead to shortening of the vagina and painful intercourse. Follow Up Appointment:  Your follow up appointment will be scheduled on the same day we schedule your surgery. If one is not, please call the office when you get home to schedule. Call the office if you Experience:  ·  A fever higher than 100.4 Fahrenheit  · Increasing pain not controlled by pain medications  · Inability to eat or drink without vomiting  · Shortness of breath  · Inability to empty your bladder  · Redness and tenderness at the incision site, or a large amount of drainage  · Heavy, bright red vaginal bleeding or foul odor from discharge. You can expect to have a small amount of reddish-brown colored discharge up to 6 weeks. DO NOT BE ALARMED BY THIS. If you fell you need to be seen emergently, please go to the Emergency Department, if possible, where your surgery was performed so that our physicians may care for you. Any questions regarding your surgery or post-operative recovery should be directed to the staff at the [facility]) Gynecology Oncology at [phone], rather than your primary care physician.

## 2021-02-03 NOTE — PROGRESS NOTES
[address] [facility] [place], [facility], [address] [facility] [address]      I am seeing Bibi Rodriguez for New Patient at the request of Dr. Maria A Mi MD, OB/GYN, [facility]. Patient's PCP is Jie Alcantar MD    Chief Complaint: Endometrial cancer    HPI  She is a [de-identified] y.o.  3, para 3 (all vaginal deliveries) female who is being referred for endometrial adenocarcinoma, grade 2. The patient states that she began having vaginal spotting in mid December. She mentioned it to her PCP who referred her to Dr. Rufina Christian. He recommended a D&C and hysteroscopy. The surgery was performed on 2021. He noted a large endometrial polyp at the time of the surgery as well. Pathology revealed a moderately differentiated endometrioid adenocarcinoma. He has referred her to [facility] gynecologic oncology for further work-up and a discussion of management options. Patient's daughter states that her mother has had some memory difficulties since recovering from Covid several months ago. Cardiac history is significant in that she had 3 coronary artery stents placed in . She currently takes only a baby aspirin. She has seen Dr. Wayne Escalera, cardiologist.    Review of Systems  I have personally reviewed and agree with the review of systems done by my ancillary staff in the [facility] documentation. OBJECTIVE:  Vitals:    21 0909   Weight: 160 lb 9.6 oz (72.8 kg)   Height: 4' 11\" (1.499 m)       General: Alert and oriented x3, no acute distress    HEENT:  EOM intact, ARLETH, no cervical or supraclavicular lymphadenopathy. No Thyromegaly. Heart: Auscultation of heart revels a grade 2/6 systolic murmur with frequent irregular beats and a rate of about 60. Gallops, or rubs. Lungs:  Clear bilaterally to auscultation to the bases. CVA: No tenderness. Abdomen: Flat. Right subcostal scar from previous cholecystectomy. No other surgical scars noted. No herniations. A mild yeast appearing rash in the groin areas. Palpation reveals no hepatomegaly or splenomegaly. No masses. No tenderness. No inguinal adenopathy on either side. Lower Extremities:  No lymphedema, no calf tenderness, no musculoskeletal deformities. Pelvic Exam: Normal external genitalia. Atrophy noted. No lesions seen. Normal-appearing urethra, lower vagina, perineum and anus. A medium size lighted speculum was utilized to view the vagina. There was no blood or discharge. There were no vaginal lesions or cervical lesions. Bimanual examination reveals a slightly retrocessed uterus that is normal to small in size and symmetrical.  It is mobile and nontender. No adnexal pathology is noted. There is no nodularity in the cul-de-sac. Family History   Problem Relation Age of Onset    Heart Attack Father     Cancer Sister        Assessment:  1. Endometrial cancer ([facility] [address]      2. Memory loss since Covid    3.  3 coronary artery stents placed in 2012 on aspirin 81 mg/day    Discussion: A discussion was held with the patient and her daughter regarding management options. We discussed hormonal therapy, radiation therapy and surgery. If she can be cleared from a medical standpoint and a cardiology standpoint she would be a surgical candidate. We discussed various surgical approaches including the traditional approach laparotomy. We discussed laparoscopic approach and robotic laparoscopic approach. I think she would be a good candidate for robotic laparoscopic approach if cleared. The procedure was discussed in detail with the patient and her daughter and all questions were answered to their satisfaction. The risks and benefits of the procedure were also discussed in her as listed below. The benefits would be to rid her of the bleeding and the cancer along with its consequences. The patient wishes to proceed with the surgery as planned. Plan: Robotic laparoscopic modified radical hysterectomy, bilateral salpingo-oophorectomies, peritoneal washings for cytology, sentinel lymph node mapping, sentinel lymph node biopsies. Possible laparotomy. Follow Up: Postoperatively      INFORMED CONSENT:  We discussed options including but not limited to observation, hormonal therapy, radiation therapy and various surgical approaches. The patient wishes to have a robotic laparoscopic approach if possible. .  Benefits of the procedure(s) include but not limited to treatment, possible staging of precancerous/cancerous lesions and/or improvement in systems and quality of life. The procedure(s) were explained in great detail along with all the possible risks and complications including, but not limited to blood loss requiring transfusions, DVT requiring blood thinning agents, injury to surrounding structures including bladder, bowel ureters, etc., as well as the possibility of infection requiring prophylactic antibiotics. The patient was counseled at length about the risks of roman Covid-19 during their perioperative period and any recovery window from their procedure. The patient was made aware that roman Covid-19  may worsen their prognosis for recovering from their procedure  and lend to a higher morbidity and/or mortality risk. All material risks, benefits, and reasonable alternatives including postponing the procedure were discussed. The patient does wish to proceed with the procedure at this time. I spent 65 minutes providing care to the patient and >50% of the time was spent counseling and conoordinating care, discussing the patient's current situation, reviewing her options, counseling and education her and answering her questions. All of the patient's pertinent images, labs and previous records and procedures were reviewed.     Electronically signed by Brandy Sanchez MD on 2/3/21 at 9:16 AM EST

## 2021-02-03 NOTE — PROGRESS NOTES
Review of Systems   Hematological: Bruises/bleeds easily. All other systems reviewed and are negative.

## 2021-02-04 ENCOUNTER — TELEPHONE (OUTPATIENT)
Dept: CARDIOLOGY | Age: 81
End: 2021-02-04

## 2021-02-04 NOTE — LETTER
Cardiology Consultation  [facility] [address], [address] [place] Ankit Meehann  [phone]      02/05/21       Regarding:  Rachel Koroma  1940      To Whom It May Concern,      Patient is Intermediate Cardiac Risk for planned surgery. Special instructions:  Patient is taking ASA and can be held for 5-7 days prior to procedure and resumed as soon as surgical bleeding risk has resolved. Please continue other meds.         Thank you,      Fidel Burris DO, [address] [facility]  Cardiology  [facility]    Electronically signed by Fidel Burris DO

## 2021-02-04 NOTE — TELEPHONE ENCOUNTER
Spoke with pt's daughter. She states pt goes up and down stairs every day to do laundry or feed cat.

## 2021-02-05 ENCOUNTER — TELEPHONE (OUTPATIENT)
Dept: GYNECOLOGIC ONCOLOGY | Age: 81
End: 2021-02-05

## 2021-02-05 RX ORDER — ISOSORBIDE MONONITRATE 30 MG/1
TABLET, EXTENDED RELEASE ORAL
Qty: 90 TABLET | Refills: 2 | Status: SHIPPED | OUTPATIENT
Start: 2021-02-05 | End: 2021-12-23 | Stop reason: SDUPTHER

## 2021-02-05 NOTE — TELEPHONE ENCOUNTER
Last Appt:  12/7/2020  Next Appt:   5/24/2021  Med verified in [facility]

## 2021-02-05 NOTE — TELEPHONE ENCOUNTER
Notified daughter Abbey Higgins and pt that surgery is scheduled at [facility] on 2/23/21 @7:30am,arrive 6:00am. PAT is same day as surgery and pt is to complete lab work, CXR & EKG at next availability. Светлана Sommers will call pt to schedule Covid testing. Reminded to contact PCP to see if appt is needed for medical clearance. Daughter voiced understanding. Hemigard Intro: Due to skin fragility and wound tension, it was decided to use HEMIGARD adhesive retention suture devices to permit a linear closure. The skin was cleaned and dried for a 6cm distance away from the wound. Excessive hair, if present, was removed to allow for adhesion.

## 2021-02-08 ENCOUNTER — PREP FOR PROCEDURE (OUTPATIENT)
Dept: GYNECOLOGIC ONCOLOGY | Age: 81
End: 2021-02-08

## 2021-02-08 DIAGNOSIS — C54.1 ENDOMETRIAL CANCER (HCC): ICD-10-CM

## 2021-02-08 DIAGNOSIS — Z01.818 PRE-OP EVALUATION: Primary | ICD-10-CM

## 2021-02-08 RX ORDER — ONDANSETRON 2 MG/ML
4 INJECTION INTRAMUSCULAR; INTRAVENOUS ONCE
Status: CANCELLED | OUTPATIENT
Start: 2021-02-08 | End: 2021-02-08

## 2021-02-08 RX ORDER — SODIUM CHLORIDE 0.9 % (FLUSH) 0.9 %
10 SYRINGE (ML) INJECTION PRN
Status: CANCELLED | OUTPATIENT
Start: 2021-02-08

## 2021-02-08 RX ORDER — ACETAMINOPHEN 500 MG
1000 TABLET ORAL ONCE
Status: CANCELLED | OUTPATIENT
Start: 2021-02-08 | End: 2021-02-08

## 2021-02-08 RX ORDER — SODIUM CHLORIDE 0.9 % (FLUSH) 0.9 %
10 SYRINGE (ML) INJECTION EVERY 12 HOURS SCHEDULED
Status: CANCELLED | OUTPATIENT
Start: 2021-02-08

## 2021-02-08 RX ORDER — DEXAMETHASONE 4 MG/1
4 TABLET ORAL ONCE
Status: CANCELLED | OUTPATIENT
Start: 2021-02-08 | End: 2021-02-08

## 2021-02-08 RX ORDER — SODIUM CHLORIDE 9 MG/ML
INJECTION, SOLUTION INTRAVENOUS CONTINUOUS
Status: CANCELLED | OUTPATIENT
Start: 2021-02-08

## 2021-02-09 ENCOUNTER — HOSPITAL ENCOUNTER (OUTPATIENT)
Dept: NON INVASIVE DIAGNOSTICS | Age: 81
Discharge: HOME | End: 2021-02-09
Payer: MEDICARE

## 2021-02-09 ENCOUNTER — HOSPITAL ENCOUNTER (OUTPATIENT)
Dept: LAB | Age: 81
Discharge: HOME | End: 2021-02-09
Payer: MEDICARE

## 2021-02-09 ENCOUNTER — OFFICE VISIT (OUTPATIENT)
Dept: FAMILY MEDICINE CLINIC | Age: 81
End: 2021-02-09
Payer: MEDICARE

## 2021-02-09 ENCOUNTER — HOSPITAL ENCOUNTER (OUTPATIENT)
Dept: GENERAL RADIOLOGY | Age: 81
Discharge: HOME | End: 2021-02-11
Payer: MEDICARE

## 2021-02-09 ENCOUNTER — TELEPHONE (OUTPATIENT)
Dept: GYNECOLOGIC ONCOLOGY | Age: 81
End: 2021-02-09

## 2021-02-09 ENCOUNTER — TELEPHONE (OUTPATIENT)
Dept: PREADMISSION TESTING | Age: 81
End: 2021-02-09

## 2021-02-09 VITALS
BODY MASS INDEX: 31.77 KG/M2 | SYSTOLIC BLOOD PRESSURE: 130 MMHG | OXYGEN SATURATION: 98 % | HEIGHT: 59 IN | RESPIRATION RATE: 16 BRPM | DIASTOLIC BLOOD PRESSURE: 74 MMHG | WEIGHT: 157.6 LBS | HEART RATE: 54 BPM

## 2021-02-09 DIAGNOSIS — I10 ESSENTIAL HYPERTENSION: ICD-10-CM

## 2021-02-09 DIAGNOSIS — C54.1 ENDOMETRIAL CANCER (HCC): ICD-10-CM

## 2021-02-09 DIAGNOSIS — Z01.818 PRE-OP TESTING: ICD-10-CM

## 2021-02-09 DIAGNOSIS — Z01.811 PRE-OP CHEST EXAM: ICD-10-CM

## 2021-02-09 DIAGNOSIS — I25.10 CORONARY ARTERY DISEASE INVOLVING NATIVE CORONARY ARTERY OF NATIVE HEART WITHOUT ANGINA PECTORIS: ICD-10-CM

## 2021-02-09 DIAGNOSIS — Z01.818 PREOP EXAMINATION: ICD-10-CM

## 2021-02-09 LAB
-: NORMAL
ABSOLUTE EOS #: 0.11 K/UL (ref 0–0.44)
ABSOLUTE IMMATURE GRANULOCYTE: <0.03 K/UL (ref 0–0.3)
ABSOLUTE LYMPH #: 2.75 K/UL (ref 1.1–3.7)
ABSOLUTE MONO #: 0.76 K/UL (ref 0.1–1.2)
ALBUMIN SERPL-MCNC: 4.7 G/DL (ref 3.5–5.2)
ALBUMIN/GLOBULIN RATIO: 1.6 (ref 1–2.5)
ALP BLD-CCNC: 65 U/L (ref 35–104)
ALT SERPL-CCNC: 16 U/L (ref 5–33)
AMORPHOUS: NORMAL
ANION GAP SERPL CALCULATED.3IONS-SCNC: 9 MMOL/L (ref 9–17)
AST SERPL-CCNC: 22 U/L
BACTERIA: NORMAL
BASOPHILS # BLD: 1 % (ref 0–2)
BASOPHILS ABSOLUTE: 0.08 K/UL (ref 0–0.2)
BILIRUB SERPL-MCNC: 0.73 MG/DL (ref 0.3–1.2)
BILIRUBIN URINE: NEGATIVE
BUN BLDV-MCNC: 13 MG/DL (ref 8–23)
BUN/CREAT BLD: 18 (ref 9–20)
CA 125: 12 U/ML
CALCIUM SERPL-MCNC: 9.7 MG/DL (ref 8.6–10.4)
CASTS UA: NORMAL /LPF (ref 0–2)
CASTS UA: NORMAL /LPF (ref 0–2)
CHLORIDE BLD-SCNC: 107 MMOL/L (ref 98–107)
CO2: 28 MMOL/L (ref 20–31)
COLOR: ABNORMAL
COMMENT UA: ABNORMAL
CREAT SERPL-MCNC: 0.73 MG/DL (ref 0.5–0.9)
CRYSTALS, UA: NORMAL /HPF
DIFFERENTIAL TYPE: NORMAL
EKG ATRIAL RATE: 51 BPM
EKG P AXIS: 65 DEGREES
EKG P-R INTERVAL: 168 MS
EKG Q-T INTERVAL: 410 MS
EKG QRS DURATION: 80 MS
EKG QTC CALCULATION (BAZETT): 377 MS
EKG R AXIS: 54 DEGREES
EKG T AXIS: 46 DEGREES
EKG VENTRICULAR RATE: 51 BPM
EOSINOPHILS RELATIVE PERCENT: 1 % (ref 1–4)
EPITHELIAL CELLS UA: NORMAL /HPF (ref 0–5)
GFR AFRICAN AMERICAN: >60 ML/MIN
GFR NON-AFRICAN AMERICAN: >60 ML/MIN
GFR SERPL CREATININE-BSD FRML MDRD: ABNORMAL ML/MIN/{1.73_M2}
GFR SERPL CREATININE-BSD FRML MDRD: ABNORMAL ML/MIN/{1.73_M2}
GLUCOSE BLD-MCNC: 115 MG/DL (ref 70–99)
GLUCOSE URINE: NEGATIVE
HCT VFR BLD CALC: 44.2 % (ref 36.3–47.1)
HEMOGLOBIN: 14.1 G/DL (ref 11.9–15.1)
IMMATURE GRANULOCYTES: 0 %
KETONES, URINE: NEGATIVE
LEUKOCYTE ESTERASE, URINE: NEGATIVE
LYMPHOCYTES # BLD: 31 % (ref 24–43)
MCH RBC QN AUTO: 31.8 PG (ref 25.2–33.5)
MCHC RBC AUTO-ENTMCNC: 31.9 G/DL (ref 25.2–33.5)
MCV RBC AUTO: 99.5 FL (ref 82.6–102.9)
MONOCYTES # BLD: 9 % (ref 3–12)
MUCUS: NORMAL
NITRITE, URINE: NEGATIVE
NRBC AUTOMATED: 0 PER 100 WBC
OTHER OBSERVATIONS UA: NORMAL
PDW BLD-RTO: 12.9 % (ref 11.8–14.4)
PH UA: 5.5 (ref 5–6)
PLATELET # BLD: 248 K/UL (ref 138–453)
PLATELET ESTIMATE: NORMAL
PMV BLD AUTO: 9.3 FL (ref 8.1–13.5)
POTASSIUM SERPL-SCNC: 4.2 MMOL/L (ref 3.7–5.3)
PROTEIN UA: NEGATIVE
RBC # BLD: 4.44 M/UL (ref 3.95–5.11)
RBC # BLD: NORMAL 10*6/UL
RBC UA: NORMAL /HPF (ref 0–4)
RENAL EPITHELIAL, UA: NORMAL /HPF
SEG NEUTROPHILS: 58 % (ref 36–65)
SEGMENTED NEUTROPHILS ABSOLUTE COUNT: 5.14 K/UL (ref 1.5–8.1)
SODIUM BLD-SCNC: 144 MMOL/L (ref 135–144)
SPECIFIC GRAVITY UA: 1.03 (ref 1.01–1.02)
TOTAL PROTEIN: 7.6 G/DL (ref 6.4–8.3)
TRICHOMONAS: NORMAL
TURBIDITY: ABNORMAL
URINE HGB: ABNORMAL
UROBILINOGEN, URINE: NORMAL
WBC # BLD: 8.9 K/UL (ref 3.5–11.3)
WBC # BLD: NORMAL 10*3/UL
WBC UA: NORMAL /HPF (ref 0–4)
YEAST: NORMAL

## 2021-02-09 PROCEDURE — 1036F TOBACCO NON-USER: CPT | Performed by: FAMILY MEDICINE

## 2021-02-09 PROCEDURE — G8399 PT W/DXA RESULTS DOCUMENT: HCPCS | Performed by: FAMILY MEDICINE

## 2021-02-09 PROCEDURE — 4040F PNEUMOC VAC/ADMIN/RCVD: CPT | Performed by: FAMILY MEDICINE

## 2021-02-09 PROCEDURE — 1090F PRES/ABSN URINE INCON ASSESS: CPT | Performed by: FAMILY MEDICINE

## 2021-02-09 PROCEDURE — 99211 OFF/OP EST MAY X REQ PHY/QHP: CPT | Performed by: FAMILY MEDICINE

## 2021-02-09 PROCEDURE — 81001 URINALYSIS AUTO W/SCOPE: CPT

## 2021-02-09 PROCEDURE — 36415 COLL VENOUS BLD VENIPUNCTURE: CPT

## 2021-02-09 PROCEDURE — 85025 COMPLETE CBC W/AUTO DIFF WBC: CPT

## 2021-02-09 PROCEDURE — 86304 IMMUNOASSAY TUMOR CA 125: CPT

## 2021-02-09 PROCEDURE — 80053 COMPREHEN METABOLIC PANEL: CPT

## 2021-02-09 PROCEDURE — 99215 OFFICE O/P EST HI 40 MIN: CPT | Performed by: FAMILY MEDICINE

## 2021-02-09 PROCEDURE — 93005 ELECTROCARDIOGRAM TRACING: CPT

## 2021-02-09 PROCEDURE — 71046 X-RAY EXAM CHEST 2 VIEWS: CPT

## 2021-02-09 PROCEDURE — G8417 CALC BMI ABV UP PARAM F/U: HCPCS | Performed by: FAMILY MEDICINE

## 2021-02-09 PROCEDURE — 1123F ACP DISCUSS/DSCN MKR DOCD: CPT | Performed by: FAMILY MEDICINE

## 2021-02-09 PROCEDURE — G8427 DOCREV CUR MEDS BY ELIG CLIN: HCPCS | Performed by: FAMILY MEDICINE

## 2021-02-09 PROCEDURE — G8484 FLU IMMUNIZE NO ADMIN: HCPCS | Performed by: FAMILY MEDICINE

## 2021-02-09 ASSESSMENT — PATIENT HEALTH QUESTIONNAIRE - PHQ9
1. LITTLE INTEREST OR PLEASURE IN DOING THINGS: 0
SUM OF ALL RESPONSES TO PHQ QUESTIONS 1-9: 0

## 2021-02-09 NOTE — PROGRESS NOTES
HPI:  Patient comes in today for   Chief Complaint   Patient presents with    Pre-op Exam     hysterectomy Feb 23rd   Here for Preop exam for hysterectomy has had problems with uterine bleeding work up with [facility]  and hysteroscopy with polyp removal ,Histopathology  revealed  Endometrial cancer. No bleeding currently. No chest pain or SOB. h/o CAD s/p cardiac stents. Diastolic dysfunction has had stress test in 10/2020 , has been cleared by cardiology. No chest pain or SOB. No leg swelling. HISTORY:  Past Medical History:   Diagnosis Date    Asteroid hyalosis of right eye     Coronary artery disease     status post acute non-Q wave myocardial infarction 08/23/2012, status post drug eluting stent placement in the LAD and 2 drug eluting stents in the left circumflex artery 08/23/2012.  Diastolic dysfunction     grade 1.     Gastric ulcer with hemorrhage 03/03/2015    gastric and esophageal ulcers due to nsaid    Hyperlipidemia     Mild mitral regurgitation     Mild tricuspid regurgitation     Obesity     Patient in clinical research study 06/27/2020    Expanded Access to [address] for COVID-19 date of completed 6/30/2020    Vitreous floaters     left eye. Past Surgical History:   Procedure Laterality Date    CARDIAC CATHETERIZATION Left 08/23/2012    left main artery normal, LAD with mid 60 to 70% stenosis, FFR was 0.76, left circumflex artery with mid 90% stenosis with thrombus, RCA with mild irregularities, preserved left ventricular systolic function, ejection fraction estimated around 50%, status post drug eluting stent placement in the LAD and 2 drug eluting stents in the left circumflex artery.      CHOLECYSTECTOMY  1978    DILATION AND CURETTAGE OF UTERUS N/A 1/20/2021    D & C HYSTEROSCOPY Polypectomy performed by Renaye Cogan, MD at [facility], [place], DIAGNOSTIC  03/05/2015    esophageal/gastric ulcer; few diverticuli    UPPER GASTROINTESTINAL ENDOSCOPY  4/16/2015 healed gastric ulcer        Family History   Problem Relation Age of Onset    Heart Attack Father     Cancer Sister     Prostate Cancer Brother        Social History     Socioeconomic History    Marital status: Single     Spouse name: Not on file    Number of children: Not on file    Years of education: Not on file    Highest education level: Not on file   Occupational History    Not on file   Social Needs    Financial resource strain: Not on file    Food insecurity     Worry: Not on file     Inability: Not on file    Transportation needs     Medical: Not on file     Non-medical: Not on file   Tobacco Use    Smoking status: Never Smoker    Smokeless tobacco: Never Used   Substance and Sexual Activity    Alcohol use: Yes     Comment: occasionally    Drug use: No    Sexual activity: Not Currently   Lifestyle    Physical activity     Days per week: Not on file     Minutes per session: Not on file    Stress: Not on file   Relationships    Social connections     Talks on phone: Not on file     Gets together: Not on file     Attends Synagogue service: Not on file     Active member of club or organization: Not on file     Attends meetings of clubs or organizations: Not on file     Relationship status: Not on file    Intimate partner violence     Fear of current or ex partner: Not on file     Emotionally abused: Not on file     Physically abused: Not on file     Forced sexual activity: Not on file   Other Topics Concern    Not on file   Social History Narrative    Not on file       Current Outpatient Medications   Medication Sig Dispense Refill    isosorbide mononitrate (IMDUR) 30 MG extended release tablet TAKE 1 TABLET EVERY DAY 90 tablet 2    alendronate (FOSAMAX) 70 MG tablet TAKE 1 TABLET BY MOUTH EVERY 7 DAYS 12 tablet 0    atorvastatin (LIPITOR) 40 MG tablet TAKE 1 TABLET AT BEDTIME 90 tablet 3    Cholecalciferol (VITAMIN D3) 50 MCG (2000 UT) CAPS Take 1 capsule by mouth daily 90 capsule 3

## 2021-02-09 NOTE — TELEPHONE ENCOUNTER
Spoke to pt daughter Giacomo Brunner and notified cardiac & medical clearance had been received. Notified medical clearance from [address] [facility] stated for pt to take Lopressor dose the am of surgery with a small sip of water. Notified per Becca Kurtz pt is to hold Aspirin 5 days before surgery. Pt daughter stated pt is due for 2nd Covid vaccine on 2/25/21. Notified per Becca Kurtz that pt could have vaccine as long as she is discharged from hospital prior to appt. Notified to call with any questions. She voiced understanding.

## 2021-02-12 RX ORDER — ALENDRONATE SODIUM 70 MG/1
70 TABLET ORAL
Qty: 12 TABLET | Refills: 0 | Status: SHIPPED | OUTPATIENT
Start: 2021-02-12 | End: 2021-04-12

## 2021-02-18 ENCOUNTER — HOSPITAL ENCOUNTER (OUTPATIENT)
Dept: PREADMISSION TESTING | Age: 81
Setting detail: SPECIMEN
Discharge: HOME | End: 2021-02-22
Payer: MEDICARE

## 2021-02-18 DIAGNOSIS — Z11.59 ENCOUNTER FOR SCREENING FOR OTHER VIRAL DISEASES: Primary | ICD-10-CM

## 2021-02-18 PROCEDURE — U0005 INFEC AGEN DETEC AMPLI PROBE: HCPCS

## 2021-02-18 PROCEDURE — U0003 INFECTIOUS AGENT DETECTION BY NUCLEIC ACID (DNA OR RNA); SEVERE ACUTE RESPIRATORY SYNDROME CORONAVIRUS 2 (SARS-COV-2) (CORONAVIRUS DISEASE [COVID-19]), AMPLIFIED PROBE TECHNIQUE, MAKING USE OF HIGH THROUGHPUT TECHNOLOGIES AS DESCRIBED BY CMS-2020-01-R: HCPCS

## 2021-02-18 RX ORDER — IBUPROFEN 600 MG/1
600 TABLET ORAL EVERY 8 HOURS PRN
Status: ON HOLD | COMMUNITY
End: 2021-02-23 | Stop reason: SDUPTHER

## 2021-02-19 LAB
SARS-COV-2: NORMAL
SARS-COV-2: NOT DETECTED
SOURCE: NORMAL

## 2021-02-20 ENCOUNTER — TELEPHONE (OUTPATIENT)
Dept: PRIMARY CARE CLINIC | Age: 81
End: 2021-02-20

## 2021-02-22 NOTE — DISCHARGE SUMMARY
Gyn Discharge Summary  [facility]      Patient Name: Fransisca Proctor  Patient : 1940  Primary Care Physician: Carole Gustafson MD  Admit Date: 2021    Principal Diagnosis: Endometrial Cancer    Other Diagnosis:   Post-operative state [Z98.890]  Patient Active Problem List   Diagnosis    Coronary artery disease involving native coronary artery of native heart without angina pectoris    Hyperlipidemia with target LDL less than 70    Chronic diastolic heart failure (HCC)    Anemia    GI bleed    Shingles    Esophageal ulcer    Gastric ulcer    Gastric ulcer with hemorrhage    Coronary artery disease    Diastolic dysfunction    Hyperlipidemia    Obesity    Pneumonia due to COVID-19 virus    Postmenopausal bleeding    Endometrial polyp    Post-menopausal bleeding    Endometrial cancer (HCC)    Post-operative state    Post-operative pain       Infection: No  Hospital Acquired: No    Surgical Operations & Procedures: Robotic laparoscopic modified radical hysterectomy, bilateral salpingo-oohporectomy, peritoneal washings for cytology, sentinel lymph node mapping, sentinel lymph node biopsies    Consultations: none    Pertinent Findings & Procedures:   Fransisca Proctor is a [de-identified] y.o. female ., admitted for surgical management of endometrial cancer; received Ancef and Lovenox pre operatively. She underwent robotic laparoscopic modified radical hysterectomy, bilateral salpingo-oohporectomy, peritoneal washings for cytology, sentinel lymph node mapping, sentinel lymph node biopsies on 21. Post-op course normal, discharged home 21. Follow up in 1-2 weeks. Discharge instructions reviewed and questions answered.     Course of patient: normal    Discharge to: Home    Readmission planned: No    Recommendations on Discharge:     Medications:     Medication List      START taking these medications    docusate sodium 100 MG capsule  Commonly known as: Colace  Take 1 capsule by mouth 2 times daily as needed for Constipation     enoxaparin 60 MG/0.6ML injection  Commonly known as: Lovenox  Inject 0.4 mLs into the skin daily     HYDROcodone-acetaminophen 5-325 MG per tablet  Commonly known as: Norco  Take 1 tablet by mouth every 4 hours as needed for Pain for up to 3 days. Intended supply: 3 days.  Take lowest dose possible to manage pain     ondansetron 4 MG disintegrating tablet  Commonly known as: Zofran ODT  Take 1 tablet by mouth every 8 hours as needed for Nausea     simethicone 80 MG chewable tablet  Commonly known as: MYLICON  Take 1 tablet by mouth 4 times daily as needed for Flatulence        CHANGE how you take these medications    alendronate 70 MG tablet  Commonly known as: FOSAMAX  TAKE 1 TABLET BY MOUTH EVERY 7 DAYS  What changed: additional instructions     ibuprofen 400 MG tablet  Commonly known as: ADVIL;MOTRIN  Take 1 tablet by mouth every 6 hours as needed for Pain  What changed:   · medication strength  · how much to take  · when to take this        CONTINUE taking these medications    ascorbic acid 500 MG tablet  Commonly known as: VITAMIN C     aspirin 81 MG tablet     atorvastatin 40 MG tablet  Commonly known as: LIPITOR  TAKE 1 TABLET AT BEDTIME     ferrous sulfate 325 (65 Fe) MG EC tablet  Commonly known as: FE TABS 325  TAKE 1 TABLET BY MOUTH DAILY (WITH BREAKFAST)     isosorbide mononitrate 30 MG extended release tablet  Commonly known as: IMDUR  TAKE 1 TABLET EVERY DAY     lisinopril 5 MG tablet  Commonly known as: PRINIVIL;ZESTRIL  TAKE 1 TABLET EVERY DAY     metoprolol tartrate 25 MG tablet  Commonly known as: LOPRESSOR  TAKE 1 TABLET TWICE DAILY     MULTIVITAMIN PO     nitroGLYCERIN 0.4 MG SL tablet  Commonly known as: NITROSTAT  Place 1 tablet under the tongue every 5 minutes as needed for Chest pain     Vitamin D3 50 MCG (2000 UT) Caps  Take 1 capsule by mouth daily           Where to Get Your Medications      You can get these medications from any pharmacy Bring a paper prescription for each of these medications  · docusate sodium 100 MG capsule  · enoxaparin 60 MG/0.6ML injection  · HYDROcodone-acetaminophen 5-325 MG per tablet  · ibuprofen 400 MG tablet  · ondansetron 4 MG disintegrating tablet  · simethicone 80 MG chewable tablet           Activity: pelvic rest x 6 weeks, no driving on narcotics, no lifting greater than 10 lbs  Diet: regular diet  Follow up: 1-2 weeks     Condition on discharge: good and stable   Discharge Date: 2/24/21    Comments:  Home care, Follow-up care, restrictions reviewed. Sheila Gomez DO  Ob/Gyn Resident  [facility]  2/24/2021, 8:47 AM         Attending Physician Statement  I have discussed the care of Brit Mcgregor, including pertinent history and exam findings,  with the resident. I have seen and examined the patient and the key elements of all parts of the encounter have been performed by me. I agree with the assessment, plan and orders as documented by the resident.      Dotty Cogan, MD

## 2021-02-22 NOTE — H&P
OB/GYN Pre-Op H&P  [facility]    Patient Name: Nasir Charles     Patient : 1940  Room/Bed: [place] /NONE  Admission Date/Time: 2021  5:48 AM  Primary Care Physician: Crissy Baker MD  MRN: [id number]    Date: 2021  Time: 7:07 AM    The patient was seen in pre-op holding. She is here for surgical management of endometrial cancer with robotic laparoscopic modified radical hysterectomy, bilateral salpingo-oohporectomy, peritoneal washings for cytology, sentinel lymph node mapping, sentinel lymph node biopsies, possible laparotomy. HPI previously obtained by Dr. Antonia Barajas on 2/3/2021 and reads as follows: Aaron Rosenthal is a [de-identified] y.o.  3, para 3 (all vaginal deliveries) female who is being referred for endometrial adenocarcinoma, grade 2. The patient states that she began having vaginal spotting in mid December. She mentioned it to her PCP who referred her to Dr. Nata Carlson. He recommended a D&C and hysteroscopy. The surgery was performed on 2021. He noted a large endometrial polyp at the time of the surgery as well.     Pathology revealed a moderately differentiated endometrioid adenocarcinoma.     He has referred her to [address] [place] [address] gynecologic oncology for further work-up and a discussion of management options.     Patient's daughter states that her mother has had some memory difficulties since recovering from Covid several months ago.     Cardiac history is significant in that she had 3 coronary artery stents placed in . She currently takes only a baby aspirin. She has seen Dr. Florinda Ortiz, cardiologist.\"    The procedure risks and complications were reviewed. The labs, Consent, and H&P were reviewed and updated. The patient was counseled on the possibility of  the need of a second surgery. The patient voiced understanding and had all of her questions answered. The possibility of incomplete removal of abnormal tissue was discussed.     OBSTETRICAL HISTORY:   OB History No obstetric history on file. PAST MEDICAL HISTORY:   has a past medical history of Abnormal uterine bleeding (AUB), Anxiety, Asteroid hyalosis of right eye, Bruises easily, Coronary artery disease, FTCDW-64, Diastolic dysfunction, Gastric ulcer with hemorrhage, Hyperlipidemia, Hypertension, Lives alone, Memory loss, Mild mitral regurgitation, Mild tricuspid regurgitation, Obesity, Patient in clinical research study, Poor intravenous access, and Vitreous floaters. PAST SURGICAL HISTORY:   has a past surgical history that includes Cholecystectomy (1978); Cardiac catheterization (Left, 08/23/2012); Endoscopy, colon, diagnostic (03/05/2015); Upper gastrointestinal endoscopy (4/16/2015); and Dilation and curettage of uterus (N/A, 1/20/2021). ALLERGIES:  Allergies as of 02/04/2021 - Review Complete 02/03/2021   Allergen Reaction Noted    Codeine Nausea Only 01/06/2015       MEDICATIONS:  Current Facility-Administered Medications   Medication Dose Route Frequency Provider Last Rate Last Admin    0.9 % sodium chloride infusion   Intravenous Continuous Phyllis Carpio PA-C        ceFAZolin (ANCEF) 2000 mg in dextrose 5 % 50 mL IVPB  2,000 mg Intravenous [facility]        enoxaparin (LOVENOX) injection 40 mg  40 mg Subcutaneous Once AetBARBARA bermeo-MONAE        sodium chloride flush 0.9 % injection 10 mL  10 mL Intravenous 2 times per day Phyllis Carpio PA-C        sodium chloride flush 0.9 % injection 10 mL  10 mL Intravenous PRN Phyllis Carpio PA-C           FAMILY HISTORY:  family history includes Cancer in her sister; Heart Attack in her father; Prostate Cancer in her brother. SOCIAL HISTORY:   reports that she has never smoked. She has never used smokeless tobacco. She reports current alcohol use. She reports that she does not use drugs.     VITALS:  Vitals:    02/18/21 1129 02/23/21 0651   BP:  131/60   Pulse:  (!) 49   Resp:  16   Temp:  97.3 °F (36.3 °C)   TempSrc:  Temporal that meet requirements to perform high or moderate complexity tests. An individual without symptoms of COVID-19 and who is not shedding SARS-CoV-2 virus would   expect to have a negative (not detected) result in this assay.   Fact sheet for Healthcare Providers: Tao.es  Fact sheet for Patients: https://www.                           fda.gov/media/267261/download        METHODOLOGY: RT-PCR     Hospital Outpatient Visit on 02/09/2021   Component Date Value Ref Range Status    Ventricular Rate 02/09/2021 51  BPM Final    Atrial Rate 02/09/2021 51  BPM Final    P-R Interval 02/09/2021 168  ms Final    QRS Duration 02/09/2021 80  ms Final    Q-T Interval 02/09/2021 410  ms Final    QTc Calculation (Bazett) 02/09/2021 377  ms Final    P Axis 02/09/2021 65  degrees Final    R Axis 02/09/2021 54  degrees Final    T Axis 02/09/2021 46  degrees Final   Hospital Outpatient Visit on 02/09/2021   Component Date Value Ref Range Status    Color, UA 02/09/2021 NOT REPORTED  YELLOW Final    Turbidity UA 02/09/2021 NOT REPORTED  CLEAR Final    Glucose, Ur 02/09/2021 NEGATIVE  NEGATIVE Final    Bilirubin Urine 02/09/2021 NEGATIVE  NEGATIVE Final    Ketones, Urine 02/09/2021 NEGATIVE  NEGATIVE Final    Specific Gravity, UA 02/09/2021 1.030* 1.010 - 1.025 Final    Urine Hgb 02/09/2021 TRACE* NEGATIVE Final    pH, UA 02/09/2021 5.5  5.0 - 6.0 Final    Protein, UA 02/09/2021 NEGATIVE  NEGATIVE Final    Urobilinogen, Urine 02/09/2021 Normal  Normal Final    Nitrite, Urine 02/09/2021 NEGATIVE  NEGATIVE Final    Leukocyte Esterase, Urine 02/09/2021 NEGATIVE  NEGATIVE Final    Urinalysis Comments 02/09/2021 NOT REPORTED   Final    - 02/09/2021        Final    WBC, UA 02/09/2021 0 TO 4  0 - 4 /HPF Final    RBC, UA 02/09/2021 0 TO 4  0 - 4 /HPF Final    Casts UA 02/09/2021 0 TO 4  0 - 2 /LPF Final    Casts UA 02/09/2021 HYALINE  0 - 2 /LPF Final    Crystals, UA 02/09/2021 NOT REPORTED  None /HPF Final    Epithelial Cells UA 02/09/2021 0 TO 4  0 - 5 /HPF Final    Renal Epithelial, UA 02/09/2021 NOT REPORTED  0 /HPF Final    Bacteria, UA 02/09/2021 None  None Final    Mucus, UA 02/09/2021 NOT REPORTED  None Final    Trichomonas, UA 02/09/2021 NOT REPORTED  None Final    Amorphous, UA 02/09/2021 NOT REPORTED  None Final    Other Observations UA 02/09/2021 NOT REPORTED  NOT REQ. Final    Yeast, UA 02/09/2021 NOT REPORTED  None Final   Hospital Outpatient Visit on 02/09/2021   Component Date Value Ref Range Status     02/09/2021 12  <38 U/mL Final    Glucose 02/09/2021 115* 70 - 99 mg/dL Final    BUN 02/09/2021 13  8 - 23 mg/dL Final    CREATININE 02/09/2021 0.73  0.50 - 0.90 mg/dL Final    Bun/Cre Ratio 02/09/2021 18  9 - 20 Final    Calcium 02/09/2021 9.7  8.6 - 10.4 mg/dL Final    Sodium 02/09/2021 144  135 - 144 mmol/L Final    Potassium 02/09/2021 4.2  3.7 - 5.3 mmol/L Final    Chloride 02/09/2021 107  98 - 107 mmol/L Final    CO2 02/09/2021 28  20 - 31 mmol/L Final    Anion Gap 02/09/2021 9  9 - 17 mmol/L Final    Alkaline Phosphatase 02/09/2021 65  35 - 104 U/L Final    ALT 02/09/2021 16  5 - 33 U/L Final    AST 02/09/2021 22  <32 U/L Final    Total Bilirubin 02/09/2021 0.73  0.3 - 1.2 mg/dL Final    Total Protein 02/09/2021 7.6  6.4 - 8.3 g/dL Final    Albumin 02/09/2021 4.7  3.5 - 5.2 g/dL Final    Albumin/Globulin Ratio 02/09/2021 1.6  1.0 - 2.5 Final    GFR Non- 02/09/2021 >60  >60 mL/min Final    GFR  02/09/2021 >60  >60 mL/min Final    GFR Comment 02/09/2021        Final    Comment: Average GFR for 79or more years old:   76 mL/min/1.73sq m  Chronic Kidney Disease:   <60 mL/min/1.73sq m  Kidney failure:   <15 mL/min/1.73sq m        The equation has not been validated in patients older than 79, but an MDRD-derived eGFR may   still be a useful tool for providers caring for patients older than 70. GFR is a calculated value that has proven clinically to  be a more effective measure of   kidney function when reported with serum creatinine.  GFR Staging 02/09/2021 NOT REPORTED   Final    WBC 02/09/2021 8.9  3.5 - 11.3 k/uL Final    RBC 02/09/2021 4.44  3.95 - 5.11 m/uL Final    Hemoglobin 02/09/2021 14.1  11.9 - 15.1 g/dL Final    Hematocrit 02/09/2021 44.2  36.3 - 47.1 % Final    MCV 02/09/2021 99.5  82.6 - 102.9 fL Final    MCH 02/09/2021 31.8  25.2 - 33.5 pg Final    MCHC 02/09/2021 31.9  25.2 - 33.5 g/dL Final    RDW 02/09/2021 12.9  11.8 - 14.4 % Final    Platelets 23/55/1305 248  138 - 453 k/uL Final    MPV 02/09/2021 9.3  8.1 - 13.5 fL Final    NRBC Automated 02/09/2021 0.0  0.0 per 100 WBC Final    Differential Type 02/09/2021 NOT REPORTED   Final    Seg Neutrophils 02/09/2021 58  36 - 65 % Final    Lymphocytes 02/09/2021 31  24 - 43 % Final    Monocytes 02/09/2021 9  3 - 12 % Final    Eosinophils % 02/09/2021 1  1 - 4 % Final    Basophils 02/09/2021 1  0 - 2 % Final    Immature Granulocytes 02/09/2021 0  0 % Final    Segs Absolute 02/09/2021 5.14  1.50 - 8.10 k/uL Final    Absolute Lymph # 02/09/2021 2.75  1.10 - 3.70 k/uL Final    Absolute Mono # 02/09/2021 0.76  0.10 - 1.20 k/uL Final    Absolute Eos # 02/09/2021 0.11  0.00 - 0.44 k/uL Final    Basophils Absolute 02/09/2021 0.08  0.00 - 0.20 k/uL Final    Absolute Immature Granulocyte 02/09/2021 <0.03  0.00 - 0.30 k/uL Final    WBC Morphology 02/09/2021 NOT REPORTED   Final    RBC Morphology 02/09/2021 NOT REPORTED   Final    Platelet Estimate 27/98/1234 NOT REPORTED   Final       DIAGNOSTICS:  Xr Chest (2 Vw)    Result Date: 2/9/2021  EXAMINATION: TWO XRAY VIEWS OF THE CHEST 2/9/2021 11:01 am COMPARISON: Chest July 13, 2020. HISTORY: ORDERING SYSTEM PROVIDED HISTORY: Pre-op chest exam TECHNOLOGIST PROVIDED HISTORY: pre op Reason for Exam: Pre-op chest, no current chest complaints.  Acuity: Chronic Type of Exam: Initial FINDINGS: Heart appears normal in size. There is been interval resolution of the patient's bilateral airspace disease with residual scarring noted. No new focal consolidation, pneumothorax, pleural effusion or free air. Calcified granuloma left upper lobe. Osseous structures demonstrate degenerative change. No acute process. EXAMINATION:   TRANSABDOMINAL AND TRANSVAGINAL PELVIC ULTRASOUND       1/12/2021       TECHNIQUE:   Transabdominal and transvaginal pelvic ultrasound was performed.       COMPARISON:   None       HISTORY:   ORDERING SYSTEM PROVIDED HISTORY: Postmenopausal bleeding   TECHNOLOGIST PROVIDED HISTORY:   Reason for Exam: post menopausal bleeding   Acuity: Acute   Type of Exam: Initial       FINDINGS:       Measurements:       Uterus:  6.8 x 3.8 x 3.2 cm       Endometrial stripe:  25 mm       Right Ovary:  1.7 x 1.2 x 1.1 cm       Left Ovary:  2.5 x 1.7 x 1.7 cm           Ultrasound Findings:       Uterus: Uterus demonstrates normal myometrial echotexture.       Endometrial stripe: Abnormal thickening with possible polyp measuring 1.9 x   1.9 x 1.6 cm.       Right Ovary: Right ovary is within normal limits.       Left Ovary:  Left ovary is within normal limits.       Free Fluid: No evidence of free fluid.           Impression   Abnormal endometrial stripe thickening with possible polyp measuring 1.9 x   1.9 x 1.6 cm.  Endometrial sampling is suggested. SURGICAL PATHOLOGY CONSULTATION       Patient Name: Gina Puga [facility] Rec: [id number]   Path Number: [id number]   Collected: 1/20/2021   Received: 1/20/2021   Reported: 1/22/2021 12:12     -- Diagnosis --   ENDOMETRIUM, CURETTINGS:        -  ENDOMETRIOID CARCINOMA, FIGO GRADE 2 OF 3.        -  SEE COMMENT. -- Diagnosis Comment --   RESULTS OF MISMATCH REPAIR TESTING BY IMMUNOHISTOCHEMISTRY WILL BE   REPORTED AS AN ADDENDUM. Otilio Cabrera M.D.   **Electronically Signed Out**         jet/1/21/2021   Procedures/Addenda ADDENDUM AFTER SPECIAL STAINS     Date Ordered:     2/1/2021       Status: Signed Out        Date Complete:     2/1/2021     By: Rachael Jamison. Salina Irizarry M.D.        Date Reported:     2/1/2021       INTERPRETATION   A PARAFFIN BLOCK DR83-901-1S OF INVASIVE TUMOR WAS SENT TO [facility] FOR MISMATCH REPAIR BY IMMUNOHISTOCHEMISTRY WITH REFLEX   TO MLH1 PROMOTER METHYLATION.  THE RESULTS ARE AS FOLLOWS:     MISMATCH REPAIR BY IHC, RESULT:  NORMAL     GENETIC COUNSELING IS RECOMMENDED FOR THE INTERPRETATION OF ALL   RESULTS. \"     MISMATCH REPAIR BY IHC WITH MLH1:     NORMAL   MISMATCH REPAIR BY IHC WITH MSH2:     NORMAL   MISMATCH REPAIR BY IHC WITH MSH6:     NORMAL   MISMATCH REPAIR BY IHC WITH PMS2:     NORMAL     FOR COMPLETE RESULTS, PLEASE REFER TO THE COMPLETE [facility] REPORT, WHICH   HAS BEEN SCANNED INTO THE ELECTRONIC MEDICAL RECORD.            Alan Irizarry M.D.             Clinical Information   Pre-op Diagnosis:  POSTMENOPAUSAL BLEEDING     Operative Findings:  ENDOMETRIAL CURETTINGS AND ENDOMETRIAL POLYP   Operation Performed:  D&C HYSTEROSCOPY, POLYPECTOMY     Source of Specimen   1: ENDOMETRIAL CURETTINGS AND ENDOMETRIAL POLYP     Gross Description   \"NEHA GLOVER, ENDOMETRIAL CURETTINGS AND ENDOMETRIAL POLYP\" Pink-red   fragments, 4.0 x 3.0 x 0.8 cm in aggregate.  Entirely 2cs.  tm       Microscopic Description   Sections of endometrial glands and stroma show complex branching   glands with crowding and some areas of gland fusion with back-to-back   glands without intervening stroma.  Some areas show increased   cytologic atypia with hobnail type cells.  A p53 immunostain shows   weak patchy staining, consistent with wild-type p53 expression.  The   tumor is strongly (3+, over 90%) positive for estrogen receptor   immunostain.  Controls stain appropriately.  The current specimen   consists entirely of tumor tissue.  The patient's previous gastric   biopsy from 2015 ([id number] #1) will be used as a \"normal\"

## 2021-02-23 ENCOUNTER — ANESTHESIA EVENT (OUTPATIENT)
Dept: OPERATING ROOM | Age: 81
DRG: 741 | End: 2021-02-23
Payer: MEDICARE

## 2021-02-23 ENCOUNTER — ANESTHESIA (OUTPATIENT)
Dept: OPERATING ROOM | Age: 81
DRG: 741 | End: 2021-02-23
Payer: MEDICARE

## 2021-02-23 ENCOUNTER — HOSPITAL ENCOUNTER (INPATIENT)
Age: 81
LOS: 1 days | Discharge: HOME | DRG: 741 | End: 2021-02-24
Attending: OBSTETRICS & GYNECOLOGY | Admitting: OBSTETRICS & GYNECOLOGY
Payer: MEDICARE

## 2021-02-23 VITALS — SYSTOLIC BLOOD PRESSURE: 118 MMHG | DIASTOLIC BLOOD PRESSURE: 68 MMHG | OXYGEN SATURATION: 100 % | TEMPERATURE: 94 F

## 2021-02-23 DIAGNOSIS — G89.18 POST-OPERATIVE PAIN: Primary | ICD-10-CM

## 2021-02-23 PROBLEM — Z98.890 POST-OPERATIVE STATE: Status: ACTIVE | Noted: 2021-02-23

## 2021-02-23 LAB
ABO/RH: NORMAL
ANTIBODY SCREEN: NEGATIVE
ARM BAND NUMBER: NORMAL
CASE NUMBER:: NORMAL
EXPIRATION DATE: NORMAL
SPECIMEN DESCRIPTION: NORMAL

## 2021-02-23 PROCEDURE — 6370000000 HC RX 637 (ALT 250 FOR IP): Performed by: PHYSICIAN ASSISTANT

## 2021-02-23 PROCEDURE — 3600000009 HC SURGERY ROBOT BASE: Performed by: OBSTETRICS & GYNECOLOGY

## 2021-02-23 PROCEDURE — 88342 IMHCHEM/IMCYTCHM 1ST ANTB: CPT

## 2021-02-23 PROCEDURE — 6360000002 HC RX W HCPCS: Performed by: NURSE ANESTHETIST, CERTIFIED REGISTERED

## 2021-02-23 PROCEDURE — S2900 ROBOTIC SURGICAL SYSTEM: HCPCS | Performed by: OBSTETRICS & GYNECOLOGY

## 2021-02-23 PROCEDURE — 88307 TISSUE EXAM BY PATHOLOGIST: CPT

## 2021-02-23 PROCEDURE — 6360000002 HC RX W HCPCS: Performed by: PHYSICIAN ASSISTANT

## 2021-02-23 PROCEDURE — 2580000003 HC RX 258: Performed by: OBSTETRICS & GYNECOLOGY

## 2021-02-23 PROCEDURE — 0UT7FZZ RESECTION OF BILATERAL FALLOPIAN TUBES, VIA NATURAL OR ARTIFICIAL OPENING WITH PERCUTANEOUS ENDOSCOPIC ASSISTANCE: ICD-10-PCS | Performed by: OBSTETRICS & GYNECOLOGY

## 2021-02-23 PROCEDURE — 3700000001 HC ADD 15 MINUTES (ANESTHESIA): Performed by: OBSTETRICS & GYNECOLOGY

## 2021-02-23 PROCEDURE — 7100000000 HC PACU RECOVERY - FIRST 15 MIN: Performed by: OBSTETRICS & GYNECOLOGY

## 2021-02-23 PROCEDURE — 07BC4ZZ EXCISION OF PELVIS LYMPHATIC, PERCUTANEOUS ENDOSCOPIC APPROACH: ICD-10-PCS | Performed by: OBSTETRICS & GYNECOLOGY

## 2021-02-23 PROCEDURE — 2580000003 HC RX 258: Performed by: NURSE ANESTHETIST, CERTIFIED REGISTERED

## 2021-02-23 PROCEDURE — 88309 TISSUE EXAM BY PATHOLOGIST: CPT

## 2021-02-23 PROCEDURE — 0UT2FZZ RESECTION OF BILATERAL OVARIES, VIA NATURAL OR ARTIFICIAL OPENING WITH PERCUTANEOUS ENDOSCOPIC ASSISTANCE: ICD-10-PCS | Performed by: OBSTETRICS & GYNECOLOGY

## 2021-02-23 PROCEDURE — 88112 CYTOPATH CELL ENHANCE TECH: CPT

## 2021-02-23 PROCEDURE — 3700000000 HC ANESTHESIA ATTENDED CARE: Performed by: OBSTETRICS & GYNECOLOGY

## 2021-02-23 PROCEDURE — 0UT9FZZ RESECTION OF UTERUS, VIA NATURAL OR ARTIFICIAL OPENING WITH PERCUTANEOUS ENDOSCOPIC ASSISTANCE: ICD-10-PCS | Performed by: OBSTETRICS & GYNECOLOGY

## 2021-02-23 PROCEDURE — 6370000000 HC RX 637 (ALT 250 FOR IP): Performed by: STUDENT IN AN ORGANIZED HEALTH CARE EDUCATION/TRAINING PROGRAM

## 2021-02-23 PROCEDURE — 86900 BLOOD TYPING SEROLOGIC ABO: CPT

## 2021-02-23 PROCEDURE — 88305 TISSUE EXAM BY PATHOLOGIST: CPT

## 2021-02-23 PROCEDURE — 88304 TISSUE EXAM BY PATHOLOGIST: CPT

## 2021-02-23 PROCEDURE — 0DNW4ZZ RELEASE PERITONEUM, PERCUTANEOUS ENDOSCOPIC APPROACH: ICD-10-PCS | Performed by: OBSTETRICS & GYNECOLOGY

## 2021-02-23 PROCEDURE — 2500000003 HC RX 250 WO HCPCS: Performed by: STUDENT IN AN ORGANIZED HEALTH CARE EDUCATION/TRAINING PROGRAM

## 2021-02-23 PROCEDURE — 2500000003 HC RX 250 WO HCPCS: Performed by: OBSTETRICS & GYNECOLOGY

## 2021-02-23 PROCEDURE — 6360000002 HC RX W HCPCS: Performed by: STUDENT IN AN ORGANIZED HEALTH CARE EDUCATION/TRAINING PROGRAM

## 2021-02-23 PROCEDURE — 86850 RBC ANTIBODY SCREEN: CPT

## 2021-02-23 PROCEDURE — 86901 BLOOD TYPING SEROLOGIC RH(D): CPT

## 2021-02-23 PROCEDURE — 8E0W4CZ ROBOTIC ASSISTED PROCEDURE OF TRUNK REGION, PERCUTANEOUS ENDOSCOPIC APPROACH: ICD-10-PCS | Performed by: OBSTETRICS & GYNECOLOGY

## 2021-02-23 PROCEDURE — 3E1M38X IRRIGATION OF PERITONEAL CAVITY USING IRRIGATING SUBSTANCE, PERCUTANEOUS APPROACH, DIAGNOSTIC: ICD-10-PCS | Performed by: OBSTETRICS & GYNECOLOGY

## 2021-02-23 PROCEDURE — 6370000000 HC RX 637 (ALT 250 FOR IP): Performed by: NURSE ANESTHETIST, CERTIFIED REGISTERED

## 2021-02-23 PROCEDURE — 2500000003 HC RX 250 WO HCPCS: Performed by: NURSE ANESTHETIST, CERTIFIED REGISTERED

## 2021-02-23 PROCEDURE — 2720000010 HC SURG SUPPLY STERILE: Performed by: OBSTETRICS & GYNECOLOGY

## 2021-02-23 PROCEDURE — 3600000019 HC SURGERY ROBOT ADDTL 15MIN: Performed by: OBSTETRICS & GYNECOLOGY

## 2021-02-23 PROCEDURE — 1200000000 HC SEMI PRIVATE

## 2021-02-23 PROCEDURE — 0DNN4ZZ RELEASE SIGMOID COLON, PERCUTANEOUS ENDOSCOPIC APPROACH: ICD-10-PCS | Performed by: OBSTETRICS & GYNECOLOGY

## 2021-02-23 PROCEDURE — 87086 URINE CULTURE/COLONY COUNT: CPT

## 2021-02-23 PROCEDURE — 7100000001 HC PACU RECOVERY - ADDTL 15 MIN: Performed by: OBSTETRICS & GYNECOLOGY

## 2021-02-23 PROCEDURE — 58548 LAP RADICAL HYST: CPT | Performed by: OBSTETRICS & GYNECOLOGY

## 2021-02-23 PROCEDURE — 2709999900 HC NON-CHARGEABLE SUPPLY: Performed by: OBSTETRICS & GYNECOLOGY

## 2021-02-23 PROCEDURE — C1760 CLOSURE DEV, VASC: HCPCS | Performed by: OBSTETRICS & GYNECOLOGY

## 2021-02-23 RX ORDER — HYDROCODONE BITARTRATE AND ACETAMINOPHEN 5; 325 MG/1; MG/1
1 TABLET ORAL EVERY 4 HOURS PRN
Status: DISCONTINUED | OUTPATIENT
Start: 2021-02-23 | End: 2021-02-24 | Stop reason: HOSPADM

## 2021-02-23 RX ORDER — SIMETHICONE 80 MG
80 TABLET,CHEWABLE ORAL 4 TIMES DAILY PRN
Qty: 60 TABLET | Refills: 3 | Status: SHIPPED | OUTPATIENT
Start: 2021-02-23 | End: 2021-04-16 | Stop reason: ALTCHOICE

## 2021-02-23 RX ORDER — HYDROCODONE BITARTRATE AND ACETAMINOPHEN 5; 325 MG/1; MG/1
2 TABLET ORAL EVERY 4 HOURS PRN
Status: DISCONTINUED | OUTPATIENT
Start: 2021-02-23 | End: 2021-02-24 | Stop reason: HOSPADM

## 2021-02-23 RX ORDER — MORPHINE SULFATE 4 MG/ML
4 INJECTION, SOLUTION INTRAMUSCULAR; INTRAVENOUS
Status: DISCONTINUED | OUTPATIENT
Start: 2021-02-23 | End: 2021-02-24

## 2021-02-23 RX ORDER — LIDOCAINE HYDROCHLORIDE 20 MG/ML
JELLY TOPICAL PRN
Status: DISCONTINUED | OUTPATIENT
Start: 2021-02-23 | End: 2021-02-23 | Stop reason: SDUPTHER

## 2021-02-23 RX ORDER — PROPOFOL 10 MG/ML
INJECTION, EMULSION INTRAVENOUS PRN
Status: DISCONTINUED | OUTPATIENT
Start: 2021-02-23 | End: 2021-02-23 | Stop reason: SDUPTHER

## 2021-02-23 RX ORDER — IBUPROFEN 400 MG/1
400 TABLET ORAL EVERY 6 HOURS PRN
Qty: 40 TABLET | Refills: 1 | Status: SHIPPED | OUTPATIENT
Start: 2021-02-23

## 2021-02-23 RX ORDER — ROCURONIUM BROMIDE 10 MG/ML
INJECTION, SOLUTION INTRAVENOUS PRN
Status: DISCONTINUED | OUTPATIENT
Start: 2021-02-23 | End: 2021-02-23 | Stop reason: SDUPTHER

## 2021-02-23 RX ORDER — SODIUM CHLORIDE 9 MG/ML
INJECTION, SOLUTION INTRAVENOUS CONTINUOUS
Status: DISCONTINUED | OUTPATIENT
Start: 2021-02-23 | End: 2021-02-23

## 2021-02-23 RX ORDER — FENTANYL CITRATE 50 UG/ML
INJECTION, SOLUTION INTRAMUSCULAR; INTRAVENOUS PRN
Status: DISCONTINUED | OUTPATIENT
Start: 2021-02-23 | End: 2021-02-23 | Stop reason: SDUPTHER

## 2021-02-23 RX ORDER — ATROPINE SULFATE 0.1 MG/ML
INJECTION INTRAVENOUS PRN
Status: DISCONTINUED | OUTPATIENT
Start: 2021-02-23 | End: 2021-02-23 | Stop reason: SDUPTHER

## 2021-02-23 RX ORDER — ONDANSETRON 2 MG/ML
INJECTION INTRAMUSCULAR; INTRAVENOUS PRN
Status: DISCONTINUED | OUTPATIENT
Start: 2021-02-23 | End: 2021-02-23 | Stop reason: SDUPTHER

## 2021-02-23 RX ORDER — MAGNESIUM HYDROXIDE 1200 MG/15ML
LIQUID ORAL PRN
Status: DISCONTINUED | OUTPATIENT
Start: 2021-02-23 | End: 2021-02-23 | Stop reason: HOSPADM

## 2021-02-23 RX ORDER — GLYCOPYRROLATE 1 MG/5 ML
SYRINGE (ML) INTRAVENOUS PRN
Status: DISCONTINUED | OUTPATIENT
Start: 2021-02-23 | End: 2021-02-23

## 2021-02-23 RX ORDER — ACETAMINOPHEN 325 MG/1
650 TABLET ORAL EVERY 4 HOURS PRN
Status: DISCONTINUED | OUTPATIENT
Start: 2021-02-23 | End: 2021-02-24 | Stop reason: HOSPADM

## 2021-02-23 RX ORDER — KETOROLAC TROMETHAMINE 15 MG/ML
15 INJECTION, SOLUTION INTRAMUSCULAR; INTRAVENOUS EVERY 6 HOURS
Status: DISCONTINUED | OUTPATIENT
Start: 2021-02-23 | End: 2021-02-24

## 2021-02-23 RX ORDER — DEXTROSE, SODIUM CHLORIDE, AND POTASSIUM CHLORIDE 5; .45; .15 G/100ML; G/100ML; G/100ML
INJECTION INTRAVENOUS CONTINUOUS
Status: DISCONTINUED | OUTPATIENT
Start: 2021-02-23 | End: 2021-02-24

## 2021-02-23 RX ORDER — HYDROCODONE BITARTRATE AND ACETAMINOPHEN 5; 325 MG/1; MG/1
1 TABLET ORAL EVERY 4 HOURS PRN
Qty: 18 TABLET | Refills: 0 | Status: SHIPPED | OUTPATIENT
Start: 2021-02-23 | End: 2021-02-26

## 2021-02-23 RX ORDER — ISOSORBIDE MONONITRATE 30 MG/1
30 TABLET, EXTENDED RELEASE ORAL NIGHTLY
Status: DISCONTINUED | OUTPATIENT
Start: 2021-02-24 | End: 2021-02-24 | Stop reason: HOSPADM

## 2021-02-23 RX ORDER — ACETAMINOPHEN 500 MG
1000 TABLET ORAL ONCE
Status: COMPLETED | OUTPATIENT
Start: 2021-02-23 | End: 2021-02-23

## 2021-02-23 RX ORDER — ONDANSETRON 4 MG/1
4 TABLET, ORALLY DISINTEGRATING ORAL EVERY 8 HOURS PRN
Qty: 10 TABLET | Refills: 0 | Status: SHIPPED | OUTPATIENT
Start: 2021-02-23 | End: 2021-03-10

## 2021-02-23 RX ORDER — LIDOCAINE HYDROCHLORIDE 10 MG/ML
INJECTION, SOLUTION EPIDURAL; INFILTRATION; INTRACAUDAL; PERINEURAL PRN
Status: DISCONTINUED | OUTPATIENT
Start: 2021-02-23 | End: 2021-02-23 | Stop reason: SDUPTHER

## 2021-02-23 RX ORDER — MAGNESIUM HYDROXIDE 1200 MG/15ML
LIQUID ORAL CONTINUOUS PRN
Status: COMPLETED | OUTPATIENT
Start: 2021-02-23 | End: 2021-02-23

## 2021-02-23 RX ORDER — ZOLPIDEM TARTRATE 5 MG/1
5 TABLET ORAL NIGHTLY PRN
Status: DISCONTINUED | OUTPATIENT
Start: 2021-02-23 | End: 2021-02-24 | Stop reason: HOSPADM

## 2021-02-23 RX ORDER — SODIUM CHLORIDE 0.9 % (FLUSH) 0.9 %
10 SYRINGE (ML) INJECTION PRN
Status: DISCONTINUED | OUTPATIENT
Start: 2021-02-23 | End: 2021-02-23 | Stop reason: HOSPADM

## 2021-02-23 RX ORDER — MORPHINE SULFATE 2 MG/ML
2 INJECTION, SOLUTION INTRAMUSCULAR; INTRAVENOUS
Status: DISCONTINUED | OUTPATIENT
Start: 2021-02-23 | End: 2021-02-24

## 2021-02-23 RX ORDER — ONDANSETRON 2 MG/ML
4 INJECTION INTRAMUSCULAR; INTRAVENOUS EVERY 6 HOURS PRN
Status: DISCONTINUED | OUTPATIENT
Start: 2021-02-23 | End: 2021-02-24 | Stop reason: HOSPADM

## 2021-02-23 RX ORDER — BUPIVACAINE HYDROCHLORIDE 2.5 MG/ML
INJECTION, SOLUTION EPIDURAL; INFILTRATION; INTRACAUDAL PRN
Status: DISCONTINUED | OUTPATIENT
Start: 2021-02-23 | End: 2021-02-23 | Stop reason: HOSPADM

## 2021-02-23 RX ORDER — DOCUSATE SODIUM 100 MG/1
100 CAPSULE, LIQUID FILLED ORAL 2 TIMES DAILY PRN
Qty: 60 CAPSULE | Refills: 0 | Status: SHIPPED | OUTPATIENT
Start: 2021-02-23 | End: 2021-04-16 | Stop reason: ALTCHOICE

## 2021-02-23 RX ORDER — DEXAMETHASONE 4 MG/1
4 TABLET ORAL ONCE
Status: COMPLETED | OUTPATIENT
Start: 2021-02-23 | End: 2021-02-23

## 2021-02-23 RX ORDER — LISINOPRIL 5 MG/1
5 TABLET ORAL DAILY
Status: DISCONTINUED | OUTPATIENT
Start: 2021-02-24 | End: 2021-02-24 | Stop reason: HOSPADM

## 2021-02-23 RX ORDER — SODIUM CHLORIDE, SODIUM LACTATE, POTASSIUM CHLORIDE, CALCIUM CHLORIDE 600; 310; 30; 20 MG/100ML; MG/100ML; MG/100ML; MG/100ML
INJECTION, SOLUTION INTRAVENOUS CONTINUOUS PRN
Status: DISCONTINUED | OUTPATIENT
Start: 2021-02-23 | End: 2021-02-23 | Stop reason: SDUPTHER

## 2021-02-23 RX ORDER — SODIUM CHLORIDE 0.9 % (FLUSH) 0.9 %
10 SYRINGE (ML) INJECTION EVERY 12 HOURS SCHEDULED
Status: DISCONTINUED | OUTPATIENT
Start: 2021-02-23 | End: 2021-02-23 | Stop reason: HOSPADM

## 2021-02-23 RX ORDER — GLYCOPYRROLATE 1 MG/5 ML
SYRINGE (ML) INTRAVENOUS PRN
Status: DISCONTINUED | OUTPATIENT
Start: 2021-02-23 | End: 2021-02-23 | Stop reason: SDUPTHER

## 2021-02-23 RX ORDER — ONDANSETRON 2 MG/ML
4 INJECTION INTRAMUSCULAR; INTRAVENOUS ONCE
Status: COMPLETED | OUTPATIENT
Start: 2021-02-23 | End: 2021-02-23

## 2021-02-23 RX ORDER — PROMETHAZINE HYDROCHLORIDE 12.5 MG/1
12.5 TABLET ORAL EVERY 6 HOURS PRN
Status: DISCONTINUED | OUTPATIENT
Start: 2021-02-23 | End: 2021-02-24 | Stop reason: HOSPADM

## 2021-02-23 RX ORDER — INDOCYANINE GREEN AND WATER 25 MG
KIT INJECTION PRN
Status: DISCONTINUED | OUTPATIENT
Start: 2021-02-23 | End: 2021-02-23 | Stop reason: HOSPADM

## 2021-02-23 RX ORDER — SODIUM CHLORIDE 0.9 % (FLUSH) 0.9 %
10 SYRINGE (ML) INJECTION PRN
Status: DISCONTINUED | OUTPATIENT
Start: 2021-02-23 | End: 2021-02-24 | Stop reason: HOSPADM

## 2021-02-23 RX ADMIN — FENTANYL CITRATE 50 MCG: 50 INJECTION, SOLUTION INTRAMUSCULAR; INTRAVENOUS at 07:27

## 2021-02-23 RX ADMIN — PHENYLEPHRINE HYDROCHLORIDE 150 MCG: 10 INJECTION INTRAVENOUS at 07:49

## 2021-02-23 RX ADMIN — ONDANSETRON 4 MG: 2 INJECTION INTRAMUSCULAR; INTRAVENOUS at 07:04

## 2021-02-23 RX ADMIN — ONDANSETRON 4 MG: 2 INJECTION INTRAMUSCULAR; INTRAVENOUS at 11:19

## 2021-02-23 RX ADMIN — PROPOFOL 150 MG: 10 INJECTION, EMULSION INTRAVENOUS at 07:28

## 2021-02-23 RX ADMIN — SUGAMMADEX 200 MG: 100 INJECTION, SOLUTION INTRAVENOUS at 11:13

## 2021-02-23 RX ADMIN — METOPROLOL TARTRATE 25 MG: 25 TABLET ORAL at 20:16

## 2021-02-23 RX ADMIN — FENTANYL CITRATE 50 MCG: 50 INJECTION, SOLUTION INTRAMUSCULAR; INTRAVENOUS at 09:48

## 2021-02-23 RX ADMIN — Medication 0.2 MG: at 07:43

## 2021-02-23 RX ADMIN — ACETAMINOPHEN 1000 MG: 500 TABLET ORAL at 07:01

## 2021-02-23 RX ADMIN — FENTANYL CITRATE 25 MCG: 50 INJECTION, SOLUTION INTRAMUSCULAR; INTRAVENOUS at 11:32

## 2021-02-23 RX ADMIN — FENTANYL CITRATE 50 MCG: 50 INJECTION, SOLUTION INTRAMUSCULAR; INTRAVENOUS at 07:25

## 2021-02-23 RX ADMIN — SODIUM CHLORIDE, POTASSIUM CHLORIDE, SODIUM LACTATE AND CALCIUM CHLORIDE: 600; 310; 30; 20 INJECTION, SOLUTION INTRAVENOUS at 09:43

## 2021-02-23 RX ADMIN — ENOXAPARIN SODIUM 40 MG: 40 INJECTION SUBCUTANEOUS at 07:06

## 2021-02-23 RX ADMIN — DEXAMETHASONE 4 MG: 4 TABLET ORAL at 07:01

## 2021-02-23 RX ADMIN — SODIUM CHLORIDE, POTASSIUM CHLORIDE, SODIUM LACTATE AND CALCIUM CHLORIDE: 600; 310; 30; 20 INJECTION, SOLUTION INTRAVENOUS at 07:37

## 2021-02-23 RX ADMIN — ROCURONIUM BROMIDE 50 MG: 10 INJECTION INTRAVENOUS at 07:28

## 2021-02-23 RX ADMIN — LIDOCAINE HYDROCHLORIDE 3 ML: 20 JELLY TOPICAL at 07:32

## 2021-02-23 RX ADMIN — SODIUM CHLORIDE, POTASSIUM CHLORIDE, SODIUM LACTATE AND CALCIUM CHLORIDE: 600; 310; 30; 20 INJECTION, SOLUTION INTRAVENOUS at 06:30

## 2021-02-23 RX ADMIN — FENTANYL CITRATE 25 MCG: 50 INJECTION, SOLUTION INTRAMUSCULAR; INTRAVENOUS at 11:24

## 2021-02-23 RX ADMIN — KETOROLAC TROMETHAMINE 15 MG: 15 INJECTION, SOLUTION INTRAMUSCULAR; INTRAVENOUS at 15:02

## 2021-02-23 RX ADMIN — LIDOCAINE HYDROCHLORIDE 50 MG: 10 INJECTION, SOLUTION EPIDURAL; INFILTRATION; INTRACAUDAL; PERINEURAL at 07:28

## 2021-02-23 RX ADMIN — ATROPINE SULFATE 0.4 MG: 0.1 INJECTION, SOLUTION INTRAVENOUS at 07:48

## 2021-02-23 RX ADMIN — PHENYLEPHRINE HYDROCHLORIDE 100 MCG: 10 INJECTION INTRAVENOUS at 08:05

## 2021-02-23 RX ADMIN — KETOROLAC TROMETHAMINE 15 MG: 15 INJECTION, SOLUTION INTRAMUSCULAR; INTRAVENOUS at 20:16

## 2021-02-23 RX ADMIN — POTASSIUM CHLORIDE, DEXTROSE MONOHYDRATE AND SODIUM CHLORIDE: 150; 5; 450 INJECTION, SOLUTION INTRAVENOUS at 23:40

## 2021-02-23 RX ADMIN — POTASSIUM CHLORIDE, DEXTROSE MONOHYDRATE AND SODIUM CHLORIDE: 150; 5; 450 INJECTION, SOLUTION INTRAVENOUS at 13:28

## 2021-02-23 RX ADMIN — CEFAZOLIN 2 G: 10 INJECTION, POWDER, FOR SOLUTION INTRAVENOUS at 07:58

## 2021-02-23 ASSESSMENT — PULMONARY FUNCTION TESTS
PIF_VALUE: 0
PIF_VALUE: 12
PIF_VALUE: 0
PIF_VALUE: 24
PIF_VALUE: 16
PIF_VALUE: 31
PIF_VALUE: 11
PIF_VALUE: 0
PIF_VALUE: 15
PIF_VALUE: 28
PIF_VALUE: 27
PIF_VALUE: 32
PIF_VALUE: 29
PIF_VALUE: 15
PIF_VALUE: 30
PIF_VALUE: 27
PIF_VALUE: 30
PIF_VALUE: 29
PIF_VALUE: 29
PIF_VALUE: 16
PIF_VALUE: 28
PIF_VALUE: 27
PIF_VALUE: 29
PIF_VALUE: 30
PIF_VALUE: 29
PIF_VALUE: 28
PIF_VALUE: 28
PIF_VALUE: 26
PIF_VALUE: 16
PIF_VALUE: 15
PIF_VALUE: 3
PIF_VALUE: 25
PIF_VALUE: 26
PIF_VALUE: 28
PIF_VALUE: 28
PIF_VALUE: 32
PIF_VALUE: 31
PIF_VALUE: 31
PIF_VALUE: 29
PIF_VALUE: 15
PIF_VALUE: 25
PIF_VALUE: 17
PIF_VALUE: 29
PIF_VALUE: 17
PIF_VALUE: 30
PIF_VALUE: 15
PIF_VALUE: 15
PIF_VALUE: 31
PIF_VALUE: 15
PIF_VALUE: 26
PIF_VALUE: 31
PIF_VALUE: 20
PIF_VALUE: 28
PIF_VALUE: 15
PIF_VALUE: 31
PIF_VALUE: 26
PIF_VALUE: 29
PIF_VALUE: 27
PIF_VALUE: 15
PIF_VALUE: 25
PIF_VALUE: 28
PIF_VALUE: 18
PIF_VALUE: 1
PIF_VALUE: 2
PIF_VALUE: 28
PIF_VALUE: 15
PIF_VALUE: 22
PIF_VALUE: 24
PIF_VALUE: 18
PIF_VALUE: 26
PIF_VALUE: 23
PIF_VALUE: 0
PIF_VALUE: 22
PIF_VALUE: 31
PIF_VALUE: 27
PIF_VALUE: 11
PIF_VALUE: 27
PIF_VALUE: 29
PIF_VALUE: 15
PIF_VALUE: 32
PIF_VALUE: 28
PIF_VALUE: 18
PIF_VALUE: 30
PIF_VALUE: 15
PIF_VALUE: 15
PIF_VALUE: 30
PIF_VALUE: 17
PIF_VALUE: 15
PIF_VALUE: 24
PIF_VALUE: 19
PIF_VALUE: 28
PIF_VALUE: 29
PIF_VALUE: 32
PIF_VALUE: 15
PIF_VALUE: 31
PIF_VALUE: 26
PIF_VALUE: 25
PIF_VALUE: 26
PIF_VALUE: 28
PIF_VALUE: 13
PIF_VALUE: 32
PIF_VALUE: 29
PIF_VALUE: 28
PIF_VALUE: 33
PIF_VALUE: 1
PIF_VALUE: 18
PIF_VALUE: 29
PIF_VALUE: 16
PIF_VALUE: 31
PIF_VALUE: 27
PIF_VALUE: 26
PIF_VALUE: 10
PIF_VALUE: 24
PIF_VALUE: 23
PIF_VALUE: 15
PIF_VALUE: 29
PIF_VALUE: 23
PIF_VALUE: 24
PIF_VALUE: 29
PIF_VALUE: 16
PIF_VALUE: 15
PIF_VALUE: 29
PIF_VALUE: 28
PIF_VALUE: 19
PIF_VALUE: 26
PIF_VALUE: 18
PIF_VALUE: 15
PIF_VALUE: 28
PIF_VALUE: 16
PIF_VALUE: 10
PIF_VALUE: 18
PIF_VALUE: 31
PIF_VALUE: 15
PIF_VALUE: 15

## 2021-02-23 ASSESSMENT — PAIN SCALES - GENERAL
PAINLEVEL_OUTOF10: 0
PAINLEVEL_OUTOF10: 0
PAINLEVEL_OUTOF10: 4

## 2021-02-23 NOTE — ANESTHESIA PRE PROCEDURE
Cholecalciferol (VITAMIN D3) 50 MCG (2000 UT) CAPS Take 1 capsule by mouth daily 10/16/20  Yes Pippa Brito MD   ferrous sulfate (FE TABS 325) 325 (65 Fe) MG EC tablet TAKE 1 TABLET BY MOUTH DAILY (WITH BREAKFAST) 7/20/20  Yes Pippa Brito MD   lisinopril (PRINIVIL;ZESTRIL) 5 MG tablet TAKE 1 TABLET EVERY DAY 5/11/20  Yes Emma Martins DO   metoprolol tartrate (LOPRESSOR) 25 MG tablet TAKE 1 TABLET TWICE DAILY 5/11/20  Yes Emma Martins DO   aspirin 81 MG tablet Take 81 mg by mouth daily Takes 5 days per week, last dose  2/17 and instructed not to take any more   Yes Historical Provider, MD   ascorbic acid (VITAMIN C) 500 MG tablet Take 500 mg by mouth 2 times daily Indications: patient only takes in the Winter With ana paula hips    Yes Historical Provider, MD   nitroGLYCERIN (NITROSTAT) 0.4 MG SL tablet Place 1 tablet under the tongue every 5 minutes as needed for Chest pain 3/13/19   Pippa Brito MD   Multiple Vitamins-Minerals (MULTIVITAMIN PO) Take 1 tablet by mouth daily. Historical Provider, MD       Current medications:    Current Facility-Administered Medications   Medication Dose Route Frequency Provider Last Rate Last Admin    0.9 % sodium chloride infusion   Intravenous Continuous Phyllis Carpio PA-C        ceFAZolin (ANCEF) 2000 mg in dextrose 5 % 50 mL IVPB  2,000 mg Intravenous On Call to [facility]        sodium chloride flush 0.9 % injection 10 mL  10 mL Intravenous 2 times per day [facility]        sodium chloride flush 0.9 % injection 10 mL  10 mL Intravenous PRN Phyllis Carpio PA-C           Allergies:     Allergies   Allergen Reactions    Codeine Nausea Only       Problem List:    Patient Active Problem List   Diagnosis Code    Coronary artery disease involving native coronary artery of native heart without angina pectoris I25.10    Hyperlipidemia with target LDL less than 70 E78.5    Chronic diastolic heart failure (HCC) I50.32    Anemia D64.9  GI bleed K92.2    Shingles B02.9    Esophageal ulcer K22.10    Gastric ulcer K25.9    Gastric ulcer with hemorrhage K25.4    Coronary artery disease C80.13    Diastolic dysfunction G85.62    Hyperlipidemia E78.5    Obesity E66.9    Pneumonia due to COVID-19 virus U07.1, J12.82    Postmenopausal bleeding N95.0    Endometrial polyp N84.0    Post-menopausal bleeding N95.0    Endometrial cancer (HCC) C54.1       Past Medical History:        Diagnosis Date    Abnormal uterine bleeding (AUB) 01/2021    Anxiety     daughter reports since Covid    Asteroid hyalosis of right eye     Bruises easily     per daughter   [facility] Coronary artery disease     status post acute non-Q wave myocardial infarction 08/23/2012, status post drug eluting stent placement in the LAD and 2 drug eluting stents in the left circumflex artery 08/23/2012.  COVID-19 06/30/2020    confusion, SOB, weakness x 6-8 weeks; was hospitalized and did receive plasma    Diastolic dysfunction     grade 1.     Gastric ulcer with hemorrhage 03/03/2015    gastric and esophageal ulcers due to nsaid    Hyperlipidemia     Hypertension     Lives alone     Memory loss     daughter reports since Covid    Mild mitral regurgitation     Mild tricuspid regurgitation     Obesity     Patient in clinical research study 06/27/2020    Expanded Access to Convalescent Plasma for COVID-19 date of completed 6/30/2020    Poor intravenous access     instructed to hydrate for surgery    Vitreous floaters     left eye.         Past Surgical History:        Procedure Laterality Date    CARDIAC CATHETERIZATION Left 08/23/2012 left main artery normal, LAD with mid 60 to 70% stenosis, FFR was 0.76, left circumflex artery with mid 90% stenosis with thrombus, RCA with mild irregularities, preserved left ventricular systolic function, ejection fraction estimated around 50%, status post drug eluting stent placement in the LAD and 2 drug eluting stents in the left circumflex artery. [address]    DILATION AND CURETTAGE OF UTERUS N/A 1/20/2021    D & C HYSTEROSCOPY Polypectomy performed by Riccardo Walker MD at [facility], DIAGNOSTIC  03/05/2015    esophageal/gastric ulcer; few diverticuli    UPPER GASTROINTESTINAL ENDOSCOPY  4/16/2015    healed gastric ulcer       Social History:    Social History     Tobacco Use    Smoking status: Never Smoker    Smokeless tobacco: Never Used   Substance Use Topics    Alcohol use: Yes     Comment: rare                                Counseling given: Not Answered      Vital Signs (Current):   Vitals:    02/18/21 1129 02/23/21 0651   BP:  131/60   Pulse:  (!) 49   Resp:  16   Temp:  97.3 °F (36.3 °C)   TempSrc:  Temporal   SpO2:  98%   Weight: 160 lb (72.6 kg) 153 lb 14.1 oz (69.8 kg)   Height: 4' 11\" (1.499 m) 5' (1.524 m)                                              BP Readings from Last 3 Encounters:   02/23/21 131/60   02/23/21 99/72   02/09/21 130/74       NPO Status: Time of last liquid consumption: 2200                        Time of last solid consumption: 1800                        Date of last liquid consumption: 02/22/21                        Date of last solid food consumption: 02/20/21    BMI:   Wt Readings from Last 3 Encounters:   02/23/21 153 lb 14.1 oz (69.8 kg)   02/09/21 157 lb 9.6 oz (71.5 kg)   02/03/21 160 lb 9.6 oz (72.8 kg)     Body mass index is 30.05 kg/m².     CBC:   Lab Results   Component Value Date    WBC 8.9 02/09/2021    RBC 4.44 02/09/2021    HGB 14.1 02/09/2021    HCT 44.2 02/09/2021    MCV 99.5 02/09/2021    RDW 12.9 02/09/2021  02/09/2021       CMP:   Lab Results   Component Value Date     02/09/2021    K 4.2 02/09/2021     02/09/2021    CO2 28 02/09/2021    BUN 13 02/09/2021    CREATININE 0.73 02/09/2021    GFRAA >60 02/09/2021    LABGLOM >60 02/09/2021    GLUCOSE 115 02/09/2021    PROT 7.6 02/09/2021    CALCIUM 9.7 02/09/2021    BILITOT 0.73 02/09/2021    ALKPHOS 65 02/09/2021    AST 22 02/09/2021    ALT 16 02/09/2021       POC Tests: No results for input(s): POCGLU, POCNA, POCK, POCCL, POCBUN, POCHEMO, POCHCT in the last 72 hours. Coags:   Lab Results   Component Value Date    PROTIME 10.3 06/27/2020    INR 1.0 06/27/2020    APTT 26.7 08/24/2012       HCG (If Applicable): No results found for: PREGTESTUR, PREGSERUM, HCG, HCGQUANT     ABGs: No results found for: PHART, PO2ART, OHZ8PFT, VSC3VMD, BEART, B8SCUHFY     Type & Screen (If Applicable):  No results found for: LABABO, LABRH    Drug/Infectious Status (If Applicable):  No results found for: HIV, HEPCAB    COVID-19 Screening (If Applicable):   Lab Results   Component Value Date    COVID19 Not Detected 02/18/2021    COVID19 Not Detected 01/15/2021         Anesthesia Evaluation  Patient summary reviewed and Nursing notes reviewed no history of anesthetic complications:   Airway: Mallampati: II  TM distance: >3 FB   Neck ROM: full  Mouth opening: > = 3 FB Dental: normal exam         Pulmonary:Negative Pulmonary ROS and normal exam                               Cardiovascular:  Exercise tolerance: good (>4 METS),   (+) hypertension:, CAD: non-obstructive and no interval change, CABG/stent: no interval change, hyperlipidemia    (-) dysrhythmias,  angina and orthopnea      Rhythm: regular  Rate: normal                    Neuro/Psych:   Negative Neuro/Psych ROS              GI/Hepatic/Renal:   (+) PUD,           Endo/Other:    (+) malignancy/cancer.                  Abdominal:           Vascular:                                        Anesthesia Plan      general

## 2021-02-23 NOTE — BRIEF OP NOTE
Brief Postoperative Note      Patient: Lino Monreal  YOB: 1940  MRN: [id number]    Date of Procedure: 2/23/2021    Pre-Op Diagnosis: ENDOMETRIAL CANCER    Post-Op Diagnosis: Same       Procedure(s):  XI ROBOTIC LAPAROSCOPIC MODIFIED RADICAL  HYSTERECTOMY, BILATERAL SALPINGO-OOPHORECTOMY, CYTOLOGIC WASHINGS, SENTINEL LYMPHNODE MAPPINGS, SENTINEL LYMPHNODE BIOPSIES, ICG DYE     Surgeon(s):  Jelani Ratliff MD    Assistant:  First Assistant: Mckenna Brice  Physician Assistant: Sukhwinder Busch PA-C  Resident: Annabella Aleman DO; Vernal Aliment    Anesthesia: General    Estimated Blood Loss (mL): 15SC    Complications: none    Specimens:   ID Type Source Tests Collected by Time Destination   1 : URINE FOR CULTURE Urine Urine, straight catheter CULTURE, URINE Jelani Ratliff MD 2/23/2021 1492    A :  Body Fluid Pelvic Washings CYTOLOGY, NON-GYN Jelani Ratliff MD 2/23/2021 1314    B : LEFT PELVIC SENTINEL LYMPH NODE Tissue Lymph Node SURGICAL PATHOLOGY Jelani Ratliff MD 2/23/2021 1713    C : RIGHT EXTERNAL ILIAC SENTINAL LYMPH NODE Tissue Lymph Node SURGICAL PATHOLOGY Jelani Ratliff MD 2/23/2021 6453    D : RIGHT OBTURATOR SENTINEL LYMPH NODE Tissue Lymph Node SURGICAL PATHOLOGY Jelani Ratliff MD 2/23/2021 4917    E : LEFT OBTURATOR SENTINEL LYMPH NODE Tissue Lymph Node [address] Zaida Patterson MD 2/23/2021 8350    F : UTERUS, CERVIX, BILATERAL FALLOPIAN TUBES AND BILATERAL OVARIES Tissue Uterus SURGICAL PATHOLOGY Jelani Ratliff MD 2/23/2021 1010            Drains: No wound drains  Urethral Catheter Double-lumen;Straight-tip 16 fr (Active)   Catheter Indications Perioperative use in selected surgeries including but not limited to urologic, pelvic or need for intraoperative monitoring of urinary output due to prolonged surgery, large volume infusion or need for diuretic therapy in surgery 02/23/21 1130   Site Assessment No urethral drainage 02/23/21 1130   Urine Color Yellow 02/23/21 1130 Urine Appearance Clear 02/23/21 1130       Findings: The patient was found to have extensive adhesions in the upper abdomen from previous gallbladder surgery. Patient was found not to have any evidence of metastatic disease in the pelvis or abdomen. She did have moderately enlarged bilateral obturator lymph nodes. [place] lymph node biopsies were performed and the enlarged obturator lymph nodes were also removed.     Electronically signed by Kirit Pandey MD on 2/23/2021 at 11:52 AM

## 2021-02-23 NOTE — PROGRESS NOTES
Gynecology Progress Note    Date: 2021  Time: 1:47 PM    Rachel Koroma [de-identified] y.o. female  POD #0 s/p robotic laparoscopic modified radical hysterectomy, bilateral salping-oophorectomy, peritoneal washings for cytology, and sentinel lymph node biopsies 21    Patient seen and examined. She complained of a dry mouth and sore throat but overall feeling well. Pain is controlled. Patient is  tolerating oral intake and has had several containers of broth/juice. She is urinating well with godinez in place. She denies any vaginal bleeding. She is not yet  ambulating. She is not passing flatus. She denies Fever/Chills, Chest Pain, SOB, N/V, Calf Pain    Vitals:  Vitals:    21 1200 21 1209 21 1300 21 1315   BP: (!) 164/77 (!) 158/78 (!) 153/72 139/61   Pulse: 56 56 61 59   Resp: 13 17 12 16   Temp:  96.8 °F (36 °C)  97.2 °F (36.2 °C)   TempSrc:    Oral   SpO2: 100% 100% 100%    Weight:       Height:             Intake/Output:   Last Shift: No intake/output data recorded.   Current Shift: I/O this shift:  In: 0 [I.V.:2220]  Out: 580 [Urine:550; Blood:30]  550 mL out in last 8 hrs       Physical Exam:  General:  no apparent distress, alert and cooperative  Neurologic:  alert, oriented, normal speech, no focal findings or movement disorder noted  Lungs:  No increased work of breathing, good air exchange, clear to auscultation bilaterally, no crackles or wheezing  Heart:  regular rate and rhythm and no murmur    Abdomen: soft, non-distended, appropriate tenderness, no CVA tenderness, normal bowel sounds  Incision: clean, dry, intact and dermabond in place  Extremities:  no calf tenderness, non edematous, SCDs in place and functioning     Lab:  Recent Results (from the past 12 hour(s))   TYPE AND SCREEN    Collection Time: 21  7:00 AM   Result Value Ref Range    Expiration Date 2021,0839     Arm Band Number [id number]     ABO/Rh O POSITIVE     Antibody Screen NEGATIVE Assessment/Plan:  Araceli Stanford [de-identified] y.o. female  POD #0 s/p robotic laparoscopic modified radical hysterectomy, bilateral salping-oophorectomy, peritoneal washings for cytology, and sentinel lymph node biopsies on 21   - Doing well, vitals stable   - Stephenson in place, UOP appropriate   - continue IVF   - Encourage ambulation and use of incentive spirometer   - Pain control: Morphine/Toradol overnight. Will transition to PO pain medication when tolerating PO intake.     - Labs: CBC, BMP ordered    - DVT Proph: SCDs, Lovenox to start in AM if Cr stable    - Abx: Ancef pre operatively   - Diet: liquid, ADAD   - Path: pending   - Continue post-op care, please page with any questions      HTN    - Home meds ordered     [facility]  [facility]N Resident, PGY4  Pager: [phone] [facility]  21  2:48 PM

## 2021-02-23 NOTE — PROGRESS NOTES
Gynecology Progress Note    Date: 2021  Time: 7:53 AM    Veronica Klein [de-identified] y.o. female  POD #1 s/p robotic laparoscopic modified radical hysterectomy, bilateral salping-oophorectomy, peritoneal washings for cytology, and sentinel lymph node biopsies 21    Patient seen and examined. She is doing well this morning and is sitting up in the chair. She had no complatints. Pain is controlled. Patient tolerated soup yesterday but has not had any oral intake this AM, she will attempt to eat breakfast this AM.   She is urinating well, godinez in place. She denies any vaginal bleeding. She is ambulating with assistance. She is not passing flatus. She denies Fever/Chills, Chest Pain, SOB, N/V, Calf Pain. Vitals:  Vitals:    21 1625 21 2000 21 0400 21 0730   BP: 131/81 (!) 140/68 (!) 114/58 (!) 128/47   Pulse: 73 75 55 64   Resp: 16 16 16 12   Temp: 97.6 °F (36.4 °C) 98 °F (36.7 °C) 98.1 °F (36.7 °C) 98.3 °F (36.8 °C)   TempSrc: Oral Oral Oral Oral   SpO2: 99% 94% 95% 96%   Weight:       Height:             Intake/Output:   Last Shift: I/O last 3 completed shifts: In: 5017.1 [P.O.:960; I.V.:4057.1]  Out: 1480 [Urine:1450; Blood:30]  Current Shift: No intake/output data recorded.   725 mL out in last 8 hrs ~90ml/h      Physical Exam:  General:  no apparent distress, alert and cooperative  Neurologic:  alert, oriented, normal speech, no focal findings or movement disorder noted  Lungs:  No increased work of breathing, good air exchange, clear to auscultation bilaterally, no crackles or wheezing  Heart:  regular rate and rhythm and no murmur    Abdomen: soft, non-distended, appropriate tenderness, no CVA tenderness, normal bowel sounds  Incision: clean, dry, intact and dermabond in place  Extremities:  no calf tenderness, non edematous, SCDs in place and functioning    Lab:  Recent Results (from the past 12 hour(s))   Basic Metabolic Panel    Collection Time: 21  6:02 AM   Result - BP minimally elevated overnight    BMI 1323 [facility]  OB/GYN Resident, PGY3  Pager: [phone] [facility]  02/24/21  7:53 AM

## 2021-02-23 NOTE — FLOWSHEET NOTE
02/23/21 1541   Encounter Summary   Services provided to: Patient and family together   Referral/Consult From: [facility] Family members   Continue Visiting   (2/23/2021)   Complexity of Encounter Low   Length of Encounter 15 minutes   Spiritual Assessment Completed Yes   Routine   Type Initial   Assessment Coping; Hopeful   Intervention Active listening;Nurtured hope;[place]   Outcome Expressed gratitude

## 2021-02-24 VITALS
WEIGHT: 153.88 LBS | TEMPERATURE: 98.3 F | BODY MASS INDEX: 30.21 KG/M2 | HEART RATE: 64 BPM | HEIGHT: 60 IN | RESPIRATION RATE: 12 BRPM | DIASTOLIC BLOOD PRESSURE: 47 MMHG | OXYGEN SATURATION: 96 % | SYSTOLIC BLOOD PRESSURE: 128 MMHG

## 2021-02-24 LAB
ANION GAP SERPL CALCULATED.3IONS-SCNC: 10 MMOL/L (ref 9–17)
BUN BLDV-MCNC: 6 MG/DL (ref 8–23)
BUN/CREAT BLD: ABNORMAL (ref 9–20)
CALCIUM SERPL-MCNC: 7.6 MG/DL (ref 8.6–10.4)
CHLORIDE BLD-SCNC: 103 MMOL/L (ref 98–107)
CO2: 18 MMOL/L (ref 20–31)
CREAT SERPL-MCNC: 0.54 MG/DL (ref 0.5–0.9)
CULTURE: NO GROWTH
GFR AFRICAN AMERICAN: >60 ML/MIN
GFR NON-AFRICAN AMERICAN: >60 ML/MIN
GFR SERPL CREATININE-BSD FRML MDRD: ABNORMAL ML/MIN/{1.73_M2}
GFR SERPL CREATININE-BSD FRML MDRD: ABNORMAL ML/MIN/{1.73_M2}
GLUCOSE BLD-MCNC: 133 MG/DL (ref 70–99)
HCT VFR BLD CALC: 36.6 % (ref 36.3–47.1)
HEMOGLOBIN: 11.6 G/DL (ref 11.9–15.1)
Lab: NORMAL
MCH RBC QN AUTO: 31.4 PG (ref 25.2–33.5)
MCHC RBC AUTO-ENTMCNC: 31.7 G/DL (ref 28.4–34.8)
MCV RBC AUTO: 99.2 FL (ref 82.6–102.9)
NRBC AUTOMATED: 0 PER 100 WBC
PDW BLD-RTO: 12.5 % (ref 11.8–14.4)
PLATELET # BLD: ABNORMAL K/UL (ref 138–453)
PLATELET, FLUORESCENCE: NORMAL K/UL (ref 138–453)
PLATELET, IMMATURE FRACTION: NORMAL % (ref 1.1–10.3)
PMV BLD AUTO: ABNORMAL FL (ref 8.1–13.5)
POTASSIUM SERPL-SCNC: 4.4 MMOL/L (ref 3.7–5.3)
RBC # BLD: 3.69 M/UL (ref 3.95–5.11)
SODIUM BLD-SCNC: 131 MMOL/L (ref 135–144)
SPECIMEN DESCRIPTION: NORMAL
SURGICAL PATHOLOGY REPORT: NORMAL
SURGICAL PATHOLOGY REPORT: NORMAL
WBC # BLD: 9.4 K/UL (ref 3.5–11.3)

## 2021-02-24 PROCEDURE — 85027 COMPLETE CBC AUTOMATED: CPT

## 2021-02-24 PROCEDURE — 80048 BASIC METABOLIC PNL TOTAL CA: CPT

## 2021-02-24 PROCEDURE — 85055 RETICULATED PLATELET ASSAY: CPT

## 2021-02-24 PROCEDURE — 6370000000 HC RX 637 (ALT 250 FOR IP): Performed by: STUDENT IN AN ORGANIZED HEALTH CARE EDUCATION/TRAINING PROGRAM

## 2021-02-24 PROCEDURE — 36415 COLL VENOUS BLD VENIPUNCTURE: CPT

## 2021-02-24 PROCEDURE — 6360000002 HC RX W HCPCS: Performed by: STUDENT IN AN ORGANIZED HEALTH CARE EDUCATION/TRAINING PROGRAM

## 2021-02-24 RX ADMIN — LISINOPRIL 5 MG: 5 TABLET ORAL at 08:58

## 2021-02-24 RX ADMIN — METOPROLOL TARTRATE 25 MG: 25 TABLET ORAL at 08:58

## 2021-02-24 RX ADMIN — HYDROCODONE BITARTRATE AND ACETAMINOPHEN 2 TABLET: 5; 325 TABLET ORAL at 11:13

## 2021-02-24 RX ADMIN — KETOROLAC TROMETHAMINE 15 MG: 15 INJECTION, SOLUTION INTRAMUSCULAR; INTRAVENOUS at 03:33

## 2021-02-24 ASSESSMENT — PAIN SCALES - WONG BAKER: WONGBAKER_NUMERICALRESPONSE: 0

## 2021-02-24 NOTE — OP NOTE
[address] [address]                                OPERATIVE REPORT    PATIENT NAME: Michelle Aguilar                       :        1940  MED REC NO:   [id number]                             ROOM:       7969  ACCOUNT NO:   [de-identified]                           ADMIT DATE: 2021  PROVIDER:     Jose Alvares MD    DATE OF PROCEDURE:  2021    PREOPERATIVE DIAGNOSIS:  Endometrial adenocarcinoma, grade 2. POSTOPERATIVE DIAGNOSES:  Endometrial adenocarcinoma, grade 2; extensive  upper abdominal adhesions. PROCEDURES:  Robotic laparoscopic modified radical hysterectomy,  bilateral salpingo-oophorectomies, peritoneal washings for cytology,  sentinel lymph node mappings with ICG dye, and bilateral pelvic sentinel  lymph node biopsies. SURGEON:  Jose Alvares MD    ASSISTANTS:  Haim Mccormick, first assistant; María Brice DO;  Tiffanie Blanchard DO; Nedra Ludwig PA-C    ANESTHESIA:  General.    ESTIMATED BLOOD LOSS:  30 mL. BLOOD PRODUCTS GIVEN:  None. COMPLICATIONS:  None. DRAINS:  None. SPECIAL MEDICATIONS:  Included Ancef 2 gm IVPB preoperatively and  Lovenox 40 mg subcu preop. SCDs:  There were SCDs to the lower extremities during the procedure. TIRADO:  There was a Tirado catheter to straight drain during the  procedure. LINES:  There was a peripheral IV of lactated Ringer's during the  procedure. BRIEF HISTORY AND INDICATION FOR OPERATION:  The patient is an  25-year-old  3, para 3 female who began having vaginal spotting  in mid 2020. She mentioned it to her PCP who referred her to Dr. Joseph Trujillo, [address] [place] [address]. Dr. Joseph Trujillo recommended a D and C and  hysteroscopy. This was performed on 2021. At the time of  surgery, he noted a \"large endometrial polyp. \"    Pathology revealed a \"moderately differentiated endometrioid adenocarcinoma of the endometrium. The patient was then referred to  [facility] for further workup and treatment. The patient's history is significant in that she had a severe bout of  COVID several months ago and did recover from this but has had some  memory difficulty since that time. She also has a significant cardiac  history with three coronary artery stents placed in 2001. She takes  only a baby aspirin and was cleared by Dr. Eliezer Lynn, her cardiologist, for  today's procedure. The patient was counseled regarding various management options, and it  was mutually decided to proceed with a robotic laparoscopic approach. DESCRIPTION OF OPERATIVE PROCEDURE:  Under adequate general anesthesia,  the patient was placed in the dorsal lithotomy position initially and  prepped and draped in the usual fashion. The vaginal portion of the procedure was performed initially, and the  patient's uterus was sounded to 8 cm, gradually dilated up, and a large  Websenseare uterine manipulator was utilized and was sewn into place. Prior  to sewing it into place, however, the ICG dye was injected at 9 o'clock  and 3 o'clock on the cervix. We then performed the abdominal portion of the procedure. The patient  had a very large right subcostal scar from a cholecystectomy performed  on the patient as a young lady. The patient had no other scars. The infraumbilical area was infiltrated with 0.25% Marcaine, and a tiny  incision was placed in order to place the Veress needle. The Veress  needle was placed without difficulty, and a pneumoperitoneum was created  with CO2. At this point, we measured from the symphysis pubis to  approximately 21 cm and chose the camera trocar location approximately 5  cm above the umbilicus and 2 cm to the right of midline. All trocar  sites were initially infiltrated with 0.25% Marcaine. All trocars were  placed under direct camera guidance.   We initially placed the camera trocar, which was an 8-mm da Aixa trocar. Through this, we were able  to place all of the other trocars. We placed two trocars on the  patient's left mid-abdomen, one on the right mid-abdomen. We placed a  5-mm port in the right upper abdomen and a 12-mm AirSeal port in the  left upper abdomen. We then docked the Sparkfly robot after the patient had been placed  in maximum Trendelenburg. We surveyed the abdomen. There were extensive adhesions in the right  upper abdomen and to a certain degree in the left from her previous  cholecystectomy. There was no evidence of metastatic disease in the  upper abdomen or in the pelvis. The omentum appeared normal, as did the  right and left gutter. The right and left pelvis were also normal.   Right and left tubes and ovaries appeared normal.  The uterus was small  and symmetrical.  The large- and small-bowel appeared normal.  The  appendix was also normal.  There were a few adhesions in the pelvis on  the left side by the sigmoid colon, and these were lysed. Peritoneal washings were then performed initially in the pelvis, washing  both tubes and ovaries and uterus and collecting the fluid and  submitting this for cytologic evaluation. We then clipped the fallopian tubes twice on each side to prevent  spillage of tumor. We took down all of the adhesions including the sigmoid colon on the  left side and then opened up the retroperitoneal spaces as in a  radical-type hysterectomy. The spaces were developed widely and  included developing the pararectal spaces and the paravesical spaces. The retroperitoneal structures were all identified including the common  iliac arteries, the external iliac and internal iliac arteries and their  respective veins. The ureters were noted on the medial leaf of  peritoneum. We then turned on the \"Firefly\" infrared camera to identify the ICG dye  and the sentinel lymph nodes.   On both sides, the patient had an enlarged obturator lymph node approximately 2.5-3 cm in greatest  diameter. These, however, did not light up very much, and instead the  external iliac lymph nodes on both sides did add up in the midportion. Both the enlarged obturator lymph nodes and the external iliac sentinel  lymph nodes were removed. We did cauterize the proximal and distal  lymphatic channels. Care was taken not to injure the obturator nerve  and the genitofemoral nerves on both sides. Once hemostasis was obtained, we proceeded with the radical-type  hysterectomy. We took down the medial peritoneal leaves with the  ureters in clear sight, all the way to the uterosacral ligaments  bilaterally and pushed the ureter far out laterally out of harm's way. We then cauterized the base of the uterosacral ligaments at the  cervicovaginal junction bilaterally. We came back up and identified the ovarian vascular bundles. These were  then isolated away from the ureters and heavily cauterized with the  fenestrated bipolar forceps and cut. The same procedures were performed on the opposite side and will not be  repeated in their entirety here. The round ligaments were then cauterized near the pelvic brim and cut. The anterior leaf of the broad ligament was taken down using the  monopolar cautery scissors. We then cut across the vesicouterine  peritoneum across the midline and dissected the bladder off the anterior  cervix and well down onto the upper vagina. Posteriorly, we then cut  the peritoneum from the right uterosacral ligament to the left and also  opened up the vagina using the VCare as a guide at 6 o'clock. Anteriorly, we opened the vagina at 12 o'clock. The cardinal ligaments were then heavily cauterized just above the  cervicovaginal junctions on both sides, and these were then cut. We  left most of the parametrial tissue on the specimen as in a modified  radical-type hysterectomy. Using the monopolar scissors as a single blade, we cut around the VCare  using it as a guide to free the entire specimen. The specimen was then  removed through the vagina in an en bloc fashion and included bilateral  tubes, bilateral ovaries, uterus, and cervix. There was little bleeding. We then closed the vaginal angled sutures  with #1 Vicryl in a figure-of-eight fashion bilaterally. This was for  vaginal support. We then closed the remainder of the vagina with  interrupted sutures of #1 Vicryl in a figure-of-eight fashion. We did  place two 0 PDS sutures at the end in the midportion of the vagina. The pelvis was then copiously lavaged with warm normal saline x3 and  suctioned free. All areas were inspected for hemostasis and found to be  dry. We did place a small amount of Surgicel powder over the raw  surfaces in the pelvis. We then closed the 12-mm AirSeal port under direct camera guidance with  #1 Vicryl. All port sites were then irrigated with a Betadine and  saline solution. The port sites were closed with 4-0 Vicryl, and  Dermabond was then placed on the skin as a sealant and dressing. The patient tolerated the surgery and anesthesia well and was then taken  to the recovery room in satisfactory condition. Again, the blood loss  was approximately 30 mL, and no blood products were given.         Meghna Adam MD    D: 02/23/2021 12:21:49       T: 02/23/2021 13:23:51     [email or web address]  Job#: [id number]     Doc#: [id number]    CC:

## 2021-02-25 ENCOUNTER — TELEPHONE (OUTPATIENT)
Dept: FAMILY MEDICINE CLINIC | Age: 81
End: 2021-02-25

## 2021-02-25 ENCOUNTER — IMMUNIZATION (OUTPATIENT)
Dept: LAB | Age: 81
End: 2021-02-25
Payer: MEDICARE

## 2021-02-25 PROCEDURE — 91301 COVID-19, MODERNA VACCINE 100MCG/0.5ML DOSE: CPT

## 2021-02-25 NOTE — TELEPHONE ENCOUNTER
[facility] Transitions Initial Follow Up Call    Outreach made within 2 business days of discharge: Yes    Patient: Veronica Klein   Patient : 1940   MRN: [id number]    Reason for Admission:   Discharge Date: 21       Spoke with: Amadeo Aguirre and her daughter    Discharge department/facility: [facility] -    TCM Interactive Patient Contact:  Was patient able to fill all prescriptions: Yes  Was patient instructed to bring all medications to the follow-up visit: Yes  Is patient taking all medications as directed in the discharge summary?  Yes  Does patient understand their discharge instructions: Yes  Does patient have questions or concerns that need addressed prior to 7-14 day follow up office visit: no    Patient is following up with Dr. Terry Rivero and will see Dr. Marge Leach at her normal scheduled appt on      Follow Up  Future Appointments   Date Time Provider Catherine Traore   3/10/2021 10:15 AM Lisa Sanchez MD [place] [facility]   2021  8:00 AM Bridger Ramirez MD [facility]   2021 10:45 AM DO ADRIEN Dimas [facility]       Michael Garcia LPN

## 2021-03-09 ENCOUNTER — TELEPHONE (OUTPATIENT)
Dept: GYNECOLOGIC ONCOLOGY | Age: 81
End: 2021-03-09

## 2021-03-09 NOTE — TELEPHONE ENCOUNTER
Called patient and asked to move her appointment time from 10:15 to 10:30 am, patient agreed and is now on the schedule for 10:30.

## 2021-03-10 ENCOUNTER — OFFICE VISIT (OUTPATIENT)
Dept: GYNECOLOGIC ONCOLOGY | Age: 81
End: 2021-03-10

## 2021-03-10 VITALS
WEIGHT: 159.6 LBS | BODY MASS INDEX: 31.17 KG/M2 | SYSTOLIC BLOOD PRESSURE: 158 MMHG | OXYGEN SATURATION: 98 % | HEART RATE: 55 BPM | DIASTOLIC BLOOD PRESSURE: 83 MMHG

## 2021-03-10 DIAGNOSIS — C54.1 ENDOMETRIAL CANCER (HCC): Primary | ICD-10-CM

## 2021-03-10 DIAGNOSIS — Z98.890 POST-OPERATIVE STATE: ICD-10-CM

## 2021-03-10 PROCEDURE — 99024 POSTOP FOLLOW-UP VISIT: CPT | Performed by: PHYSICIAN ASSISTANT

## 2021-03-10 NOTE — PATIENT INSTRUCTIONS
1. Return to clinic in 6 weeks for post operative check  2. Continue to limit physical activity to no lifting more than 10 pounds and no strenuous activity   3.  Call or return to clinic sooner with any questions or concerns

## 2021-03-10 NOTE — PROGRESS NOTES
Review of Systems   All other systems reviewed and are negative.
the review of systems done by my ancillary staff in the [facility] documentation. Objective:  Vitals:    03/10/21 1036 03/10/21 1059   BP: (!) 161/79 (!) 158/83   Pulse: 55    SpO2: 98%    Weight: 159 lb 9.6 oz (72.4 kg)        Physical Exam:  General: well-appearing, no acute distress    Abdomen: Soft, healing hematomas present. Non tender. Multiple laparoscopic incision sites present, incisions are closed throughout. There is no active drainage. No rashes present. LUQ air seal largest port is moderately indurated, appropriately healing. Assessment:  Cancer Staging  Endometrial cancer [facility])  Staging form: Corpus Uteri - Carcinoma And Carcinosarcoma, AJCC 8th Edition  - Clinical stage from 2/25/2021: FIGO Stage IA (ycT1a, cN0, cM0) - Signed by Anatoliy Ya MD on 2/25/2021  - Pathologic stage from 2/25/2021: FIGO Stage IA (ypT1a, pN0, cM0) - Signed by Anatoliy Ya MD on 2/25/2021    Post Operative Changes:  Stable healing appropriately    Discussed final pathology results were reviewed with Dr. Redd Colvin, myself and the patient present with her daughter, and all questions were answered to their satisfaction. Plan:  NCCN guidelines based on final pathology of stage IA grade 1, questionable grade 2 on initial endometrial curettings with only high risk feature being age, we would not recommend adjuvant therapy at this time. She will be seen for surveillance. Referrals:  None needed    Follow Up Instructions:  6 weeks    Continue activity restrictions for at least another 6 weeks. No lifting more than 10 lbs, no strenuous activity. Continue to ambulate and stay hydrated.      Electronically signed by Carolyne Hopkins PA-C on 3/10/21 at 11:00 AM EST

## 2021-03-25 PROBLEM — Z98.890 POST-OPERATIVE STATE: Status: RESOLVED | Noted: 2021-02-23 | Resolved: 2021-03-25

## 2021-04-12 RX ORDER — ALENDRONATE SODIUM 70 MG/1
70 TABLET ORAL
Qty: 12 TABLET | Refills: 0 | Status: SHIPPED | OUTPATIENT
Start: 2021-04-12 | End: 2021-04-16 | Stop reason: SDUPTHER

## 2021-04-12 NOTE — TELEPHONE ENCOUNTER
Michaeleen Rabon called requesting a refill of the below medication which has been pended for you:     Requested Prescriptions     Pending Prescriptions Disp Refills    alendronate (FOSAMAX) 70 MG tablet [Pharmacy Med Name: ALENDRONATE SODIUM 70 MG Tablet] 12 tablet 0     Sig: TAKE 1 TABLET BY MOUTH EVERY 7 DAYS       Last Appointment Date: 10/16/2020  Next Appointment Date: 4/16/2021 (6 month follow up)    Allergies   Allergen Reactions    Codeine Nausea Only

## 2021-04-16 ENCOUNTER — OFFICE VISIT (OUTPATIENT)
Dept: FAMILY MEDICINE CLINIC | Age: 81
End: 2021-04-16
Payer: MEDICARE

## 2021-04-16 ENCOUNTER — HOSPITAL ENCOUNTER (OUTPATIENT)
Dept: LAB | Age: 81
Discharge: HOME | End: 2021-04-16
Payer: MEDICARE

## 2021-04-16 VITALS
SYSTOLIC BLOOD PRESSURE: 146 MMHG | DIASTOLIC BLOOD PRESSURE: 78 MMHG | BODY MASS INDEX: 31.25 KG/M2 | HEIGHT: 59 IN | WEIGHT: 155 LBS | HEART RATE: 72 BPM

## 2021-04-16 DIAGNOSIS — I25.10 CORONARY ARTERY DISEASE INVOLVING NATIVE CORONARY ARTERY OF NATIVE HEART WITHOUT ANGINA PECTORIS: ICD-10-CM

## 2021-04-16 DIAGNOSIS — M85.80 OSTEOPENIA, UNSPECIFIED LOCATION: ICD-10-CM

## 2021-04-16 DIAGNOSIS — E78.00 PURE HYPERCHOLESTEROLEMIA: ICD-10-CM

## 2021-04-16 DIAGNOSIS — R41.3 MEMORY LOSS: ICD-10-CM

## 2021-04-16 DIAGNOSIS — I51.89 DIASTOLIC DYSFUNCTION: ICD-10-CM

## 2021-04-16 DIAGNOSIS — K25.4 CHRONIC GASTRIC ULCER WITH HEMORRHAGE: Primary | ICD-10-CM

## 2021-04-16 DIAGNOSIS — D50.9 IRON DEFICIENCY ANEMIA, UNSPECIFIED IRON DEFICIENCY ANEMIA TYPE: ICD-10-CM

## 2021-04-16 DIAGNOSIS — E66.09 CLASS 1 OBESITY DUE TO EXCESS CALORIES WITHOUT SERIOUS COMORBIDITY IN ADULT, UNSPECIFIED BMI: ICD-10-CM

## 2021-04-16 DIAGNOSIS — R73.01 IMPAIRED FASTING BLOOD SUGAR: ICD-10-CM

## 2021-04-16 DIAGNOSIS — C54.1 ENDOMETRIAL CANCER (HCC): ICD-10-CM

## 2021-04-16 DIAGNOSIS — I10 ESSENTIAL HYPERTENSION: ICD-10-CM

## 2021-04-16 LAB
ABSOLUTE EOS #: 0.12 K/UL (ref 0–0.44)
ABSOLUTE IMMATURE GRANULOCYTE: 0.03 K/UL (ref 0–0.3)
ABSOLUTE LYMPH #: 2.79 K/UL (ref 1.1–3.7)
ABSOLUTE MONO #: 0.96 K/UL (ref 0.1–1.2)
ALBUMIN SERPL-MCNC: 4.4 G/DL (ref 3.5–5.2)
ALBUMIN/GLOBULIN RATIO: 1.4 (ref 1–2.5)
ALP BLD-CCNC: 60 U/L (ref 35–104)
ALT SERPL-CCNC: 18 U/L (ref 5–33)
ANION GAP SERPL CALCULATED.3IONS-SCNC: 7 MMOL/L (ref 9–17)
AST SERPL-CCNC: 25 U/L
BASOPHILS # BLD: 1 % (ref 0–2)
BASOPHILS ABSOLUTE: 0.06 K/UL (ref 0–0.2)
BILIRUB SERPL-MCNC: 1.15 MG/DL (ref 0.3–1.2)
BUN BLDV-MCNC: 12 MG/DL (ref 8–23)
BUN/CREAT BLD: 19 (ref 9–20)
CALCIUM SERPL-MCNC: 10 MG/DL (ref 8.6–10.4)
CHLORIDE BLD-SCNC: 104 MMOL/L (ref 98–107)
CHOLESTEROL/HDL RATIO: 2.1
CHOLESTEROL: 119 MG/DL
CO2: 29 MMOL/L (ref 20–31)
CREAT SERPL-MCNC: 0.63 MG/DL (ref 0.5–0.9)
DIFFERENTIAL TYPE: NORMAL
EOSINOPHILS RELATIVE PERCENT: 1 % (ref 1–4)
ESTIMATED AVERAGE GLUCOSE: 111 MG/DL
GFR AFRICAN AMERICAN: >60 ML/MIN
GFR NON-AFRICAN AMERICAN: >60 ML/MIN
GFR SERPL CREATININE-BSD FRML MDRD: ABNORMAL ML/MIN/{1.73_M2}
GFR SERPL CREATININE-BSD FRML MDRD: ABNORMAL ML/MIN/{1.73_M2}
GLUCOSE BLD-MCNC: 101 MG/DL (ref 70–99)
HBA1C MFR BLD: 5.5 % (ref 4–6)
HCT VFR BLD CALC: 42.6 % (ref 36.3–47.1)
HDLC SERPL-MCNC: 58 MG/DL
HEMOGLOBIN: 13.9 G/DL (ref 11.9–15.1)
IMMATURE GRANULOCYTES: 0 %
IRON SATURATION: 26 % (ref 20–55)
IRON: 66 UG/DL (ref 37–145)
LDL CHOLESTEROL: 39 MG/DL (ref 0–130)
LYMPHOCYTES # BLD: 28 % (ref 24–43)
MCH RBC QN AUTO: 32.1 PG (ref 25.2–33.5)
MCHC RBC AUTO-ENTMCNC: 32.6 G/DL (ref 25.2–33.5)
MCV RBC AUTO: 98.4 FL (ref 82.6–102.9)
MONOCYTES # BLD: 10 % (ref 3–12)
NRBC AUTOMATED: 0 PER 100 WBC
PDW BLD-RTO: 13.1 % (ref 11.8–14.4)
PLATELET # BLD: 201 K/UL (ref 138–453)
PLATELET ESTIMATE: NORMAL
PMV BLD AUTO: 10 FL (ref 8.1–13.5)
POTASSIUM SERPL-SCNC: 4.6 MMOL/L (ref 3.7–5.3)
RBC # BLD: 4.33 M/UL (ref 3.95–5.11)
RBC # BLD: NORMAL 10*6/UL
SEG NEUTROPHILS: 60 % (ref 36–65)
SEGMENTED NEUTROPHILS ABSOLUTE COUNT: 6.03 K/UL (ref 1.5–8.1)
SODIUM BLD-SCNC: 140 MMOL/L (ref 135–144)
TOTAL IRON BINDING CAPACITY: 256 UG/DL (ref 250–450)
TOTAL PROTEIN: 7.5 G/DL (ref 6.4–8.3)
TRIGL SERPL-MCNC: 111 MG/DL
UNSATURATED IRON BINDING CAPACITY: 190 UG/DL (ref 112–347)
VLDLC SERPL CALC-MCNC: NORMAL MG/DL (ref 1–30)
WBC # BLD: 10 K/UL (ref 3.5–11.3)
WBC # BLD: NORMAL 10*3/UL

## 2021-04-16 PROCEDURE — 99214 OFFICE O/P EST MOD 30 MIN: CPT | Performed by: FAMILY MEDICINE

## 2021-04-16 PROCEDURE — 85025 COMPLETE CBC W/AUTO DIFF WBC: CPT

## 2021-04-16 PROCEDURE — 83036 HEMOGLOBIN GLYCOSYLATED A1C: CPT

## 2021-04-16 PROCEDURE — 80061 LIPID PANEL: CPT

## 2021-04-16 PROCEDURE — G8399 PT W/DXA RESULTS DOCUMENT: HCPCS | Performed by: FAMILY MEDICINE

## 2021-04-16 PROCEDURE — G8427 DOCREV CUR MEDS BY ELIG CLIN: HCPCS | Performed by: FAMILY MEDICINE

## 2021-04-16 PROCEDURE — 83540 ASSAY OF IRON: CPT

## 2021-04-16 PROCEDURE — 1123F ACP DISCUSS/DSCN MKR DOCD: CPT | Performed by: FAMILY MEDICINE

## 2021-04-16 PROCEDURE — 1090F PRES/ABSN URINE INCON ASSESS: CPT | Performed by: FAMILY MEDICINE

## 2021-04-16 PROCEDURE — 1036F TOBACCO NON-USER: CPT | Performed by: FAMILY MEDICINE

## 2021-04-16 PROCEDURE — 99213 OFFICE O/P EST LOW 20 MIN: CPT

## 2021-04-16 PROCEDURE — 80053 COMPREHEN METABOLIC PANEL: CPT

## 2021-04-16 PROCEDURE — 83550 IRON BINDING TEST: CPT

## 2021-04-16 PROCEDURE — 36415 COLL VENOUS BLD VENIPUNCTURE: CPT

## 2021-04-16 PROCEDURE — 4040F PNEUMOC VAC/ADMIN/RCVD: CPT | Performed by: FAMILY MEDICINE

## 2021-04-16 PROCEDURE — G8417 CALC BMI ABV UP PARAM F/U: HCPCS | Performed by: FAMILY MEDICINE

## 2021-04-16 RX ORDER — LISINOPRIL 5 MG/1
TABLET ORAL
Qty: 90 TABLET | Refills: 3 | Status: SHIPPED | OUTPATIENT
Start: 2021-04-16 | End: 2021-06-14

## 2021-04-16 RX ORDER — DONEPEZIL HYDROCHLORIDE 5 MG/1
5 TABLET, FILM COATED ORAL NIGHTLY
Qty: 90 TABLET | Refills: 3 | Status: SHIPPED | OUTPATIENT
Start: 2021-04-16 | End: 2021-11-09 | Stop reason: DRUGHIGH

## 2021-04-16 RX ORDER — ALENDRONATE SODIUM 70 MG/1
70 TABLET ORAL
Qty: 12 TABLET | Refills: 0 | Status: SHIPPED | OUTPATIENT
Start: 2021-04-16 | End: 2021-06-16

## 2021-04-16 ASSESSMENT — ENCOUNTER SYMPTOMS
EYES NEGATIVE: 1
GASTROINTESTINAL NEGATIVE: 1
RESPIRATORY NEGATIVE: 1
ALLERGIC/IMMUNOLOGIC NEGATIVE: 1

## 2021-04-16 ASSESSMENT — PATIENT HEALTH QUESTIONNAIRE - PHQ9
SUM OF ALL RESPONSES TO PHQ QUESTIONS 1-9: 0
SUM OF ALL RESPONSES TO PHQ QUESTIONS 1-9: 0
2. FEELING DOWN, DEPRESSED OR HOPELESS: 0
SUM OF ALL RESPONSES TO PHQ QUESTIONS 1-9: 0

## 2021-04-16 NOTE — PROGRESS NOTES
Subjective:      Patient ID: Hay Rees is a [de-identified] y.o. female. Hypertension    Coronary Artery Disease  Risk factors include hyperlipidemia. Hyperlipidemia    Other  Pertinent negatives include no arthralgias. Routine follow up on chronic medical conditions, refills, and review of updated labs. I have reviewed the patient's medical history in detail and updated the computerized patient record. No significant medical events, injuries, illness, or surgery. No current concerns or complaints. Compliant with medications. No gi symptoms . No chest pain issues. Good exercise tolerance. Driving actively . Driving only locally. Working her own housework. Staying home during covid 19 restrictions. No gi bleeding concerns on review. Vision stable. Memory continues to decline. Cervical cancer diagnosis and s/p karla/bso robotically assisted by Dr. Abdi Salinas. Robotic laparoscopic modified radical hysterectomy, bilateral salpingo-oohporectomy, peritoneal washings for cytology, sentinel lymph node mapping, sentinel lymph node biopsies. Feeling confident they got everything. surveillance plan at present. She has not post operative concerns. Memory continues to decline. She looks to her dtr to answer most questions. Past Medical History:   Diagnosis Date    Abnormal uterine bleeding (AUB) 01/2021    Anxiety     daughter reports since Covid    Asteroid hyalosis of right eye     Bruises easily     per daughter   [facility] Coronary artery disease     status post acute non-Q wave myocardial infarction 08/23/2012, status post drug eluting stent placement in the LAD and 2 drug eluting stents in the left circumflex artery 08/23/2012.     COVID-19 06/30/2020    confusion, SOB, weakness x 6-8 weeks; was hospitalized and did receive plasma    Diastolic dysfunction     grade 1.     Gastric ulcer with hemorrhage 03/03/2015    gastric and esophageal ulcers due to nsaid    Hyperlipidemia     Hypertension     Lives alone     Memory loss     daughter reports since Covid    Mild mitral regurgitation     Mild tricuspid regurgitation     Obesity     Patient in clinical research study 06/27/2020    Expanded Access to Convalescent Plasma for COVID-19 date of completed 6/30/2020    Poor intravenous access     instructed to hydrate for surgery    Vitreous floaters     left eye. Past Surgical History:   Procedure Laterality Date    CARDIAC CATHETERIZATION Left 08/23/2012    left main artery normal, LAD with mid 60 to 70% stenosis, FFR was 0.76, left circumflex artery with mid 90% stenosis with thrombus, RCA with mild irregularities, preserved left ventricular systolic function, ejection fraction estimated around 50%, status post drug eluting stent placement in the LAD and 2 drug eluting stents in the left circumflex artery.      CHOLECYSTECTOMY  01/01/1978    DILATION AND CURETTAGE OF UTERUS N/A 01/20/2021    D & C HYSTEROSCOPY Polypectomy performed by Caridad Mar MD at [facility]. [address], COLON, DIAGNOSTIC  03/05/2015    esophageal/gastric ulcer; few diverticuli    HYSTERECTOMY N/A 2/23/2021    XI ROBOTIC LAPAROSCOPIC MODIFIED RADICAL  HYSTERECTOMY, BILATERAL SALPINGO-OOPHORECTOMY, CYTOLOGIC WASHINGS, SENTINEL LYMPHNODE MAPPINGS, SENTINEL LYMPHNODE BIOPSIES, ICG DYE  performed by Myesha Rivas MD at [address]  02/23/2021    ROBOTIC LAPAROSCOPIC MODIFIED RADICAL  HYSTERECTOMY, BILATERAL SALPINGO-OOPHORECTOMY, CYTOLOGIC WASHINGS, SENTINEL LYMPHNODE MAPPINGS, SENTINEL LYMPHNODE BIOPSIES    UPPER GASTROINTESTINAL ENDOSCOPY  04/16/2015    healed gastric ulcer       Current Outpatient Medications   Medication Sig Dispense Refill    alendronate (FOSAMAX) 70 MG tablet TAKE 1 TABLET BY MOUTH EVERY 7 DAYS 12 tablet 0    isosorbide mononitrate (IMDUR) 30 MG extended release tablet TAKE 1 TABLET EVERY DAY 90 tablet 2    atorvastatin (LIPITOR) 40 MG tablet TAKE 1 TABLET AT BEDTIME 90 tablet 3    Cholecalciferol (VITAMIN D3) 50 MCG (2000 UT) CAPS Take 1 capsule by mouth daily 90 capsule 3    ferrous sulfate (FE TABS 325) 325 (65 Fe) MG EC tablet TAKE 1 TABLET BY MOUTH DAILY (WITH BREAKFAST) 90 tablet 3    lisinopril (PRINIVIL;ZESTRIL) 5 MG tablet TAKE 1 TABLET EVERY DAY 90 tablet 3    metoprolol tartrate (LOPRESSOR) 25 MG tablet TAKE 1 TABLET TWICE DAILY 180 tablet 3    nitroGLYCERIN (NITROSTAT) 0.4 MG SL tablet Place 1 tablet under the tongue every 5 minutes as needed for Chest pain 25 tablet 1    aspirin 81 MG tablet Take 81 mg by mouth daily Takes 5 days per week, last dose  2/17 and instructed not to take any more      ascorbic acid (VITAMIN C) 500 MG tablet Take 500 mg by mouth 2 times daily Indications: patient only takes in the Winter With ana paula hips       Multiple Vitamins-Minerals (MULTIVITAMIN PO) Take 1 tablet by mouth daily.  ibuprofen (ADVIL;MOTRIN) 400 MG tablet Take 1 tablet by mouth every 6 hours as needed for Pain 40 tablet 1     No current facility-administered medications for this visit. Allergies   Allergen Reactions    Codeine Nausea Only     Social History     Tobacco Use    Smoking status: Never Smoker    Smokeless tobacco: Never Used   Substance Use Topics    Alcohol use: Yes     Comment: rare    Drug use: No     Family History   Problem Relation Age of Onset    Heart Attack Father     Cancer Sister     Prostate Cancer Brother            Review of Systems   Constitutional: Negative. HENT: Negative. Eyes: Negative. Respiratory: Negative. Cardiovascular: Negative. Gastrointestinal: Negative. Endocrine: Negative. Genitourinary: Negative. Musculoskeletal: Negative for arthralgias. Skin: Negative. Allergic/Immunologic: Negative. Neurological: Negative. Hematological: Negative. Psychiatric/Behavioral: Positive for confusion and decreased concentration.        Objective:   Physical Exam  Constitutional:       General: She is not in acute distress. Appearance: She is well-developed. HENT:      Head: Normocephalic and atraumatic. Right Ear: External ear normal.      Left Ear: External ear normal.      Mouth/Throat:      Pharynx: No oropharyngeal exudate. Eyes:      General: No scleral icterus. Conjunctiva/sclera: Conjunctivae normal.   Neck:      Musculoskeletal: Neck supple. Thyroid: No thyromegaly. Cardiovascular:      Rate and Rhythm: Normal rate and regular rhythm. Heart sounds: Normal heart sounds. No murmur. Pulmonary:      Effort: Pulmonary effort is normal. No respiratory distress. Breath sounds: Normal breath sounds. No wheezing. Abdominal:      General: Bowel sounds are normal. There is no distension. Palpations: Abdomen is soft. Tenderness: There is no abdominal tenderness. There is no rebound. Musculoskeletal: Normal range of motion. General: No tenderness. Skin:     General: Skin is warm and dry. Findings: No erythema or rash. Neurological:      Mental Status: She is alert and oriented to person, place, and time. Psychiatric:         Behavior: Behavior normal.         Thought Content: Thought content normal.         Judgment: Judgment normal.       BP (!) 146/78 (Site: Right Upper Arm, Position: Sitting, Cuff Size: Large Adult)   Pulse 72   Ht 4' 11\" (1.499 m)   Wt 155 lb (70.3 kg)   LMP  (LMP Unknown)   BMI 31.31 kg/m²    Labs pending draw. Assessment:       Encounter Diagnoses   Name Primary?     Essential hypertension     Chronic gastric ulcer with hemorrhage Yes    Coronary artery disease involving native coronary artery of native heart without angina pectoris     Diastolic dysfunction     Pure hypercholesterolemia     Class 1 obesity due to excess calories without serious comorbidity in adult, unspecified BMI     Osteopenia, unspecified location     Iron deficiency anemia, unspecified iron deficiency anemia type     Impaired fasting blood sugar     Endometrial cancer ([facility] [address]     Memory loss            Plan:        Prior gastric ulcer, likely nsaid related. On asa at present. Asx. No active treatment at present. Will follow up prn concerns. No active treatment at present beyond avoiding nsaids. CAD: CINDY to LAD and LCX 08/2012. Stable/asx. Plan to cont. Asa, lopressor, statin, ace inhibitor. Diastolic dysfunction. No sense of decompensation or any recent chf issues. No peripheral edema or sob . Hyperlipidemia:  Doing well on lipitor. Cont. Current dosing. Obesity. Discussed wt. Loss. Trying to avoid worsening bs control. Colonoscopy normal 2015    She refuses mammogram.  No concerns on home self breast exams. Osteopenia per dexa 9/20/18:  Consider prolia twice a year. Willing to start fosamax. Side effects discussed. History of anemia; more gi bleeding/upper gi ulceration in the past.  Remains asx/normal range. Updated labs pending. Impaired fasting blood sugar. 119.  a1c reassuring at 5.6% last check. Cont. Dietary discretion. Updates pending. Endometrial cancer. S/p karla and bso, ln sampling 1/21. Doing well post op. Memory loss, progressing dementia/memory loss . Currently more moderate. Looking to her dtr. To answer most questions. Planning to start aricept 5 mg/day, after discussion today.

## 2021-04-28 ENCOUNTER — OFFICE VISIT (OUTPATIENT)
Dept: GYNECOLOGIC ONCOLOGY | Age: 81
End: 2021-04-28

## 2021-04-28 VITALS
HEIGHT: 59 IN | DIASTOLIC BLOOD PRESSURE: 78 MMHG | HEART RATE: 61 BPM | SYSTOLIC BLOOD PRESSURE: 161 MMHG | WEIGHT: 157 LBS | BODY MASS INDEX: 31.65 KG/M2 | OXYGEN SATURATION: 98 %

## 2021-04-28 DIAGNOSIS — C54.1 ENDOMETRIAL CANCER (HCC): Primary | ICD-10-CM

## 2021-04-28 DIAGNOSIS — Z98.890 POST-OPERATIVE STATE: ICD-10-CM

## 2021-04-28 PROCEDURE — 99024 POSTOP FOLLOW-UP VISIT: CPT | Performed by: PHYSICIAN ASSISTANT

## 2021-04-28 NOTE — PATIENT INSTRUCTIONS
Return to clinic in 3-4 months for follow up surveillance    May begin to resume normal daily activity in 2 weeks    Call or return to clinic sooner with questions or concerns

## 2021-04-30 DIAGNOSIS — D50.9 IRON DEFICIENCY ANEMIA, UNSPECIFIED IRON DEFICIENCY ANEMIA TYPE: ICD-10-CM

## 2021-04-30 NOTE — TELEPHONE ENCOUNTER
Laz Patterson called requesting a refill of the below medication which has been pended for you:     Requested Prescriptions     Pending Prescriptions Disp Refills    ferrous sulfate (FE TABS 325) 325 (65 Fe) MG EC tablet [Pharmacy Med Name: FERROUS SULFATE 325 (65 Fe) MG Tablet Delayed Release] 90 tablet 3     Sig: TAKE 1 TABLET BY MOUTH DAILY (WITH BREAKFAST)       Last Appointment Date: 4/16/2021  Next Appointment Date: 10/18/2021    Allergies   Allergen Reactions    Codeine Nausea Only

## 2021-05-03 RX ORDER — LANOLIN ALCOHOL/MO/W.PET/CERES
325 CREAM (GRAM) TOPICAL
Qty: 90 TABLET | Refills: 3 | Status: SHIPPED | OUTPATIENT
Start: 2021-05-03 | End: 2021-09-09 | Stop reason: SDUPTHER

## 2021-05-11 ENCOUNTER — OFFICE VISIT (OUTPATIENT)
Dept: OPTOMETRY | Age: 81
End: 2021-05-11
Payer: COMMERCIAL

## 2021-05-11 DIAGNOSIS — H52.203 MYOPIA OF BOTH EYES WITH ASTIGMATISM AND PRESBYOPIA: Primary | ICD-10-CM

## 2021-05-11 DIAGNOSIS — H52.4 MYOPIA OF BOTH EYES WITH ASTIGMATISM AND PRESBYOPIA: Primary | ICD-10-CM

## 2021-05-11 DIAGNOSIS — H25.13 NUCLEAR SCLEROSIS OF BOTH EYES: ICD-10-CM

## 2021-05-11 DIAGNOSIS — H52.13 MYOPIA OF BOTH EYES WITH ASTIGMATISM AND PRESBYOPIA: Primary | ICD-10-CM

## 2021-05-11 PROCEDURE — 92004 COMPRE OPH EXAM NEW PT 1/>: CPT | Performed by: OPTOMETRIST

## 2021-05-11 ASSESSMENT — REFRACTION_WEARINGRX
OD_AXIS: 009
OD_SPHERE: -0.75
OS_AXIS: 174
OS_SPHERE: -1.50
OD_ADD: +2.75
OD_CYLINDER: -5.00
OS_CYLINDER: -5.25
OS_ADD: +2.75
SPECS_TYPE: BIFOCAL

## 2021-05-11 ASSESSMENT — REFRACTION_MANIFEST
OD_SPHERE: -0.50
OD_CYLINDER: -4.25
OS_ADD: +2.75
OD_ADD: +2.75
OS_SPHERE: -0.50
OS_CYLINDER: -5.25
OS_AXIS: 178
OD_AXIS: 009

## 2021-05-11 ASSESSMENT — SLIT LAMP EXAM - LIDS
COMMENTS: NORMAL
COMMENTS: NORMAL

## 2021-05-11 ASSESSMENT — TONOMETRY
OD_IOP_MMHG: 17
IOP_METHOD: NON-CONTACT AIR PUFF
OS_IOP_MMHG: 15

## 2021-05-11 ASSESSMENT — VISUAL ACUITY
OD_CC: 20/70 OU
OS_CC: 20/100
METHOD: SNELLEN - LINEAR
OD_CC+: -2
CORRECTION_TYPE: GLASSES

## 2021-05-11 ASSESSMENT — ENCOUNTER SYMPTOMS
GASTROINTESTINAL NEGATIVE: 0
RESPIRATORY NEGATIVE: 0
EYES NEGATIVE: 0
ALLERGIC/IMMUNOLOGIC NEGATIVE: 0

## 2021-05-11 NOTE — PROGRESS NOTES
Laly Hernandez presents today for   Chief Complaint   Patient presents with    Blurred Vision    Vision Exam   .    HPI     Blurred Vision     In both eyes. Vision is blurred. Context:  distance vision and reading. Comments     Last Vision Exam: 15 yrs ago  Last Ophthalmology Exam: n/a  Last Filled Glasses Rx: 15 yrs ago  Insurance: [employer]  Update: [employer] and reading are getting more blurry  Uses a magnify glass to see up close. Full time glasses                Current Outpatient Medications   Medication Sig Dispense Refill    ferrous sulfate (FE TABS 325) 325 (65 Fe) MG EC tablet TAKE 1 TABLET BY MOUTH DAILY (WITH BREAKFAST) 90 tablet 3    alendronate (FOSAMAX) 70 MG tablet Take 1 tablet by mouth every 7 days 12 tablet 0    lisinopril (PRINIVIL;ZESTRIL) 5 MG tablet 1 po daily 90 tablet 3    metoprolol tartrate (LOPRESSOR) 25 MG tablet 1 po daily 180 tablet 3    ibuprofen (ADVIL;MOTRIN) 400 MG tablet Take 1 tablet by mouth every 6 hours as needed for Pain 40 tablet 1    isosorbide mononitrate (IMDUR) 30 MG extended release tablet TAKE 1 TABLET EVERY DAY 90 tablet 2    atorvastatin (LIPITOR) 40 MG tablet TAKE 1 TABLET AT BEDTIME 90 tablet 3    Cholecalciferol (VITAMIN D3) 50 MCG (2000 UT) CAPS Take 1 capsule by mouth daily 90 capsule 3    nitroGLYCERIN (NITROSTAT) 0.4 MG SL tablet Place 1 tablet under the tongue every 5 minutes as needed for Chest pain 25 tablet 1    aspirin 81 MG tablet Take 81 mg by mouth daily Takes 5 days per week, last dose  2/17 and instructed not to take any more      ascorbic acid (VITAMIN C) 500 MG tablet Take 500 mg by mouth 2 times daily Indications: patient only takes in the Winter With ana paula hips       Multiple Vitamins-Minerals (MULTIVITAMIN PO) Take 1 tablet by mouth daily.       donepezil (ARICEPT) 5 MG tablet Take 1 tablet by mouth nightly (Patient not taking: Reported on 5/11/2021) 90 tablet 3     No current facility-administered medications for this visit. Family History   Problem Relation Age of Onset    Heart Attack Father     Cancer Sister     Prostate Cancer Brother     Cataracts Neg Hx     Diabetes Neg Hx     Glaucoma Neg Hx      Social History     Socioeconomic History    Marital status: Single     Spouse name: None    Number of children: None    Years of education: None    Highest education level: None   Occupational History    None   Social Needs    Financial resource strain: None    Food insecurity     Worry: None     Inability: None    Transportation needs     Medical: None     Non-medical: None   Tobacco Use    Smoking status: Never Smoker    Smokeless tobacco: Never Used   Substance and Sexual Activity    Alcohol use: Yes     Comment: rare    Drug use: No    Sexual activity: Not Currently   Lifestyle    Physical activity     Days per week: None     Minutes per session: None    Stress: None   Relationships    Social connections     Talks on phone: None     Gets together: None     Attends Synagogue service: None     Active member of club or organization: None     Attends meetings of clubs or organizations: None     Relationship status: None    Intimate partner violence     Fear of current or ex partner: None     Emotionally abused: None     Physically abused: None     Forced sexual activity: None   Other Topics Concern    None   Social History Narrative    None     Past Medical History:   Diagnosis Date    Abnormal uterine bleeding (AUB) 01/2021    Anxiety     daughter reports since Covid    Asteroid hyalosis of right eye     Bruises easily     per daughter   [facility] Coronary artery disease     status post acute non-Q wave myocardial infarction 08/23/2012, status post drug eluting stent placement in the LAD and 2 drug eluting stents in the left circumflex artery 08/23/2012.  COVID-19 06/30/2020    confusion, SOB, weakness x 6-8 weeks; was hospitalized and did receive plasma    Diastolic dysfunction     grade 1. Add [place]    Right -0.50 -4.25 009 20/40+ +2.75     Left -0.50 -5.25 178 20/40+ +2.75 20/20ou          Manifest Refraction #2 (Auto)       [facility] [place]    Right -1.00 -4.25 004       Left -0.25 -5.25 179                  Final Rx       [facility] Add [place]    Right -0.50 -4.25 009 20/40+ +2.75     Left -0.50 -5.25 178 20/40+ +2.75 20/20ou    Type: PAL    Expiration Date: 5/12/2023                IMPRESSION:  1. Myopia of both eyes with astigmatism and presbyopia    2. Nuclear sclerosis of both eyes        PLAN:    1. New glasses recommended  2.  Monitor for progression       Patient Instructions   New glasses recommended     We will watch for cataract progression on future visits      Return in about 2 years (around 5/11/2023) for complete eye exam.

## 2021-06-14 ENCOUNTER — OFFICE VISIT (OUTPATIENT)
Dept: CARDIOLOGY | Age: 81
End: 2021-06-14
Payer: MEDICARE

## 2021-06-14 VITALS
BODY MASS INDEX: 30.32 KG/M2 | DIASTOLIC BLOOD PRESSURE: 78 MMHG | HEIGHT: 59 IN | HEART RATE: 51 BPM | WEIGHT: 150.4 LBS | SYSTOLIC BLOOD PRESSURE: 160 MMHG

## 2021-06-14 DIAGNOSIS — I10 ESSENTIAL HYPERTENSION: ICD-10-CM

## 2021-06-14 DIAGNOSIS — I25.10 CORONARY ARTERY DISEASE INVOLVING NATIVE CORONARY ARTERY OF NATIVE HEART WITHOUT ANGINA PECTORIS: Primary | ICD-10-CM

## 2021-06-14 DIAGNOSIS — E78.2 MIXED HYPERLIPIDEMIA: ICD-10-CM

## 2021-06-14 DIAGNOSIS — R00.1 SINUS BRADYCARDIA: ICD-10-CM

## 2021-06-14 PROCEDURE — 99214 OFFICE O/P EST MOD 30 MIN: CPT | Performed by: INTERNAL MEDICINE

## 2021-06-14 PROCEDURE — 93010 ELECTROCARDIOGRAM REPORT: CPT | Performed by: INTERNAL MEDICINE

## 2021-06-14 PROCEDURE — 99213 OFFICE O/P EST LOW 20 MIN: CPT | Performed by: INTERNAL MEDICINE

## 2021-06-14 PROCEDURE — 93005 ELECTROCARDIOGRAM TRACING: CPT | Performed by: INTERNAL MEDICINE

## 2021-06-14 RX ORDER — LISINOPRIL 10 MG/1
10 TABLET ORAL DAILY
Qty: 90 TABLET | Refills: 1 | Status: SHIPPED | OUTPATIENT
Start: 2021-07-05 | End: 2021-09-09 | Stop reason: SDUPTHER

## 2021-06-14 NOTE — PROGRESS NOTES
OR    ENDOSCOPY, COLON, DIAGNOSTIC  03/05/2015    esophageal/gastric ulcer; few diverticuli    HYSTERECTOMY N/A 2/23/2021    XI ROBOTIC LAPAROSCOPIC MODIFIED RADICAL  HYSTERECTOMY, BILATERAL SALPINGO-OOPHORECTOMY, CYTOLOGIC WASHINGS, SENTINEL LYMPHNODE MAPPINGS, SENTINEL LYMPHNODE BIOPSIES, ICG DYE  performed by David Stoner MD at [address] [place] [address]  02/23/2021    ROBOTIC LAPAROSCOPIC MODIFIED RADICAL  HYSTERECTOMY, BILATERAL SALPINGO-OOPHORECTOMY, CYTOLOGIC WASHINGS, SENTINEL LYMPHNODE MAPPINGS, SENTINEL LYMPHNODE BIOPSIES    UPPER GASTROINTESTINAL ENDOSCOPY  04/16/2015    healed gastric ulcer       Family History   Problem Relation Age of Onset    Heart Attack Father     Cancer Sister     Prostate Cancer Brother     Cataracts Neg Hx     Diabetes Neg Hx     Glaucoma Neg Hx        REVIEW OF SYSTEMS:    · Constitutional: there has been no unanticipated weight loss. There's been No change in energy level, No change in activity level. · Eyes: No visual changes or diplopia. No scleral icterus. · ENT: No Headaches, hearing loss or vertigo. No mouth sores or sore throat. · Cardiovascular: AS HPI  · Respiratory: AS HPI  · Gastrointestinal: No abdominal pain, appetite loss, blood in stools. No change in bowel or bladder habits. · Genitourinary: No dysuria, trouble voiding, or hematuria. · Musculoskeletal:  No gait disturbance, No weakness or joint complaints. · Integumentary: No rash or pruritis. · Neurological: No headache, diplopia, change in muscle strength, numbness or tingling. No change in gait, balance, coordination, mood, affect, memory, mentation, behavior. · Psychiatric: No new anxiety or depression. · Endocrine: No temperature intolerance. No excessive thirst, fluid intake, or urination. No tremor. · Hematologic/Lymphatic: No abnormal bruising or bleeding, blood clots or swollen lymph nodes. · Allergic/Immunologic: No nasal congestion or hives.     Physical exam:    BP (!) 166/80 (Site: Right Upper Arm, Position: Sitting, Cuff Size: Medium Adult)   Pulse 51   Ht 4' 11\" (1.499 m)   Wt 150 lb 6.4 oz (68.2 kg)   LMP  (LMP Unknown)   BMI 30.38 kg/m²     Vitals as above. Alert and oriented x 3. No JVD, or carotid bruits. Lungs are clear to auscultation. Heart sounds are regular, normal, no murmur. Abdomen is soft, no tenderness. Extremities No peripheral edema. Meds:    Current Outpatient Medications:     ferrous sulfate (FE TABS 325) 325 (65 Fe) MG EC tablet, TAKE 1 TABLET BY MOUTH DAILY (WITH BREAKFAST), Disp: 90 tablet, Rfl: 3    alendronate (FOSAMAX) 70 MG tablet, Take 1 tablet by mouth every 7 days, Disp: 12 tablet, Rfl: 0    lisinopril (PRINIVIL;ZESTRIL) 5 MG tablet, 1 po daily, Disp: 90 tablet, Rfl: 3    metoprolol tartrate (LOPRESSOR) 25 MG tablet, 1 po daily, Disp: 180 tablet, Rfl: 3    donepezil (ARICEPT) 5 MG tablet, Take 1 tablet by mouth nightly, Disp: 90 tablet, Rfl: 3    ibuprofen (ADVIL;MOTRIN) 400 MG tablet, Take 1 tablet by mouth every 6 hours as needed for Pain, Disp: 40 tablet, Rfl: 1    isosorbide mononitrate (IMDUR) 30 MG extended release tablet, TAKE 1 TABLET EVERY DAY, Disp: 90 tablet, Rfl: 2    atorvastatin (LIPITOR) 40 MG tablet, TAKE 1 TABLET AT BEDTIME, Disp: 90 tablet, Rfl: 3    Cholecalciferol (VITAMIN D3) 50 MCG (2000 UT) CAPS, Take 1 capsule by mouth daily, Disp: 90 capsule, Rfl: 3    aspirin 81 MG tablet, Take 81 mg by mouth daily Takes 5 days per week, last dose  2/17 and instructed not to take any more, Disp: , Rfl:     ascorbic acid (VITAMIN C) 500 MG tablet, Take 500 mg by mouth 2 times daily Indications: patient only takes in the Winter With ana paula hips , Disp: , Rfl:     Multiple Vitamins-Minerals (MULTIVITAMIN PO), Take 1 tablet by mouth daily. , Disp: , Rfl:     nitroGLYCERIN (NITROSTAT) 0.4 MG SL tablet, Place 1 tablet under the tongue every 5 minutes as needed for Chest pain (Patient not taking: Reported on 6/14/2021), Disp: 25 tablet, Rfl: 1    LABS  Lab Results   Component Value Date    CHOL 119 04/16/2021    TRIG 111 04/16/2021    HDL 58 04/16/2021    LDLCHOLESTEROL 39 04/16/2021    VLDL NOT REPORTED 04/16/2021    CHOLHDLRATIO 2.1 04/16/2021       Lab Results   Component Value Date     04/16/2021    K 4.6 04/16/2021     04/16/2021    CO2 29 04/16/2021    BUN 12 04/16/2021    CREATININE 0.63 04/16/2021    GLUCOSE 101 (H) 04/16/2021    CALCIUM 10.0 04/16/2021    PROT 7.5 04/16/2021    LABALBU 4.4 04/16/2021    BILITOT 1.15 04/16/2021    ALKPHOS 60 04/16/2021    AST 25 04/16/2021    ALT 18 04/16/2021    LABGLOM >60 04/16/2021    GFRAA >60 04/16/2021    GLOB 3.2 06/26/2020       EKG: Sinus  Bradycardia   WITHIN NORMAL LIMITS    Nuclear Stress 9/20:  IMPRESSION:  1. Mild anterolateral ischemia. 2.  LVEF 80%. Assessment and Plan:    -HTN-higher, in my office and at other Dr [employer]. Increase lisinopril 10 mg po qd. -Asymptomatic Sinus Bradycardia- will follow  -CAD- CINDY to LAD and LCX 08/2012- stable without significant angina. Continue ASA, statin, BB, imdur.   -Reviewed stress test 9/20- possible mild anterolateral ischemia, reviewed images, not that convincing. -COVID 19 in June 2020- with some memory issues since then. -Obesity - encouraged diet, exercise, and discussed weight loss extensively. -Hyperlipidemia- LDL 39 on 4/21 continue statin.   -History of severe anemia along with gastric and esophageal ulcers 3/2015-     The patient is to continue heart healthy diet, weight loss and exercise as tolerated. Patient's medications and side effects were discussed. Medication refills were provided if needed. Follow up appointment timing was discussed. All questions and concerns were addressed to patient's satisfaction. The patient is to follow up in 6 months or sooner if necessary. Thank you for allowing me to participate in the care of this patient, please do not hesitate to call if you have any questions. Queen Betzaida DO, [facility], [address] [facility]  [email or web address]  [phone]

## 2021-06-16 RX ORDER — ALENDRONATE SODIUM 70 MG/1
70 TABLET ORAL
Qty: 12 TABLET | Refills: 0 | Status: SHIPPED | OUTPATIENT
Start: 2021-06-16 | End: 2021-09-09 | Stop reason: SDUPTHER

## 2021-07-28 ENCOUNTER — OFFICE VISIT (OUTPATIENT)
Dept: GYNECOLOGIC ONCOLOGY | Age: 81
End: 2021-07-28
Payer: MEDICARE

## 2021-07-28 VITALS
HEIGHT: 59 IN | WEIGHT: 146.2 LBS | SYSTOLIC BLOOD PRESSURE: 152 MMHG | HEART RATE: 55 BPM | BODY MASS INDEX: 29.48 KG/M2 | OXYGEN SATURATION: 97 % | DIASTOLIC BLOOD PRESSURE: 76 MMHG

## 2021-07-28 DIAGNOSIS — C54.1 ENDOMETRIAL CANCER (HCC): Primary | ICD-10-CM

## 2021-07-28 PROCEDURE — 99214 OFFICE O/P EST MOD 30 MIN: CPT | Performed by: PHYSICIAN ASSISTANT

## 2021-07-28 ASSESSMENT — ENCOUNTER SYMPTOMS
EYES NEGATIVE: 1
RESPIRATORY NEGATIVE: 1
GASTROINTESTINAL NEGATIVE: 1

## 2021-07-28 NOTE — PROGRESS NOTES
Review of Systems   Constitutional: Negative. HENT:  Negative. Eyes: Negative. Respiratory: Negative. Cardiovascular: Negative. Gastrointestinal: Negative. Endocrine: Negative. Genitourinary: Negative. Musculoskeletal: Negative. Skin: Negative. Neurological: Negative. Hematological: Bruises/bleeds easily (not new).

## 2021-07-28 NOTE — PROGRESS NOTES
[address] [facility], [place] [facility], [address] Dominguez Malik present for her endometrial cancer surveillance visit. 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 She initially presented to her local gynecologist Dr. Jennifer Warner with concerns of post menopausal bleeding in December 2020. A pelvic ultrasound obtained on 1/12/21 revealed uterus measuring 6.8 x 3.8 x 3.2 cm, the endometrial stripe thickness of 25 mm. Bilateral ovaries were within normal limits, there was no evidence of free fluid. She underwent a D&C and polypectomy on 1/20/21 and final pathology revealed  A moderately differentiated endometrioid adenocarcinoma. She was therefore referred to [facility] for further evaluation and treatment. Treatment options were discussed and patient was deemed an appropriate surgical candidate. Her pre operative  was within normal at 12 units on 2/9/2021. Final pathology revealed a well differentiated endometrioid adenocarcinoma, FIGO grade 1, limited to endometrium, surgical margins negative. Tumor size 3.3 cm in largest diameter. Cervix negative for neoplasm. Bilateral fallopian tubes and ovaries were benign. No lymphovascular invasion present. Pelvic washings were negative for malignancy. MMR testing was normal.    She did well post operatively and was discharged home on POD #1. Today, she has no concerns. She has no abdominal or pelvic pain. She has no vaginal bleeding or discharge. She has no new lumps or bumps. Appetite is stable. Normal urination and bowel habits. Denies blood in urine or stool.       ROS:  I have personally reviewed and agree with the review of systems done by my ancillary staff in the EPIC documentation. Physical Exam:    Vitals:    07/28/21 0954 07/28/21 1001   BP: (!) 149/75 (!) 152/76   Site: Left Upper Arm Left Upper Arm   Position: Sitting Sitting   Cuff Size: Medium Adult Medium Adult   Pulse: 55    SpO2: 97%    Weight: 146 lb 3.2 oz (66.3 kg)    Height: 4' 11\" (1.499 m)        General: well- appearing, no acute distress, alert and oriented x 3    HEENT: Thyroid normal size. No cervical or supraclavicular lymphadenopathy. Lungs: Clear to auscultate bilaterally to the bases    No CVA tenderness present bilaterally. Heart: Auscultation reveals regular rate and rhythm. No murmurs or gallops appreciated. Abdomen: Soft, flat. Multiple laparoscopic incision scars present. No herniations. No detectable organomegaly. No palpable masses or ascites. No inguinal lymphadenopathy bilaterally. Extremities: No edema, calf tenderness or deformities bilaterally    Pelvic: The patient has normal female external genitalia including, vulva, urethra, vagina, perineum, Bartholin glands. Uterus, bilateral fallopian tubes and adnexae, and cervix are  surgically absent. Speculum examination using medium speculum reveals no blood or discharge in vaginal vault. The vaginal cuff well-intact with no signs of erythema, induration, separation, or granulation tissue. Bimanual examination: Bladder is non-tender, without mass. There are no palpable vaginal nodules and no other pelvic masses, tenderness or pathology noted.        Assessment/Plan:  Cancer Staging  Endometrial cancer [facility])  Staging form: Corpus Uteri - Carcinoma And Carcinosarcoma, AJCC 8th Edition  - Clinical stage from 2/25/2021: FIGO Stage IA (ycT1a, cN0, cM0) - Signed by Ashkan Dumont MD on 2/25/2021  - Pathologic stage from 2/25/2021: FIGO Stage IA (ypT1a, pN0, cM0) - Signed by Ashkan Dumont MD on 2/25/2021    Impression: FIGO Stage IA grade 1 endometrioid adenocarcinoma, s/p SHARIF/BSO and staging February 2021. No adjuvant treatment. Today there is no clinical evidence of recurrent disease    Her Surveillance plan is as follows:   1. Return to clinic in 4 months for follow up surveillance    2. Referral placed to [facility]    I spent 30 minutes providing care to the patient and >50% of the time was spent counseling and coordinating care, discussing the patient's current situation, reviewing her options, counseling and education her and answering her questions. The patient's pertinent images, labs, and previous records and procedures were reviewed.     Electronically signed by Dwayne Sommers PA-C on 7/28/21 at 10:20 AM ZAINT

## 2021-07-28 NOTE — PATIENT INSTRUCTIONS
Return to clinic in 4 months    Referral placed to Survivorship Clinic    Call or return to clinic sooner with any questions or concerns

## 2021-08-04 ENCOUNTER — TELEPHONE (OUTPATIENT)
Dept: ONCOLOGY | Age: 81
End: 2021-08-04

## 2021-08-17 NOTE — PROGRESS NOTES
[address] [place] [address].  Phone: [phone]  Fax: [phone]       Patient name:           Chiquita Bender  MRN:   [id number]  YOB: 1940  PCP:                           Cookie Leroy MD  Gyn. Oncologist:         Dr. Joan Weiss MD      Reason for Visit:   Chiquita Bender presents via virtual visit with her daughter, Elvira Mcnally, for survivorship visit upon completion of treatment for endometrial cancer. Cancer Diagnosis/Stage:   Endometrioid adenocarcinoma - 1/20/2021  Cancer Staging  Endometrial cancer [facility])  Staging form: Corpus Uteri - Carcinoma And Carcinosarcoma, AJCC 8th Edition  - Clinical stage from 2/25/2021: FIGO Stage IA (ycT1a, cN0, cM0) - Signed by Ruth Blunt MD on 2/25/2021  - Pathologic stage from 2/25/2021: FIGO Stage IA (ypT1a, pN0, cM0) - Signed by Ruth Blunt MD on 2/25/2021    Oncologic Treatment(s):  · Robotic laparoscopic modified radical hysterectomy, bilateral salpingo-oophorectomies, peritoneal washings for cytology, sentinel lymph node mappings with ICG dye, and bilateral pelvic sentinel lymph node biopsies - 2/23/2021    Summary of Case/History:   Chiquita Bender is a very pleasant 80 y.o. y/o female who has a history of endometrial adenocarcinoma. She initially presented to her local gynecologist, , with concerns of post menopausal bleeding in December 2020. A pelvic ultrasound obtained on 1/12/21 revealed uterus measuring 6.8 x 3.8 x 3.2 cm, the endometrial stripe thickness of 25 mm. Bilateral ovaries were within normal limits, there was no evidence of free fluid. Based on these results she then underwent a D&C and polypectomy on 1/20/21 in which final pathology revealed : ENDOMETRIUM, CURETTINGS: ENDOMETRIOID CARCINOMA, FIGO GRADE 2 OF 3. MISMATCH REPAIR BY IHC, RESULT:  NORMAL.  She was therefore referred to [facility] for further evaluation and treatment.      She had initial consult with Dr. Mai Solid on 2/3/2021 in which treatment options were discussed \"A discussion was held with the patient and her daughter regarding management options. We discussed hormonal therapy, radiation therapy and surgery. If she can be cleared from a medical standpoint and a cardiology standpoint she would be a surgical candidate. We discussed various surgical approaches including the traditional approach laparotomy. We discussed laparoscopic approach and robotic laparoscopic approach. Jorge Fan The patient wishes to have a robotic laparoscopic approach if possible. \"    On 2/23/2021 she underwent a robotic laparoscopic modified radical hysterectomy, bilateral salpingo-oophorectomies, peritoneal washings for cytology, sentinel lymph node mappings with ICG dye, and bilateral pelvic sentinel lymph node biopsies. Final pathology revealed a well differentiated endometrioid adenocarcinoma, FIGO grade 1, limited to endometrium, surgical margins negative. Tumor size 3.3 cm in largest diameter. Cervix negative for neoplasm. Bilateral fallopian tubes and ovaries were benign. No lymphovascular invasion present. Pelvic washings were negative for malignancy. MMR testing was normal. Her pre operative  was within normal at 12 units on 2/9/2021. She tolerated the procedure well and was discharged on post op day #1.      Cayden Cintron denies any treatment related side effects to date. Feels well recovered from surgery. She denies any pelvic/abdominal pain, vaginal bleeding/dicsharge or urinary concerns. She continues to follow with gynecologic oncology with her last surveillance visit on 7/28/2021 and it was noted \"Today there is no clinical evidence of recurrent disease. \" She follows every 6 months with her PCP. She previously declined mammograms with PCP for several years. Last colonoscopy 2015. Will not need to repeat unless issues arise. She lives at home alone and is very active. She has a lot of support with her family. She denies any needs during her visit today. Past Medical History:    has a past medical history of Abnormal uterine bleeding (AUB), Anxiety, Asteroid hyalosis of right eye, Bruises easily, Coronary artery disease, ONVOR-50, Diastolic dysfunction, Gastric ulcer with hemorrhage, Hyperlipidemia, Hypertension, Lives alone, Memory loss, Mild mitral regurgitation, Mild tricuspid regurgitation, Obesity, Patient in clinical research study, Poor intravenous access, and Vitreous floaters. Past Surgical History:    has a past surgical history that includes Cholecystectomy (01/01/1978); Cardiac catheterization (Left, 08/23/2012); Endoscopy, colon, diagnostic (03/05/2015); Upper gastrointestinal endoscopy (04/16/2015); Dilation and curettage of uterus (N/A, 01/20/2021); karla and bso (cervix removed) (02/23/2021); and Hysterectomy (N/A, 2/23/2021). Allergies: Allergies   Allergen Reactions    Codeine Nausea Only        Current Medications: has a current medication list which includes the following prescription(s): alendronate, lisinopril, ferrous sulfate, metoprolol tartrate, donepezil, ibuprofen, isosorbide mononitrate, atorvastatin, vitamin d3, nitroglycerin, aspirin, ascorbic acid, and multiple vitamin. Family History:  Family History   Problem Relation Age of Onset    Heart Attack Father     Cancer Sister     Prostate Cancer Brother     Cataracts Neg Hx     Diabetes Neg Hx     Glaucoma Neg Hx        Social History:   reports that she has never smoked. She has never used smokeless tobacco. She reports current alcohol use. She reports that she does not use drugs. Review of Systems   Constitutional: Negative for fatigue, fever and unexpected weight change. HENT: Negative for sore throat, trouble swallowing and voice change. Eyes: Negative for visual disturbance. Respiratory: Negative for cough, chest tightness, shortness of breath and wheezing.     Cardiovascular: Negative for chest pain and leg swelling. Gastrointestinal: Negative for abdominal distention, abdominal pain, anal bleeding, blood in stool, constipation, diarrhea, nausea and vomiting. Endocrine: Negative for polyuria. Genitourinary: Negative for decreased urine volume, difficulty urinating, dysuria, frequency, hematuria, urgency, vaginal bleeding, vaginal discharge and vaginal pain. Musculoskeletal: Negative for gait problem. Skin: Negative for rash and wound. Allergic/Immunologic: Negative for environmental allergies and food allergies. Neurological: Negative for dizziness, facial asymmetry, speech difficulty, weakness, light-headedness and numbness. Hematological: Negative for adenopathy. Psychiatric/Behavioral: Negative for confusion, decreased concentration and sleep disturbance. The patient is not nervous/anxious. Experiencing memory loss since infected with COVID-19       OBJECTIVE:  Vital Signs:LMP  (LMP Unknown)     Physical Exam  Constitutional:       General: She is not in acute distress. Appearance: Normal appearance. She is not ill-appearing. HENT:      Head: Normocephalic and atraumatic. Eyes:      General:         Right eye: No discharge. Left eye: No discharge. Extraocular Movements: Extraocular movements intact. Pulmonary:      Effort: Pulmonary effort is normal. No respiratory distress. Neurological:      General: No focal deficit present. Mental Status: She is alert and oriented to person, place, and time. Psychiatric:         Mood and Affect: Mood normal.         Behavior: Behavior normal.         Thought Content: Thought content normal.         Judgment: Judgment normal.         DATA     1/12/2021 Pelvic Ultrasound  Impression   Abnormal endometrial stripe thickening with possible polyp measuring 1.9 x   1.9 x 1.6 cm.  Endometrial sampling is suggested.      1/20/2021 ENDOMETRIAL BIOPSY performed at [facility]) By Dr. Joao Anthony MD    Final Pathologic Diagnosis:    ENDOMETRIUM, CURETTINGS:        -  ENDOMETRIOID CARCINOMA, FIGO GRADE 2 OF 3. 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 THE RESULTS ARE AS FOLLOWS:     MISMATCH REPAIR BY IHC, RESULT:  NORMAL     GENETIC COUNSELING IS RECOMMENDED FOR THE INTERPRETATION OF ALL   RESULTS. \"     MISMATCH REPAIR BY IHC WITH MLH1:     NORMAL   MISMATCH REPAIR BY IHC WITH MSH2:     NORMAL   MISMATCH REPAIR BY IHC WITH MSH6:     NORMAL   MISMATCH REPAIR BY IHC WITH PMS2:     NORMAL    2/23/2021 Robotic laparoscopic modified radical hysterectomy, bilateral salpingo-oophorectomies, peritoneal washings for cytology, sentinel lymph node mappings with ICG dye, and bilateral pelvic sentinel lymph node biopsies performed by Dr. Fanta Patterson MD at [facility])    Final Pathologic Diagnosis:    1. LEFT PELVIC SOFT TISSUE, BIOPSY:- BENIGN FIBROADIPOSE TISSUE (NO LYMPH NODE IDENTIFIED). 2. RIGHT EXTERNAL ILIAC SENTINEL LYMPH NODE, EXCISIONAL BIOPSY:-   SINGLE BENIGN LYMPH NODE (0/1). 3. RIGHT OBTURATOR SENTINEL LYMPH NODES, EXCISIONAL BIOPSIES:- FOUR BENIGN LYMPH NODES (0/4). 4. LEFT OBTURATOR SENTINEL LYMPH NODE, EXCISIONAL BIOPSY:- SINGLE BENIGN LYMPH NODE (0/1). 5. CERVIX AND UTERUS WITH BILATERAL FALLOPIAN TUBES, HYSTERECTOMY WITH BILATERAL SALPINGO-OOPHORECTOMY:   CERVIX:- NEGATIVE FOR NEOPLASM. - UNREMARKABLE SURGICAL MARGINS. UTERUS:- ENDOMETRIOID ADENOCARCINOMA, FIGO GRADE 1, LIMITED TO THE ENDOMETRIUM.- THE SURGICAL MARGINS ARE NEGATIVE FOR NEOPLASM. - LEIOMYOMATA. - ADENOMYOSIS. BILATERAL FALLOPIAN TUBES AND OVARIES:- NEGATIVE FOR NEOPLASM. -- Diagnosis Comment --   THE SENTINEL LYMPH NODES WERE EVALUATED WITH PANCYTOKERATIN IMMUNOSTAIN (CONTROL APPROPRIATE), WHICH IS NEGATIVE FOR METASTATIC CARCINOMA IN THE LYMPH NODES.     PELVIC WASHINGS:           NEGATIVE FOR MALIGNANCY. Microscopic Description   1-5. PROCEDURE: Radical hysterectomy with bilateral salpingo-oophorectomy   and sentinel lymph node sampling   TUMOR SIZE: 3.3 x 2.9 x 1.2 cm (gross aggregate measurements)   HISTOLOGIC TYPE: Endometrioid adenocarcinoma   HISTOLOGIC GRADE (IF APPLICABLE): FIGO grade 1     MYOMETRIAL TOTAL THICKNESS (CM): 1.1 cm   MYOMETRIAL INVASION DEPTH (CM): Limited to the endometrium   MYOMETRIAL INVASION STATUS (< or >= 50%): Limited to the endometrium   UTERINE SEROSA INVOLVEMENT: Not involved   CERVICAL STROMAL INVOLVEMENT/INVASION: Not involved   OTHER TISSUE / ORGAN INVOLVEMENT (SPECIFY EACH):  Not identified   MARGINS (FOR CERVIX INVOLVEMENT ONLY): Negative (the tumor was 5 cm   from the external cervical os)   -ECTOCERVICAL / VAGINAL CUFF MARGIN: N/A   -PARAMETRIAL / PARACERVICAL MARGIN: Negative   LYMPHOVASCULAR INVASION: Not identified     REGIONAL LYMPH NODES EXAMINED:                    TOTAL OF ALL: 6   -PELVIC, SENTINEL (NUMBER):                  RIGHT: 5;LEFT: 1   -PELVIC, SENTINEL + NON-SENTINEL (NUMBER):               RIGHT: 5   ; LEFT: 1   -PARA-AORTIC, SENTINEL (NUMBER):                                   RIGHT: 0   ;  LEFT: 0   -PARA-AORTIC, SENTINEL + NON-SENTINEL (NUMBER):          RIGHT: 0; LEFT: 0   -OTHER REGIONAL (SPECIFY LOCATION AND NUMBER):          RIGHT: 0; LEFT: 0   LYMPH NODES POSITIVE (SPECIFY NUMBER AND SENTINEL   OR NON-SENTINEL STATUS, IF PROVIDED):   -PELVIC, MACROMETASTASIS (GREATER THAN 2 MM):          RIGHT: 0   ; LEFT: 0   -PELVIC, MICROMETASTASIS (GREATER THAN 0.2 TO 2 MM):          RIGHT: 0;  LEFT: 0   -PELVIC, ISOLATED TUMOR CELLS (0.2 MM OR LESS):                     RIGHT: 0;  LEFT: 0   -PARA-AORTIC, MACROMETASTASIS (GREATER THAN 2 MM):     RIGHT: N/A   ; LEFT: N/A   -PARA-AORTIC, MICROMETASTASIS (GREATER THAN 0.2 TO 2 MM):     RIGHT: N/A   ;  LEFT: N/A   -PARA-AORTIC, ISOLATED TUMOR CELLS (0.2 MM OR LESS):          RIGHT: N/A  ;  LEFT: N/A BIOMARKER TESTING: Mismatch repair analysis performed on the original biopsy was normal.     PATHOLOGIC STAGE CLASSIFICATION (pTNM [2015 FIGO]):      pT1a  pN0   (sn)     [FIGO IA]   DISTANT METASTASES (REQUIRED ONLY IF CONFIRMED PATHOLOGICALLY IN THIS   CASE): N/A   From CAP Endometrium 4102 in conjunction with AJCC 8 ed. Assessment/Plan:  1. Endometrial cancer ([facility] [address]  · Continue scheduled follow-up for endometrial cancer surveillance as discussed. · Dr. Patsy Gonzalez PA-C (gynecologic oncologist): 11/3/2021  · Survivorship Care Plan/Treatment summary extensively reviewed. Refer to Survivorship Care Plan/Treatment summary for details. · Extensive education provided on potential long/late term effects of treatment, signs/symptoms of recurrence to report, follow-up care plan, healthy lifestyle promotion, and available local and national resources. · Continue to follow with PCP for screening of new primary cancers and management of non-cancer related problems/diagnoses. · Next appt: 10/18/2021  · Colorectal cancer screening: Last colonoscopy 2015. Will not need to repeat unless issues arise  · Breast cancer screening: d She previously declined mammograms with PCP for several years. Electronically signed by MEÑO Grullon CNP          I spent 45 minutes face to face with patient and greater than 50% of time was spent on survivorship education . Miguel Maier, was evaluated through a synchronous (real-time) audio-video encounter. The patient (or guardian if applicable) is aware that this is a billable service. Verbal consent to proceed has been obtained within the past 12 months. The visit was conducted pursuant to the emergency declaration under the [address] [place] [address], [address] [place] [address] authority and the [employer] General Act. Patient identification was verified, and a caregiver was present when appropriate.  The

## 2021-08-25 ENCOUNTER — OFFICE VISIT (OUTPATIENT)
Dept: ONCOLOGY | Age: 81
End: 2021-08-25
Payer: MEDICARE

## 2021-08-25 DIAGNOSIS — C54.1 ENDOMETRIAL CANCER (HCC): Primary | ICD-10-CM

## 2021-08-25 PROCEDURE — 99215 OFFICE O/P EST HI 40 MIN: CPT | Performed by: NURSE PRACTITIONER

## 2021-08-25 ASSESSMENT — ENCOUNTER SYMPTOMS
CONSTIPATION: 0
WHEEZING: 0
BLOOD IN STOOL: 0
SORE THROAT: 0
VOMITING: 0
DIARRHEA: 0
TROUBLE SWALLOWING: 0
CHEST TIGHTNESS: 0
ABDOMINAL PAIN: 0
SHORTNESS OF BREATH: 0
ABDOMINAL DISTENTION: 0
NAUSEA: 0
ANAL BLEEDING: 0
VOICE CHANGE: 0
COUGH: 0

## 2021-08-25 NOTE — PROGRESS NOTES
Cancer Treatment Summary and  Survivorship Care Plan    Provided by DE Goldman, AOCNP on 8/25/2021      This Cancer Treatment Summary is a brief record of your cancer treatment. The forms provide a way for a cancer survivor to review and retain information about their cancer, cancer treatment, and follow-up care. The forms are also meant to provide basic information about a survivor's care to future healthcare providers. General Information   Patient name Ada Vera    Patient ID [id number]    Phone [phone] (home)    Date of birth 1940    Age 80 y.o. Support contact Extended Emergency Contact Information  Primary Emergency Contact: Terese Fontenot  Address: [address] [place] [address] , [address] [facility]  Home Phone: [phone]  Relation: Child   needed? No  Secondary Emergency ContactArabella Cruz [address] [facility]  Home Phone: [phone]  Relation: Child   needed? No      Health Care Team   Gynecologic Oncologist Dr. Leonel Ojeda MD/[facility]                         Phone: [phone]   Primary Care Physician Bert Ott MD, [phone]   Survivorship Coordinator DE Goldman                       Phone: [phone]     Cancer Diagnosis Information    Cancer Type/Location Endometrioid adenocarcinoma grade 1   Diagnosis date 1/20/2021   Age at diagnosis [de-identified] y.o.    Stage at Diagnosis Cancer Staging  Endometrial cancer [facility])  Staging form: Corpus Uteri - Carcinoma And Carcinosarcoma, AJCC 8th Edition  - Clinical stage from 2/25/2021: FIGO Stage IA (ycT1a, cN0, cM0) - Signed by Aureliano Araujo MD on 2/25/2021  - Pathologic stage from 2/25/2021: FIGO Stage IA (ypT1a, pN0, cM0) - Signed by Aureliano Araujo MD on 2/25/2021   Tumor markers at Diagnosis    Date Result Reference Range   2/9/2021 12 < 38      Surgical procedure/location/  findings Procedure/Findings Date   ENDOMETRIAL BIOPSY performed at [facility]) By Dr. Sudarshan Castano MD Charan    Final Pathologic Diagnosis:    ENDOMETRIUM, CURETTINGS:        -  ENDOMETRIOID CARCINOMA, FIGO GRADE 2 OF 3. 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 THE RESULTS ARE AS FOLLOWS:     MISMATCH REPAIR BY IHC, RESULT:  NORMAL     GENETIC COUNSELING IS RECOMMENDED FOR THE INTERPRETATION OF ALL RESULTS. \"     MISMATCH REPAIR BY IHC WITH MLH1:     NORMAL   MISMATCH REPAIR BY IHC WITH MSH2:     NORMAL   MISMATCH REPAIR BY IHC WITH MSH6:     NORMAL   MISMATCH REPAIR BY IHC WITH PMS2:     NORMAL 1/20/21   Robotic laparoscopic modified radical hysterectomy, bilateral salpingo-oophorectomies, peritoneal washings for cytology, sentinel lymph node mappings with ICG dye, and bilateral pelvic sentinel lymph node biopsies performed by Dr. Jenette Boxer, MD at [facility])    Final Pathologic Diagnosis:    1. LEFT PELVIC SOFT TISSUE, BIOPSY:- BENIGN FIBROADIPOSE TISSUE (NO LYMPH NODE IDENTIFIED). 2. RIGHT EXTERNAL ILIAC SENTINEL LYMPH NODE, EXCISIONAL BIOPSY:-   SINGLE BENIGN LYMPH NODE (0/1). 3. RIGHT OBTURATOR SENTINEL LYMPH NODES, EXCISIONAL BIOPSIES:- FOUR BENIGN LYMPH NODES (0/4). 4. LEFT OBTURATOR SENTINEL LYMPH NODE, EXCISIONAL BIOPSY:- SINGLE BENIGN LYMPH NODE (0/1). 5. CERVIX AND UTERUS WITH BILATERAL FALLOPIAN TUBES, HYSTERECTOMY WITH BILATERAL SALPINGO-OOPHORECTOMY:   CERVIX:- NEGATIVE FOR NEOPLASM. - UNREMARKABLE SURGICAL MARGINS. UTERUS:- ENDOMETRIOID ADENOCARCINOMA, FIGO GRADE 1, LIMITED TO THE ENDOMETRIUM.- THE SURGICAL MARGINS ARE NEGATIVE FOR NEOPLASM. - LEIOMYOMATA. - ADENOMYOSIS. BILATERAL FALLOPIAN TUBES AND OVARIES:- NEGATIVE FOR NEOPLASM. -- Diagnosis Comment --   THE SENTINEL LYMPH NODES WERE EVALUATED WITH PANCYTOKERATIN IMMUNOSTAIN (CONTROL APPROPRIATE), WHICH IS NEGATIVE FOR METASTATIC CARCINOMA IN THE LYMPH NODES.     PELVIC WASHINGS: NEGATIVE FOR MALIGNANCY. Microscopic Description   1-5. PROCEDURE: Radical hysterectomy with bilateral salpingo-oophorectomy   and sentinel lymph node sampling   TUMOR SIZE: 3.3 x 2.9 x 1.2 cm (gross aggregate measurements)   HISTOLOGIC TYPE: Endometrioid adenocarcinoma   HISTOLOGIC GRADE (IF APPLICABLE): FIGO grade 1     MYOMETRIAL TOTAL THICKNESS (CM): 1.1 cm   MYOMETRIAL INVASION DEPTH (CM): Limited to the endometrium   MYOMETRIAL INVASION STATUS (< or >= 50%): Limited to the endometrium   UTERINE SEROSA INVOLVEMENT: Not involved   CERVICAL STROMAL INVOLVEMENT/INVASION: Not involved   OTHER TISSUE / ORGAN INVOLVEMENT (SPECIFY EACH):  Not identified   MARGINS (FOR CERVIX INVOLVEMENT ONLY): Negative (the tumor was 5 cm from the external cervical os)   -ECTOCERVICAL / VAGINAL CUFF MARGIN: N/A   -PARAMETRIAL / PARACERVICAL MARGIN: Negative   LYMPHOVASCULAR INVASION: Not identified     REGIONAL LYMPH NODES EXAMINED:                    TOTAL OF ALL: 6   -PELVIC, SENTINEL (NUMBER):                  RIGHT: 5;LEFT: 1   -PELVIC, SENTINEL + NON-SENTINEL (NUMBER):               RIGHT: 5   ; LEFT: 1   -PARA-AORTIC, SENTINEL (NUMBER):                                   RIGHT: 0   ;  LEFT: 0   -PARA-AORTIC, SENTINEL + NON-SENTINEL (NUMBER):          RIGHT: 0; LEFT: 0   -OTHER REGIONAL (SPECIFY LOCATION AND NUMBER):          RIGHT: 0; LEFT: 0   LYMPH NODES POSITIVE (SPECIFY NUMBER AND SENTINEL   OR NON-SENTINEL STATUS, IF PROVIDED):   -PELVIC, MACROMETASTASIS (GREATER THAN 2 MM):          RIGHT: 0   ; LEFT: 0   -PELVIC, MICROMETASTASIS (GREATER THAN 0.2 TO 2 MM):          RIGHT: 0;  LEFT: 0   -PELVIC, ISOLATED TUMOR CELLS (0.2 MM OR LESS):             RIGHT: 0;  LEFT: 0   -PARA-AORTIC, MACROMETASTASIS (GREATER THAN 2 MM):     RIGHT: N/A   ; LEFT: N/A   -PARA-AORTIC, MICROMETASTASIS (GREATER THAN 0.2 TO 2 MM):     RIGHT: N/A   ;  LEFT: N/A   -PARA-AORTIC, ISOLATED TUMOR CELLS (0.2 MM OR LESS):          RIGHT: N/A  ;  LEFT: N/A     BIOMARKER TESTING: Mismatch repair analysis performed on the original biopsy was normal.     PATHOLOGIC STAGE CLASSIFICATION (pTNM [2015 FIGO]):      pT1a  pN0   (sn)     [FIGO IA]   DISTANT METASTASES (REQUIRED ONLY IF CONFIRMED PATHOLOGICALLY IN THIS   CASE): N/A   From CAP Endometrium 4102 in conjunction with AJCC 8 ed. 21          Background Information   Genetic/hereditary risk factor(s) or predisposing conditions Cancer-related family history includes Brain Cancer in her sister; Prostate Cancer in her brother. Genetic counseling performed No     Treatment Summary   CHEMOTHERAPY No      RADIATION No     SURGERY/CHEMOTHERAPY/  RADIATION    Treatment-related hospitalization required -2021: Pertinent Findings & Procedures:   Sedrick Alonso is a [de-identified] y.o. female ., admitted for surgical management of endometrial cancer; received Ancef and Lovenox pre operatively. She underwent robotic laparoscopic modified radical hysterectomy, bilateral salpingo-oohporectomy, peritoneal washings for cytology, sentinel lymph node mapping, sentinel lymph node biopsies on 21. Post-op course normal, discharged home 21. Follow up in 1-2 weeks. Ongoing toxicity or side-effects of all treatments received at the completion of treatment. Denies any treatment related side effects to date. Feels well recovered from surgery. Ongoing Treatment    Need for ongoing (adjuvant) treatment for cancer No     Follow-Up Care Plan    Continue all standard non-cancer related health care with your Primary Care Provider. Any new, unusual and/or persistent symptoms should be brought to the attention of your provider. Primary Care Physician Shelly Oneill MD, [phone]    Coordinating Provider When / How Often?    Gynecologic Oncology visits Dr. Maxine Quintanilla MD/  Tobias Lua PA-C       Phone: [phone]   Every 3-6 months for 2-3 years every 6 months for up to 5 years, then annually per NCCN Guidelines Next appt: 11/3/2021   Lab [facility] Dr[facility] Marilia Perez [facility]  [facility], [facility] CA-125 if initially elevated per NCCN Guidelines     CA-125 was within normal limits initially   Imaging Dr. Marilia Perez MD/  [facility] Imaging should be guided by patient symptoms, risk assessment, and clinical concern for recurrent disease per NCCN Guidelines    Other Cancer Screenings Alexia Santiago MD, [phone] Colorectal cancer screening: The American Cancer Society recommends that people at average risk* of colorectal cancer start regular screening at age 39. This can be done either with a sensitive test that looks for signs of cancer in a persons stool (a stool-based test), or with an exam that looks at the colon and rectum (a visual exam). People who are in good health and with a life expectancy of more than 10 years should continue regular colorectal cancer screening through the age of 76. Last colonoscopy 2015. Will not need to repeat unless issues arise    Breast cancer screening: The American Cancer Society recommends that the following women (of average risk) undergo screening mammograms:      Women between 36 and 44 have the option to start screening with a mammogram every year.  Women 45 to 47 should get mammograms every year.  Women 54 and older can switch to a mammogram every other year, or they can choose to continue yearly mammograms. Screening should continue as long as a woman is in good health and is expected to live at least 10 more years. Has previously declined mammograms with PCP for several years.        Potential late- and long-term effects of treatment(s) Long-term Effects  Start during treatment and persist Late Effects  Start after treatment ends   Surgery Effects    Surgical menopause     Urogenital/sexual dysfunction  e.g., dyspareunia (painful intercourse), vaginal dryness, incontinence   Infertility  Increased risk of bowel obstruction   General Psychological Long-term and Late Effects    Depression   Distressmultifactorial unpleasant experience of psychological, social and/or spiritual nature   Worry, anxiety   Fear of recurrence   Fear of pain   End-of-life concerns: Death and dying   Changes in sexual function and/or desire   Challenges with body image   Challenges with self-image   Relationship and other social role difficulties   Xrxmuf-[id number] concerns and financial challenges             Call your doctor if you have any of these signs or symptoms    Unusual vaginal bleeding, spotting, or other discharge   Pelvic pain   Unexplained weight loss   Persistent fatigue   Pain during sex       Issues/Concerns   None. Douglas Ariza is recovering well from her surgery and denies any concerns or needs. She has a lot of family support to assist with her medical care/appointments. Lifestyle/Behaviors   Education provided on healthy lifestyle promotion. See handouts provided at [facility]   See handout provided at Survivorship Visit      Survivorship Patient [address] [place] (www.cancer. org/SurvivorshipCenter)  Cancer Survivors Network (csn.cancer. org)  [employer] (www.livestrong. org)  [facility] (www.survivorship.cancer.gov/springboard)  [employer] [facility] [employer] (www.canceradvocacy. org)   [address] (www.nccn.org/patients/resources/life_after_cancer/)           Copies of this Survivorship Care Plan sent to: [facility], PA-C, Dr. Osmar Alvarado and Dr. Yannick Norman

## 2021-09-09 DIAGNOSIS — I10 ESSENTIAL HYPERTENSION: ICD-10-CM

## 2021-09-09 DIAGNOSIS — D50.9 IRON DEFICIENCY ANEMIA, UNSPECIFIED IRON DEFICIENCY ANEMIA TYPE: ICD-10-CM

## 2021-09-09 RX ORDER — LISINOPRIL 10 MG/1
10 TABLET ORAL DAILY
Qty: 90 TABLET | Refills: 1 | Status: SHIPPED | OUTPATIENT
Start: 2021-09-09 | End: 2022-03-09 | Stop reason: SDUPTHER

## 2021-09-09 RX ORDER — ACETAMINOPHEN 160 MG
1 TABLET,DISINTEGRATING ORAL DAILY
Qty: 90 CAPSULE | Refills: 3 | Status: SHIPPED | OUTPATIENT
Start: 2021-09-09 | End: 2022-07-25

## 2021-09-09 RX ORDER — ATORVASTATIN CALCIUM 40 MG/1
TABLET, FILM COATED ORAL
Qty: 90 TABLET | Refills: 3 | Status: SHIPPED | OUTPATIENT
Start: 2021-09-09 | End: 2022-08-04

## 2021-09-09 RX ORDER — LANOLIN ALCOHOL/MO/W.PET/CERES
325 CREAM (GRAM) TOPICAL
Qty: 90 TABLET | Refills: 3 | Status: SHIPPED | OUTPATIENT
Start: 2021-09-09 | End: 2022-07-25

## 2021-09-09 RX ORDER — ALENDRONATE SODIUM 70 MG/1
70 TABLET ORAL
Qty: 12 TABLET | Refills: 0 | Status: SHIPPED | OUTPATIENT
Start: 2021-09-09 | End: 2021-11-09 | Stop reason: SDUPTHER

## 2021-11-01 ENCOUNTER — HOSPITAL ENCOUNTER (OUTPATIENT)
Dept: LAB | Age: 81
Discharge: HOME | End: 2021-11-01
Payer: MEDICARE

## 2021-11-01 DIAGNOSIS — R73.01 IMPAIRED FASTING BLOOD SUGAR: ICD-10-CM

## 2021-11-01 DIAGNOSIS — I10 ESSENTIAL HYPERTENSION: ICD-10-CM

## 2021-11-01 DIAGNOSIS — E78.00 PURE HYPERCHOLESTEROLEMIA: ICD-10-CM

## 2021-11-01 LAB
ABSOLUTE EOS #: 0.08 K/UL (ref 0–0.44)
ABSOLUTE IMMATURE GRANULOCYTE: <0.03 K/UL (ref 0–0.3)
ABSOLUTE LYMPH #: 2.95 K/UL (ref 1.1–3.7)
ABSOLUTE MONO #: 0.6 K/UL (ref 0.1–1.2)
ALBUMIN SERPL-MCNC: 4.6 G/DL (ref 3.5–5.2)
ALBUMIN/GLOBULIN RATIO: 1.5 (ref 1–2.5)
ALP BLD-CCNC: 65 U/L (ref 35–104)
ALT SERPL-CCNC: 21 U/L (ref 5–33)
ANION GAP SERPL CALCULATED.3IONS-SCNC: 10 MMOL/L (ref 9–17)
AST SERPL-CCNC: 27 U/L
BASOPHILS # BLD: 1 % (ref 0–2)
BASOPHILS ABSOLUTE: 0.07 K/UL (ref 0–0.2)
BILIRUB SERPL-MCNC: 1.13 MG/DL (ref 0.3–1.2)
BUN BLDV-MCNC: 10 MG/DL (ref 8–23)
BUN/CREAT BLD: 15 (ref 9–20)
CALCIUM SERPL-MCNC: 10.2 MG/DL (ref 8.6–10.4)
CHLORIDE BLD-SCNC: 105 MMOL/L (ref 98–107)
CHOLESTEROL/HDL RATIO: 2
CHOLESTEROL: 121 MG/DL
CO2: 27 MMOL/L (ref 20–31)
CREAT SERPL-MCNC: 0.66 MG/DL (ref 0.5–0.9)
DIFFERENTIAL TYPE: NORMAL
EOSINOPHILS RELATIVE PERCENT: 1 % (ref 1–4)
GFR AFRICAN AMERICAN: >60 ML/MIN
GFR NON-AFRICAN AMERICAN: >60 ML/MIN
GFR SERPL CREATININE-BSD FRML MDRD: ABNORMAL ML/MIN/{1.73_M2}
GFR SERPL CREATININE-BSD FRML MDRD: ABNORMAL ML/MIN/{1.73_M2}
GLUCOSE BLD-MCNC: 111 MG/DL (ref 70–99)
HCT VFR BLD CALC: 46.3 % (ref 36.3–47.1)
HDLC SERPL-MCNC: 62 MG/DL
HEMOGLOBIN: 15.1 G/DL (ref 11.9–15.1)
IMMATURE GRANULOCYTES: 0 %
LDL CHOLESTEROL: 36 MG/DL (ref 0–130)
LYMPHOCYTES # BLD: 36 % (ref 24–43)
MCH RBC QN AUTO: 31.9 PG (ref 25.2–33.5)
MCHC RBC AUTO-ENTMCNC: 32.6 G/DL (ref 25.2–33.5)
MCV RBC AUTO: 97.7 FL (ref 82.6–102.9)
MONOCYTES # BLD: 7 % (ref 3–12)
NRBC AUTOMATED: 0 PER 100 WBC
PDW BLD-RTO: 12.5 % (ref 11.8–14.4)
PLATELET # BLD: 192 K/UL (ref 138–453)
PLATELET ESTIMATE: NORMAL
PMV BLD AUTO: 9.4 FL (ref 8.1–13.5)
POTASSIUM SERPL-SCNC: 5 MMOL/L (ref 3.7–5.3)
RBC # BLD: 4.74 M/UL (ref 3.95–5.11)
RBC # BLD: NORMAL 10*6/UL
SEG NEUTROPHILS: 55 % (ref 36–65)
SEGMENTED NEUTROPHILS ABSOLUTE COUNT: 4.52 K/UL (ref 1.5–8.1)
SODIUM BLD-SCNC: 142 MMOL/L (ref 135–144)
TOTAL PROTEIN: 7.7 G/DL (ref 6.4–8.3)
TRIGL SERPL-MCNC: 116 MG/DL
VLDLC SERPL CALC-MCNC: NORMAL MG/DL (ref 1–30)
WBC # BLD: 8.2 K/UL (ref 3.5–11.3)
WBC # BLD: NORMAL 10*3/UL

## 2021-11-01 PROCEDURE — 85025 COMPLETE CBC W/AUTO DIFF WBC: CPT

## 2021-11-01 PROCEDURE — 83036 HEMOGLOBIN GLYCOSYLATED A1C: CPT

## 2021-11-01 PROCEDURE — 80053 COMPREHEN METABOLIC PANEL: CPT

## 2021-11-01 PROCEDURE — 36415 COLL VENOUS BLD VENIPUNCTURE: CPT

## 2021-11-01 PROCEDURE — 80061 LIPID PANEL: CPT

## 2021-11-02 LAB
ESTIMATED AVERAGE GLUCOSE: 120 MG/DL
HBA1C MFR BLD: 5.8 % (ref 4–6)

## 2021-11-03 ENCOUNTER — OFFICE VISIT (OUTPATIENT)
Dept: GYNECOLOGIC ONCOLOGY | Age: 81
End: 2021-11-03
Payer: MEDICARE

## 2021-11-03 VITALS
BODY MASS INDEX: 29.03 KG/M2 | HEIGHT: 59 IN | HEART RATE: 64 BPM | SYSTOLIC BLOOD PRESSURE: 137 MMHG | DIASTOLIC BLOOD PRESSURE: 73 MMHG | OXYGEN SATURATION: 97 % | WEIGHT: 144 LBS

## 2021-11-03 DIAGNOSIS — C54.1 ENDOMETRIAL CANCER (HCC): Primary | ICD-10-CM

## 2021-11-03 PROCEDURE — 1090F PRES/ABSN URINE INCON ASSESS: CPT | Performed by: PHYSICIAN ASSISTANT

## 2021-11-03 PROCEDURE — G8417 CALC BMI ABV UP PARAM F/U: HCPCS | Performed by: PHYSICIAN ASSISTANT

## 2021-11-03 PROCEDURE — G8484 FLU IMMUNIZE NO ADMIN: HCPCS | Performed by: PHYSICIAN ASSISTANT

## 2021-11-03 PROCEDURE — 1036F TOBACCO NON-USER: CPT | Performed by: PHYSICIAN ASSISTANT

## 2021-11-03 PROCEDURE — 4040F PNEUMOC VAC/ADMIN/RCVD: CPT | Performed by: PHYSICIAN ASSISTANT

## 2021-11-03 PROCEDURE — G8399 PT W/DXA RESULTS DOCUMENT: HCPCS | Performed by: PHYSICIAN ASSISTANT

## 2021-11-03 PROCEDURE — G8427 DOCREV CUR MEDS BY ELIG CLIN: HCPCS | Performed by: PHYSICIAN ASSISTANT

## 2021-11-03 PROCEDURE — 1123F ACP DISCUSS/DSCN MKR DOCD: CPT | Performed by: PHYSICIAN ASSISTANT

## 2021-11-03 PROCEDURE — 99214 OFFICE O/P EST MOD 30 MIN: CPT | Performed by: PHYSICIAN ASSISTANT

## 2021-11-03 NOTE — PATIENT INSTRUCTIONS
Return to clinic in 6 months for follow-up surveillance    Return to clinic sooner with any questions or concerns

## 2021-11-03 NOTE — PROGRESS NOTES
[address] [facility], [facility], [address] Doming[place] Malik present for her endometrial cancer surveillance visit. 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 She initially presented to her local gynecologist Dr. Sylvester Carolina with concerns of post menopausal bleeding in December 2020. A pelvic ultrasound obtained on 1/12/21 revealed uterus measuring 6.8 x 3.8 x 3.2 cm, the endometrial stripe thickness of 25 mm. Bilateral ovaries were within normal limits, there was no evidence of free fluid. She underwent a D&C and polypectomy on 1/20/21 and final pathology revealed  A moderately differentiated endometrioid adenocarcinoma. She was therefore referred to [facility] for further evaluation and treatment. Treatment options were discussed and patient was deemed an appropriate surgical candidate. Her pre operative  was within normal at 12 units on 2/9/2021. Final pathology revealed a well differentiated endometrioid adenocarcinoma, FIGO grade 1, limited to endometrium, surgical margins negative. Tumor size 3.3 cm in largest diameter. Cervix negative for neoplasm. Bilateral fallopian tubes and ovaries were benign. No lymphovascular invasion present. Pelvic washings were negative for malignancy. MMR testing was normal.    She did well post operatively and was discharged home on POD #1. She has met with Survivorship clinic. Today, she has no concerns. She has no abdominal or pelvic pain. She has no vaginal bleeding or discharge. She has no new lumps or bumps. Appetite is stable. Normal urination and bowel habits. Denies blood in urine or stool.     ROS:  I have personally reviewed and agree with the review of systems done by my ancillary staff in the [facility] documentation. Physical Exam:    Vitals:    11/03/21 1007   BP: 137/73   Pulse: 64   SpO2: 97%   Weight: 144 lb (65.3 kg)   Height: 4' 11\" (1.499 m)       General: well- appearing, no acute distress, alert and oriented x 3    HEENT: Thyroid normal size. No cervical or supraclavicular lymphadenopathy. Lungs: Clear to auscultate bilaterally to the bases    No CVA tenderness present bilaterally. Heart: Auscultation reveals regular rate and rhythm. No murmurs or gallops appreciated. Abdomen: Soft, flat. Multiple laparoscopic incision scars present. No herniations. No detectable organomegaly. No palpable masses or ascites. No inguinal lymphadenopathy bilaterally. Extremities: No edema, calf tenderness or deformities bilaterally    Pelvic: The patient has normal female external genitalia including, vulva, urethra, vagina, perineum, Bartholin glands. Uterus, bilateral fallopian tubes and adnexae, and cervix are  surgically absent. Speculum examination using medium speculum reveals no blood or discharge in vaginal vault. The vaginal cuff well-intact with no signs of erythema, induration, separation, or granulation tissue. Bimanual examination: Bladder is non-tender, without mass. There are no palpable vaginal nodules and no other pelvic masses, tenderness or pathology noted. Assessment/Plan:  Cancer Staging  Endometrial cancer [facility])  Staging form: Corpus Uteri - Carcinoma And Carcinosarcoma, AJCC 8th Edition  - Clinical stage from 2/25/2021: FIGO Stage IA (ycT1a, cN0, cM0) - Signed by Obi Grigsby MD on 2/25/2021  - Pathologic stage from 2/25/2021: FIGO Stage IA (ypT1a, pN0, cM0) - Signed by Obi Grigsby MD on 2/25/2021    Impression: FIGO Stage IA grade 1 endometrioid adenocarcinoma, s/p SHARIF/BSO and staging February 2021. No adjuvant treatment.      Today there is no clinical evidence of recurrent disease    Her Surveillance plan is as follows:   1. Return to clinic in 6 months for follow up surveillance    2. Call or return to clinic sooner with any questions or concerns     I spent 30 minutes providing care to the patient and >50% of the time was spent counseling and coordinating care, discussing the patient's current situation, reviewing her options, counseling and education her and answering her questions. The patient's pertinent images, labs, and previous records and procedures were reviewed.     Electronically signed by Marie Peoples PA-C on 11/3/21 at 10:20 AM EDT

## 2021-11-09 ENCOUNTER — OFFICE VISIT (OUTPATIENT)
Dept: FAMILY MEDICINE CLINIC | Age: 81
End: 2021-11-09
Payer: MEDICARE

## 2021-11-09 VITALS
BODY MASS INDEX: 28.68 KG/M2 | WEIGHT: 142 LBS | SYSTOLIC BLOOD PRESSURE: 126 MMHG | DIASTOLIC BLOOD PRESSURE: 70 MMHG | HEART RATE: 68 BPM

## 2021-11-09 DIAGNOSIS — R41.3 MEMORY LOSS: Primary | ICD-10-CM

## 2021-11-09 DIAGNOSIS — I25.10 CORONARY ARTERY DISEASE INVOLVING NATIVE CORONARY ARTERY OF NATIVE HEART WITHOUT ANGINA PECTORIS: ICD-10-CM

## 2021-11-09 DIAGNOSIS — R73.01 IMPAIRED FASTING BLOOD SUGAR: ICD-10-CM

## 2021-11-09 DIAGNOSIS — K25.4 CHRONIC GASTRIC ULCER WITH HEMORRHAGE: ICD-10-CM

## 2021-11-09 DIAGNOSIS — D50.9 IRON DEFICIENCY ANEMIA, UNSPECIFIED IRON DEFICIENCY ANEMIA TYPE: ICD-10-CM

## 2021-11-09 DIAGNOSIS — E78.00 PURE HYPERCHOLESTEROLEMIA: ICD-10-CM

## 2021-11-09 DIAGNOSIS — C54.1 ENDOMETRIAL CANCER (HCC): ICD-10-CM

## 2021-11-09 DIAGNOSIS — I10 ESSENTIAL HYPERTENSION: ICD-10-CM

## 2021-11-09 DIAGNOSIS — M85.80 OSTEOPENIA, UNSPECIFIED LOCATION: ICD-10-CM

## 2021-11-09 DIAGNOSIS — I51.89 DIASTOLIC DYSFUNCTION: ICD-10-CM

## 2021-11-09 PROCEDURE — G8399 PT W/DXA RESULTS DOCUMENT: HCPCS | Performed by: FAMILY MEDICINE

## 2021-11-09 PROCEDURE — 1090F PRES/ABSN URINE INCON ASSESS: CPT | Performed by: FAMILY MEDICINE

## 2021-11-09 PROCEDURE — G8482 FLU IMMUNIZE ORDER/ADMIN: HCPCS | Performed by: FAMILY MEDICINE

## 2021-11-09 PROCEDURE — G8427 DOCREV CUR MEDS BY ELIG CLIN: HCPCS | Performed by: FAMILY MEDICINE

## 2021-11-09 PROCEDURE — 99213 OFFICE O/P EST LOW 20 MIN: CPT | Performed by: FAMILY MEDICINE

## 2021-11-09 PROCEDURE — 1123F ACP DISCUSS/DSCN MKR DOCD: CPT | Performed by: FAMILY MEDICINE

## 2021-11-09 PROCEDURE — 4040F PNEUMOC VAC/ADMIN/RCVD: CPT | Performed by: FAMILY MEDICINE

## 2021-11-09 PROCEDURE — G8417 CALC BMI ABV UP PARAM F/U: HCPCS | Performed by: FAMILY MEDICINE

## 2021-11-09 PROCEDURE — 1036F TOBACCO NON-USER: CPT | Performed by: FAMILY MEDICINE

## 2021-11-09 PROCEDURE — 99214 OFFICE O/P EST MOD 30 MIN: CPT | Performed by: FAMILY MEDICINE

## 2021-11-09 RX ORDER — MEMANTINE HYDROCHLORIDE 5 MG/1
5 TABLET ORAL 2 TIMES DAILY
Qty: 60 TABLET | Refills: 5 | Status: SHIPPED | OUTPATIENT
Start: 2021-11-09 | End: 2022-04-11

## 2021-11-09 RX ORDER — DONEPEZIL HYDROCHLORIDE 5 MG/1
10 TABLET, FILM COATED ORAL NIGHTLY
Qty: 90 TABLET | Refills: 3 | Status: SHIPPED | OUTPATIENT
Start: 2021-11-09 | End: 2022-04-11

## 2021-11-09 RX ORDER — ALENDRONATE SODIUM 70 MG/1
70 TABLET ORAL
Qty: 12 TABLET | Refills: 3 | Status: SHIPPED | OUTPATIENT
Start: 2021-11-09 | End: 2022-06-23

## 2021-11-09 ASSESSMENT — ENCOUNTER SYMPTOMS
EYE PAIN: 0
DIARRHEA: 0
SORE THROAT: 0
EYES NEGATIVE: 1
SHORTNESS OF BREATH: 0
COUGH: 0
ABDOMINAL PAIN: 0
EYE DISCHARGE: 0
CHEST TIGHTNESS: 0
RESPIRATORY NEGATIVE: 1
ALLERGIC/IMMUNOLOGIC NEGATIVE: 1
RHINORRHEA: 0
CONSTIPATION: 1

## 2021-11-09 NOTE — PATIENT INSTRUCTIONS
Hospital Outpatient Visit on 11/01/2021   Component Date Value Ref Range Status    Cholesterol 11/01/2021 121  <200 mg/dL Final    Comment:    Cholesterol Guidelines:      <200  Desirable   200-240  Borderline      >240  Undesirable         HDL 11/01/2021 62  >40 mg/dL Final    Comment:    HDL Guidelines:    <40     Undesirable   40-59    Borderline    >59     Desirable         LDL Cholesterol 11/01/2021 36  0 - 130 mg/dL Final    Comment:    LDL Guidelines:     <100    Desirable   100-129   Near to/above Desirable   130-159   Borderline      >159   Undesirable     Direct (measured) LDL and calculated LDL are not interchangeable tests.  Chol/HDL Ratio 11/01/2021 2.0  <5 Final            Triglycerides 11/01/2021 116  <150 mg/dL Final    Comment:    Triglyceride Guidelines:     <150   Desirable   150-199  Borderline   200-499  High     >499   Very high   Based on AHA Guidelines for fasting triglyceride, October 2012.          VLDL 11/01/2021 NOT REPORTED  1 - 30 mg/dL Final    Glucose 11/01/2021 111* 70 - 99 mg/dL Final    BUN 11/01/2021 10  8 - 23 mg/dL Final    CREATININE 11/01/2021 0.66  0.50 - 0.90 mg/dL Final    Bun/Cre Ratio 11/01/2021 15  9 - 20 Final    Calcium 11/01/2021 10.2  8.6 - 10.4 mg/dL Final    Sodium 11/01/2021 142  135 - 144 mmol/L Final    Potassium 11/01/2021 5.0  3.7 - 5.3 mmol/L Final    Chloride 11/01/2021 105  98 - 107 mmol/L Final    CO2 11/01/2021 27  20 - 31 mmol/L Final    Anion Gap 11/01/2021 10  9 - 17 mmol/L Final    Alkaline Phosphatase 11/01/2021 65  35 - 104 U/L Final    ALT 11/01/2021 21  5 - 33 U/L Final    AST 11/01/2021 27  <32 U/L Final    Total Bilirubin 11/01/2021 1.13  0.3 - 1.2 mg/dL Final    Total Protein 11/01/2021 7.7  6.4 - 8.3 g/dL Final    Albumin 11/01/2021 4.6  3.5 - 5.2 g/dL Final    Albumin/Globulin Ratio 11/01/2021 1.5  1.0 - 2.5 Final    GFR Non- 11/01/2021 >60  >60 mL/min Final    GFR  11/01/2021 >60 >60 mL/min Final    GFR Comment 11/01/2021        Final    Comment: Average GFR for 79or more years old:   76 mL/min/1.73sq m  Chronic Kidney Disease:   <60 mL/min/1.73sq m  Kidney failure:   <15 mL/min/1.73sq m              eGFR calculated using average adult body mass.  Additional eGFR calculator available at:        Number[employer]            GFR Staging 11/01/2021 NOT REPORTED   Final    WBC 11/01/2021 8.2  3.5 - 11.3 k/uL Final    RBC 11/01/2021 4.74  3.95 - 5.11 m/uL Final    Hemoglobin 11/01/2021 15.1  11.9 - 15.1 g/dL Final    Hematocrit 11/01/2021 46.3  36.3 - 47.1 % Final    MCV 11/01/2021 97.7  82.6 - 102.9 fL Final    MCH 11/01/2021 31.9  25.2 - 33.5 pg Final    MCHC 11/01/2021 32.6  25.2 - 33.5 g/dL Final    RDW 11/01/2021 12.5  11.8 - 14.4 % Final    Platelets 18/56/3881 192  138 - 453 k/uL Final    MPV 11/01/2021 9.4  8.1 - 13.5 fL Final    NRBC Automated 11/01/2021 0.0  0.0 per 100 WBC Final    Differential Type 11/01/2021 NOT REPORTED   Final    Seg Neutrophils 11/01/2021 55  36 - 65 % Final    Lymphocytes 11/01/2021 36  24 - 43 % Final    Monocytes 11/01/2021 7  3 - 12 % Final    Eosinophils % 11/01/2021 1  1 - 4 % Final    Basophils 11/01/2021 1  0 - 2 % Final    Immature Granulocytes 11/01/2021 0  0 % Final    Segs Absolute 11/01/2021 4.52  1.50 - 8.10 k/uL Final    Absolute Lymph # 11/01/2021 2.95  1.10 - 3.70 k/uL Final    Absolute Mono # 11/01/2021 0.60  0.10 - 1.20 k/uL Final    Absolute Eos # 11/01/2021 0.08  0.00 - 0.44 k/uL Final    Basophils Absolute 11/01/2021 0.07  0.00 - 0.20 k/uL Final    Absolute Immature Granulocyte 11/01/2021 <0.03  0.00 - 0.30 k/uL Final    WBC Morphology 11/01/2021 NOT REPORTED   Final    RBC Morphology 11/01/2021 NOT REPORTED   Final    Platelet Estimate 40/89/3178 NOT REPORTED   Final    Hemoglobin A1C 11/01/2021 5.8  4.0 - 6.0 % Final    Estimated Avg Glucose 11/01/2021 120  mg/dL Final    Comment: The ADA and AACC recommend providing the estimated average glucose result to permit better   patient understanding of their HBA1c result.

## 2021-11-09 NOTE — PROGRESS NOTES
Subjective:      Patient ID: Zahira Choe is a 80 y.o. female. Hyperlipidemia  Pertinent negatives include no chest pain or shortness of breath. Other  Pertinent negatives include no abdominal pain, arthralgias, chest pain, coughing, fever or sore throat. Hypertension  Pertinent negatives include no chest pain or shortness of breath. Coronary Artery Disease  Pertinent negatives include no chest pain, chest tightness, leg swelling or shortness of breath. Risk factors include hyperlipidemia. Routine follow up on chronic medical conditions, refills, and review of updated labs. I have reviewed the patient's medical history in detail and updated the computerized patient record. No significant medical events, injuries, illness, or surgery. No current concerns or complaints. Compliant with medications. No gi symptoms . No chest pain issues. Good exercise tolerance. Driving actively . Driving only locally. Working her own housework. Staying home during covid 19 restrictions. No gi bleeding concerns on review. Vision stable. Memory continues to decline. Cervical cancer diagnosis and s/p karla/bso robotically assisted by Dr. Adan Aldana. Robotic laparoscopic modified radical hysterectomy, bilateral salpingo-oohporectomy, peritoneal washings for cytology, sentinel lymph node mapping, sentinel lymph node biopsies. Feeling confident they got everything. surveillance plan at present. She has not post operative concerns. Memory continues to decline. She looks to her dtr to answer most questions. Memory loss, progressing dementia/memory loss. Currently more moderate. Looking to her dtr. To answer most questions. Started aricept 5 mg/day last visit. Patient and daughter both think that it is not helping. Her memory is continuing to decline. Would like to try something else. Denies any recent illnesses, chest pain, or SOB.      Past Medical History:   Diagnosis Date    Abnormal uterine bleeding (AUB) 01/2021    Anxiety     daughter reports since Covid    Asteroid hyalosis of right eye     Bruises easily     per daughter   Ruma Hernandez Coronary artery disease     status post acute non-Q wave myocardial infarction 08/23/2012, status post drug eluting stent placement in the LAD and 2 drug eluting stents in the left circumflex artery 08/23/2012.  COVID-19 06/30/2020    confusion, SOB, weakness x 6-8 weeks; was hospitalized and did receive plasma    Diastolic dysfunction     grade 1.     Gastric ulcer with hemorrhage 03/03/2015    gastric and esophageal ulcers due to nsaid    Hyperlipidemia     Hypertension     Lives alone     Memory loss     daughter reports since Covid    Mild mitral regurgitation     Mild tricuspid regurgitation     Obesity     Patient in clinical research study 06/27/2020    Expanded Access to Convalescent Plasma for COVID-19 date of completed 6/30/2020    Poor intravenous access     instructed to hydrate for surgery    Vitreous floaters     left eye. Past Surgical History:   Procedure Laterality Date    CARDIAC CATHETERIZATION Left 08/23/2012    left main artery normal, LAD with mid 60 to 70% stenosis, FFR was 0.76, left circumflex artery with mid 90% stenosis with thrombus, RCA with mild irregularities, preserved left ventricular systolic function, ejection fraction estimated around 50%, status post drug eluting stent placement in the LAD and 2 drug eluting stents in the left circumflex artery.      CHOLECYSTECTOMY  01/01/1978    DILATION AND CURETTAGE OF UTERUS N/A 01/20/2021    D & C HYSTEROSCOPY Polypectomy performed by Jt Lopez MD at [facility], DIAGNOSTIC  03/05/2015    esophageal/gastric ulcer; few diverticuli    HYSTERECTOMY N/A 2/23/2021    XI ROBOTIC LAPAROSCOPIC MODIFIED RADICAL  HYSTERECTOMY, BILATERAL SALPINGO-OOPHORECTOMY, CYTOLOGIC WASHINGS, SENTINEL LYMPHNODE MAPPINGS, SENTINEL LYMPHNODE BIOPSIES, ICG DYE  performed by Lalito Swartz MD at [facility]  02/23/2021    ROBOTIC LAPAROSCOPIC MODIFIED RADICAL  HYSTERECTOMY, BILATERAL SALPINGO-OOPHORECTOMY, CYTOLOGIC WASHINGS, SENTINEL LYMPHNODE MAPPINGS, SENTINEL LYMPHNODE BIOPSIES    UPPER GASTROINTESTINAL ENDOSCOPY  04/16/2015    healed gastric ulcer       Current Outpatient Medications   Medication Sig Dispense Refill    lisinopril (PRINIVIL;ZESTRIL) 10 MG tablet Take 1 tablet by mouth daily 90 tablet 1    ferrous sulfate (FE TABS 325) 325 (65 Fe) MG EC tablet Take 1 tablet by mouth daily (with breakfast) 90 tablet 3    Cholecalciferol (VITAMIN D3) 50 MCG (2000 UT) CAPS Take 1 capsule by mouth daily 90 capsule 3    alendronate (FOSAMAX) 70 MG tablet Take 1 tablet by mouth every 7 days 12 tablet 0    atorvastatin (LIPITOR) 40 MG tablet TAKE 1 TABLET AT BEDTIME 90 tablet 3    metoprolol tartrate (LOPRESSOR) 25 MG tablet 1 po daily 180 tablet 3    donepezil (ARICEPT) 5 MG tablet Take 1 tablet by mouth nightly 90 tablet 3    ibuprofen (ADVIL;MOTRIN) 400 MG tablet Take 1 tablet by mouth every 6 hours as needed for Pain 40 tablet 1    isosorbide mononitrate (IMDUR) 30 MG extended release tablet TAKE 1 TABLET EVERY DAY 90 tablet 2    nitroGLYCERIN (NITROSTAT) 0.4 MG SL tablet Place 1 tablet under the tongue every 5 minutes as needed for Chest pain 25 tablet 1    aspirin 81 MG tablet Take 81 mg by mouth daily Takes 5 days per week, last dose  2/17 and instructed not to take any more      ascorbic acid (VITAMIN C) 500 MG tablet Take 500 mg by mouth 2 times daily Indications: patient only takes in the Winter With ana paula hips       Multiple Vitamins-Minerals (MULTIVITAMIN PO) Take 1 tablet by mouth daily. No current facility-administered medications for this visit.      Allergies   Allergen Reactions    Codeine Nausea Only     Social History     Tobacco Use    Smoking status: Never Smoker    Smokeless tobacco: Never Used   Vaping Use    Vaping Use: Never used Substance Use Topics    Alcohol use: Yes     Comment: rare    Drug use: No     Family History   Problem Relation Age of Onset    Heart Attack Father     Cancer Sister     Prostate Cancer Brother     Cataracts Neg Hx     Diabetes Neg Hx     Glaucoma Neg Hx            Review of Systems   Constitutional: Negative. Negative for activity change, appetite change and fever. HENT: Negative. Negative for ear discharge, ear pain, rhinorrhea and sore throat. Eyes: Negative. Negative for pain and discharge. Respiratory: Negative. Negative for cough, chest tightness and shortness of breath. Cardiovascular: Negative. Negative for chest pain and leg swelling. Gastrointestinal: Positive for constipation (occasional, contolled with miralax). Negative for abdominal pain and diarrhea. Endocrine: Negative. Genitourinary: Negative. Negative for difficulty urinating, dysuria, hematuria and vaginal bleeding. Musculoskeletal: Negative for arthralgias. Skin: Negative. Allergic/Immunologic: Negative. Neurological: Negative. Negative for syncope and light-headedness. Hematological: Negative. Psychiatric/Behavioral: Positive for confusion and decreased concentration. Negative for sleep disturbance. Objective:   Physical Exam  Constitutional:       General: She is not in acute distress. Appearance: She is well-developed. HENT:      Head: Normocephalic and atraumatic. Right Ear: Tympanic membrane, ear canal and external ear normal.      Left Ear: Tympanic membrane, ear canal and external ear normal.      Nose: Nose normal.      Mouth/Throat:      Pharynx: No oropharyngeal exudate. Eyes:      General: No scleral icterus. Extraocular Movements: Extraocular movements intact. Conjunctiva/sclera: Conjunctivae normal.   Neck:      Thyroid: No thyromegaly. Cardiovascular:      Rate and Rhythm: Normal rate and regular rhythm. Heart sounds: Normal heart sounds.  No murmur heard. Pulmonary:      Effort: Pulmonary effort is normal. No respiratory distress. Breath sounds: Normal breath sounds. No wheezing. Abdominal:      General: Bowel sounds are normal. There is no distension. Palpations: Abdomen is soft. Tenderness: There is no abdominal tenderness. Musculoskeletal:         General: No tenderness. Normal range of motion. Cervical back: Neck supple. Skin:     General: Skin is warm and dry. Findings: No erythema or rash. Neurological:      Mental Status: She is alert and oriented to person, place, and time. Psychiatric:         Mood and Affect: Mood normal.         Behavior: Behavior normal.       /70 (Site: Right Upper Arm, Position: Sitting, Cuff Size: Large Adult)   Pulse 68   Wt 142 lb (64.4 kg)   LMP  (LMP Unknown)   BMI 28.68 kg/m²            Assessment:       Encounter Diagnoses   Name Primary?  Chronic gastric ulcer with hemorrhage     Coronary artery disease involving native coronary artery of native heart without angina pectoris     Diastolic dysfunction     Pure hypercholesterolemia     Iron deficiency anemia, unspecified iron deficiency anemia type     Essential hypertension     Impaired fasting blood sugar     Endometrial cancer (HCC)     Osteopenia, unspecified location     Memory loss Yes             Plan:        Prior gastric ulcer, likely nsaid related. On asa at present. Asx. No active treatment at present. Will follow up prn concerns. No active treatment at present beyond avoiding nsaids. CAD: CINDY to LAD and LCX 08/2012. Stable/asx. Plan to cont. Asa, lopressor, statin, ace inhibitor. Diastolic dysfunction. No sense of decompensation or any recent chf issues. No peripheral edema or sob . Hyperlipidemia:  Doing well on lipitor. Cont. Current dosing. Obesity. Discussed wt. Loss. Trying to avoid worsening bs control.         Colonoscopy normal 2015    She refuses mammogram. No concerns on home self breast exams. Osteopenia per dexa 9/20/18:  Consider prolia twice a year. Started fosamax last visit. Side effects discussed. History of anemia; more gi bleeding/upper gi ulceration in the past.  Remains asx/normal range. Hb 15.1 today. Impaired fasting blood sugar. Stable. 119--111.  a1c reassuring. 5.6% --5.8% today. Cont. Dietary discretion. Endometrial cancer. S/p karla and bso, ln sampling 1/21. Doing well post op. Following with OBGYN, they gave her a clean bill of health this month. Memory loss, progressing dementia/memory loss . Currently more moderate. Looking to her dtr. To answer most questions. Planning to increase aricept to 10 mg/day, and add namenda after discussion today.

## 2021-12-23 NOTE — TELEPHONE ENCOUNTER
PCP Dr. Correia July for refills please due to no cardiologist in clinic over the holidays. Thank you. Fax received from [phone] requesting isosorbide.

## 2022-01-03 RX ORDER — ISOSORBIDE MONONITRATE 30 MG/1
TABLET, EXTENDED RELEASE ORAL
Qty: 90 TABLET | Refills: 3 | Status: SHIPPED | OUTPATIENT
Start: 2022-01-03 | End: 2022-01-04 | Stop reason: SDUPTHER

## 2022-01-04 RX ORDER — ISOSORBIDE MONONITRATE 30 MG/1
TABLET, EXTENDED RELEASE ORAL
Qty: 90 TABLET | Refills: 3 | Status: SHIPPED | OUTPATIENT
Start: 2022-01-04

## 2022-03-09 DIAGNOSIS — I10 ESSENTIAL HYPERTENSION: ICD-10-CM

## 2022-03-09 RX ORDER — LISINOPRIL 10 MG/1
10 TABLET ORAL DAILY
Qty: 90 TABLET | Refills: 1 | Status: SHIPPED | OUTPATIENT
Start: 2022-03-09 | End: 2022-03-10

## 2022-03-10 RX ORDER — LISINOPRIL 10 MG/1
TABLET ORAL
Qty: 90 TABLET | Refills: 1 | Status: SHIPPED | OUTPATIENT
Start: 2022-03-10 | End: 2022-10-10

## 2022-04-04 ENCOUNTER — TELEPHONE (OUTPATIENT)
Dept: CARDIOLOGY | Age: 82
End: 2022-04-04

## 2022-04-04 ENCOUNTER — OFFICE VISIT (OUTPATIENT)
Dept: CARDIOLOGY | Age: 82
End: 2022-04-04
Payer: MEDICARE

## 2022-04-04 VITALS
HEIGHT: 59 IN | SYSTOLIC BLOOD PRESSURE: 132 MMHG | WEIGHT: 140.4 LBS | BODY MASS INDEX: 28.3 KG/M2 | HEART RATE: 46 BPM | DIASTOLIC BLOOD PRESSURE: 80 MMHG

## 2022-04-04 DIAGNOSIS — R00.1 SINUS BRADYCARDIA: ICD-10-CM

## 2022-04-04 DIAGNOSIS — I25.10 CORONARY ARTERY DISEASE INVOLVING NATIVE CORONARY ARTERY OF NATIVE HEART WITHOUT ANGINA PECTORIS: Primary | ICD-10-CM

## 2022-04-04 DIAGNOSIS — I10 ESSENTIAL HYPERTENSION: ICD-10-CM

## 2022-04-04 DIAGNOSIS — E78.2 MIXED HYPERLIPIDEMIA: ICD-10-CM

## 2022-04-04 PROCEDURE — 1036F TOBACCO NON-USER: CPT | Performed by: INTERNAL MEDICINE

## 2022-04-04 PROCEDURE — 4040F PNEUMOC VAC/ADMIN/RCVD: CPT | Performed by: INTERNAL MEDICINE

## 2022-04-04 PROCEDURE — G8427 DOCREV CUR MEDS BY ELIG CLIN: HCPCS | Performed by: INTERNAL MEDICINE

## 2022-04-04 PROCEDURE — 1090F PRES/ABSN URINE INCON ASSESS: CPT | Performed by: INTERNAL MEDICINE

## 2022-04-04 PROCEDURE — G8417 CALC BMI ABV UP PARAM F/U: HCPCS | Performed by: INTERNAL MEDICINE

## 2022-04-04 PROCEDURE — 93010 ELECTROCARDIOGRAM REPORT: CPT | Performed by: INTERNAL MEDICINE

## 2022-04-04 PROCEDURE — 99214 OFFICE O/P EST MOD 30 MIN: CPT | Performed by: INTERNAL MEDICINE

## 2022-04-04 PROCEDURE — 93005 ELECTROCARDIOGRAM TRACING: CPT | Performed by: INTERNAL MEDICINE

## 2022-04-04 PROCEDURE — 1123F ACP DISCUSS/DSCN MKR DOCD: CPT | Performed by: INTERNAL MEDICINE

## 2022-04-04 PROCEDURE — G8399 PT W/DXA RESULTS DOCUMENT: HCPCS | Performed by: INTERNAL MEDICINE

## 2022-04-04 NOTE — PROGRESS NOTES
01/20/2021    D & C HYSTEROSCOPY Polypectomy performed by Dorian Lockhart MD at  [address], COLON, DIAGNOSTIC  03/05/2015    esophageal/gastric ulcer; few diverticuli    HYSTERECTOMY N/A 2/23/2021    XI ROBOTIC LAPAROSCOPIC MODIFIED RADICAL  HYSTERECTOMY, BILATERAL SALPINGO-OOPHORECTOMY, CYTOLOGIC WASHINGS, SENTINEL LYMPHNODE MAPPINGS, SENTINEL LYMPHNODE BIOPSIES, ICG DYE  performed by Aureliano Araujo MD at [facility]  02/23/2021    ROBOTIC LAPAROSCOPIC MODIFIED RADICAL  HYSTERECTOMY, BILATERAL SALPINGO-OOPHORECTOMY, CYTOLOGIC WASHINGS, SENTINEL LYMPHNODE MAPPINGS, SENTINEL LYMPHNODE BIOPSIES    UPPER GASTROINTESTINAL ENDOSCOPY  04/16/2015    healed gastric ulcer       Family History   Problem Relation Age of Onset    Heart Attack Father     Cancer Sister     Prostate Cancer Brother     Cataracts Neg Hx     Diabetes Neg Hx     Glaucoma Neg Hx        REVIEW OF SYSTEMS:    · Constitutional: there has been no unanticipated weight loss. There's been No change in energy level, No change in activity level. · Eyes: No visual changes or diplopia. No scleral icterus. · ENT: No Headaches, hearing loss or vertigo. No mouth sores or sore throat. · Cardiovascular: AS HPI  · Respiratory: AS HPI  · Gastrointestinal: No abdominal pain, appetite loss, blood in stools. No change in bowel or bladder habits. · Genitourinary: No dysuria, trouble voiding, or hematuria. · Musculoskeletal:  No gait disturbance, No weakness or joint complaints. · Integumentary: No rash or pruritis. · Neurological: No headache, diplopia, change in muscle strength, numbness or tingling. No change in gait, balance, coordination, mood, affect, memory, mentation, behavior. · Psychiatric: No new anxiety or depression. · Endocrine: No temperature intolerance. No excessive thirst, fluid intake, or urination. No tremor.   · Hematologic/Lymphatic: No abnormal bruising or bleeding, blood clots or swollen lymph nodes.  · Allergic/Immunologic: No nasal congestion or hives. Physical exam:    /80   Pulse (!) 46   Ht 4' 11\" (1.499 m)   Wt 140 lb 6.4 oz (63.7 kg)   LMP  (LMP Unknown)   BMI 28.36 kg/m²     Vitals as above. Alert and oriented x 3. No JVD, or carotid bruits. Lungs are clear to auscultation. Heart sounds are regular, normal, no murmur. Abdomen is soft, no tenderness. Extremities No peripheral edema.     Meds:    Current Outpatient Medications:     lisinopril (PRINIVIL;ZESTRIL) 10 MG tablet, TAKE 1 TABLET EVERY DAY, Disp: 90 tablet, Rfl: 1    isosorbide mononitrate (IMDUR) 30 MG extended release tablet, TAKE 1 TABLET EVERY DAY, Disp: 90 tablet, Rfl: 3    alendronate (FOSAMAX) 70 MG tablet, Take 1 tablet by mouth every 7 days, Disp: 12 tablet, Rfl: 3    donepezil (ARICEPT) 5 MG tablet, Take 2 tablets by mouth nightly, Disp: 90 tablet, Rfl: 3    memantine (NAMENDA) 5 MG tablet, Take 1 tablet by mouth 2 times daily, Disp: 60 tablet, Rfl: 5    ferrous sulfate (FE TABS 325) 325 (65 Fe) MG EC tablet, Take 1 tablet by mouth daily (with breakfast), Disp: 90 tablet, Rfl: 3    Cholecalciferol (VITAMIN D3) 50 MCG (2000 UT) CAPS, Take 1 capsule by mouth daily, Disp: 90 capsule, Rfl: 3    atorvastatin (LIPITOR) 40 MG tablet, TAKE 1 TABLET AT BEDTIME, Disp: 90 tablet, Rfl: 3    ibuprofen (ADVIL;MOTRIN) 400 MG tablet, Take 1 tablet by mouth every 6 hours as needed for Pain, Disp: 40 tablet, Rfl: 1    nitroGLYCERIN (NITROSTAT) 0.4 MG SL tablet, Place 1 tablet under the tongue every 5 minutes as needed for Chest pain, Disp: 25 tablet, Rfl: 1    aspirin 81 MG tablet, Take 81 mg by mouth daily Takes 5 days per week, last dose  2/17 and instructed not to take any more, Disp: , Rfl:     ascorbic acid (VITAMIN C) 500 MG tablet, Take 500 mg by mouth 2 times daily Indications: patient only takes in the Winter With ana paula hips , Disp: , Rfl:     Multiple Vitamins-Minerals (MULTIVITAMIN PO), Take 1 tablet by mouth daily. , Disp: , Rfl:     LABS  Lab Results   Component Value Date    CHOL 121 11/01/2021    TRIG 116 11/01/2021    HDL 62 11/01/2021    LDLCHOLESTEROL 36 11/01/2021    VLDL NOT REPORTED 11/01/2021    CHOLHDLRATIO 2.0 11/01/2021       Lab Results   Component Value Date     11/01/2021    K 5.0 11/01/2021     11/01/2021    CO2 27 11/01/2021    BUN 10 11/01/2021    CREATININE 0.66 11/01/2021    GLUCOSE 111 (H) 11/01/2021    CALCIUM 10.2 11/01/2021    PROT 7.7 11/01/2021    LABALBU 4.6 11/01/2021    BILITOT 1.13 11/01/2021    ALKPHOS 65 11/01/2021    AST 27 11/01/2021    ALT 21 11/01/2021    LABGLOM >60 11/01/2021    GFRAA >60 11/01/2021    GLOB 3.2 06/26/2020       EKG: Marked sinus  Bradycardia - HR 46. BORDERLINE RHYTHM    Nuclear Stress 9/20:  IMPRESSION:  1. Mild anterolateral ischemia. 2.  LVEF 80%. Assessment and Plan:    -Asymptomatic Sinus Bradycardia- HR 46- stop metoprolol completely  -HTN- better control on higher dose lisinopril 10 mg po qd. -CAD- CINDY to LAD and LCX 08/2012- stable without significant angina. Continue ASA, statin, BB, imdur.   -Reviewed stress test 9/20- possible mild anterolateral ischemia, reviewed images, not that convincing. I discussed with her the possibility of repeating the stress test, she is currently not interested and would like to hold off, and will notify us if she develops any symptoms. -COVID 19 in June 2020- with some memory issues since then. -Obesity - encouraged diet, exercise, and discussed weight loss extensively. -Hyperlipidemia- LDL 36 on 11/21 continue statin.   -History of severe anemia along with gastric and esophageal ulcers 3/2015-     The patient is to continue heart healthy diet, weight loss and exercise as tolerated. Patient's medications and side effects were discussed. Medication refills were provided if needed. Follow up appointment timing was discussed. All questions and concerns were addressed to patient's satisfaction. The patient is to follow up in 6 months or sooner if necessary. Thank you for allowing me to participate in the care of this patient, please do not hesitate to call if you have any questions. Aimee El DO, [facility] [place], [address], [address] [facility]  [facility]  [phone]

## 2022-04-04 NOTE — TELEPHONE ENCOUNTER
Left message for daughter of orders per Dr Virgie Woods to stop metoprolol and to call office with any questions.

## 2022-04-04 NOTE — TELEPHONE ENCOUNTER
Pt daughter called stating the pt had been taking the metoprolol tartrate 25 mg BID which is the instructions on the bottle. Pt stated the Dr stated something about stopping it since she only takes it once daily. Please advise if pt should still stop it completely.     Pt asks her daughter be the one that gets the call [phone]    Last Appt:  4/4/2022  Next Appt:   10/10/22

## 2022-04-11 RX ORDER — MEMANTINE HYDROCHLORIDE 5 MG/1
TABLET ORAL
Qty: 60 TABLET | Refills: 5 | Status: SHIPPED | OUTPATIENT
Start: 2022-04-11 | End: 2022-08-25

## 2022-04-11 RX ORDER — DONEPEZIL HYDROCHLORIDE 5 MG/1
TABLET, FILM COATED ORAL
Qty: 90 TABLET | Refills: 3 | Status: SHIPPED | OUTPATIENT
Start: 2022-04-11

## 2022-05-02 ENCOUNTER — HOSPITAL ENCOUNTER (OUTPATIENT)
Dept: LAB | Age: 82
Discharge: HOME | End: 2022-05-02
Payer: MEDICARE

## 2022-05-02 DIAGNOSIS — R73.01 IMPAIRED FASTING BLOOD SUGAR: ICD-10-CM

## 2022-05-02 DIAGNOSIS — E78.00 PURE HYPERCHOLESTEROLEMIA: ICD-10-CM

## 2022-05-02 DIAGNOSIS — I25.10 CORONARY ARTERY DISEASE INVOLVING NATIVE CORONARY ARTERY OF NATIVE HEART WITHOUT ANGINA PECTORIS: ICD-10-CM

## 2022-05-02 LAB
ABSOLUTE EOS #: 0.12 K/UL (ref 0–0.44)
ABSOLUTE IMMATURE GRANULOCYTE: <0.03 K/UL (ref 0–0.3)
ABSOLUTE LYMPH #: 3.05 K/UL (ref 1.1–3.7)
ABSOLUTE MONO #: 0.6 K/UL (ref 0.1–1.2)
ALBUMIN SERPL-MCNC: 4.3 G/DL (ref 3.5–5.2)
ALBUMIN/GLOBULIN RATIO: 1.5 (ref 1–2.5)
ALP BLD-CCNC: 73 U/L (ref 35–104)
ALT SERPL-CCNC: 17 U/L (ref 5–33)
ANION GAP SERPL CALCULATED.3IONS-SCNC: 10 MMOL/L (ref 9–17)
AST SERPL-CCNC: 24 U/L
BASOPHILS # BLD: 1 % (ref 0–2)
BASOPHILS ABSOLUTE: 0.09 K/UL (ref 0–0.2)
BILIRUB SERPL-MCNC: 0.82 MG/DL (ref 0.3–1.2)
BUN BLDV-MCNC: 11 MG/DL (ref 8–23)
BUN/CREAT BLD: 16 (ref 9–20)
CALCIUM SERPL-MCNC: 9.6 MG/DL (ref 8.6–10.4)
CHLORIDE BLD-SCNC: 104 MMOL/L (ref 98–107)
CHOLESTEROL/HDL RATIO: 2.4
CHOLESTEROL: 124 MG/DL
CO2: 27 MMOL/L (ref 20–31)
CREAT SERPL-MCNC: 0.67 MG/DL (ref 0.5–0.9)
EOSINOPHILS RELATIVE PERCENT: 2 % (ref 1–4)
ESTIMATED AVERAGE GLUCOSE: 111 MG/DL
GFR AFRICAN AMERICAN: >60 ML/MIN
GFR NON-AFRICAN AMERICAN: >60 ML/MIN
GFR SERPL CREATININE-BSD FRML MDRD: ABNORMAL ML/MIN/{1.73_M2}
GLUCOSE BLD-MCNC: 103 MG/DL (ref 70–99)
HBA1C MFR BLD: 5.5 % (ref 4–6)
HCT VFR BLD CALC: 44.1 % (ref 36.3–47.1)
HDLC SERPL-MCNC: 52 MG/DL
HEMOGLOBIN: 14.2 G/DL (ref 11.9–15.1)
IMMATURE GRANULOCYTES: 0 %
LDL CHOLESTEROL: 42 MG/DL (ref 0–130)
LYMPHOCYTES # BLD: 41 % (ref 24–43)
MCH RBC QN AUTO: 31.4 PG (ref 25.2–33.5)
MCHC RBC AUTO-ENTMCNC: 32.2 G/DL (ref 25.2–33.5)
MCV RBC AUTO: 97.6 FL (ref 82.6–102.9)
MONOCYTES # BLD: 8 % (ref 3–12)
NRBC AUTOMATED: 0 PER 100 WBC
PDW BLD-RTO: 13.1 % (ref 11.8–14.4)
PLATELET # BLD: 192 K/UL (ref 138–453)
PMV BLD AUTO: 9.9 FL (ref 8.1–13.5)
POTASSIUM SERPL-SCNC: 4.4 MMOL/L (ref 3.7–5.3)
RBC # BLD: 4.52 M/UL (ref 3.95–5.11)
SEG NEUTROPHILS: 48 % (ref 36–65)
SEGMENTED NEUTROPHILS ABSOLUTE COUNT: 3.54 K/UL (ref 1.5–8.1)
SODIUM BLD-SCNC: 141 MMOL/L (ref 135–144)
TOTAL PROTEIN: 7.1 G/DL (ref 6.4–8.3)
TRIGL SERPL-MCNC: 148 MG/DL
WBC # BLD: 7.4 K/UL (ref 3.5–11.3)

## 2022-05-02 PROCEDURE — 85025 COMPLETE CBC W/AUTO DIFF WBC: CPT

## 2022-05-02 PROCEDURE — 80061 LIPID PANEL: CPT

## 2022-05-02 PROCEDURE — 36415 COLL VENOUS BLD VENIPUNCTURE: CPT

## 2022-05-02 PROCEDURE — 80053 COMPREHEN METABOLIC PANEL: CPT

## 2022-05-02 PROCEDURE — 83036 HEMOGLOBIN GLYCOSYLATED A1C: CPT

## 2022-05-04 ENCOUNTER — OFFICE VISIT (OUTPATIENT)
Dept: GYNECOLOGIC ONCOLOGY | Age: 82
End: 2022-05-04
Payer: MEDICARE

## 2022-05-04 VITALS
HEART RATE: 59 BPM | SYSTOLIC BLOOD PRESSURE: 180 MMHG | WEIGHT: 139.8 LBS | DIASTOLIC BLOOD PRESSURE: 85 MMHG | OXYGEN SATURATION: 97 % | HEIGHT: 59 IN | TEMPERATURE: 97.3 F | BODY MASS INDEX: 28.18 KG/M2

## 2022-05-04 DIAGNOSIS — C54.1 ENDOMETRIAL CANCER (HCC): Primary | ICD-10-CM

## 2022-05-04 PROCEDURE — 4040F PNEUMOC VAC/ADMIN/RCVD: CPT | Performed by: PHYSICIAN ASSISTANT

## 2022-05-04 PROCEDURE — 1123F ACP DISCUSS/DSCN MKR DOCD: CPT | Performed by: PHYSICIAN ASSISTANT

## 2022-05-04 PROCEDURE — 99214 OFFICE O/P EST MOD 30 MIN: CPT | Performed by: PHYSICIAN ASSISTANT

## 2022-05-04 PROCEDURE — G8399 PT W/DXA RESULTS DOCUMENT: HCPCS | Performed by: PHYSICIAN ASSISTANT

## 2022-05-04 PROCEDURE — 1090F PRES/ABSN URINE INCON ASSESS: CPT | Performed by: PHYSICIAN ASSISTANT

## 2022-05-04 PROCEDURE — G8417 CALC BMI ABV UP PARAM F/U: HCPCS | Performed by: PHYSICIAN ASSISTANT

## 2022-05-04 PROCEDURE — 1036F TOBACCO NON-USER: CPT | Performed by: PHYSICIAN ASSISTANT

## 2022-05-04 PROCEDURE — G8427 DOCREV CUR MEDS BY ELIG CLIN: HCPCS | Performed by: PHYSICIAN ASSISTANT

## 2022-05-04 NOTE — PROGRESS NOTES
[address] [facility], [facility], [address] Doming[place] Malik present for her endometrial cancer surveillance visit. 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 She initially presented to her local gynecologist Dr. Walt Venegas with concerns of post menopausal bleeding in December 2020. A pelvic ultrasound obtained on 1/12/21 revealed uterus measuring 6.8 x 3.8 x 3.2 cm, the endometrial stripe thickness of 25 mm. Bilateral ovaries were within normal limits, there was no evidence of free fluid. She underwent a D&C and polypectomy on 1/20/21 and final pathology revealed  A moderately differentiated endometrioid adenocarcinoma. She was therefore referred to [facility] for further evaluation and treatment. Treatment options were discussed and patient was deemed an appropriate surgical candidate. Her pre operative  was within normal at 12 units on 2/9/2021. Final pathology revealed a well differentiated endometrioid adenocarcinoma, FIGO grade 1, limited to endometrium, surgical margins negative. Tumor size 3.3 cm in largest diameter. Cervix negative for neoplasm. Bilateral fallopian tubes and ovaries were benign. No lymphovascular invasion present. Pelvic washings were negative for malignancy. MMR testing was normal.    She did well post operatively and was discharged home on POD #1. Today, she is doing well. She has no concerns. She denies abdominal or pelvic pain. No new lumps or bumps. Denies vaginal bleeding or discharge. She denies chest pain or shortness of breath. Her appetite is strong, denies nausea or vomiting.  Reviewed recent follow up with her cardiologist.    ROS:  I have personally reviewed and agree with the review of systems done by my ancillary staff in the [facility] documentation. Physical Exam:    Vitals:    05/04/22 1003 05/04/22 1021   BP: (!) 146/92 (!) 180/85   Pulse: 59    Temp: 97.3 °F (36.3 °C)    TempSrc: Temporal    SpO2: 97%    Weight: 139 lb 12.8 oz (63.4 kg)    Height: 4' 11\" (1.499 m)        General: well- appearing, no acute distress, alert and oriented x 3    HEENT: Thyroid normal size. No cervical or supraclavicular lymphadenopathy. Lungs: Clear to auscultate bilaterally to the bases    No CVA tenderness present bilaterally. Heart: Auscultation reveals regular rate and rhythm. No murmurs or gallops appreciated. Abdomen: Soft, flat. Multiple laparoscopic incision scars present. RUQ surgical scar present. No herniations. No detectable organomegaly. No palpable masses or ascites. No inguinal lymphadenopathy bilaterally. Extremities: No edema, calf tenderness or deformities bilaterally    Pelvic: The patient has normal female external genitalia including, vulva, urethra, vagina, perineum, Bartholin glands. Uterus, bilateral fallopian tubes and adnexae, and cervix are  surgically absent. Speculum examination using small speculum reveals no blood or discharge in vaginal vault. The vaginal cuff well-intact with no signs of erythema, induration, separation, or granulation tissue. Bimanual examination: Bladder is non-tender, without mass. There are no palpable vaginal nodules and no other pelvic masses, tenderness or pathology noted.        Assessment/Plan:  Cancer Staging  Endometrial cancer [facility])  Staging form: Corpus Uteri - Carcinoma And Carcinosarcoma, AJCC 8th Edition  - Clinical stage from 2/25/2021: FIGO Stage IA (ycT1a, cN0, cM0) - Signed by Ashkan Dumont MD on 2/25/2021  - Pathologic stage from 2/25/2021: FIGO Stage IA (ypT1a, pN0, cM0) - Signed by Ashkan Dumont MD on 2/25/2021    Impression: FIGO Stage IA grade 1 endometrioid adenocarcinoma, s/p SHARIF/BSO and staging February 2021. No adjuvant treatment. Today there is no clinical evidence of recurrent disease    Her Surveillance plan is as follows:   1. Return to clinic in 6 months for follow up surveillance    2. Call or return to clinic sooner with any questions or concerns     3. Elevated blood pressure readings, patient advised to monitor at home in controlled setting and notify cardiologist if continued elevation     I spent 30 minutes providing care to the patient and >50% of the time was spent counseling and coordinating care, discussing the patient's current situation, reviewing her options, counseling and education her and answering her questions. The patient's pertinent images, labs, and previous records and procedures were reviewed.     Electronically signed by YULIA Donahue on 5/4/22 at 10:20 AM EDT

## 2022-05-04 NOTE — PATIENT INSTRUCTIONS
Advised to recheck blood pressure at home in controlled environment  And notify PCP or cardiologist if continued elevation    Return to clinic in 6 months    Call or return to clinic sooner with any questions or concerns

## 2022-05-10 ENCOUNTER — OFFICE VISIT (OUTPATIENT)
Dept: FAMILY MEDICINE CLINIC | Age: 82
End: 2022-05-10
Payer: MEDICARE

## 2022-05-10 VITALS
HEIGHT: 59 IN | HEART RATE: 68 BPM | WEIGHT: 138 LBS | BODY MASS INDEX: 27.82 KG/M2 | DIASTOLIC BLOOD PRESSURE: 72 MMHG | SYSTOLIC BLOOD PRESSURE: 130 MMHG

## 2022-05-10 DIAGNOSIS — K25.4 CHRONIC GASTRIC ULCER WITH HEMORRHAGE: Primary | ICD-10-CM

## 2022-05-10 DIAGNOSIS — M85.80 OSTEOPENIA, UNSPECIFIED LOCATION: ICD-10-CM

## 2022-05-10 DIAGNOSIS — E78.00 PURE HYPERCHOLESTEROLEMIA: ICD-10-CM

## 2022-05-10 DIAGNOSIS — D50.9 IRON DEFICIENCY ANEMIA, UNSPECIFIED IRON DEFICIENCY ANEMIA TYPE: ICD-10-CM

## 2022-05-10 DIAGNOSIS — R73.01 IMPAIRED FASTING BLOOD SUGAR: ICD-10-CM

## 2022-05-10 DIAGNOSIS — C54.1 ENDOMETRIAL CANCER (HCC): ICD-10-CM

## 2022-05-10 DIAGNOSIS — I25.10 CORONARY ARTERY DISEASE INVOLVING NATIVE CORONARY ARTERY OF NATIVE HEART WITHOUT ANGINA PECTORIS: ICD-10-CM

## 2022-05-10 DIAGNOSIS — E66.09 CLASS 1 OBESITY DUE TO EXCESS CALORIES WITHOUT SERIOUS COMORBIDITY IN ADULT, UNSPECIFIED BMI: ICD-10-CM

## 2022-05-10 DIAGNOSIS — I51.89 DIASTOLIC DYSFUNCTION: ICD-10-CM

## 2022-05-10 DIAGNOSIS — R41.3 MEMORY LOSS: ICD-10-CM

## 2022-05-10 PROCEDURE — 4040F PNEUMOC VAC/ADMIN/RCVD: CPT | Performed by: FAMILY MEDICINE

## 2022-05-10 PROCEDURE — G8417 CALC BMI ABV UP PARAM F/U: HCPCS | Performed by: FAMILY MEDICINE

## 2022-05-10 PROCEDURE — 99214 OFFICE O/P EST MOD 30 MIN: CPT | Performed by: FAMILY MEDICINE

## 2022-05-10 PROCEDURE — 1123F ACP DISCUSS/DSCN MKR DOCD: CPT | Performed by: FAMILY MEDICINE

## 2022-05-10 PROCEDURE — G8427 DOCREV CUR MEDS BY ELIG CLIN: HCPCS | Performed by: FAMILY MEDICINE

## 2022-05-10 PROCEDURE — 1090F PRES/ABSN URINE INCON ASSESS: CPT | Performed by: FAMILY MEDICINE

## 2022-05-10 PROCEDURE — 99213 OFFICE O/P EST LOW 20 MIN: CPT

## 2022-05-10 PROCEDURE — G8399 PT W/DXA RESULTS DOCUMENT: HCPCS | Performed by: FAMILY MEDICINE

## 2022-05-10 PROCEDURE — 1036F TOBACCO NON-USER: CPT | Performed by: FAMILY MEDICINE

## 2022-05-10 ASSESSMENT — ENCOUNTER SYMPTOMS
SORE THROAT: 0
CONSTIPATION: 1
COUGH: 0
RHINORRHEA: 0
EYES NEGATIVE: 1
RESPIRATORY NEGATIVE: 1
SHORTNESS OF BREATH: 0
DIARRHEA: 0
EYE DISCHARGE: 0
ABDOMINAL PAIN: 0
ALLERGIC/IMMUNOLOGIC NEGATIVE: 1
EYE PAIN: 0
CHEST TIGHTNESS: 0

## 2022-05-10 NOTE — PATIENT INSTRUCTIONS
Hospital Outpatient Visit on 05/02/2022   Component Date Value Ref Range Status    Hemoglobin A1C 05/02/2022 5.5  4.0 - 6.0 % Final    Estimated Avg Glucose 05/02/2022 111  mg/dL Final    Comment: The ADA and AACC recommend providing the estimated average glucose result to permit better   patient understanding of their HBA1c result.       WBC 05/02/2022 7.4  3.5 - 11.3 k/uL Final    RBC 05/02/2022 4.52  3.95 - 5.11 m/uL Final    Hemoglobin 05/02/2022 14.2  11.9 - 15.1 g/dL Final    Hematocrit 05/02/2022 44.1  36.3 - 47.1 % Final    MCV 05/02/2022 97.6  82.6 - 102.9 fL Final    MCH 05/02/2022 31.4  25.2 - 33.5 pg Final    MCHC 05/02/2022 32.2  25.2 - 33.5 g/dL Final    RDW 05/02/2022 13.1  11.8 - 14.4 % Final    Platelets 09/43/6401 192  138 - 453 k/uL Final    MPV 05/02/2022 9.9  8.1 - 13.5 fL Final    NRBC Automated 05/02/2022 0.0  0.0 per 100 WBC Final    Seg Neutrophils 05/02/2022 48  36 - 65 % Final    Lymphocytes 05/02/2022 41  24 - 43 % Final    Monocytes 05/02/2022 8  3 - 12 % Final    Eosinophils % 05/02/2022 2  1 - 4 % Final    Basophils 05/02/2022 1  0 - 2 % Final    Immature Granulocytes 05/02/2022 0  0 % Final    Segs Absolute 05/02/2022 3.54  1.50 - 8.10 k/uL Final    Absolute Lymph # 05/02/2022 3.05  1.10 - 3.70 k/uL Final    Absolute Mono # 05/02/2022 0.60  0.10 - 1.20 k/uL Final    Absolute Eos # 05/02/2022 0.12  0.00 - 0.44 k/uL Final    Basophils Absolute 05/02/2022 0.09  0.00 - 0.20 k/uL Final    Absolute Immature Granulocyte 05/02/2022 <0.03  0.00 - 0.30 k/uL Final    Glucose 05/02/2022 103* 70 - 99 mg/dL Final    BUN 05/02/2022 11  8 - 23 mg/dL Final    CREATININE 05/02/2022 0.67  0.50 - 0.90 mg/dL Final    Bun/Cre Ratio 05/02/2022 16  9 - 20 Final    Calcium 05/02/2022 9.6  8.6 - 10.4 mg/dL Final    Sodium 05/02/2022 141  135 - 144 mmol/L Final    Potassium 05/02/2022 4.4  3.7 - 5.3 mmol/L Final    Chloride 05/02/2022 104  98 - 107 mmol/L Final    CO2 05/02/2022 27  20 - 31 mmol/L Final    Anion Gap 05/02/2022 10  9 - 17 mmol/L Final    Alkaline Phosphatase 05/02/2022 73  35 - 104 U/L Final    ALT 05/02/2022 17  5 - 33 U/L Final    AST 05/02/2022 24  <32 U/L Final    Total Bilirubin 05/02/2022 0.82  0.3 - 1.2 mg/dL Final    Total Protein 05/02/2022 7.1  6.4 - 8.3 g/dL Final    Albumin 05/02/2022 4.3  3.5 - 5.2 g/dL Final    Albumin/Globulin Ratio 05/02/2022 1.5  1.0 - 2.5 Final    GFR Non- 05/02/2022 >60  >60 mL/min Final    GFR  05/02/2022 >60  >60 mL/min Final    GFR Comment 05/02/2022        Final    Comment: Average GFR for 79or more years old:   76 mL/min/1.73sq m  Chronic Kidney Disease:   <60 mL/min/1.73sq m  Kidney failure:   <15 mL/min/1.73sq m              eGFR calculated using average adult body mass. Additional eGFR calculator available at:        [email or web address]            Cholesterol 05/02/2022 124  <200 mg/dL Final    Comment:    Cholesterol Guidelines:      <200  Desirable   200-240  Borderline      >240  Undesirable         HDL 05/02/2022 52  >40 mg/dL Final    Comment:    HDL Guidelines:    <40     Undesirable   40-59    Borderline    >59     Desirable         LDL Cholesterol 05/02/2022 42  0 - 130 mg/dL Final    Comment:    LDL Guidelines:     <100    Desirable   100-129   Near to/above Desirable   130-159   Borderline      >159   Undesirable     Direct (measured) LDL and calculated LDL are not interchangeable tests.  Chol/HDL Ratio 05/02/2022 2.4  <5 Final            Triglycerides 05/02/2022 148  <150 mg/dL Final    Comment:    Triglyceride Guidelines:     <150   Desirable   150-199  Borderline   200-499  High     >499   Very high   Based on AHA Guidelines for fasting triglyceride, October 2012.

## 2022-05-10 NOTE — PROGRESS NOTES
Subjective:      Patient ID: Tyrone Willett is a 80 y.o. female. Hypertension  Pertinent negatives include no chest pain or shortness of breath. Hyperlipidemia  Pertinent negatives include no chest pain or shortness of breath. Other  Pertinent negatives include no abdominal pain, arthralgias, chest pain, coughing, fever or sore throat. Coronary Artery Disease  Pertinent negatives include no chest pain, chest tightness, leg swelling or shortness of breath. Risk factors include hyperlipidemia. Routine follow up on chronic medical conditions, refills, and review of updated labs. I have reviewed the patient's medical history in detail and updated the computerized patient record. No significant medical events, injuries, illness, or surgery. No current concerns or complaints. Compliant with medications. No gi symptoms . No chest pain issues. Good exercise tolerance. Driving actively . Driving only locally. Working her own housework. Staying home during covid 19 restrictions. No gi bleeding concerns on review. Vision stable. Memory continues to decline. Cervical cancer diagnosis and s/p karla/bso robotically assisted by Dr. Donald Mcclendon. Robotic laparoscopic modified radical hysterectomy, bilateral salpingo-oohporectomy, peritoneal washings for cytology, sentinel lymph node mapping, sentinel lymph node biopsies. Feeling confident they got everything. surveillance plan at present. She has not post operative concerns. Memory continues to decline. She looks to her dtr to answer most questions. Memory loss, progressing dementia/memory loss. Currently more moderate. Looking to her dtr. To answer most questions. Started aricept 5 mg/day last visit. Patient and daughter both think that it is not helping. Her memory is continuing to decline. Would like to try something else. Denies any recent illnesses, chest pain, or SOB. Dtr. Describes trouble swallowing , food sticking. She describes upper esophageal/hypopharnyx area. Usually meat. Usually when eating outside the home, she feels it may be nerves/eating too fast.             Past Medical History:   Diagnosis Date    Abnormal uterine bleeding (AUB) 01/2021    Anxiety     daughter reports since Covid    Asteroid hyalosis of right eye     Bruises easily     per daughter   Sandra Coronary artery disease     status post acute non-Q wave myocardial infarction 08/23/2012, status post drug eluting stent placement in the LAD and 2 drug eluting stents in the left circumflex artery 08/23/2012.  COVID-19 06/30/2020    confusion, SOB, weakness x 6-8 weeks; was hospitalized and did receive plasma    Diastolic dysfunction     grade 1.     Gastric ulcer with hemorrhage 03/03/2015    gastric and esophageal ulcers due to nsaid    Hyperlipidemia     Hypertension     Lives alone     Memory loss     daughter reports since Covid    Mild mitral regurgitation     Mild tricuspid regurgitation     Obesity     Patient in clinical research study 06/27/2020    Expanded Access to Convalescent Plasma for COVID-19 date of completed 6/30/2020    Poor intravenous access     instructed to hydrate for surgery    Vitreous floaters     left eye. Past Surgical History:   Procedure Laterality Date    CARDIAC CATHETERIZATION Left 08/23/2012    left main artery normal, LAD with mid 60 to 70% stenosis, FFR was 0.76, left circumflex artery with mid 90% stenosis with thrombus, RCA with mild irregularities, preserved left ventricular systolic function, ejection fraction estimated around 50%, status post drug eluting stent placement in the LAD and 2 drug eluting stents in the left circumflex artery.      CHOLECYSTECTOMY  01/01/1978    DILATION AND CURETTAGE OF UTERUS N/A 01/20/2021    D & C HYSTEROSCOPY Polypectomy performed by Shirley Aponte MD at [facility], DIAGNOSTIC  03/05/2015    esophageal/gastric ulcer; few diverticuli    HYSTERECTOMY N/A 2/23/2021    XI ROBOTIC LAPAROSCOPIC MODIFIED RADICAL  HYSTERECTOMY, BILATERAL SALPINGO-OOPHORECTOMY, CYTOLOGIC WASHINGS, SENTINEL LYMPHNODE MAPPINGS, SENTINEL LYMPHNODE BIOPSIES, ICG DYE  performed by Helio Viera MD at [address] [place] [address]  02/23/2021    ROBOTIC LAPAROSCOPIC MODIFIED RADICAL  HYSTERECTOMY, BILATERAL SALPINGO-OOPHORECTOMY, CYTOLOGIC WASHINGS, SENTINEL LYMPHNODE MAPPINGS, SENTINEL LYMPHNODE BIOPSIES    UPPER GASTROINTESTINAL ENDOSCOPY  04/16/2015    healed gastric ulcer       Current Outpatient Medications   Medication Sig Dispense Refill    memantine (NAMENDA) 5 MG tablet TAKE 1 TABLET TWICE DAILY 60 tablet 5    donepezil (ARICEPT) 5 MG tablet TAKE 1 TABLET EVERY NIGHT 90 tablet 3    lisinopril (PRINIVIL;ZESTRIL) 10 MG tablet TAKE 1 TABLET EVERY DAY 90 tablet 1    isosorbide mononitrate (IMDUR) 30 MG extended release tablet TAKE 1 TABLET EVERY DAY 90 tablet 3    alendronate (FOSAMAX) 70 MG tablet Take 1 tablet by mouth every 7 days 12 tablet 3    ferrous sulfate (FE TABS 325) 325 (65 Fe) MG EC tablet Take 1 tablet by mouth daily (with breakfast) 90 tablet 3    Cholecalciferol (VITAMIN D3) 50 MCG (2000 UT) CAPS Take 1 capsule by mouth daily 90 capsule 3    atorvastatin (LIPITOR) 40 MG tablet TAKE 1 TABLET AT BEDTIME 90 tablet 3    ibuprofen (ADVIL;MOTRIN) 400 MG tablet Take 1 tablet by mouth every 6 hours as needed for Pain 40 tablet 1    nitroGLYCERIN (NITROSTAT) 0.4 MG SL tablet Place 1 tablet under the tongue every 5 minutes as needed for Chest pain 25 tablet 1    aspirin 81 MG tablet Take 81 mg by mouth daily Takes 5 days per week, last dose  2/17 and instructed not to take any more      ascorbic acid (VITAMIN C) 500 MG tablet Take 500 mg by mouth 2 times daily Indications: patient only takes in the Winter With ana paula hips       Multiple Vitamins-Minerals (MULTIVITAMIN PO) Take 1 tablet by mouth daily.        No current facility-administered medications for this visit. Allergies   Allergen Reactions    Codeine Nausea Only     Social History     Tobacco Use    Smoking status: Never Smoker    Smokeless tobacco: Never Used   Vaping Use    Vaping Use: Never used   Substance Use Topics    Alcohol use: Yes     Comment: rare    Drug use: No     Family History   Problem Relation Age of Onset    Heart Attack Father     Cancer Sister     Prostate Cancer Brother     Cataracts Neg Hx     Diabetes Neg Hx     Glaucoma Neg Hx            Review of Systems   Constitutional: Negative. Negative for activity change, appetite change and fever. HENT: Negative. Negative for ear discharge, ear pain, rhinorrhea and sore throat. Eyes: Negative. Negative for pain and discharge. Respiratory: Negative. Negative for cough, chest tightness and shortness of breath. Cardiovascular: Negative. Negative for chest pain and leg swelling. Gastrointestinal: Positive for constipation (occasional, contolled with miralax). Negative for abdominal pain and diarrhea. Endocrine: Negative. Genitourinary: Negative. Negative for difficulty urinating, dysuria, hematuria and vaginal bleeding. Musculoskeletal: Negative for arthralgias. Skin: Negative. Allergic/Immunologic: Negative. Neurological: Negative. Negative for syncope and light-headedness. Hematological: Negative. Psychiatric/Behavioral: Positive for confusion and decreased concentration. Negative for sleep disturbance. Objective:   Physical Exam  Constitutional:       General: She is not in acute distress. Appearance: She is well-developed. HENT:      Head: Normocephalic and atraumatic. Right Ear: Tympanic membrane, ear canal and external ear normal.      Left Ear: Tympanic membrane, ear canal and external ear normal.      Nose: Nose normal.      Mouth/Throat:      Pharynx: No oropharyngeal exudate. Eyes:      General: No scleral icterus.      Extraocular Movements: Extraocular movements intact. Conjunctiva/sclera: Conjunctivae normal.   Neck:      Thyroid: No thyromegaly. Cardiovascular:      Rate and Rhythm: Normal rate and regular rhythm. Heart sounds: Normal heart sounds. No murmur heard. Pulmonary:      Effort: Pulmonary effort is normal. No respiratory distress. Breath sounds: Normal breath sounds. No wheezing. Abdominal:      General: Bowel sounds are normal. There is no distension. Palpations: Abdomen is soft. Tenderness: There is no abdominal tenderness. Musculoskeletal:         General: No tenderness. Normal range of motion. Cervical back: Neck supple. Skin:     General: Skin is warm and dry. Findings: No erythema or rash. Neurological:      Mental Status: She is alert and oriented to person, place, and time.    Psychiatric:         Mood and Affect: Mood normal.         Behavior: Behavior normal.       /72 (Site: Right Upper Arm, Position: Sitting, Cuff Size: Large Adult)   Pulse 68   Ht 4' 11\" (1.499 m)   Wt 138 lb (62.6 kg)   LMP  (LMP Unknown)   BMI 27.87 kg/m²      Hospital Outpatient Visit on 05/02/2022   Component Date Value Ref Range Status    Hemoglobin A1C 05/02/2022 5.5  4.0 - 6.0 % Final    Estimated Avg Glucose 05/02/2022 111  mg/dL Final    WBC 05/02/2022 7.4  3.5 - 11.3 k/uL Final    RBC 05/02/2022 4.52  3.95 - 5.11 m/uL Final    Hemoglobin 05/02/2022 14.2  11.9 - 15.1 g/dL Final    Hematocrit 05/02/2022 44.1  36.3 - 47.1 % Final    MCV 05/02/2022 97.6  82.6 - 102.9 fL Final    MCH 05/02/2022 31.4  25.2 - 33.5 pg Final    MCHC 05/02/2022 32.2  25.2 - 33.5 g/dL Final    RDW 05/02/2022 13.1  11.8 - 14.4 % Final    Platelets 81/27[birth year]1970 192  138 - 453 k/uL Final    MPV 05/02/2022 9.9  8.1 - 13.5 fL Final    NRBC Automated 05/02/2022 0.0  0.0 per 100 WBC Final    Seg Neutrophils 05/02/2022 48  36 - 65 % Final    Lymphocytes 05/02/2022 41  24 - 43 % Final    Monocytes 05/02/2022 8  3 - 12 % Final    Eosinophils % 05/02/2022 2  1 - 4 % Final    Basophils 05/02/2022 1  0 - 2 % Final    Immature Granulocytes 05/02/2022 0  0 % Final    Segs Absolute 05/02/2022 3.54  1.50 - 8.10 k/uL Final    Absolute Lymph # 05/02/2022 3.05  1.10 - 3.70 k/uL Final    Absolute Mono # 05/02/2022 0.60  0.10 - 1.20 k/uL Final    Absolute Eos # 05/02/2022 0.12  0.00 - 0.44 k/uL Final    Basophils Absolute 05/02/2022 0.09  0.00 - 0.20 k/uL Final    Absolute Immature Granulocyte 05/02/2022 <0.03  0.00 - 0.30 k/uL Final    Glucose 05/02/2022 103* 70 - 99 mg/dL Final    BUN 05/02/2022 11  8 - 23 mg/dL Final    CREATININE 05/02/2022 0.67  0.50 - 0.90 mg/dL Final    Bun/Cre Ratio 05/02/2022 16  9 - 20 Final    Calcium 05/02/2022 9.6  8.6 - 10.4 mg/dL Final    Sodium 05/02/2022 141  135 - 144 mmol/L Final    Potassium 05/02/2022 4.4  3.7 - 5.3 mmol/L Final    Chloride 05/02/2022 104  98 - 107 mmol/L Final    CO2 05/02/2022 27  20 - 31 mmol/L Final    Anion Gap 05/02/2022 10  9 - 17 mmol/L Final    Alkaline Phosphatase 05/02/2022 73  35 - 104 U/L Final    ALT 05/02/2022 17  5 - 33 U/L Final    AST 05/02/2022 24  <32 U/L Final    Total Bilirubin 05/02/2022 0.82  0.3 - 1.2 mg/dL Final    Total Protein 05/02/2022 7.1  6.4 - 8.3 g/dL Final    Albumin 05/02/2022 4.3  3.5 - 5.2 g/dL Final    Albumin/Globulin Ratio 05/02/2022 1.5  1.0 - 2.5 Final    GFR Non- 05/02/2022 >60  >60 mL/min Final    GFR  05/02/2022 >60  >60 mL/min Final    GFR Comment 05/02/2022        Final    Cholesterol 05/02/2022 124  <200 mg/dL Final    HDL 05/02/2022 52  >40 mg/dL Final    LDL Cholesterol 05/02/2022 42  0 - 130 mg/dL Final    Chol/HDL Ratio 05/02/2022 2.4  <5 Final    Triglycerides 05/02/2022 148  <150 mg/dL Final             Assessment:       Encounter Diagnoses   Name Primary?     Chronic gastric ulcer with hemorrhage Yes    Coronary artery disease involving native coronary artery of native heart without angina pectoris     Diastolic dysfunction     Pure hypercholesterolemia     Class 1 obesity due to excess calories without serious comorbidity in adult, unspecified BMI     Osteopenia, unspecified location     Iron deficiency anemia, unspecified iron deficiency anemia type     Impaired fasting blood sugar     Endometrial cancer (HCC)     Memory loss                Plan:        Prior gastric ulcer, likely nsaid related. On asa at present. Asx. No active treatment at present. Will follow up prn concerns. No active treatment at present beyond avoiding nsaids. CAD: CINDY to LAD and LCX 08/2012. Stable/asx. Plan to cont. Asa, imdur, statin, ace inhibitor. Stopped lopressor due to persistent bradycardia. Improved since stopping. Cont. Routine cardiology follow up. Diastolic dysfunction. No sense of decompensation or any recent chf issues. No peripheral edema or sob . Hyperlipidemia:  Doing well on lipitor. Cont. Current dosing. Obesity. Discussed wt. Loss. Trying to avoid worsening bs control. Colonoscopy normal 2015    She refuses mammogram.  No concerns on home self breast exams. Osteopenia per dexa 9/20/18:  Consider prolia twice a year. Started fosamax last visit. Side effects discussed. History of anemia; more gi bleeding/upper gi ulceration in the past.  Remains asx/normal range. Hb 14.2 today. Impaired fasting blood sugar. Stable. 119--111.  a1c reassuring. 5.6% --5.8%--5.5% today. Cont. Dietary discretion. Endometrial cancer. S/p karla and bso, ln sampling 1/21. Doing well post op. Following with OBGYN, they gave her a clean bill of health at last visit . Memory loss, progressing dementia/memory loss . Currently more moderate. Looking to her dtr. To answer most questions. Planning to increase aricept to 10 mg/day, and add namenda after discussion today. Some swallowing issues. Usually meat.   She feels it is anxiety or nervousness at the time, trying to eat too fast.  Declines work up at present.

## 2022-06-23 RX ORDER — ALENDRONATE SODIUM 70 MG/1
TABLET ORAL
Qty: 12 TABLET | Refills: 3 | Status: SHIPPED | OUTPATIENT
Start: 2022-06-23

## 2022-07-25 DIAGNOSIS — D50.9 IRON DEFICIENCY ANEMIA, UNSPECIFIED IRON DEFICIENCY ANEMIA TYPE: ICD-10-CM

## 2022-07-25 RX ORDER — ACETAMINOPHEN 160 MG
TABLET,DISINTEGRATING ORAL
Qty: 90 CAPSULE | Refills: 3 | Status: SHIPPED | OUTPATIENT
Start: 2022-07-25

## 2022-07-25 RX ORDER — LANOLIN ALCOHOL/MO/W.PET/CERES
325 CREAM (GRAM) TOPICAL
Qty: 90 TABLET | Refills: 3 | Status: SHIPPED | OUTPATIENT
Start: 2022-07-25

## 2022-08-04 RX ORDER — ATORVASTATIN CALCIUM 40 MG/1
TABLET, FILM COATED ORAL
Qty: 90 TABLET | Refills: 3 | Status: SHIPPED | OUTPATIENT
Start: 2022-08-04

## 2022-08-25 RX ORDER — MEMANTINE HYDROCHLORIDE 5 MG/1
TABLET ORAL
Qty: 180 TABLET | Refills: 1 | Status: SHIPPED | OUTPATIENT
Start: 2022-08-25

## 2022-10-07 ENCOUNTER — OFFICE VISIT (OUTPATIENT)
Dept: CARDIOLOGY | Age: 82
End: 2022-10-07
Payer: MEDICARE

## 2022-10-07 VITALS
DIASTOLIC BLOOD PRESSURE: 68 MMHG | BODY MASS INDEX: 26.42 KG/M2 | WEIGHT: 130.8 LBS | HEART RATE: 47 BPM | SYSTOLIC BLOOD PRESSURE: 142 MMHG

## 2022-10-07 DIAGNOSIS — R00.1 SINUS BRADYCARDIA: ICD-10-CM

## 2022-10-07 DIAGNOSIS — I25.119 CORONARY ARTERY DISEASE INVOLVING NATIVE HEART WITH ANGINA PECTORIS, UNSPECIFIED VESSEL OR LESION TYPE (HCC): ICD-10-CM

## 2022-10-07 DIAGNOSIS — I10 ESSENTIAL HYPERTENSION: ICD-10-CM

## 2022-10-07 DIAGNOSIS — I25.10 CORONARY ARTERY DISEASE INVOLVING NATIVE CORONARY ARTERY OF NATIVE HEART WITHOUT ANGINA PECTORIS: Primary | ICD-10-CM

## 2022-10-07 PROCEDURE — G8417 CALC BMI ABV UP PARAM F/U: HCPCS | Performed by: INTERNAL MEDICINE

## 2022-10-07 PROCEDURE — 93010 ELECTROCARDIOGRAM REPORT: CPT | Performed by: INTERNAL MEDICINE

## 2022-10-07 PROCEDURE — 93005 ELECTROCARDIOGRAM TRACING: CPT | Performed by: INTERNAL MEDICINE

## 2022-10-07 PROCEDURE — G8399 PT W/DXA RESULTS DOCUMENT: HCPCS | Performed by: INTERNAL MEDICINE

## 2022-10-07 PROCEDURE — 1036F TOBACCO NON-USER: CPT | Performed by: INTERNAL MEDICINE

## 2022-10-07 PROCEDURE — G8484 FLU IMMUNIZE NO ADMIN: HCPCS | Performed by: INTERNAL MEDICINE

## 2022-10-07 PROCEDURE — 99213 OFFICE O/P EST LOW 20 MIN: CPT | Performed by: INTERNAL MEDICINE

## 2022-10-07 PROCEDURE — G8427 DOCREV CUR MEDS BY ELIG CLIN: HCPCS | Performed by: INTERNAL MEDICINE

## 2022-10-07 PROCEDURE — 99214 OFFICE O/P EST MOD 30 MIN: CPT | Performed by: INTERNAL MEDICINE

## 2022-10-07 PROCEDURE — 1123F ACP DISCUSS/DSCN MKR DOCD: CPT | Performed by: INTERNAL MEDICINE

## 2022-10-07 PROCEDURE — 1090F PRES/ABSN URINE INCON ASSESS: CPT | Performed by: INTERNAL MEDICINE

## 2022-10-07 RX ORDER — NITROGLYCERIN 0.4 MG/1
0.4 TABLET SUBLINGUAL EVERY 5 MIN PRN
Qty: 25 TABLET | Refills: 1 | Status: SHIPPED | OUTPATIENT
Start: 2022-10-07

## 2022-10-07 NOTE — TELEPHONE ENCOUNTER
Renold Lefort called requesting a refill of the below medication which has been pended for you:     Requested Prescriptions     Pending Prescriptions Disp Refills    lisinopril (PRINIVIL;ZESTRIL) 10 MG tablet [Pharmacy Med Name: LISINOPRIL 10 MG Tablet] 90 tablet 0     Sig: TAKE 1 TABLET EVERY DAY       Last Appointment Date: 5/10/2022  Next Appointment Date: 11/11/2022    Allergies   Allergen Reactions    Codeine Nausea Only

## 2022-10-07 NOTE — PROGRESS NOTES
[facility]    CC: Follow up for CAD. HPI:  Ivis Chandler  is here for follow up. Denies any cp, sob, orthopnea, pnd, le edema. No dizziness, no lightheadedness, no fatigued/tired. No nitro use. States she tries to stays active. Goes up and down her stairs 4-5 times a day with no issues. Doing all chores at home with no issue. Thinks she may have been taking metoprolol. Past Medical History:   Diagnosis Date    Abnormal uterine bleeding (AUB) 01/2021    Anxiety     daughter reports since Covid    Asteroid hyalosis of right eye     Bruises easily     per daughter    Coronary artery disease     status post acute non-Q wave myocardial infarction 08/23/2012, status post drug eluting stent placement in the LAD and 2 drug eluting stents in the left circumflex artery 08/23/2012. COVID-19 06/30/2020    confusion, SOB, weakness x 6-8 weeks; was hospitalized and did receive plasma    Diastolic dysfunction     grade 1. Gastric ulcer with hemorrhage 03/03/2015    gastric and esophageal ulcers due to nsaid    Hyperlipidemia     Hypertension     Lives alone     Memory loss     daughter reports since Covid    Mild mitral regurgitation     Mild tricuspid regurgitation     Obesity     Patient in clinical research study 06/27/2020    Expanded Access to Convalescent Plasma for COVID-19 date of completed 6/30/2020    Poor intravenous access     instructed to hydrate for surgery    Vitreous floaters     left eye. Past Surgical History:   Procedure Laterality Date    CARDIAC CATHETERIZATION Left 08/23/2012    left main artery normal, LAD with mid 60 to 70% stenosis, FFR was 0.76, left circumflex artery with mid 90% stenosis with thrombus, RCA with mild irregularities, preserved left ventricular systolic function, ejection fraction estimated around 50%, status post drug eluting stent placement in the LAD and 2 drug eluting stents in the left circumflex artery.      CHOLECYSTECTOMY  01/01/1978 DILATION AND CURETTAGE OF UTERUS N/A 01/20/2021    D & C HYSTEROSCOPY Polypectomy performed by Kj Reagan MD at [facility] 1, COLON, DIAGNOSTIC  03/05/2015    esophageal/gastric ulcer; few diverticuli    HYSTERECTOMY (CERVIX STATUS UNKNOWN) N/A 2/23/2021    XI ROBOTIC LAPAROSCOPIC MODIFIED RADICAL  HYSTERECTOMY, BILATERAL SALPINGO-OOPHORECTOMY, CYTOLOGIC WASHINGS, SENTINEL LYMPHNODE MAPPINGS, SENTINEL LYMPHNODE BIOPSIES, ICG DYE  performed by Lavelle García MD at [facility] (CERVIX REMOVED)  02/23/2021    ROBOTIC LAPAROSCOPIC MODIFIED RADICAL  HYSTERECTOMY, BILATERAL SALPINGO-OOPHORECTOMY, CYTOLOGIC WASHINGS, SENTINEL LYMPHNODE MAPPINGS, SENTINEL LYMPHNODE BIOPSIES    UPPER GASTROINTESTINAL ENDOSCOPY  04/16/2015    healed gastric ulcer       Family History   Problem Relation Age of Onset    Heart Attack Father     Cancer Sister     Prostate Cancer Brother     Cataracts Neg Hx     Diabetes Neg Hx     Glaucoma Neg Hx        REVIEW OF SYSTEMS:    Constitutional: there has been no unanticipated weight loss. There's been No change in energy level, No change in activity level. Eyes: No visual changes or diplopia. No scleral icterus. ENT: No Headaches, hearing loss or vertigo. No mouth sores or sore throat. Cardiovascular: AS HPI  Respiratory: AS HPI  Gastrointestinal: No abdominal pain, appetite loss, blood in stools. No change in bowel or bladder habits. Genitourinary: No dysuria, trouble voiding, or hematuria. Musculoskeletal:  No gait disturbance, No weakness or joint complaints. Integumentary: No rash or pruritis. Neurological: No headache, diplopia, change in muscle strength, numbness or tingling. No change in gait, balance, coordination, mood, affect, memory, mentation, behavior. Psychiatric: No new anxiety or depression. Endocrine: No temperature intolerance. No excessive thirst, fluid intake, or urination. No tremor.   Hematologic/Lymphatic: No abnormal bruising or bleeding, blood clots or swollen lymph nodes. Allergic/Immunologic: No nasal congestion or hives. Physical exam:    BP (!) 142/68 (Site: Right Upper Arm, Position: Sitting)   Pulse (!) 47   Wt 130 lb 12.8 oz (59.3 kg)   LMP  (LMP Unknown)   BMI 26.42 kg/m²     Vitals as above. Alert and oriented x 3. No JVD, or carotid bruits. Lungs are clear to auscultation. Heart sounds are regular, normal, no murmur. Abdomen is soft, no tenderness. Extremities No peripheral edema.     Meds:    Current Outpatient Medications:     memantine (NAMENDA) 5 MG tablet, TAKE 1 TABLET TWICE DAILY, Disp: 180 tablet, Rfl: 1    atorvastatin (LIPITOR) 40 MG tablet, TAKE 1 TABLET AT BEDTIME, Disp: 90 tablet, Rfl: 3    Cholecalciferol (VITAMIN D3) 50 MCG (2000 UT) CAPS, TAKE 1 CAPSULE EVERY DAY, Disp: 90 capsule, Rfl: 3    ferrous sulfate (FE TABS 325) 325 (65 Fe) MG EC tablet, TAKE 1 TABLET BY MOUTH DAILY (WITH BREAKFAST), Disp: 90 tablet, Rfl: 3    alendronate (FOSAMAX) 70 MG tablet, TAKE 1 TABLET EVERY 7 DAYS, Disp: 12 tablet, Rfl: 3    donepezil (ARICEPT) 5 MG tablet, TAKE 1 TABLET EVERY NIGHT, Disp: 90 tablet, Rfl: 3    lisinopril (PRINIVIL;ZESTRIL) 10 MG tablet, TAKE 1 TABLET EVERY DAY, Disp: 90 tablet, Rfl: 1    isosorbide mononitrate (IMDUR) 30 MG extended release tablet, TAKE 1 TABLET EVERY DAY, Disp: 90 tablet, Rfl: 3    ibuprofen (ADVIL;MOTRIN) 400 MG tablet, Take 1 tablet by mouth every 6 hours as needed for Pain, Disp: 40 tablet, Rfl: 1    nitroGLYCERIN (NITROSTAT) 0.4 MG SL tablet, Place 1 tablet under the tongue every 5 minutes as needed for Chest pain, Disp: 25 tablet, Rfl: 1    aspirin 81 MG tablet, Take 81 mg by mouth daily Takes 5 days per week, last dose  2/17 and instructed not to take any more, Disp: , Rfl:     ascorbic acid (VITAMIN C) 500 MG tablet, Take 500 mg by mouth 2 times daily Indications: patient only takes in the Winter With ana paula hips , Disp: , Rfl:     Multiple Vitamins-Minerals (MULTIVITAMIN PO), Take 1 tablet by mouth daily. , Disp: , Rfl:     LABS  Lab Results   Component Value Date    CHOL 124 05/02/2022    TRIG 148 05/02/2022    HDL 52 05/02/2022    LDLCHOLESTEROL 42 05/02/2022    VLDL NOT REPORTED 11/01/2021    CHOLHDLRATIO 2.4 05/02/2022       Lab Results   Component Value Date     05/02/2022    K 4.4 05/02/2022     05/02/2022    CO2 27 05/02/2022    BUN 11 05/02/2022    CREATININE 0.67 05/02/2022    GLUCOSE 103 (H) 05/02/2022    CALCIUM 9.6 05/02/2022    PROT 7.1 05/02/2022    LABALBU 4.3 05/02/2022    BILITOT 0.82 05/02/2022    ALKPHOS 73 05/02/2022    AST 24 05/02/2022    ALT 17 05/02/2022    LABGLOM >60 05/02/2022    GFRAA >60 05/02/2022    GLOB 3.2 06/26/2020       EKG: Marked sinus  Bradycardia     Nuclear Stress 9/20:  IMPRESSION:  1. Mild anterolateral ischemia. 2.  LVEF 80%. Assessment and Plan:    -Asymptomatic Sinus Bradycardia- HR 47- we previously stopped metoprolol, but thinks she may still haven been taking it as it came in for her [id number]. Again recommended to stop it. Wrote down name of medication. Pt and daughter aware to stop metoprolol. Re eval HR in 1 month. -HTN- higher today. She will monitor BP at home. We will re check in 1 month. If BP remains higher, increase lisinopril 20 qd    -CAD- CINDY to LAD and LCX 08/2012- stable without significant angina. Continue ASA, statin, imdur.     -Reviewed stress test 9/20- possible mild anterolateral ischemia, reviewed images, not that convincing. I discussed with her the possibility of repeating the stress test, she is currently not interested and would like to hold off, and will notify us if she develops any symptoms. -COVID 19 in June 2020- with some memory issues since then. -Obesity - encouraged diet, exercise, and discussed weight loss extensively. -Hyperlipidemia- LDL 42 on 5/22.   continue statin.     -History of severe anemia along with gastric and esophageal ulcers 3/2015-     The patient is to continue heart healthy diet, weight loss and exercise as tolerated. Patient's medications and side effects were discussed. Medication refills were provided if needed. Follow up appointment timing was discussed. All questions and concerns were addressed to patient's satisfaction. The patient is to follow up in 1 months or sooner if necessary. Thank you for allowing me to participate in the care of this patient, please do not hesitate to call if you have any questions. Louie Salguero DO, [facility], [address], [address], [place] [address] [facility]  [facility]  [phone]

## 2022-10-10 RX ORDER — LISINOPRIL 10 MG/1
TABLET ORAL
Qty: 90 TABLET | Refills: 0 | Status: SHIPPED | OUTPATIENT
Start: 2022-10-10

## 2022-10-31 ENCOUNTER — HOSPITAL ENCOUNTER (OUTPATIENT)
Dept: LAB | Age: 82
Discharge: HOME | End: 2022-10-31
Payer: MEDICARE

## 2022-10-31 DIAGNOSIS — D50.9 IRON DEFICIENCY ANEMIA, UNSPECIFIED IRON DEFICIENCY ANEMIA TYPE: ICD-10-CM

## 2022-10-31 DIAGNOSIS — R73.01 IMPAIRED FASTING BLOOD SUGAR: ICD-10-CM

## 2022-10-31 DIAGNOSIS — E78.00 PURE HYPERCHOLESTEROLEMIA: ICD-10-CM

## 2022-10-31 LAB
ABSOLUTE EOS #: 0.09 K/UL (ref 0–0.44)
ABSOLUTE IMMATURE GRANULOCYTE: <0.03 K/UL (ref 0–0.3)
ABSOLUTE LYMPH #: 2.98 K/UL (ref 1.1–3.7)
ABSOLUTE MONO #: 0.51 K/UL (ref 0.1–1.2)
ALBUMIN SERPL-MCNC: 4.5 G/DL (ref 3.5–5.2)
ALBUMIN/GLOBULIN RATIO: 1.5 (ref 1–2.5)
ALP BLD-CCNC: 72 U/L (ref 35–104)
ALT SERPL-CCNC: 13 U/L (ref 5–33)
ANION GAP SERPL CALCULATED.3IONS-SCNC: 10 MMOL/L (ref 9–17)
AST SERPL-CCNC: 24 U/L
BASOPHILS # BLD: 1 % (ref 0–2)
BASOPHILS ABSOLUTE: 0.04 K/UL (ref 0–0.2)
BILIRUB SERPL-MCNC: 1 MG/DL (ref 0.3–1.2)
BUN BLDV-MCNC: 8 MG/DL (ref 8–23)
BUN/CREAT BLD: 13 (ref 9–20)
CALCIUM SERPL-MCNC: 9.9 MG/DL (ref 8.6–10.4)
CHLORIDE BLD-SCNC: 106 MMOL/L (ref 98–107)
CHOLESTEROL/HDL RATIO: 2
CHOLESTEROL: 132 MG/DL
CO2: 28 MMOL/L (ref 20–31)
CREAT SERPL-MCNC: 0.6 MG/DL (ref 0.5–0.9)
EOSINOPHILS RELATIVE PERCENT: 1 % (ref 1–4)
ESTIMATED AVERAGE GLUCOSE: 108 MG/DL
GFR SERPL CREATININE-BSD FRML MDRD: >60 ML/MIN/1.73M2
GLUCOSE BLD-MCNC: 104 MG/DL (ref 70–99)
HBA1C MFR BLD: 5.4 % (ref 4–6)
HCT VFR BLD CALC: 44.8 % (ref 36.3–47.1)
HDLC SERPL-MCNC: 66 MG/DL
HEMOGLOBIN: 15 G/DL (ref 11.9–15.1)
IMMATURE GRANULOCYTES: 0 %
LDL CHOLESTEROL: 48 MG/DL (ref 0–130)
LYMPHOCYTES # BLD: 44 % (ref 24–43)
MCH RBC QN AUTO: 32.3 PG (ref 25.2–33.5)
MCHC RBC AUTO-ENTMCNC: 33.5 G/DL (ref 25.2–33.5)
MCV RBC AUTO: 96.6 FL (ref 82.6–102.9)
MONOCYTES # BLD: 8 % (ref 3–12)
NRBC AUTOMATED: 0 PER 100 WBC
PDW BLD-RTO: 13 % (ref 11.8–14.4)
PLATELET # BLD: 189 K/UL (ref 138–453)
PMV BLD AUTO: 9.7 FL (ref 8.1–13.5)
POTASSIUM SERPL-SCNC: 4.1 MMOL/L (ref 3.7–5.3)
RBC # BLD: 4.64 M/UL (ref 3.95–5.11)
SEG NEUTROPHILS: 46 % (ref 36–65)
SEGMENTED NEUTROPHILS ABSOLUTE COUNT: 3.13 K/UL (ref 1.5–8.1)
SODIUM BLD-SCNC: 144 MMOL/L (ref 135–144)
TOTAL PROTEIN: 7.5 G/DL (ref 6.4–8.3)
TRIGL SERPL-MCNC: 91 MG/DL
WBC # BLD: 6.8 K/UL (ref 3.5–11.3)

## 2022-10-31 PROCEDURE — 83036 HEMOGLOBIN GLYCOSYLATED A1C: CPT

## 2022-10-31 PROCEDURE — 80061 LIPID PANEL: CPT

## 2022-10-31 PROCEDURE — 36415 COLL VENOUS BLD VENIPUNCTURE: CPT

## 2022-10-31 PROCEDURE — 80053 COMPREHEN METABOLIC PANEL: CPT

## 2022-10-31 PROCEDURE — 85025 COMPLETE CBC W/AUTO DIFF WBC: CPT

## 2022-11-04 ENCOUNTER — OFFICE VISIT (OUTPATIENT)
Dept: GYNECOLOGIC ONCOLOGY | Age: 82
End: 2022-11-04
Payer: MEDICARE

## 2022-11-04 VITALS
OXYGEN SATURATION: 97 % | WEIGHT: 130.8 LBS | HEART RATE: 67 BPM | DIASTOLIC BLOOD PRESSURE: 83 MMHG | BODY MASS INDEX: 26.37 KG/M2 | HEIGHT: 59 IN | SYSTOLIC BLOOD PRESSURE: 148 MMHG

## 2022-11-04 DIAGNOSIS — C54.1 ENDOMETRIAL CANCER (HCC): Primary | ICD-10-CM

## 2022-11-04 PROCEDURE — 99214 OFFICE O/P EST MOD 30 MIN: CPT | Performed by: PHYSICIAN ASSISTANT

## 2022-11-04 PROCEDURE — 1123F ACP DISCUSS/DSCN MKR DOCD: CPT | Performed by: PHYSICIAN ASSISTANT

## 2022-11-04 PROCEDURE — G8484 FLU IMMUNIZE NO ADMIN: HCPCS | Performed by: PHYSICIAN ASSISTANT

## 2022-11-04 PROCEDURE — G8427 DOCREV CUR MEDS BY ELIG CLIN: HCPCS | Performed by: PHYSICIAN ASSISTANT

## 2022-11-04 PROCEDURE — 1090F PRES/ABSN URINE INCON ASSESS: CPT | Performed by: PHYSICIAN ASSISTANT

## 2022-11-04 PROCEDURE — G8399 PT W/DXA RESULTS DOCUMENT: HCPCS | Performed by: PHYSICIAN ASSISTANT

## 2022-11-04 PROCEDURE — G8417 CALC BMI ABV UP PARAM F/U: HCPCS | Performed by: PHYSICIAN ASSISTANT

## 2022-11-04 PROCEDURE — 1036F TOBACCO NON-USER: CPT | Performed by: PHYSICIAN ASSISTANT

## 2022-11-04 ASSESSMENT — ENCOUNTER SYMPTOMS
GASTROINTESTINAL NEGATIVE: 1
EYES NEGATIVE: 1
RESPIRATORY NEGATIVE: 1

## 2022-11-04 NOTE — PROGRESS NOTES
Denies known family history of breast cancer. ROS:  I have personally reviewed and agree with the review of systems done by my ancillary staff in the [facility] documentation. Physical Exam:    Vitals:    11/04/22 1000 11/04/22 1010   BP: (!) 157/91 (!) 148/83   Pulse: 67    SpO2: 97%    Weight: 130 lb 12.8 oz (59.3 kg)    Height: 4' 11\" (1.499 m)      General: well- appearing, no acute distress, alert and oriented x 3    HEENT: Thyroid normal size. No cervical or supraclavicular lymphadenopathy. Lungs: Clear to auscultate bilaterally to the bases    No CVA tenderness present bilaterally. Heart: Auscultation reveals regular rate and rhythm. No murmurs or gallops appreciated. Breasts were symmetric with no dominant mass, no skin change, no nipple discharge, no axillary adenopathy, nipples were everted bilaterally. Abdomen: Soft, flat. Multiple laparoscopic incision scars present. RUQ surgical scar present. No herniations. No detectable organomegaly. No palpable masses or ascites. No inguinal lymphadenopathy bilaterally. Extremities: No edema, calf tenderness or deformities bilaterally    Pelvic: The patient has normal female external genitalia including, vulva, urethra, vagina, perineum, Bartholin glands. Uterus, bilateral fallopian tubes and adnexae, and cervix are  surgically absent. Speculum examination using small speculum reveals no blood or discharge in vaginal vault. The vaginal cuff well-intact with no signs of erythema, induration, separation, or granulation tissue. Bimanual examination: Bladder is non-tender, without mass. There are no palpable vaginal nodules and no other pelvic masses, tenderness or pathology noted.        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 No adjuvant treatment. Today there is no clinical evidence of recurrent disease    Her Surveillance plan is as follows:   1. Return to clinic in 6 months for follow up surveillance    2. Call or return to clinic sooner with any questions or concerns     I spent 30 minutes providing care to the patient spent counseling and coordinating care, discussing the patient's current situation, reviewing her options, counseling and education her and answering her questions. The patient's pertinent images, labs, and previous records and procedures were reviewed.     Electronically signed by Komal Vickers PA-C on 11/4/22 at 10:20 AM EDT

## 2022-11-09 RX ORDER — ISOSORBIDE MONONITRATE 30 MG/1
TABLET, EXTENDED RELEASE ORAL
Qty: 90 TABLET | Refills: 3 | Status: SHIPPED | OUTPATIENT
Start: 2022-11-09

## 2022-11-11 ENCOUNTER — TELEPHONE (OUTPATIENT)
Dept: FAMILY MEDICINE CLINIC | Age: 82
End: 2022-11-11

## 2022-11-14 ENCOUNTER — OFFICE VISIT (OUTPATIENT)
Dept: CARDIOLOGY | Age: 82
End: 2022-11-14
Payer: MEDICARE

## 2022-11-14 ENCOUNTER — IMMUNIZATION (OUTPATIENT)
Dept: LAB | Age: 82
End: 2022-11-14
Payer: MEDICARE

## 2022-11-14 VITALS
BODY MASS INDEX: 26.77 KG/M2 | WEIGHT: 132.8 LBS | SYSTOLIC BLOOD PRESSURE: 128 MMHG | HEART RATE: 58 BPM | DIASTOLIC BLOOD PRESSURE: 80 MMHG | HEIGHT: 59 IN

## 2022-11-14 DIAGNOSIS — E78.2 MIXED HYPERLIPIDEMIA: ICD-10-CM

## 2022-11-14 DIAGNOSIS — I25.10 CORONARY ARTERY DISEASE INVOLVING NATIVE CORONARY ARTERY OF NATIVE HEART WITHOUT ANGINA PECTORIS: Primary | ICD-10-CM

## 2022-11-14 DIAGNOSIS — I10 ESSENTIAL HYPERTENSION: ICD-10-CM

## 2022-11-14 DIAGNOSIS — R00.1 SINUS BRADYCARDIA: ICD-10-CM

## 2022-11-14 PROCEDURE — 3078F DIAST BP <80 MM HG: CPT | Performed by: INTERNAL MEDICINE

## 2022-11-14 PROCEDURE — 3074F SYST BP LT 130 MM HG: CPT | Performed by: INTERNAL MEDICINE

## 2022-11-14 PROCEDURE — 1090F PRES/ABSN URINE INCON ASSESS: CPT | Performed by: INTERNAL MEDICINE

## 2022-11-14 PROCEDURE — G8417 CALC BMI ABV UP PARAM F/U: HCPCS | Performed by: INTERNAL MEDICINE

## 2022-11-14 PROCEDURE — G8427 DOCREV CUR MEDS BY ELIG CLIN: HCPCS | Performed by: INTERNAL MEDICINE

## 2022-11-14 PROCEDURE — 1036F TOBACCO NON-USER: CPT | Performed by: INTERNAL MEDICINE

## 2022-11-14 PROCEDURE — G0008 ADMIN INFLUENZA VIRUS VAC: HCPCS | Performed by: FAMILY MEDICINE

## 2022-11-14 PROCEDURE — PBSHW INFLUENZA, FLUAD, (AGE 65 Y+), IM, PF, 0.5 ML: Performed by: FAMILY MEDICINE

## 2022-11-14 PROCEDURE — 1123F ACP DISCUSS/DSCN MKR DOCD: CPT | Performed by: INTERNAL MEDICINE

## 2022-11-14 PROCEDURE — G8399 PT W/DXA RESULTS DOCUMENT: HCPCS | Performed by: INTERNAL MEDICINE

## 2022-11-14 PROCEDURE — 99213 OFFICE O/P EST LOW 20 MIN: CPT | Performed by: INTERNAL MEDICINE

## 2022-11-14 PROCEDURE — G8484 FLU IMMUNIZE NO ADMIN: HCPCS | Performed by: INTERNAL MEDICINE

## 2022-11-14 PROCEDURE — 99214 OFFICE O/P EST MOD 30 MIN: CPT | Performed by: INTERNAL MEDICINE

## 2022-11-14 NOTE — PROGRESS NOTES
[facility]    CC: Follow up for CAD. HPI:  Yuri Calderon  is here for follow up. Denies any cp, sob, orthopnea, pnd, le edema. No dizziness, no lightheadedness, no fatigued/tired. No nitro use. States she tries to stays active. Goes up and down her stairs 4-5 times a day with no issues. Doing all chores at home with no issue. She feels the same since she stopped her metoprolol compared to before    Past Medical History:   Diagnosis Date    Abnormal uterine bleeding (AUB) 01/2021    Anxiety     daughter reports since Covid    Asteroid hyalosis of right eye     Bruises easily     per daughter    Coronary artery disease     status post acute non-Q wave myocardial infarction 08/23/2012, status post drug eluting stent placement in the LAD and 2 drug eluting stents in the left circumflex artery 08/23/2012. COVID-19 06/30/2020    confusion, SOB, weakness x 6-8 weeks; was hospitalized and did receive plasma    Diastolic dysfunction     grade 1. Gastric ulcer with hemorrhage 03/03/2015    gastric and esophageal ulcers due to nsaid    Hyperlipidemia     Hypertension     Lives alone     Memory loss     daughter reports since Covid    Mild mitral regurgitation     Mild tricuspid regurgitation     Obesity     Patient in clinical research study 06/27/2020    Expanded Access to Convalescent Plasma for COVID-19 date of completed 6/30/2020    Poor intravenous access     instructed to hydrate for surgery    Vitreous floaters     left eye. Past Surgical History:   Procedure Laterality Date    CARDIAC CATHETERIZATION Left 08/23/2012    left main artery normal, LAD with mid 60 to 70% stenosis, FFR was 0.76, left circumflex artery with mid 90% stenosis with thrombus, RCA with mild irregularities, preserved left ventricular systolic function, ejection fraction estimated around 50%, status post drug eluting stent placement in the LAD and 2 drug eluting stents in the left circumflex artery. CHOLECYSTECTOMY  01/01/1978    DILATION AND CURETTAGE OF UTERUS N/A 01/20/2021    D & C HYSTEROSCOPY Polypectomy performed by Salome Yi MD at [facility] 1, COLON, DIAGNOSTIC  03/05/2015    esophageal/gastric ulcer; few diverticuli    HYSTERECTOMY (CERVIX STATUS UNKNOWN) N/A 2/23/2021    XI ROBOTIC LAPAROSCOPIC MODIFIED RADICAL  HYSTERECTOMY, BILATERAL SALPINGO-OOPHORECTOMY, CYTOLOGIC WASHINGS, SENTINEL LYMPHNODE MAPPINGS, SENTINEL LYMPHNODE BIOPSIES, ICG DYE  performed by Prosper Anthony MD at [facility] (CERVIX REMOVED)  02/23/2021    ROBOTIC LAPAROSCOPIC MODIFIED RADICAL  HYSTERECTOMY, BILATERAL SALPINGO-OOPHORECTOMY, CYTOLOGIC WASHINGS, SENTINEL LYMPHNODE MAPPINGS, SENTINEL LYMPHNODE BIOPSIES    UPPER GASTROINTESTINAL ENDOSCOPY  04/16/2015    healed gastric ulcer       Family History   Problem Relation Age of Onset    Heart Attack Father     Cancer Sister     Prostate Cancer Brother     Cataracts Neg Hx     Diabetes Neg Hx     Glaucoma Neg Hx        REVIEW OF SYSTEMS:    Constitutional: there has been no unanticipated weight loss. There's been No change in energy level, No change in activity level. Eyes: No visual changes or diplopia. No scleral icterus. ENT: No Headaches, hearing loss or vertigo. No mouth sores or sore throat. Cardiovascular: AS HPI  Respiratory: AS HPI  Gastrointestinal: No abdominal pain, appetite loss, blood in stools. No change in bowel or bladder habits. Genitourinary: No dysuria, trouble voiding, or hematuria. Musculoskeletal:  No gait disturbance, No weakness or joint complaints. Integumentary: No rash or pruritis. Neurological: No headache, diplopia, change in muscle strength, numbness or tingling. No change in gait, balance, coordination, mood, affect, memory, mentation, behavior. Psychiatric: No new anxiety or depression. Endocrine: No temperature intolerance. No excessive thirst, fluid intake, or urination. No tremor.   Hematologic/Lymphatic: No abnormal bruising or bleeding, blood clots or swollen lymph nodes. Allergic/Immunologic: No nasal congestion or hives. Physical exam:    LMP  (LMP Unknown)     Vitals as above. Alert and oriented x 3. No JVD, or carotid bruits. Lungs are clear to auscultation. Heart sounds are regular, normal, no murmur. Abdomen is soft, no tenderness. Extremities No peripheral edema. Meds:    Current Outpatient Medications:     isosorbide mononitrate (IMDUR) 30 MG extended release tablet, TAKE 1 TABLET EVERY DAY, Disp: 90 tablet, Rfl: 3    lisinopril (PRINIVIL;ZESTRIL) 10 MG tablet, TAKE 1 TABLET EVERY DAY, Disp: 90 tablet, Rfl: 0    nitroGLYCERIN (NITROSTAT) 0.4 MG SL tablet, Place 1 tablet under the tongue every 5 minutes as needed for Chest pain, Disp: 25 tablet, Rfl: 1    memantine (NAMENDA) 5 MG tablet, TAKE 1 TABLET TWICE DAILY, Disp: 180 tablet, Rfl: 1    atorvastatin (LIPITOR) 40 MG tablet, TAKE 1 TABLET AT BEDTIME, Disp: 90 tablet, Rfl: 3    Cholecalciferol (VITAMIN D3) 50 MCG (2000 UT) CAPS, TAKE 1 CAPSULE EVERY DAY, Disp: 90 capsule, Rfl: 3    ferrous sulfate (FE TABS 325) 325 (65 Fe) MG EC tablet, TAKE 1 TABLET BY MOUTH DAILY (WITH BREAKFAST), Disp: 90 tablet, Rfl: 3    alendronate (FOSAMAX) 70 MG tablet, TAKE 1 TABLET EVERY 7 DAYS, Disp: 12 tablet, Rfl: 3    donepezil (ARICEPT) 5 MG tablet, TAKE 1 TABLET EVERY NIGHT, Disp: 90 tablet, Rfl: 3    ibuprofen (ADVIL;MOTRIN) 400 MG tablet, Take 1 tablet by mouth every 6 hours as needed for Pain, Disp: 40 tablet, Rfl: 1    aspirin 81 MG tablet, Take 81 mg by mouth daily Takes 5 days per week, last dose  2/17 and instructed not to take any more, Disp: , Rfl:     ascorbic acid (VITAMIN C) 500 MG tablet, Take 500 mg by mouth 2 times daily Indications: patient only takes in the Winter With ana paula hips , Disp: , Rfl:     Multiple Vitamins-Minerals (MULTIVITAMIN PO), Take 1 tablet by mouth daily. , Disp: , Rfl:     LABS  Lab Results   Component Value Date    CHOL 132 10/31/2022    TRIG 91 10/31/2022    HDL 66 10/31/2022    LDLCHOLESTEROL 48 10/31/2022    VLDL NOT REPORTED 11/01/2021    CHOLHDLRATIO 2.0 10/31/2022       Lab Results   Component Value Date     10/31/2022    K 4.1 10/31/2022     10/31/2022    CO2 28 10/31/2022    BUN 8 10/31/2022    CREATININE 0.60 10/31/2022    GLUCOSE 104 (H) 10/31/2022    CALCIUM 9.9 10/31/2022    PROT 7.5 10/31/2022    LABALBU 4.5 10/31/2022    BILITOT 1.0 10/31/2022    ALKPHOS 72 10/31/2022    AST 24 10/31/2022    ALT 13 10/31/2022    LABGLOM >60 10/31/2022    GFRAA >60 05/02/2022    GLOB 3.2 06/26/2020       EKG: Marked sinus  Bradycardia     Nuclear Stress 9/20:  IMPRESSION:  1. Mild anterolateral ischemia. 2.  LVEF 80%. Assessment and Plan:    -Asymptomatic Sinus Bradycardia-currently resolved after stopping metoprolol heart rate is in the 50s at rest    -HTN-well-controlled currently on lisinopril and long-acting nitrates    -CAD- CINDY to LAD and LCX 08/2012- stable without significant angina. Continue ASA, statin, imdur. She has been refusing repeat nuclear stress test      -COVID 19 in June 2020- with some memory issues since then. -Obesity - encouraged caloric restrictions and moderate physical activity.    -Hyperlipidemia- LDL 42 on 5/22. continue statin.     -History of severe anemia along with gastric and esophageal ulcers 3/2015-     The patient is to continue heart healthy diet, weight loss and exercise as tolerated. Patient's medications and side effects were discussed. Medication refills were provided if needed. Follow up appointment timing was discussed. All questions and concerns were addressed to patient's satisfaction. The patient is to follow up in 1 months or sooner if necessary. Thank you for allowing me to participate in the care of this patient, please do not hesitate to call if you have any questions.     Electronically signed by Katie Enciso MD on 11/14/2022 at 1:11 PM      [place] [facility]  [phone]

## 2022-11-30 RX ORDER — NITROGLYCERIN 0.4 MG/1
0.4 TABLET SUBLINGUAL EVERY 5 MIN PRN
Qty: 25 TABLET | Refills: 1 | OUTPATIENT
Start: 2022-11-30

## 2022-12-29 ENCOUNTER — OFFICE VISIT (OUTPATIENT)
Dept: FAMILY MEDICINE CLINIC | Age: 82
End: 2022-12-29
Payer: MEDICARE

## 2022-12-29 VITALS
OXYGEN SATURATION: 95 % | WEIGHT: 128 LBS | SYSTOLIC BLOOD PRESSURE: 124 MMHG | DIASTOLIC BLOOD PRESSURE: 78 MMHG | TEMPERATURE: 98.4 F | BODY MASS INDEX: 25.8 KG/M2 | HEART RATE: 59 BPM | HEIGHT: 59 IN

## 2022-12-29 DIAGNOSIS — E78.00 PURE HYPERCHOLESTEROLEMIA: ICD-10-CM

## 2022-12-29 DIAGNOSIS — K25.4 CHRONIC GASTRIC ULCER WITH HEMORRHAGE: Primary | ICD-10-CM

## 2022-12-29 DIAGNOSIS — R73.01 IMPAIRED FASTING BLOOD SUGAR: ICD-10-CM

## 2022-12-29 DIAGNOSIS — C54.1 ENDOMETRIAL CANCER (HCC): ICD-10-CM

## 2022-12-29 DIAGNOSIS — E66.09 CLASS 1 OBESITY DUE TO EXCESS CALORIES WITHOUT SERIOUS COMORBIDITY IN ADULT, UNSPECIFIED BMI: ICD-10-CM

## 2022-12-29 DIAGNOSIS — D50.9 IRON DEFICIENCY ANEMIA, UNSPECIFIED IRON DEFICIENCY ANEMIA TYPE: ICD-10-CM

## 2022-12-29 DIAGNOSIS — I51.89 DIASTOLIC DYSFUNCTION: ICD-10-CM

## 2022-12-29 DIAGNOSIS — I25.10 CORONARY ARTERY DISEASE INVOLVING NATIVE CORONARY ARTERY OF NATIVE HEART WITHOUT ANGINA PECTORIS: ICD-10-CM

## 2022-12-29 DIAGNOSIS — M85.80 OSTEOPENIA, UNSPECIFIED LOCATION: ICD-10-CM

## 2022-12-29 DIAGNOSIS — R41.3 MEMORY LOSS: ICD-10-CM

## 2022-12-29 PROCEDURE — G8484 FLU IMMUNIZE NO ADMIN: HCPCS | Performed by: FAMILY MEDICINE

## 2022-12-29 PROCEDURE — 1090F PRES/ABSN URINE INCON ASSESS: CPT | Performed by: FAMILY MEDICINE

## 2022-12-29 PROCEDURE — 1036F TOBACCO NON-USER: CPT | Performed by: FAMILY MEDICINE

## 2022-12-29 PROCEDURE — G8427 DOCREV CUR MEDS BY ELIG CLIN: HCPCS | Performed by: FAMILY MEDICINE

## 2022-12-29 PROCEDURE — 1123F ACP DISCUSS/DSCN MKR DOCD: CPT | Performed by: FAMILY MEDICINE

## 2022-12-29 PROCEDURE — G8417 CALC BMI ABV UP PARAM F/U: HCPCS | Performed by: FAMILY MEDICINE

## 2022-12-29 PROCEDURE — 99214 OFFICE O/P EST MOD 30 MIN: CPT | Performed by: FAMILY MEDICINE

## 2022-12-29 PROCEDURE — G8399 PT W/DXA RESULTS DOCUMENT: HCPCS | Performed by: FAMILY MEDICINE

## 2022-12-29 SDOH — ECONOMIC STABILITY: FOOD INSECURITY: WITHIN THE PAST 12 MONTHS, YOU WORRIED THAT YOUR FOOD WOULD RUN OUT BEFORE YOU GOT MONEY TO BUY MORE.: PATIENT DECLINED

## 2022-12-29 SDOH — ECONOMIC STABILITY: FOOD INSECURITY: WITHIN THE PAST 12 MONTHS, THE FOOD YOU BOUGHT JUST DIDN'T LAST AND YOU DIDN'T HAVE MONEY TO GET MORE.: PATIENT DECLINED

## 2022-12-29 ASSESSMENT — ENCOUNTER SYMPTOMS
COUGH: 0
DIARRHEA: 0
SORE THROAT: 0
ABDOMINAL PAIN: 0
SHORTNESS OF BREATH: 0
CONSTIPATION: 1
ALLERGIC/IMMUNOLOGIC NEGATIVE: 1
CHEST TIGHTNESS: 0
EYES NEGATIVE: 1
RESPIRATORY NEGATIVE: 1
EYE PAIN: 0
EYE DISCHARGE: 0
RHINORRHEA: 0

## 2022-12-29 ASSESSMENT — PATIENT HEALTH QUESTIONNAIRE - PHQ9
SUM OF ALL RESPONSES TO PHQ QUESTIONS 1-9: 0
SUM OF ALL RESPONSES TO PHQ QUESTIONS 1-9: 0
2. FEELING DOWN, DEPRESSED OR HOPELESS: 0
SUM OF ALL RESPONSES TO PHQ9 QUESTIONS 1 & 2: 0
SUM OF ALL RESPONSES TO PHQ QUESTIONS 1-9: 0
SUM OF ALL RESPONSES TO PHQ QUESTIONS 1-9: 0
1. LITTLE INTEREST OR PLEASURE IN DOING THINGS: 0

## 2022-12-29 ASSESSMENT — SOCIAL DETERMINANTS OF HEALTH (SDOH): HOW HARD IS IT FOR YOU TO PAY FOR THE VERY BASICS LIKE FOOD, HOUSING, MEDICAL CARE, AND HEATING?: PATIENT DECLINED

## 2022-12-29 NOTE — PROGRESS NOTES
Subjective:      Patient ID: Meg Aguilar is a 80 y.o. female. Hypertension  Pertinent negatives include no chest pain or shortness of breath. Hyperlipidemia  Pertinent negatives include no chest pain or shortness of breath. Other  Pertinent negatives include no abdominal pain, arthralgias, chest pain, coughing, fever or sore throat. Coronary Artery Disease  Pertinent negatives include no chest pain, chest tightness, leg swelling or shortness of breath. Risk factors include hyperlipidemia. Routine follow up on chronic medical conditions, refills, and review of updated labs. I have reviewed the patient's medical history in detail and updated the computerized patient record. No significant medical events, injuries, illness, or surgery. No current concerns or complaints. Compliant with medications. No gi symptoms . No chest pain issues. Good exercise tolerance. Driving actively . Driving only locally. Working her own housework. Staying home during covid 19 restrictions. No gi bleeding concerns on review. Vision stable. Memory continues to decline. Cervical cancer diagnosis and s/p karla/bso robotically assisted by Dr. Tone García. Robotic laparoscopic modified radical hysterectomy, bilateral salpingo-oohporectomy, peritoneal washings for cytology, sentinel lymph node mapping, sentinel lymph node biopsies. Feeling confident they got everything. surveillance plan at present. She has not post operative concerns. Memory continues to decline. She looks to her dtr to answer most questions. Memory loss, progressing dementia/memory loss. Currently more moderate. Looking to her dtr. To answer most questions. Started aricept 5 mg/day last visit. Patient and daughter both think that it is not helping. Her memory is continuing to decline. Would like to try something else. Denies any recent illnesses, chest pain, or SOB. Dtr. Describes trouble swallowing , food sticking. She describes upper esophageal/hypopharnyx area. Usually meat. Usually when eating outside the home, she feels it may be nerves/eating too fast.             Past Medical History:   Diagnosis Date    Abnormal uterine bleeding (AUB) 01/2021    Anxiety     daughter reports since Covid    Asteroid hyalosis of right eye     Bruises easily     per daughter    Coronary artery disease     status post acute non-Q wave myocardial infarction 08/23/2012, status post drug eluting stent placement in the LAD and 2 drug eluting stents in the left circumflex artery 08/23/2012. COVID-19 06/30/2020    confusion, SOB, weakness x 6-8 weeks; was hospitalized and did receive plasma    Diastolic dysfunction     grade 1. Gastric ulcer with hemorrhage 03/03/2015    gastric and esophageal ulcers due to nsaid    Hyperlipidemia     Hypertension     Lives alone     Memory loss     daughter reports since Covid    Mild mitral regurgitation     Mild tricuspid regurgitation     Obesity     Patient in clinical research study 06/27/2020    Expanded Access to Convalescent Plasma for COVID-19 date of completed 6/30/2020    Poor intravenous access     instructed to hydrate for surgery    Vitreous floaters     left eye. Past Surgical History:   Procedure Laterality Date    CARDIAC CATHETERIZATION Left 08/23/2012    left main artery normal, LAD with mid 60 to 70% stenosis, FFR was 0.76, left circumflex artery with mid 90% stenosis with thrombus, RCA with mild irregularities, preserved left ventricular systolic function, ejection fraction estimated around 50%, status post drug eluting stent placement in the LAD and 2 drug eluting stents in the left circumflex artery.      CHOLECYSTECTOMY  01/01/1978    DILATION AND CURETTAGE OF UTERUS N/A 01/20/2021    D & C HYSTEROSCOPY Polypectomy performed by Marin Marie MD at [facility], COLON, DIAGNOSTIC  03/05/2015    esophageal/gastric ulcer; few diverticuli    HYSTERECTOMY (CERVIX STATUS UNKNOWN) N/A 2/23/2021    XI ROBOTIC LAPAROSCOPIC MODIFIED RADICAL  HYSTERECTOMY, BILATERAL SALPINGO-OOPHORECTOMY, CYTOLOGIC WASHINGS, SENTINEL LYMPHNODE MAPPINGS, SENTINEL LYMPHNODE BIOPSIES, ICG DYE  performed by Jorge Santos MD at [facility] (CERVIX REMOVED)  02/23/2021    ROBOTIC LAPAROSCOPIC MODIFIED RADICAL  HYSTERECTOMY, BILATERAL SALPINGO-OOPHORECTOMY, CYTOLOGIC WASHINGS, SENTINEL LYMPHNODE MAPPINGS, SENTINEL LYMPHNODE BIOPSIES    UPPER GASTROINTESTINAL ENDOSCOPY  04/16/2015    healed gastric ulcer       Current Outpatient Medications   Medication Sig Dispense Refill    isosorbide mononitrate (IMDUR) 30 MG extended release tablet TAKE 1 TABLET EVERY DAY 90 tablet 3    lisinopril (PRINIVIL;ZESTRIL) 10 MG tablet TAKE 1 TABLET EVERY DAY 90 tablet 0    nitroGLYCERIN (NITROSTAT) 0.4 MG SL tablet Place 1 tablet under the tongue every 5 minutes as needed for Chest pain 25 tablet 1    memantine (NAMENDA) 5 MG tablet TAKE 1 TABLET TWICE DAILY 180 tablet 1    atorvastatin (LIPITOR) 40 MG tablet TAKE 1 TABLET AT BEDTIME 90 tablet 3    Cholecalciferol (VITAMIN D3) 50 MCG (2000 UT) CAPS TAKE 1 CAPSULE EVERY DAY 90 capsule 3    ferrous sulfate (FE TABS 325) 325 (65 Fe) MG EC tablet TAKE 1 TABLET BY MOUTH DAILY (WITH BREAKFAST) 90 tablet 3    alendronate (FOSAMAX) 70 MG tablet TAKE 1 TABLET EVERY 7 DAYS 12 tablet 3    donepezil (ARICEPT) 5 MG tablet TAKE 1 TABLET EVERY NIGHT 90 tablet 3    ibuprofen (ADVIL;MOTRIN) 400 MG tablet Take 1 tablet by mouth every 6 hours as needed for Pain 40 tablet 1    aspirin 81 MG tablet Take 81 mg by mouth daily Takes 5 days per week, last dose  2/17 and instructed not to take any more      ascorbic acid (VITAMIN C) 500 MG tablet Take 500 mg by mouth 2 times daily Indications: patient only takes in the Winter With ana paula hips       Multiple Vitamins-Minerals (MULTIVITAMIN PO) Take 1 tablet by mouth daily. No current facility-administered medications for this visit. Allergies   Allergen Reactions    Codeine Nausea Only     Social History     Tobacco Use    Smoking status: Never    Smokeless tobacco: Never   Vaping Use    Vaping Use: Never used   Substance Use Topics    Alcohol use: Yes     Comment: rare    Drug use: No     Family History   Problem Relation Age of Onset    Heart Attack Father     Cancer Sister     Prostate Cancer Brother     Cataracts Neg Hx     Diabetes Neg Hx     Glaucoma Neg Hx            Review of Systems   Constitutional: Negative. Negative for activity change, appetite change and fever. HENT: Negative. Negative for ear discharge, ear pain, rhinorrhea and sore throat. Eyes: Negative. Negative for pain and discharge. Respiratory: Negative. Negative for cough, chest tightness and shortness of breath. Cardiovascular: Negative. Negative for chest pain and leg swelling. Gastrointestinal:  Positive for constipation (occasional, contolled with miralax). Negative for abdominal pain and diarrhea. Endocrine: Negative. Genitourinary: Negative. Negative for difficulty urinating, dysuria, hematuria and vaginal bleeding. Musculoskeletal:  Negative for arthralgias. Skin: Negative. Allergic/Immunologic: Negative. Neurological: Negative. Negative for syncope and light-headedness. Hematological: Negative. Psychiatric/Behavioral:  Positive for confusion and decreased concentration. Negative for sleep disturbance. Objective:   Physical Exam  Constitutional:       General: She is not in acute distress. Appearance: She is well-developed. HENT:      Head: Normocephalic and atraumatic. Right Ear: Tympanic membrane, ear canal and external ear normal.      Left Ear: Tympanic membrane, ear canal and external ear normal.      Nose: Nose normal.      Mouth/Throat:      Pharynx: No oropharyngeal exudate. Eyes:      General: No scleral icterus. Extraocular Movements: Extraocular movements intact.       Conjunctiva/sclera: Conjunctivae normal.   Neck:      Thyroid: No thyromegaly. Cardiovascular:      Rate and Rhythm: Normal rate and regular rhythm. Heart sounds: Normal heart sounds. No murmur heard. Pulmonary:      Effort: Pulmonary effort is normal. No respiratory distress. Breath sounds: Normal breath sounds. No wheezing. Abdominal:      General: Bowel sounds are normal. There is no distension. Palpations: Abdomen is soft. Tenderness: There is no abdominal tenderness. Musculoskeletal:         General: No tenderness. Normal range of motion. Cervical back: Neck supple. Skin:     General: Skin is warm and dry. Findings: No erythema or rash. Neurological:      Mental Status: She is alert and oriented to person, place, and time. Psychiatric:         Mood and Affect: Mood normal.         Behavior: Behavior normal.     /78 (Site: Right Upper Arm, Position: Sitting, Cuff Size: Small Adult)   Pulse 59   Temp 98.4 °F (36.9 °C) (Tympanic)   Ht 4' 11\" (1.499 m)   Wt 128 lb (58.1 kg)   LMP  (LMP Unknown)   SpO2 95%   BMI 25.85 kg/m²      No visits with results within 1 Month(s) from this visit.    Latest known visit with results is:   Hospital Outpatient Visit on 10/31/2022   Component Date Value Ref Range Status    Hemoglobin A1C 10/31/2022 5.4  4.0 - 6.0 % Final    Estimated Avg Glucose 10/31/2022 108  mg/dL Final    Glucose 10/31/2022 104 (A)  70 - 99 mg/dL Final    BUN 10/31/2022 8  8 - 23 mg/dL Final    Creatinine 10/31/2022 0.60  0.50 - 0.90 mg/dL Final    Est, Glom Filt Rate 10/31/2022 >60  >60 mL/min/1.73m2 Final    Bun/Cre Ratio 10/31/2022 13  9 - 20 Final    Calcium 10/31/2022 9.9  8.6 - 10.4 mg/dL Final    Sodium 10/31/2022 144  135 - 144 mmol/L Final    Potassium 10/31/2022 4.1  3.7 - 5.3 mmol/L Final    Chloride 10/31/2022 106  98 - 107 mmol/L Final    CO2 10/31/2022 28  20 - 31 mmol/L Final    Anion Gap 10/31/2022 10  9 - 17 mmol/L Final    Alkaline Phosphatase 10/31/2022 72 35 - 104 U/L Final    ALT 10/31/2022 13  5 - 33 U/L Final    AST 10/31/2022 24  <32 U/L Final    Total Bilirubin 10/31/2022 1.0  0.3 - 1.2 mg/dL Final    Total Protein 10/31/2022 7.5  6.4 - 8.3 g/dL Final    Albumin 10/31/2022 4.5  3.5 - 5.2 g/dL Final    Albumin/Globulin Ratio 10/31/2022 1.5  1.0 - 2.5 Final    WBC 10/31/2022 6.8  3.5 - 11.3 k/uL Final    RBC 10/31/2022 4.64  3.95 - 5.11 m/uL Final    Hemoglobin 10/31/2022 15.0  11.9 - 15.1 g/dL Final    Hematocrit 10/31/2022 44.8  36.3 - 47.1 % Final    MCV 10/31/2022 96.6  82.6 - 102.9 fL Final    MCH 10/31/2022 32.3  25.2 - 33.5 pg Final    MCHC 10/31/2022 33.5  25.2 - 33.5 g/dL Final    RDW 10/31/2022 13.0  11.8 - 14.4 % Final    Platelets 76/69/4322 189  138 - 453 k/uL Final    MPV 10/31/2022 9.7  8.1 - 13.5 fL Final    NRBC Automated 10/31/2022 0.0  0.0 per 100 WBC Final    Seg Neutrophils 10/31/2022 46  36 - 65 % Final    Lymphocytes 10/31/2022 44 (A)  24 - 43 % Final    Monocytes 10/31/2022 8  3 - 12 % Final    Eosinophils % 10/31/2022 1  1 - 4 % Final    Basophils 10/31/2022 1  0 - 2 % Final    Immature Granulocytes 10/31/2022 0  0 % Final    Segs Absolute 10/31/2022 3.13  1.50 - 8.10 k/uL Final    Absolute Lymph # 10/31/2022 2.98  1.10 - 3.70 k/uL Final    Absolute Mono # 10/31/2022 0.51  0.10 - 1.20 k/uL Final    Absolute Eos # 10/31/2022 0.09  0.00 - 0.44 k/uL Final    Basophils Absolute 10/31/2022 0.04  0.00 - 0.20 k/uL Final    Absolute Immature Granulocyte 10/31/2022 <0.03  0.00 - 0.30 k/uL Final    Cholesterol 10/31/2022 132  <200 mg/dL Final    HDL 10/31/2022 66  >40 mg/dL Final    LDL Cholesterol 10/31/2022 48  0 - 130 mg/dL Final    Chol/HDL Ratio 10/31/2022 2.0  <5 Final    Triglycerides 10/31/2022 91  <150 mg/dL Final             Assessment:       Encounter Diagnoses   Name Primary?     Chronic gastric ulcer with hemorrhage Yes    Coronary artery disease involving native coronary artery of native heart without angina pectoris     Diastolic dysfunction     Pure hypercholesterolemia     Class 1 obesity due to excess calories without serious comorbidity in adult, unspecified BMI     Osteopenia, unspecified location     Iron deficiency anemia, unspecified iron deficiency anemia type     Impaired fasting blood sugar     Endometrial cancer (HCC)     Memory loss                  Plan:        Prior gastric ulcer, likely nsaid related. On asa at present. Asx. No active treatment at present. Will follow up prn concerns. No active treatment at present beyond avoiding nsaids. CAD: CINDY to LAD and LCX 08/2012. Stable/asx. Plan to cont. Asa, imdur, statin, ace inhibitor. Stopped lopressor due to persistent bradycardia. Improved since stopping. Cont. Routine cardiology follow up. Diastolic dysfunction. No sense of decompensation or any recent chf issues. No peripheral edema or sob . Hyperlipidemia:  Doing well on lipitor. Cont. Current dosing. Obesity. Discussed wt. Loss. Trying to avoid worsening bs control. Colonoscopy normal 2015    She refuses mammogram.  No concerns on home self breast exams. Osteopenia per dexa 9/20/18:  Consider prolia twice a year. Started fosamax last visit. Side effects discussed. History of anemia; more gi bleeding/upper gi ulceration in the past.  Remains asx/normal range. Impaired fasting blood sugar. Stable. 119--111.  a1c reassuring. 5.6% --5.8%--5.5% --5.4%  today. Cont. Dietary discretion. Endometrial cancer. S/p karla and bso, ln sampling 1/21. Doing well post op. Following with OBGYN, they gave her a clean bill of health at last visit . Memory loss, progressing dementia/memory loss . Currently more moderate. Looking to her dtr. To answer most questions. Planning to increase aricept to 10 mg/day, and add namenda after discussion today. Some swallowing issues. Usually meat.   She feels it is anxiety or nervousness at the time, trying to eat too fast. Declines work up at present. Denies issues with swallowing at present.

## 2023-01-06 ENCOUNTER — TELEPHONE (OUTPATIENT)
Dept: GYNECOLOGIC ONCOLOGY | Age: 83
End: 2023-01-06

## 2023-01-06 NOTE — TELEPHONE ENCOUNTER
Unable to LVM to reschedule 5/5/23 appt due to provider out of the office. Can reschedule to next available with provider. Letter sent.

## 2023-02-10 DIAGNOSIS — I10 ESSENTIAL HYPERTENSION: ICD-10-CM

## 2023-02-10 DIAGNOSIS — D50.9 IRON DEFICIENCY ANEMIA, UNSPECIFIED IRON DEFICIENCY ANEMIA TYPE: ICD-10-CM

## 2023-02-10 RX ORDER — NITROGLYCERIN 0.4 MG/1
0.4 TABLET SUBLINGUAL EVERY 5 MIN PRN
Qty: 25 TABLET | Refills: 1 | Status: SHIPPED | OUTPATIENT
Start: 2023-02-10

## 2023-02-10 RX ORDER — ISOSORBIDE MONONITRATE 30 MG/1
TABLET, EXTENDED RELEASE ORAL
Qty: 90 TABLET | Refills: 3 | Status: SHIPPED | OUTPATIENT
Start: 2023-02-10

## 2023-02-10 NOTE — TELEPHONE ENCOUNTER
Per last note,  Memory loss, progressing dementia/memory loss . Currently more moderate. Looking to her dtr. To answer most questions. Planning to increase aricept to 10 mg/day, and add namenda after discussion today. Currently pended for 5 mg/day. Is patient to increase?     Kadi Gallegos called requesting a refill of the below medication which has been pended for you:     Requested Prescriptions     Pending Prescriptions Disp Refills    lisinopril (PRINIVIL;ZESTRIL) 10 MG tablet 90 tablet 0     Sig: TAKE 1 TABLET EVERY DAY    ferrous sulfate (FE TABS 325) 325 (65 Fe) MG EC tablet 90 tablet 3     Sig: Take 1 tablet by mouth daily (with breakfast)    donepezil (ARICEPT) 5 MG tablet 90 tablet 3       Last Appointment Date: 12/29/2022  Next Appointment Date: 5/30/2023    Allergies   Allergen Reactions    Codeine Nausea Only

## 2023-02-10 NOTE — TELEPHONE ENCOUNTER
Pt daughter, Liliane Hart, called to ask for help with the pt medication list and getting refills.     [phone]    Last Appt:  11/14/2022  Next Appt:   5/1/2023  Med verified in Epic

## 2023-02-13 RX ORDER — LISINOPRIL 10 MG/1
TABLET ORAL
Qty: 90 TABLET | Refills: 0 | Status: SHIPPED | OUTPATIENT
Start: 2023-02-13

## 2023-02-13 RX ORDER — LANOLIN ALCOHOL/MO/W.PET/CERES
325 CREAM (GRAM) TOPICAL
Qty: 90 TABLET | Refills: 3 | Status: SHIPPED | OUTPATIENT
Start: 2023-02-13

## 2023-02-13 RX ORDER — DONEPEZIL HYDROCHLORIDE 5 MG/1
TABLET, FILM COATED ORAL
Qty: 90 TABLET | Refills: 3 | Status: SHIPPED | OUTPATIENT
Start: 2023-02-13

## 2023-04-18 DIAGNOSIS — I10 ESSENTIAL HYPERTENSION: ICD-10-CM

## 2023-04-18 RX ORDER — LISINOPRIL 10 MG/1
TABLET ORAL
Qty: 90 TABLET | Refills: 3 | Status: SHIPPED | OUTPATIENT
Start: 2023-04-18

## 2023-05-01 ENCOUNTER — OFFICE VISIT (OUTPATIENT)
Dept: CARDIOLOGY | Age: 83
End: 2023-05-01
Payer: MEDICARE

## 2023-05-01 VITALS
BODY MASS INDEX: 26.21 KG/M2 | DIASTOLIC BLOOD PRESSURE: 80 MMHG | SYSTOLIC BLOOD PRESSURE: 126 MMHG | WEIGHT: 130 LBS | HEART RATE: 58 BPM | HEIGHT: 59 IN

## 2023-05-01 DIAGNOSIS — R00.1 SINUS BRADYCARDIA: ICD-10-CM

## 2023-05-01 DIAGNOSIS — I25.119 CORONARY ARTERY DISEASE INVOLVING NATIVE HEART WITH ANGINA PECTORIS, UNSPECIFIED VESSEL OR LESION TYPE (HCC): ICD-10-CM

## 2023-05-01 DIAGNOSIS — I10 ESSENTIAL HYPERTENSION: ICD-10-CM

## 2023-05-01 DIAGNOSIS — E78.2 MIXED HYPERLIPIDEMIA: ICD-10-CM

## 2023-05-01 DIAGNOSIS — I25.10 CORONARY ARTERY DISEASE INVOLVING NATIVE CORONARY ARTERY OF NATIVE HEART WITHOUT ANGINA PECTORIS: Primary | ICD-10-CM

## 2023-05-01 PROCEDURE — 3079F DIAST BP 80-89 MM HG: CPT | Performed by: INTERNAL MEDICINE

## 2023-05-01 PROCEDURE — 99214 OFFICE O/P EST MOD 30 MIN: CPT | Performed by: INTERNAL MEDICINE

## 2023-05-01 PROCEDURE — 1123F ACP DISCUSS/DSCN MKR DOCD: CPT | Performed by: INTERNAL MEDICINE

## 2023-05-01 PROCEDURE — 93010 ELECTROCARDIOGRAM REPORT: CPT | Performed by: INTERNAL MEDICINE

## 2023-05-01 PROCEDURE — 93005 ELECTROCARDIOGRAM TRACING: CPT | Performed by: INTERNAL MEDICINE

## 2023-05-01 PROCEDURE — 3074F SYST BP LT 130 MM HG: CPT | Performed by: INTERNAL MEDICINE

## 2023-05-01 NOTE — PROGRESS NOTES
10/31/2022    HDL 66 10/31/2022    LDLCHOLESTEROL 48 10/31/2022    VLDL NOT REPORTED 11/01/2021    CHOLHDLRATIO 2.0 10/31/2022       Lab Results   Component Value Date     10/31/2022    K 4.1 10/31/2022     10/31/2022    CO2 28 10/31/2022    BUN 8 10/31/2022    CREATININE 0.60 10/31/2022    GLUCOSE 104 (H) 10/31/2022    CALCIUM 9.9 10/31/2022    PROT 7.5 10/31/2022    LABALBU 4.5 10/31/2022    BILITOT 1.0 10/31/2022    ALKPHOS 72 10/31/2022    AST 24 10/31/2022    ALT 13 10/31/2022    LABGLOM >60 10/31/2022    GFRAA >60 05/02/2022    GLOB 3.2 06/26/2020       EKG: Sinus  Bradycardia  -With rate variation     Nuclear Stress 9/20:  IMPRESSION:  1. Mild anterolateral ischemia. 2.  LVEF 80%. Assessment and Plan:    -Asymptomatic Sinus Bradycardia-currently resolved after stopping metoprolol heart rate is in the 50s at rest    -HTN-well-controlled currently on lisinopril and long-acting nitrates    -CAD- CINDY to LAD and LCX 08/2012- stable without significant angina. Continue ASA, statin, imdur. She has been refusing repeat nuclear stress test      -COVID 19 in June 2020- with some memory issues since then. -Obesity - encouraged caloric restrictions and moderate physical activity.    -Hyperlipidemia- LDL 42 on 5/22. continue statin.     -History of severe anemia along with gastric and esophageal ulcers 3/2015-     The patient was consulted on signs and symptoms of CVD/CHF/ACS and notified when to call office. The patient is to continue heart healthy diet, weight loss and exercise as tolerated. Patient's medications and side effects were discussed. Medication refills were provided if needed. Follow up appointment timing was discussed. All questions and concerns were addressed to patient's satisfaction. The patient is to follow up in 6 months or sooner if necessary.      Thank you for allowing me to participate in the care of this patient, please do not hesitate to call if you have any

## 2023-05-12 ENCOUNTER — OFFICE VISIT (OUTPATIENT)
Dept: GYNECOLOGIC ONCOLOGY | Age: 83
End: 2023-05-12
Payer: MEDICARE

## 2023-05-12 VITALS
HEIGHT: 59 IN | WEIGHT: 130 LBS | BODY MASS INDEX: 26.21 KG/M2 | OXYGEN SATURATION: 98 % | HEART RATE: 60 BPM | TEMPERATURE: 97.5 F | SYSTOLIC BLOOD PRESSURE: 179 MMHG | DIASTOLIC BLOOD PRESSURE: 74 MMHG

## 2023-05-12 DIAGNOSIS — C54.1 ENDOMETRIAL CANCER (HCC): Primary | ICD-10-CM

## 2023-05-12 PROCEDURE — 3078F DIAST BP <80 MM HG: CPT | Performed by: PHYSICIAN ASSISTANT

## 2023-05-12 PROCEDURE — 3077F SYST BP >= 140 MM HG: CPT | Performed by: PHYSICIAN ASSISTANT

## 2023-05-12 PROCEDURE — 99214 OFFICE O/P EST MOD 30 MIN: CPT | Performed by: PHYSICIAN ASSISTANT

## 2023-05-12 PROCEDURE — 1123F ACP DISCUSS/DSCN MKR DOCD: CPT | Performed by: PHYSICIAN ASSISTANT

## 2023-05-12 ASSESSMENT — ENCOUNTER SYMPTOMS
RESPIRATORY NEGATIVE: 1
EYES NEGATIVE: 1
GASTROINTESTINAL NEGATIVE: 1
BACK PAIN: 1

## 2023-05-12 NOTE — PROGRESS NOTES
[address] [facility], [facility], [address] Malik present for her endometrial cancer surveillance visit. 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 She initially presented to her local gynecologist Dr. Cher Barros with concerns of post menopausal bleeding in December 2020. A pelvic ultrasound obtained on 1/12/21 revealed uterus measuring 6.8 x 3.8 x 3.2 cm, the endometrial stripe thickness of 25 mm. Bilateral ovaries were within normal limits, there was no evidence of free fluid. She underwent a D&C and polypectomy on 1/20/21 and final pathology revealed  A moderately differentiated endometrioid adenocarcinoma. She was therefore referred to [facility] for further evaluation and treatment. Treatment options were discussed and patient was deemed an appropriate surgical candidate. Her pre operative  was within normal at 12 units on 2/9/2021. Final pathology revealed a well differentiated endometrioid adenocarcinoma, FIGO grade 1, limited to endometrium, surgical margins negative. Tumor size 3.3 cm in largest diameter. Cervix negative for neoplasm. Bilateral fallopian tubes and ovaries were benign. No lymphovascular invasion present. Pelvic washings were negative for malignancy. MMR testing was normal.    She did well post operatively and was discharged home on POD #1. Today, she is doing well. She has no concerns. Denies abdominal or   Pelvic pain. No new lumps or bumps. Denies vaginal bleeding or discharge. No changes to urination or bowel habits. Denies chest pain or shortness of breath. She remains active in her garden. Patient refuses mammogram order.  States she has never

## 2023-05-12 NOTE — PROGRESS NOTES
Review of Systems   Constitutional: Negative. HENT:  Negative. Eyes: Negative. Respiratory: Negative. Cardiovascular: Negative. Gastrointestinal: Negative. Endocrine: Negative. Genitourinary: Negative. Musculoskeletal:  Positive for back pain. Skin: Negative. Neurological: Negative. Hematological:  Bruises/bleeds easily.

## 2023-05-26 ENCOUNTER — OFFICE VISIT (OUTPATIENT)
Dept: FAMILY MEDICINE CLINIC | Age: 83
End: 2023-05-26
Payer: MEDICARE

## 2023-05-26 VITALS
HEART RATE: 53 BPM | SYSTOLIC BLOOD PRESSURE: 138 MMHG | TEMPERATURE: 97.8 F | BODY MASS INDEX: 26.21 KG/M2 | DIASTOLIC BLOOD PRESSURE: 80 MMHG | OXYGEN SATURATION: 97 % | WEIGHT: 130 LBS | HEIGHT: 59 IN

## 2023-05-26 DIAGNOSIS — I51.89 DIASTOLIC DYSFUNCTION: ICD-10-CM

## 2023-05-26 DIAGNOSIS — I25.10 CORONARY ARTERY DISEASE INVOLVING NATIVE CORONARY ARTERY OF NATIVE HEART WITHOUT ANGINA PECTORIS: ICD-10-CM

## 2023-05-26 DIAGNOSIS — K25.4 CHRONIC GASTRIC ULCER WITH HEMORRHAGE: Primary | ICD-10-CM

## 2023-05-26 DIAGNOSIS — E78.00 PURE HYPERCHOLESTEROLEMIA: ICD-10-CM

## 2023-05-26 DIAGNOSIS — C54.1 ENDOMETRIAL CANCER (HCC): ICD-10-CM

## 2023-05-26 DIAGNOSIS — M85.80 OSTEOPENIA, UNSPECIFIED LOCATION: ICD-10-CM

## 2023-05-26 DIAGNOSIS — R41.3 MEMORY LOSS: ICD-10-CM

## 2023-05-26 DIAGNOSIS — R73.01 IMPAIRED FASTING BLOOD SUGAR: ICD-10-CM

## 2023-05-26 DIAGNOSIS — E66.09 CLASS 1 OBESITY DUE TO EXCESS CALORIES WITHOUT SERIOUS COMORBIDITY IN ADULT, UNSPECIFIED BMI: ICD-10-CM

## 2023-05-26 PROCEDURE — 99212 OFFICE O/P EST SF 10 MIN: CPT | Performed by: FAMILY MEDICINE

## 2023-05-26 PROCEDURE — 1123F ACP DISCUSS/DSCN MKR DOCD: CPT | Performed by: FAMILY MEDICINE

## 2023-05-26 PROCEDURE — 99214 OFFICE O/P EST MOD 30 MIN: CPT | Performed by: FAMILY MEDICINE

## 2023-05-26 PROCEDURE — 3075F SYST BP GE 130 - 139MM HG: CPT | Performed by: FAMILY MEDICINE

## 2023-05-26 PROCEDURE — 3079F DIAST BP 80-89 MM HG: CPT | Performed by: FAMILY MEDICINE

## 2023-05-26 RX ORDER — DONEPEZIL HYDROCHLORIDE 10 MG/1
TABLET, FILM COATED ORAL
Qty: 90 TABLET | Refills: 3 | Status: SHIPPED | OUTPATIENT
Start: 2023-05-26

## 2023-05-26 RX ORDER — MEMANTINE HYDROCHLORIDE 5 MG/1
5 TABLET ORAL 2 TIMES DAILY
Qty: 180 TABLET | Refills: 1 | Status: SHIPPED | OUTPATIENT
Start: 2023-05-26

## 2023-05-26 RX ORDER — ACETAMINOPHEN 160 MG
1 TABLET,DISINTEGRATING ORAL DAILY
Qty: 90 CAPSULE | Refills: 3 | Status: SHIPPED | OUTPATIENT
Start: 2023-05-26

## 2023-05-26 RX ORDER — ZOSTER VACCINE RECOMBINANT, ADJUVANTED 50 MCG/0.5
0.5 KIT INTRAMUSCULAR SEE ADMIN INSTRUCTIONS
Qty: 0.5 ML | Refills: 0 | Status: SHIPPED | OUTPATIENT
Start: 2023-05-26 | End: 2023-11-22

## 2023-05-26 RX ORDER — ATORVASTATIN CALCIUM 40 MG/1
40 TABLET, FILM COATED ORAL NIGHTLY
Qty: 90 TABLET | Refills: 1 | Status: SHIPPED | OUTPATIENT
Start: 2023-05-26

## 2023-05-26 RX ORDER — ALENDRONATE SODIUM 70 MG/1
TABLET ORAL
Qty: 12 TABLET | Refills: 1 | Status: SHIPPED | OUTPATIENT
Start: 2023-05-26

## 2023-05-26 SDOH — ECONOMIC STABILITY: FOOD INSECURITY: WITHIN THE PAST 12 MONTHS, YOU WORRIED THAT YOUR FOOD WOULD RUN OUT BEFORE YOU GOT MONEY TO BUY MORE.: NEVER TRUE

## 2023-05-26 SDOH — ECONOMIC STABILITY: HOUSING INSECURITY
IN THE LAST 12 MONTHS, WAS THERE A TIME WHEN YOU DID NOT HAVE A STEADY PLACE TO SLEEP OR SLEPT IN A SHELTER (INCLUDING NOW)?: NO

## 2023-05-26 SDOH — ECONOMIC STABILITY: INCOME INSECURITY: HOW HARD IS IT FOR YOU TO PAY FOR THE VERY BASICS LIKE FOOD, HOUSING, MEDICAL CARE, AND HEATING?: NOT HARD AT ALL

## 2023-05-26 SDOH — ECONOMIC STABILITY: FOOD INSECURITY: WITHIN THE PAST 12 MONTHS, THE FOOD YOU BOUGHT JUST DIDN'T LAST AND YOU DIDN'T HAVE MONEY TO GET MORE.: NEVER TRUE

## 2023-05-26 ASSESSMENT — ENCOUNTER SYMPTOMS
SORE THROAT: 0
EYES NEGATIVE: 1
EYE PAIN: 0
DIARRHEA: 0
COUGH: 0
ABDOMINAL PAIN: 0
ALLERGIC/IMMUNOLOGIC NEGATIVE: 1
EYE DISCHARGE: 0
CONSTIPATION: 1
RHINORRHEA: 0
RESPIRATORY NEGATIVE: 1
CHEST TIGHTNESS: 0
SHORTNESS OF BREATH: 0

## 2023-05-26 NOTE — PROGRESS NOTES
Subjective:      Patient ID: Anuel Figueroa is a 80 y.o. female. Hypertension  Pertinent negatives include no chest pain or shortness of breath. Hyperlipidemia  Pertinent negatives include no chest pain or shortness of breath. Other  Pertinent negatives include no abdominal pain, arthralgias, chest pain, coughing, fever or sore throat. Coronary Artery Disease  Pertinent negatives include no chest pain, chest tightness, leg swelling or shortness of breath. Risk factors include hyperlipidemia. Routine follow up on chronic medical conditions, refills, and review of updated labs. I have reviewed the patient's medical history in detail and updated the computerized patient record. No significant medical events, injuries, illness, or surgery. No current concerns or complaints. Compliant with medications. No gi symptoms . No chest pain issues. Good exercise tolerance. Driving actively . Driving only locally. Working her own housework. Staying home during covid 19 restrictions. No gi bleeding concerns on review. Vision stable. Memory continues to decline. Cervical cancer diagnosis and s/p karla/bso robotically assisted by Dr. Yesenia Rahman. Robotic laparoscopic modified radical hysterectomy, bilateral salpingo-oohporectomy, peritoneal washings for cytology, sentinel lymph node mapping, sentinel lymph node biopsies. Feeling confident they got everything. surveillance plan at present. She has not post operative concerns. Memory continues to decline. She looks to her dtr to answer most questions. Memory loss, progressing dementia/memory loss. Currently more moderate. Looking to her dtr. To answer most questions. Started aricept 5 mg/day last visit. Patient and daughter both think that it is not helping. Her memory is continuing to decline. Would like to try something else. Denies any recent illnesses, chest pain, or SOB. Dtr. Describes trouble swallowing , food sticking.

## 2023-05-31 ENCOUNTER — IMMUNIZATION (OUTPATIENT)
Dept: LAB | Age: 83
End: 2023-05-31
Payer: MEDICARE

## 2023-05-31 ENCOUNTER — HOSPITAL ENCOUNTER (OUTPATIENT)
Age: 83
Discharge: HOME | End: 2023-05-31
Payer: MEDICARE

## 2023-05-31 DIAGNOSIS — R73.01 IMPAIRED FASTING BLOOD SUGAR: ICD-10-CM

## 2023-05-31 DIAGNOSIS — I25.10 CORONARY ARTERY DISEASE INVOLVING NATIVE CORONARY ARTERY OF NATIVE HEART WITHOUT ANGINA PECTORIS: Primary | ICD-10-CM

## 2023-05-31 DIAGNOSIS — I25.10 CORONARY ARTERY DISEASE INVOLVING NATIVE CORONARY ARTERY OF NATIVE HEART WITHOUT ANGINA PECTORIS: ICD-10-CM

## 2023-05-31 DIAGNOSIS — D50.9 IRON DEFICIENCY ANEMIA, UNSPECIFIED IRON DEFICIENCY ANEMIA TYPE: ICD-10-CM

## 2023-05-31 DIAGNOSIS — E78.00 PURE HYPERCHOLESTEROLEMIA: ICD-10-CM

## 2023-05-31 LAB
ALBUMIN SERPL-MCNC: 4.7 G/DL (ref 3.5–5.2)
ALBUMIN/GLOB SERPL: 1.7 {RATIO} (ref 1–2.5)
ALP SERPL-CCNC: 66 U/L (ref 35–104)
ALT SERPL-CCNC: 15 U/L (ref 5–33)
ANION GAP SERPL CALCULATED.3IONS-SCNC: 10 MMOL/L (ref 9–17)
AST SERPL-CCNC: 26 U/L
BASOPHILS # BLD: 0.06 K/UL (ref 0–0.2)
BASOPHILS NFR BLD: 1 % (ref 0–2)
BILIRUB SERPL-MCNC: 1.2 MG/DL (ref 0.3–1.2)
BUN SERPL-MCNC: 13 MG/DL (ref 8–23)
BUN/CREAT SERPL: 19 (ref 9–20)
CALCIUM SERPL-MCNC: 10.1 MG/DL (ref 8.6–10.4)
CHLORIDE SERPL-SCNC: 107 MMOL/L (ref 98–107)
CHOLEST SERPL-MCNC: 124 MG/DL
CHOLESTEROL/HDL RATIO: 2.1
CO2 SERPL-SCNC: 28 MMOL/L (ref 20–31)
CREAT SERPL-MCNC: 0.69 MG/DL (ref 0.5–0.9)
EOSINOPHIL # BLD: 0.05 K/UL (ref 0–0.44)
EOSINOPHILS RELATIVE PERCENT: 1 % (ref 1–4)
ERYTHROCYTE [DISTWIDTH] IN BLOOD BY AUTOMATED COUNT: 12.7 % (ref 11.8–14.4)
EST. AVERAGE GLUCOSE BLD GHB EST-MCNC: 108 MG/DL
GFR SERPL CREATININE-BSD FRML MDRD: >60 ML/MIN/1.73M2
GLUCOSE SERPL-MCNC: 104 MG/DL (ref 70–99)
HBA1C MFR BLD: 5.4 % (ref 4–6)
HCT VFR BLD AUTO: 43.7 % (ref 36.3–47.1)
HDLC SERPL-MCNC: 60 MG/DL
HGB BLD-MCNC: 14.5 G/DL (ref 11.9–15.1)
IMM GRANULOCYTES # BLD AUTO: <0.03 K/UL (ref 0–0.3)
IMM GRANULOCYTES NFR BLD: 0 %
LDLC SERPL CALC-MCNC: 44 MG/DL (ref 0–130)
LYMPHOCYTES # BLD: 37 % (ref 24–43)
LYMPHOCYTES NFR BLD: 2.19 K/UL (ref 1.1–3.7)
MCH RBC QN AUTO: 31.8 PG (ref 25.2–33.5)
MCHC RBC AUTO-ENTMCNC: 33.2 G/DL (ref 25.2–33.5)
MCV RBC AUTO: 95.8 FL (ref 82.6–102.9)
MONOCYTES NFR BLD: 0.51 K/UL (ref 0.1–1.2)
MONOCYTES NFR BLD: 9 % (ref 3–12)
NEUTROPHILS NFR BLD: 52 % (ref 36–65)
NEUTS SEG NFR BLD: 3.18 K/UL (ref 1.5–8.1)
NRBC AUTOMATED: 0 PER 100 WBC
PLATELET # BLD AUTO: 172 K/UL (ref 138–453)
PMV BLD AUTO: 9.6 FL (ref 8.1–13.5)
POTASSIUM SERPL-SCNC: 5 MMOL/L (ref 3.7–5.3)
PROT SERPL-MCNC: 7.4 G/DL (ref 6.4–8.3)
RBC # BLD AUTO: 4.56 M/UL (ref 3.95–5.11)
REASON FOR REJECTION: NORMAL
SODIUM SERPL-SCNC: 145 MMOL/L (ref 135–144)
SPECIMEN SOURCE: NORMAL
TRIGL SERPL-MCNC: 100 MG/DL
WBC OTHER # BLD: 6 K/UL (ref 3.5–11.3)
ZZ NTE CLEAN UP: ORDERED TEST: NORMAL

## 2023-05-31 PROCEDURE — 80061 LIPID PANEL: CPT

## 2023-05-31 PROCEDURE — 85025 COMPLETE CBC W/AUTO DIFF WBC: CPT

## 2023-05-31 PROCEDURE — 91313 COVID-19, MODERNA BIVALENT, (AGE 12Y+), IM, 50 MCG/0.5 ML: CPT | Performed by: FAMILY MEDICINE

## 2023-05-31 PROCEDURE — 80053 COMPREHEN METABOLIC PANEL: CPT

## 2023-05-31 PROCEDURE — 36415 COLL VENOUS BLD VENIPUNCTURE: CPT

## 2023-05-31 PROCEDURE — 83036 HEMOGLOBIN GLYCOSYLATED A1C: CPT

## 2023-08-03 RX ORDER — ATORVASTATIN CALCIUM 40 MG/1
TABLET, FILM COATED ORAL
Qty: 100 TABLET | Refills: 2 | OUTPATIENT
Start: 2023-08-03

## 2023-08-03 RX ORDER — MEMANTINE HYDROCHLORIDE 5 MG/1
TABLET ORAL
Qty: 200 TABLET | Refills: 2 | OUTPATIENT
Start: 2023-08-03

## 2023-08-03 NOTE — TELEPHONE ENCOUNTER
Sent to pharmacy 5/26/23 for 90 days with 1 refill. Per dispense report, last filled 5/26/23 for 100 day supply.

## 2023-08-10 RX ORDER — ALENDRONATE SODIUM 70 MG/1
TABLET ORAL
Qty: 12 TABLET | Refills: 3 | OUTPATIENT
Start: 2023-08-10

## 2023-10-09 RX ORDER — ALENDRONATE SODIUM 70 MG/1
TABLET ORAL
Qty: 12 TABLET | Refills: 1 | Status: SHIPPED | OUTPATIENT
Start: 2023-10-09

## 2023-10-09 NOTE — TELEPHONE ENCOUNTER
Jefferson Lucio called requesting a refill of the below medication which has been pended for you:     Requested Prescriptions     Pending Prescriptions Disp Refills    alendronate (FOSAMAX) 70 MG tablet [Pharmacy Med Name: Alendronate Sodium 70 MG Oral Tablet] 12 tablet 1     Sig: TAKE 1 TABLET BY MOUTH WEEKLY  WITH 8 OZ OF PLAIN WATER 30  MINUTES BEFORE FIRST FOOD, [address] OR MEDS.  STAY UPRIGHT FOR 30  MINS       Last Appointment Date: 6/5/2023  Next Appointment Date: 11/29/2023    Allergies   Allergen Reactions    Codeine Nausea Only

## 2023-10-13 RX ORDER — MEMANTINE HYDROCHLORIDE 5 MG/1
5 TABLET ORAL 2 TIMES DAILY
Qty: 160 TABLET | Refills: 3 | OUTPATIENT
Start: 2023-10-13

## 2023-10-13 RX ORDER — ATORVASTATIN CALCIUM 40 MG/1
TABLET, FILM COATED ORAL
Qty: 80 TABLET | Refills: 3 | OUTPATIENT
Start: 2023-10-13

## 2023-10-16 RX ORDER — ISOSORBIDE MONONITRATE 30 MG/1
TABLET, EXTENDED RELEASE ORAL
Qty: 100 TABLET | Refills: 2 | Status: SHIPPED | OUTPATIENT
Start: 2023-10-16

## 2023-11-06 ENCOUNTER — OFFICE VISIT (OUTPATIENT)
Dept: CARDIOLOGY | Age: 83
End: 2023-11-06
Payer: MEDICARE

## 2023-11-06 VITALS
WEIGHT: 133.6 LBS | HEART RATE: 64 BPM | DIASTOLIC BLOOD PRESSURE: 74 MMHG | BODY MASS INDEX: 26.98 KG/M2 | SYSTOLIC BLOOD PRESSURE: 130 MMHG

## 2023-11-06 DIAGNOSIS — I25.10 CORONARY ARTERY DISEASE INVOLVING NATIVE CORONARY ARTERY OF NATIVE HEART WITHOUT ANGINA PECTORIS: Primary | ICD-10-CM

## 2023-11-06 PROCEDURE — 99214 OFFICE O/P EST MOD 30 MIN: CPT | Performed by: SURGERY

## 2023-11-06 PROCEDURE — 93010 ELECTROCARDIOGRAM REPORT: CPT | Performed by: SURGERY

## 2023-11-06 PROCEDURE — 3078F DIAST BP <80 MM HG: CPT | Performed by: SURGERY

## 2023-11-06 PROCEDURE — 99212 OFFICE O/P EST SF 10 MIN: CPT | Performed by: SURGERY

## 2023-11-06 PROCEDURE — 1123F ACP DISCUSS/DSCN MKR DOCD: CPT | Performed by: SURGERY

## 2023-11-06 PROCEDURE — 3075F SYST BP GE 130 - 139MM HG: CPT | Performed by: SURGERY

## 2023-11-06 PROCEDURE — 93005 ELECTROCARDIOGRAM TRACING: CPT | Performed by: SURGERY

## 2023-11-06 RX ORDER — MEMANTINE HYDROCHLORIDE 5 MG/1
5 TABLET ORAL 2 TIMES DAILY
Qty: 180 TABLET | Refills: 1 | Status: CANCELLED | OUTPATIENT
Start: 2023-11-06

## 2023-11-06 RX ORDER — MEMANTINE HYDROCHLORIDE 5 MG/1
5 TABLET ORAL 2 TIMES DAILY
Qty: 180 TABLET | Refills: 1 | Status: SHIPPED | OUTPATIENT
Start: 2023-11-06

## 2023-11-06 RX ORDER — ATORVASTATIN CALCIUM 40 MG/1
40 TABLET, FILM COATED ORAL NIGHTLY
Qty: 90 TABLET | Refills: 1 | Status: SHIPPED | OUTPATIENT
Start: 2023-11-06

## 2023-11-06 NOTE — PROGRESS NOTES
Cardiology Consultation/Follow up  [address]      11/06/23      CC:  Patient is here for CAD    HPI:  Sheri Bearden   seen and examined with daughter , is doing well from a cardiac standpoint. Good functional capacity with no significant change in functional capacity. No chest pain, no dyspnea, no PND, no syncope or pre-syncope, no orthopnea. No symptoms of CHF or angina/chest pain. Past Medical History:   Diagnosis Date    Abnormal uterine bleeding (AUB) 01/2021    Anxiety     daughter reports since Covid    Asteroid hyalosis of right eye     Bruises easily     per daughter    Coronary artery disease     status post acute non-Q wave myocardial infarction 08/23/2012, status post drug eluting stent placement in the LAD and 2 drug eluting stents in the left circumflex artery 08/23/2012. COVID-19 06/30/2020    confusion, SOB, weakness x 6-8 weeks; was hospitalized and did receive plasma    Diastolic dysfunction     grade 1. Gastric ulcer with hemorrhage 03/03/2015    gastric and esophageal ulcers due to nsaid    Hyperlipidemia     Hypertension     Lives alone     Memory loss     daughter reports since Covid    Mild mitral regurgitation     Mild tricuspid regurgitation     Obesity     Patient in clinical research study 06/27/2020    Expanded Access to Convalescent Plasma for COVID-19 date of completed 6/30/2020    Poor intravenous access     instructed to hydrate for surgery    Vitreous floaters     left eye. Past Surgical History:   Procedure Laterality Date    CARDIAC CATHETERIZATION Left 08/23/2012    left main artery normal, LAD with mid 60 to 70% stenosis, FFR was 0.76, left circumflex artery with mid 90% stenosis with thrombus, RCA with mild irregularities, preserved left ventricular systolic function, ejection fraction estimated around 50%, status post drug eluting stent placement in the LAD and 2 drug eluting stents in the left circumflex artery.      CHOLECYSTECTOMY

## 2023-11-06 NOTE — TELEPHONE ENCOUNTER
Oumar Kan called requesting a refill of the below medication which has been pended for you:     Requested Prescriptions     Pending Prescriptions Disp Refills    memantine (NAMENDA) 5 MG tablet 180 tablet 1     Sig: Take 1 tablet by mouth 2 times daily       Last Appointment Date: 6/5/2023  Next Appointment Date: 11/29/2023    Allergies   Allergen Reactions    Codeine Nausea Only

## 2023-11-10 ENCOUNTER — OFFICE VISIT (OUTPATIENT)
Dept: GYNECOLOGIC ONCOLOGY | Age: 83
End: 2023-11-10
Payer: MEDICARE

## 2023-11-10 VITALS
DIASTOLIC BLOOD PRESSURE: 81 MMHG | SYSTOLIC BLOOD PRESSURE: 137 MMHG | OXYGEN SATURATION: 97 % | HEIGHT: 59 IN | HEART RATE: 60 BPM | WEIGHT: 131.2 LBS | BODY MASS INDEX: 26.45 KG/M2

## 2023-11-10 DIAGNOSIS — C54.1 ENDOMETRIAL CANCER (HCC): Primary | ICD-10-CM

## 2023-11-10 PROCEDURE — 3075F SYST BP GE 130 - 139MM HG: CPT | Performed by: PHYSICIAN ASSISTANT

## 2023-11-10 PROCEDURE — 1123F ACP DISCUSS/DSCN MKR DOCD: CPT | Performed by: PHYSICIAN ASSISTANT

## 2023-11-10 PROCEDURE — 99214 OFFICE O/P EST MOD 30 MIN: CPT | Performed by: PHYSICIAN ASSISTANT

## 2023-11-10 PROCEDURE — 3079F DIAST BP 80-89 MM HG: CPT | Performed by: PHYSICIAN ASSISTANT

## 2023-11-10 ASSESSMENT — ENCOUNTER SYMPTOMS
GASTROINTESTINAL NEGATIVE: 1
RESPIRATORY NEGATIVE: 1
EYES NEGATIVE: 1

## 2023-11-10 NOTE — PROGRESS NOTES
[address] [facility], [facility] [address], [address]  [address] [address] present for her endometrial cancer surveillance visit. 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 She initially presented to her local gynecologist Dr. Keaton Elliott with concerns of post menopausal bleeding in December 2020. A pelvic ultrasound obtained on 1/12/21 revealed uterus measuring 6.8 x 3.8 x 3.2 cm, the endometrial stripe thickness of 25 mm. Bilateral ovaries were within normal limits, there was no evidence of free fluid. She underwent a D&C and polypectomy on 1/20/21 and final pathology revealed  A moderately differentiated endometrioid adenocarcinoma. She was therefore referred to [facility] for further evaluation and treatment. Treatment options were discussed and patient was deemed an appropriate surgical candidate. Her pre operative  was within normal at 12 units on 2/9/2021. Final pathology revealed a well differentiated endometrioid adenocarcinoma, FIGO grade 1, limited to endometrium, surgical margins negative. Tumor size 3.3 cm in largest diameter. Cervix negative for neoplasm. Bilateral fallopian tubes and ovaries were benign. No lymphovascular invasion present. Pelvic washings were negative for malignancy. MMR testing was normal.    She did well post operatively and was discharged home on POD #1. Today, the patient reports to be doing very well she has no concerns. She has no abdominal or pelvic pain. No vaginal bleeding or discharge. Normal urination and bowel habits.       ROS:  I have personally reviewed and agree with the review of systems done by my ancillary staff in the [facility]

## 2023-11-22 ENCOUNTER — HOSPITAL ENCOUNTER (OUTPATIENT)
Age: 83
Discharge: HOME | End: 2023-11-22
Payer: MEDICARE

## 2023-11-22 DIAGNOSIS — R73.01 IMPAIRED FASTING BLOOD SUGAR: ICD-10-CM

## 2023-11-22 DIAGNOSIS — E78.00 PURE HYPERCHOLESTEROLEMIA: ICD-10-CM

## 2023-11-22 DIAGNOSIS — I25.10 CORONARY ARTERY DISEASE INVOLVING NATIVE CORONARY ARTERY OF NATIVE HEART WITHOUT ANGINA PECTORIS: ICD-10-CM

## 2023-11-22 LAB
ALBUMIN SERPL-MCNC: 4.5 G/DL (ref 3.5–5.2)
ALBUMIN/GLOB SERPL: 1.7 {RATIO} (ref 1–2.5)
ALP SERPL-CCNC: 74 U/L (ref 35–104)
ALT SERPL-CCNC: 14 U/L (ref 5–33)
ANION GAP SERPL CALCULATED.3IONS-SCNC: 9 MMOL/L (ref 9–17)
AST SERPL-CCNC: 21 U/L
BASOPHILS # BLD: 0.06 K/UL (ref 0–0.2)
BASOPHILS NFR BLD: 1 % (ref 0–2)
BILIRUB SERPL-MCNC: 0.9 MG/DL (ref 0.3–1.2)
BUN SERPL-MCNC: 10 MG/DL (ref 8–23)
BUN/CREAT SERPL: 17 (ref 9–20)
CALCIUM SERPL-MCNC: 9.6 MG/DL (ref 8.6–10.4)
CHLORIDE SERPL-SCNC: 105 MMOL/L (ref 98–107)
CHOLEST SERPL-MCNC: 119 MG/DL (ref 0–199)
CHOLESTEROL/HDL RATIO: 2
CO2 SERPL-SCNC: 28 MMOL/L (ref 20–31)
CREAT SERPL-MCNC: 0.6 MG/DL (ref 0.5–0.9)
EOSINOPHIL # BLD: 0.06 K/UL (ref 0–0.44)
EOSINOPHILS RELATIVE PERCENT: 1 % (ref 1–4)
ERYTHROCYTE [DISTWIDTH] IN BLOOD BY AUTOMATED COUNT: 12.3 % (ref 11.8–14.4)
EST. AVERAGE GLUCOSE BLD GHB EST-MCNC: 108 MG/DL
GFR SERPL CREATININE-BSD FRML MDRD: >60 ML/MIN/1.73M2
GLUCOSE SERPL-MCNC: 102 MG/DL (ref 70–99)
HBA1C MFR BLD: 5.4 % (ref 4–6)
HCT VFR BLD AUTO: 45.7 % (ref 36.3–47.1)
HDLC SERPL-MCNC: 59 MG/DL
HGB BLD-MCNC: 15.2 G/DL (ref 11.9–15.1)
IMM GRANULOCYTES # BLD AUTO: <0.03 K/UL (ref 0–0.3)
IMM GRANULOCYTES NFR BLD: 0 %
LDLC SERPL CALC-MCNC: 42 MG/DL (ref 0–100)
LYMPHOCYTES NFR BLD: 2.59 K/UL (ref 1.1–3.7)
LYMPHOCYTES RELATIVE PERCENT: 41 % (ref 24–43)
MCH RBC QN AUTO: 32.1 PG (ref 25.2–33.5)
MCHC RBC AUTO-ENTMCNC: 33.3 G/DL (ref 25.2–33.5)
MCV RBC AUTO: 96.6 FL (ref 82.6–102.9)
MONOCYTES NFR BLD: 0.45 K/UL (ref 0.1–1.2)
MONOCYTES NFR BLD: 7 % (ref 3–12)
NEUTROPHILS NFR BLD: 50 % (ref 36–65)
NEUTS SEG NFR BLD: 3.19 K/UL (ref 1.5–8.1)
NRBC BLD-RTO: 0 PER 100 WBC
PLATELET # BLD AUTO: 168 K/UL (ref 138–453)
PMV BLD AUTO: 9.7 FL (ref 8.1–13.5)
POTASSIUM SERPL-SCNC: 4 MMOL/L (ref 3.7–5.3)
PROT SERPL-MCNC: 7.2 G/DL (ref 6.4–8.3)
RBC # BLD AUTO: 4.73 M/UL (ref 3.95–5.11)
SODIUM SERPL-SCNC: 142 MMOL/L (ref 135–144)
TRIGL SERPL-MCNC: 91 MG/DL (ref 0–149)
VLDLC SERPL CALC-MCNC: 18 MG/DL
WBC OTHER # BLD: 6.4 K/UL (ref 3.5–11.3)

## 2023-11-22 PROCEDURE — 80061 LIPID PANEL: CPT

## 2023-11-22 PROCEDURE — 36415 COLL VENOUS BLD VENIPUNCTURE: CPT

## 2023-11-22 PROCEDURE — 80053 COMPREHEN METABOLIC PANEL: CPT

## 2023-11-22 PROCEDURE — 85025 COMPLETE CBC W/AUTO DIFF WBC: CPT

## 2023-11-22 PROCEDURE — 83036 HEMOGLOBIN GLYCOSYLATED A1C: CPT

## 2023-11-29 ENCOUNTER — OFFICE VISIT (OUTPATIENT)
Dept: FAMILY MEDICINE CLINIC | Age: 83
End: 2023-11-29
Payer: MEDICARE

## 2023-11-29 VITALS
HEIGHT: 59 IN | SYSTOLIC BLOOD PRESSURE: 126 MMHG | OXYGEN SATURATION: 97 % | WEIGHT: 129.2 LBS | BODY MASS INDEX: 26.04 KG/M2 | DIASTOLIC BLOOD PRESSURE: 70 MMHG | HEART RATE: 74 BPM

## 2023-11-29 DIAGNOSIS — I10 ESSENTIAL HYPERTENSION: ICD-10-CM

## 2023-11-29 DIAGNOSIS — I25.10 CORONARY ARTERY DISEASE INVOLVING NATIVE CORONARY ARTERY OF NATIVE HEART WITHOUT ANGINA PECTORIS: ICD-10-CM

## 2023-11-29 DIAGNOSIS — C54.1 ENDOMETRIAL CANCER (HCC): ICD-10-CM

## 2023-11-29 DIAGNOSIS — D50.9 IRON DEFICIENCY ANEMIA, UNSPECIFIED IRON DEFICIENCY ANEMIA TYPE: ICD-10-CM

## 2023-11-29 DIAGNOSIS — M85.80 OSTEOPENIA, UNSPECIFIED LOCATION: ICD-10-CM

## 2023-11-29 DIAGNOSIS — E78.00 PURE HYPERCHOLESTEROLEMIA: ICD-10-CM

## 2023-11-29 DIAGNOSIS — Z23 NEED FOR VACCINATION: Primary | ICD-10-CM

## 2023-11-29 DIAGNOSIS — I51.89 DIASTOLIC DYSFUNCTION: ICD-10-CM

## 2023-11-29 DIAGNOSIS — K25.4 CHRONIC GASTRIC ULCER WITH HEMORRHAGE: ICD-10-CM

## 2023-11-29 DIAGNOSIS — R41.3 MEMORY LOSS: ICD-10-CM

## 2023-11-29 DIAGNOSIS — R73.01 IMPAIRED FASTING BLOOD SUGAR: ICD-10-CM

## 2023-11-29 PROCEDURE — 3074F SYST BP LT 130 MM HG: CPT | Performed by: FAMILY MEDICINE

## 2023-11-29 PROCEDURE — PBSHW INFLUENZA, FLUAD, (AGE 65 Y+), IM, PF, 0.5 ML: Performed by: FAMILY MEDICINE

## 2023-11-29 PROCEDURE — 3078F DIAST BP <80 MM HG: CPT | Performed by: FAMILY MEDICINE

## 2023-11-29 PROCEDURE — 1123F ACP DISCUSS/DSCN MKR DOCD: CPT | Performed by: FAMILY MEDICINE

## 2023-11-29 PROCEDURE — 99213 OFFICE O/P EST LOW 20 MIN: CPT | Performed by: FAMILY MEDICINE

## 2023-11-29 PROCEDURE — 90694 VACC AIIV4 NO PRSRV 0.5ML IM: CPT | Performed by: FAMILY MEDICINE

## 2023-11-29 PROCEDURE — 99214 OFFICE O/P EST MOD 30 MIN: CPT | Performed by: FAMILY MEDICINE

## 2023-11-29 ASSESSMENT — ENCOUNTER SYMPTOMS
EYE PAIN: 0
SORE THROAT: 0
CONSTIPATION: 0
EYE DISCHARGE: 0
ALLERGIC/IMMUNOLOGIC NEGATIVE: 1
SHORTNESS OF BREATH: 0
DIARRHEA: 0
RHINORRHEA: 0
EYES NEGATIVE: 1
COUGH: 0
ABDOMINAL PAIN: 0
CHEST TIGHTNESS: 0
RESPIRATORY NEGATIVE: 1

## 2023-11-29 NOTE — PROGRESS NOTES
with swallowing at present. Dtr has witnessed issues at times, usually eating out. Nothing of significance over the last interval  . Maksim Gudino doesn't recall or report issues.

## 2023-11-29 NOTE — PATIENT INSTRUCTIONS
11/22/2023 >60  >60 mL/min/1.73m2 Final    Comment:       These results are not intended for use in patients <25years of age. eGFR results are calculated without a race factor using the 2021 CKD-EPI equation. Careful clinical correlation is recommended, particularly when comparing to results   calculated using previous equations. The CKD-EPI equation is less accurate in patients with extremes of muscle mass, extra-renal   metabolism of creatine, excessive creatine ingestion, or following therapy that affects   renal tubular secretion. Bun/Cre Ratio 11/22/2023 17  9 - 20 Final    Calcium 11/22/2023 9.6  8.6 - 10.4 mg/dL Final    Total Protein 11/22/2023 7.2  6.4 - 8.3 g/dL Final    Albumin 11/22/2023 4.5  3.5 - 5.2 g/dL Final    Albumin/Globulin Ratio 11/22/2023 1.7  1.0 - 2.5 Final    Total Bilirubin 11/22/2023 0.9  0.3 - 1.2 mg/dL Final    Alkaline Phosphatase 11/22/2023 74  35 - 104 U/L Final    ALT 11/22/2023 14  5 - 33 U/L Final    AST 11/22/2023 21  <32 U/L Final    Cholesterol 11/22/2023 119  0 - 199 mg/dL Final    Comment:    Cholesterol Guidelines:      <200  Desirable   200-240  Borderline      >240  Undesirable         HDL 11/22/2023 59  >40 mg/dL Final    Comment:    HDL Guidelines:    <40     Undesirable   40-59    Borderline    >59     Desirable         LDL Cholesterol 11/22/2023 42  0 - 100 mg/dL Final    Comment:    LDL Guidelines:     <100    Desirable   100-129   Near to/above Desirable   130-159   Borderline      >159   Undesirable     Direct (measured) LDL and calculated LDL are not interchangeable tests. Chol/HDL Ratio 11/22/2023 2.0   Final    Triglycerides 11/22/2023 91  0 - 149 mg/dL Final    Comment:    Triglyceride Guidelines:     <150   Desirable   150-199  Borderline   200-499  High     >499   Very high   Based on AHA Guidelines for fasting triglyceride, October 2012.          VLDL 11/22/2023 18  mg/dL Final

## 2024-01-11 DIAGNOSIS — I10 ESSENTIAL HYPERTENSION: ICD-10-CM

## 2024-01-11 RX ORDER — MEMANTINE HYDROCHLORIDE 5 MG/1
5 TABLET ORAL 2 TIMES DAILY
Qty: 180 TABLET | Refills: 0 | Status: SHIPPED | OUTPATIENT
Start: 2024-01-11

## 2024-01-11 RX ORDER — ATORVASTATIN CALCIUM 40 MG/1
40 TABLET, FILM COATED ORAL NIGHTLY
Qty: 100 TABLET | Refills: 2 | Status: SHIPPED | OUTPATIENT
Start: 2024-01-11

## 2024-01-11 NOTE — TELEPHONE ENCOUNTER
Lyn called requesting a refill of the below medication which has been pended for you:     Requested Prescriptions     Pending Prescriptions Disp Refills    memantine (NAMENDA) 5 MG tablet [Pharmacy Med Name: Memantine HCl 5 MG Oral Tablet] 200 tablet 2     Sig: TAKE 1 TABLET BY MOUTH TWICE  DAILY       Last Appointment Date: 11/29/2023  Next Appointment Date: 5/24/2024    Allergies   Allergen Reactions    Codeine Nausea Only

## 2024-01-12 RX ORDER — LISINOPRIL 10 MG/1
10 TABLET ORAL DAILY
Qty: 90 TABLET | Refills: 0 | Status: SHIPPED | OUTPATIENT
Start: 2024-01-12

## 2024-01-12 NOTE — TELEPHONE ENCOUNTER
Lyn called requesting a refill of the below medication which has been pended for you:     Requested Prescriptions     Pending Prescriptions Disp Refills    lisinopril (PRINIVIL;ZESTRIL) 10 MG tablet [Pharmacy Med Name: Lisinopril 10 MG Oral Tablet] 100 tablet 2     Sig: TAKE 1 TABLET BY MOUTH DAILY       Last Appointment Date: 11/29/2023  Next Appointment Date: 5/24/2024    Allergies   Allergen Reactions    Codeine Nausea Only

## 2024-03-14 DIAGNOSIS — I10 ESSENTIAL HYPERTENSION: ICD-10-CM

## 2024-03-14 RX ORDER — LISINOPRIL 10 MG/1
10 TABLET ORAL DAILY
Qty: 90 TABLET | Refills: 3 | Status: SHIPPED | OUTPATIENT
Start: 2024-03-14

## 2024-03-14 RX ORDER — MEMANTINE HYDROCHLORIDE 5 MG/1
5 TABLET ORAL 2 TIMES DAILY
Qty: 180 TABLET | Refills: 3 | Status: SHIPPED | OUTPATIENT
Start: 2024-03-14

## 2024-03-14 NOTE — TELEPHONE ENCOUNTER
Lyn called requesting a refill of the below medication which has been pended for you:     Requested Prescriptions     Pending Prescriptions Disp Refills    lisinopril (PRINIVIL;ZESTRIL) 10 MG tablet [Pharmacy Med Name: Lisinopril 10 MG Oral Tablet] 90 tablet 3     Sig: TAKE 1 TABLET BY MOUTH DAILY       Last Appointment Date: 11/29/2023  Next Appointment Date: 5/24/2024    Allergies   Allergen Reactions    Codeine Nausea Only

## 2024-03-14 NOTE — TELEPHONE ENCOUNTER
Lyn called requesting a refill of the below medication which has been pended for you:     Requested Prescriptions     Pending Prescriptions Disp Refills    memantine (NAMENDA) 5 MG tablet [Pharmacy Med Name: Memantine HCl 5 MG Oral Tablet] 180 tablet 3     Sig: TAKE 1 TABLET BY MOUTH TWICE  DAILY       Last Appointment Date: Visit date not found  Next Appointment Date: Visit date not found    Allergies   Allergen Reactions    Codeine Nausea Only

## 2024-05-13 ENCOUNTER — OFFICE VISIT (OUTPATIENT)
Dept: CARDIOLOGY | Age: 84
End: 2024-05-13
Payer: MEDICARE

## 2024-05-13 VITALS
WEIGHT: 130 LBS | OXYGEN SATURATION: 98 % | SYSTOLIC BLOOD PRESSURE: 174 MMHG | BODY MASS INDEX: 26.21 KG/M2 | DIASTOLIC BLOOD PRESSURE: 90 MMHG | HEIGHT: 59 IN | HEART RATE: 80 BPM

## 2024-05-13 DIAGNOSIS — I10 ESSENTIAL HYPERTENSION: ICD-10-CM

## 2024-05-13 DIAGNOSIS — I25.10 ATHEROSCLEROSIS OF NATIVE CORONARY ARTERY OF NATIVE HEART WITHOUT ANGINA PECTORIS: ICD-10-CM

## 2024-05-13 DIAGNOSIS — I25.10 CORONARY ARTERY DISEASE INVOLVING NATIVE CORONARY ARTERY OF NATIVE HEART WITHOUT ANGINA PECTORIS: Primary | ICD-10-CM

## 2024-05-13 DIAGNOSIS — Z98.61 CORONARY ANGIOPLASTY STATUS: ICD-10-CM

## 2024-05-13 DIAGNOSIS — E78.2 MIXED HYPERLIPIDEMIA: ICD-10-CM

## 2024-05-13 PROCEDURE — 99212 OFFICE O/P EST SF 10 MIN: CPT | Performed by: INTERNAL MEDICINE

## 2024-05-13 PROCEDURE — 93010 ELECTROCARDIOGRAM REPORT: CPT | Performed by: INTERNAL MEDICINE

## 2024-05-13 PROCEDURE — 3078F DIAST BP <80 MM HG: CPT | Performed by: INTERNAL MEDICINE

## 2024-05-13 PROCEDURE — 3077F SYST BP >= 140 MM HG: CPT | Performed by: INTERNAL MEDICINE

## 2024-05-13 PROCEDURE — 1123F ACP DISCUSS/DSCN MKR DOCD: CPT | Performed by: INTERNAL MEDICINE

## 2024-05-13 PROCEDURE — 93005 ELECTROCARDIOGRAM TRACING: CPT | Performed by: INTERNAL MEDICINE

## 2024-05-13 PROCEDURE — 99214 OFFICE O/P EST MOD 30 MIN: CPT | Performed by: INTERNAL MEDICINE

## 2024-05-13 RX ORDER — LISINOPRIL 20 MG/1
20 TABLET ORAL DAILY
Qty: 90 TABLET | Refills: 3 | Status: SHIPPED | OUTPATIENT
Start: 2024-05-13

## 2024-05-13 RX ORDER — LISINOPRIL 20 MG/1
20 TABLET ORAL DAILY
Qty: 90 TABLET | Refills: 3 | Status: SHIPPED | OUTPATIENT
Start: 2024-05-13 | End: 2024-05-13

## 2024-05-13 NOTE — PROGRESS NOTES
(130 lb)   LMP  (LMP Unknown)   SpO2 98%   BMI 26.26 kg/m²     Vitals as above.   Alert and oriented x 3.    No JVD, or carotid bruits.   Lungs are clear to auscultation.   Heart sounds are regular, normal, no murmur.   Abdomen is soft, no tenderness.  Extremities No peripheral edema.    Meds:    Current Outpatient Medications:     lisinopril (PRINIVIL;ZESTRIL) 10 MG tablet, TAKE 1 TABLET BY MOUTH DAILY, Disp: 90 tablet, Rfl: 3    memantine (NAMENDA) 5 MG tablet, TAKE 1 TABLET BY MOUTH TWICE  DAILY, Disp: 180 tablet, Rfl: 3    atorvastatin (LIPITOR) 40 MG tablet, TAKE 1 TABLET BY MOUTH EVERY  NIGHT AT BEDTIME, Disp: 100 tablet, Rfl: 2    isosorbide mononitrate (IMDUR) 30 MG extended release tablet, TAKE 1 TABLET BY MOUTH DAILY, Disp: 100 tablet, Rfl: 2    alendronate (FOSAMAX) 70 MG tablet, TAKE 1 TABLET BY MOUTH WEEKLY  WITH 8 OZ OF PLAIN WATER 30  MINUTES BEFORE FIRST FOOD, DRINK OR MEDS. STAY UPRIGHT FOR 30  MINS, Disp: 12 tablet, Rfl: 1    Cholecalciferol (VITAMIN D3) 50 MCG (2000 UT) CAPS, Take 1 capsule by mouth daily, Disp: 90 capsule, Rfl: 3    donepezil (ARICEPT) 10 MG tablet, Take one tablet by mouth nightly, Disp: 90 tablet, Rfl: 3    ferrous sulfate (FE TABS 325) 325 (65 Fe) MG EC tablet, Take 1 tablet by mouth daily (with breakfast), Disp: 90 tablet, Rfl: 3    nitroGLYCERIN (NITROSTAT) 0.4 MG SL tablet, Place 1 tablet under the tongue every 5 minutes as needed for Chest pain, Disp: 25 tablet, Rfl: 1    ascorbic acid (VITAMIN C) 500 MG tablet, Take 1 tablet by mouth 2 times daily Indications: patient only takes in the Winter With ana paula hips, Disp: , Rfl:     Multiple Vitamins-Minerals (MULTIVITAMIN PO), Take 1 tablet by mouth daily., Disp: , Rfl:     LABS  Lab Results   Component Value Date    CHOL 119 11/22/2023    TRIG 91 11/22/2023    HDL 59 11/22/2023    LDL 42 11/22/2023    VLDL 18 11/22/2023    CHOLHDLRATIO 2.0 11/22/2023     Lab Results   Component Value Date     11/22/2023    K 4.0

## 2024-05-21 ENCOUNTER — HOSPITAL ENCOUNTER (OUTPATIENT)
Age: 84
Discharge: HOME | End: 2024-05-21
Payer: MEDICARE

## 2024-05-21 DIAGNOSIS — D50.9 IRON DEFICIENCY ANEMIA, UNSPECIFIED IRON DEFICIENCY ANEMIA TYPE: ICD-10-CM

## 2024-05-21 DIAGNOSIS — E78.00 PURE HYPERCHOLESTEROLEMIA: ICD-10-CM

## 2024-05-21 DIAGNOSIS — R73.01 IMPAIRED FASTING BLOOD SUGAR: ICD-10-CM

## 2024-05-21 LAB
ALBUMIN SERPL-MCNC: 4.3 G/DL (ref 3.5–5.2)
ALBUMIN/GLOB SERPL: 1.6 {RATIO} (ref 1–2.5)
ALP SERPL-CCNC: 75 U/L (ref 35–104)
ALT SERPL-CCNC: 14 U/L (ref 5–33)
ANION GAP SERPL CALCULATED.3IONS-SCNC: 9 MMOL/L (ref 9–17)
AST SERPL-CCNC: 21 U/L
BASOPHILS # BLD: 0.05 K/UL (ref 0–0.2)
BASOPHILS NFR BLD: 1 % (ref 0–2)
BILIRUB SERPL-MCNC: 1.3 MG/DL (ref 0.3–1.2)
BUN SERPL-MCNC: 13 MG/DL (ref 8–23)
BUN/CREAT SERPL: 19 (ref 9–20)
CALCIUM SERPL-MCNC: 9.6 MG/DL (ref 8.6–10.4)
CHLORIDE SERPL-SCNC: 105 MMOL/L (ref 98–107)
CHOLEST SERPL-MCNC: 118 MG/DL (ref 0–199)
CHOLESTEROL/HDL RATIO: 2
CO2 SERPL-SCNC: 29 MMOL/L (ref 20–31)
CREAT SERPL-MCNC: 0.7 MG/DL (ref 0.5–0.9)
EOSINOPHIL # BLD: 0.07 K/UL (ref 0–0.44)
EOSINOPHILS RELATIVE PERCENT: 1 % (ref 1–4)
ERYTHROCYTE [DISTWIDTH] IN BLOOD BY AUTOMATED COUNT: 12.6 % (ref 11.8–14.4)
EST. AVERAGE GLUCOSE BLD GHB EST-MCNC: 111 MG/DL
GFR, ESTIMATED: 86 ML/MIN/1.73M2
GLUCOSE SERPL-MCNC: 110 MG/DL (ref 70–99)
HBA1C MFR BLD: 5.5 % (ref 4–6)
HCT VFR BLD AUTO: 41.9 % (ref 36.3–47.1)
HDLC SERPL-MCNC: 61 MG/DL
HGB BLD-MCNC: 14.3 G/DL (ref 11.9–15.1)
IMM GRANULOCYTES # BLD AUTO: <0.03 K/UL (ref 0–0.3)
IMM GRANULOCYTES NFR BLD: 0 %
LDLC SERPL CALC-MCNC: 38 MG/DL (ref 0–100)
LYMPHOCYTES NFR BLD: 2.01 K/UL (ref 1.1–3.7)
LYMPHOCYTES RELATIVE PERCENT: 34 % (ref 24–43)
MCH RBC QN AUTO: 32.4 PG (ref 25.2–33.5)
MCHC RBC AUTO-ENTMCNC: 34.1 G/DL (ref 25.2–33.5)
MCV RBC AUTO: 94.8 FL (ref 82.6–102.9)
MONOCYTES NFR BLD: 0.51 K/UL (ref 0.1–1.2)
MONOCYTES NFR BLD: 9 % (ref 3–12)
NEUTROPHILS NFR BLD: 55 % (ref 36–65)
NEUTS SEG NFR BLD: 3.26 K/UL (ref 1.5–8.1)
NRBC BLD-RTO: 0 PER 100 WBC
PLATELET # BLD AUTO: 190 K/UL (ref 138–453)
PMV BLD AUTO: 9.6 FL (ref 8.1–13.5)
POTASSIUM SERPL-SCNC: 4.1 MMOL/L (ref 3.7–5.3)
PROT SERPL-MCNC: 7 G/DL (ref 6.4–8.3)
RBC # BLD AUTO: 4.42 M/UL (ref 3.95–5.11)
SODIUM SERPL-SCNC: 143 MMOL/L (ref 135–144)
TRIGL SERPL-MCNC: 96 MG/DL
VLDLC SERPL CALC-MCNC: 19 MG/DL
WBC OTHER # BLD: 5.9 K/UL (ref 3.5–11.3)

## 2024-05-21 PROCEDURE — 80053 COMPREHEN METABOLIC PANEL: CPT

## 2024-05-21 PROCEDURE — 85025 COMPLETE CBC W/AUTO DIFF WBC: CPT

## 2024-05-21 PROCEDURE — 80061 LIPID PANEL: CPT

## 2024-05-21 PROCEDURE — 36415 COLL VENOUS BLD VENIPUNCTURE: CPT

## 2024-05-21 PROCEDURE — 83036 HEMOGLOBIN GLYCOSYLATED A1C: CPT

## 2024-05-24 ENCOUNTER — OFFICE VISIT (OUTPATIENT)
Dept: FAMILY MEDICINE CLINIC | Age: 84
End: 2024-05-24
Payer: MEDICARE

## 2024-05-24 VITALS
WEIGHT: 132 LBS | BODY MASS INDEX: 26.61 KG/M2 | HEART RATE: 70 BPM | HEIGHT: 59 IN | SYSTOLIC BLOOD PRESSURE: 120 MMHG | DIASTOLIC BLOOD PRESSURE: 64 MMHG | OXYGEN SATURATION: 98 %

## 2024-05-24 DIAGNOSIS — K25.4 CHRONIC GASTRIC ULCER WITH HEMORRHAGE: ICD-10-CM

## 2024-05-24 DIAGNOSIS — D50.9 IRON DEFICIENCY ANEMIA, UNSPECIFIED IRON DEFICIENCY ANEMIA TYPE: ICD-10-CM

## 2024-05-24 DIAGNOSIS — R73.01 IMPAIRED FASTING BLOOD SUGAR: ICD-10-CM

## 2024-05-24 DIAGNOSIS — I25.10 CORONARY ARTERY DISEASE INVOLVING NATIVE CORONARY ARTERY OF NATIVE HEART WITHOUT ANGINA PECTORIS: ICD-10-CM

## 2024-05-24 DIAGNOSIS — E66.09 CLASS 1 OBESITY DUE TO EXCESS CALORIES WITHOUT SERIOUS COMORBIDITY IN ADULT, UNSPECIFIED BMI: ICD-10-CM

## 2024-05-24 DIAGNOSIS — I10 ESSENTIAL HYPERTENSION: Primary | ICD-10-CM

## 2024-05-24 DIAGNOSIS — I51.89 DIASTOLIC DYSFUNCTION: ICD-10-CM

## 2024-05-24 DIAGNOSIS — E78.2 MIXED HYPERLIPIDEMIA: ICD-10-CM

## 2024-05-24 DIAGNOSIS — C54.1 ENDOMETRIAL CANCER (HCC): ICD-10-CM

## 2024-05-24 DIAGNOSIS — R41.3 MEMORY LOSS: ICD-10-CM

## 2024-05-24 DIAGNOSIS — E78.00 PURE HYPERCHOLESTEROLEMIA: ICD-10-CM

## 2024-05-24 DIAGNOSIS — M85.80 OSTEOPENIA, UNSPECIFIED LOCATION: ICD-10-CM

## 2024-05-24 PROCEDURE — 99213 OFFICE O/P EST LOW 20 MIN: CPT | Performed by: FAMILY MEDICINE

## 2024-05-24 RX ORDER — ACETAMINOPHEN 160 MG
1 TABLET,DISINTEGRATING ORAL DAILY
Qty: 90 CAPSULE | Refills: 3 | Status: SHIPPED | OUTPATIENT
Start: 2024-05-24

## 2024-05-24 RX ORDER — DONEPEZIL HYDROCHLORIDE 10 MG/1
TABLET, FILM COATED ORAL
Qty: 90 TABLET | Refills: 3 | Status: SHIPPED | OUTPATIENT
Start: 2024-05-24

## 2024-05-24 RX ORDER — UREA 10 %
325 LOTION (ML) TOPICAL
Qty: 90 TABLET | Refills: 3 | Status: SHIPPED | OUTPATIENT
Start: 2024-05-24

## 2024-05-24 ASSESSMENT — PATIENT HEALTH QUESTIONNAIRE - PHQ9
SUM OF ALL RESPONSES TO PHQ QUESTIONS 1-9: 0
1. LITTLE INTEREST OR PLEASURE IN DOING THINGS: NOT AT ALL
2. FEELING DOWN, DEPRESSED OR HOPELESS: NOT AT ALL
SUM OF ALL RESPONSES TO PHQ QUESTIONS 1-9: 0
SUM OF ALL RESPONSES TO PHQ9 QUESTIONS 1 & 2: 0
SUM OF ALL RESPONSES TO PHQ QUESTIONS 1-9: 0
SUM OF ALL RESPONSES TO PHQ QUESTIONS 1-9: 0

## 2024-05-24 ASSESSMENT — ENCOUNTER SYMPTOMS
CONSTIPATION: 0
RESPIRATORY NEGATIVE: 1
RHINORRHEA: 0
COUGH: 0
SORE THROAT: 0
EYES NEGATIVE: 1
ABDOMINAL PAIN: 0
DIARRHEA: 0
SHORTNESS OF BREATH: 0
CHEST TIGHTNESS: 0
EYE DISCHARGE: 0
EYE PAIN: 0
ALLERGIC/IMMUNOLOGIC NEGATIVE: 1

## 2024-05-24 NOTE — PROGRESS NOTES
Granulocytes % 05/21/2024 0  0 % Final    Neutrophils Absolute 05/21/2024 3.26  1.50 - 8.10 k/uL Final    Lymphocytes Absolute 05/21/2024 2.01  1.10 - 3.70 k/uL Final    Monocytes Absolute 05/21/2024 0.51  0.10 - 1.20 k/uL Final    Eosinophils Absolute 05/21/2024 0.07  0.00 - 0.44 k/uL Final    Basophils Absolute 05/21/2024 0.05  0.00 - 0.20 k/uL Final    Immature Granulocytes Absolute 05/21/2024 <0.03  0.00 - 0.30 k/uL Final    Sodium 05/21/2024 143  135 - 144 mmol/L Final    Potassium 05/21/2024 4.1  3.7 - 5.3 mmol/L Final    Chloride 05/21/2024 105  98 - 107 mmol/L Final    CO2 05/21/2024 29  20 - 31 mmol/L Final    Anion Gap 05/21/2024 9  9 - 17 mmol/L Final    Glucose 05/21/2024 110 (H)  70 - 99 mg/dL Final    BUN 05/21/2024 13  8 - 23 mg/dL Final    Creatinine 05/21/2024 0.7  0.5 - 0.9 mg/dL Final    Est, Glom Filt Rate 05/21/2024 86  >60 mL/min/1.73m2 Final    BUN/Creatinine Ratio 05/21/2024 19  9 - 20 Final    Calcium 05/21/2024 9.6  8.6 - 10.4 mg/dL Final    Total Protein 05/21/2024 7.0  6.4 - 8.3 g/dL Final    Albumin 05/21/2024 4.3  3.5 - 5.2 g/dL Final    Albumin/Globulin Ratio 05/21/2024 1.6  1.0 - 2.5 Final    Total Bilirubin 05/21/2024 1.3 (H)  0.3 - 1.2 mg/dL Final    Alkaline Phosphatase 05/21/2024 75  35 - 104 U/L Final    ALT 05/21/2024 14  5 - 33 U/L Final    AST 05/21/2024 21  <32 U/L Final    Cholesterol, Total 05/21/2024 118  0 - 199 mg/dL Final    HDL 05/21/2024 61  >40 mg/dL Final    LDL Cholesterol 05/21/2024 38  0 - 100 mg/dL Final    Chol/HDL Ratio 05/21/2024 2.0   Final    Triglycerides 05/21/2024 96  <150 mg/dL Final    VLDL 05/21/2024 19  mg/dL Final             Assessment:       Encounter Diagnoses   Name Primary?    Essential hypertension Yes    Mixed hyperlipidemia     Chronic gastric ulcer with hemorrhage     Coronary artery disease involving native coronary artery of native heart without angina pectoris     Diastolic dysfunction     Pure hypercholesterolemia     Class 1 obesity

## 2024-05-24 NOTE — PATIENT INSTRUCTIONS
Hospital Outpatient Visit on 05/21/2024   Component Date Value Ref Range Status    Hemoglobin A1C 05/21/2024 5.5  4.0 - 6.0 % Final    Estimated Avg Glucose 05/21/2024 111  mg/dL Final    Comment: The ADA and AACC recommend providing the estimated average glucose result to permit better   patient understanding of their HBA1c result.      WBC 05/21/2024 5.9  3.5 - 11.3 k/uL Final    RBC 05/21/2024 4.42  3.95 - 5.11 m/uL Final    Hemoglobin 05/21/2024 14.3  11.9 - 15.1 g/dL Final    Hematocrit 05/21/2024 41.9  36.3 - 47.1 % Final    MCV 05/21/2024 94.8  82.6 - 102.9 fL Final    MCH 05/21/2024 32.4  25.2 - 33.5 pg Final    MCHC 05/21/2024 34.1 (H)  25.2 - 33.5 g/dL Final    RDW 05/21/2024 12.6  11.8 - 14.4 % Final    Platelets 05/21/2024 190  138 - 453 k/uL Final    MPV 05/21/2024 9.6  8.1 - 13.5 fL Final    NRBC Automated 05/21/2024 0.0  0.0 per 100 WBC Final    Neutrophils % 05/21/2024 55  36 - 65 % Final    Lymphocytes % 05/21/2024 34  24 - 43 % Final    Monocytes % 05/21/2024 9  3 - 12 % Final    Eosinophils % 05/21/2024 1  1 - 4 % Final    Basophils % 05/21/2024 1  0 - 2 % Final    Immature Granulocytes % 05/21/2024 0  0 % Final    Neutrophils Absolute 05/21/2024 3.26  1.50 - 8.10 k/uL Final    Lymphocytes Absolute 05/21/2024 2.01  1.10 - 3.70 k/uL Final    Monocytes Absolute 05/21/2024 0.51  0.10 - 1.20 k/uL Final    Eosinophils Absolute 05/21/2024 0.07  0.00 - 0.44 k/uL Final    Basophils Absolute 05/21/2024 0.05  0.00 - 0.20 k/uL Final    Immature Granulocytes Absolute 05/21/2024 <0.03  0.00 - 0.30 k/uL Final    Sodium 05/21/2024 143  135 - 144 mmol/L Final    Potassium 05/21/2024 4.1  3.7 - 5.3 mmol/L Final    Chloride 05/21/2024 105  98 - 107 mmol/L Final    CO2 05/21/2024 29  20 - 31 mmol/L Final    Anion Gap 05/21/2024 9  9 - 17 mmol/L Final    Glucose 05/21/2024 110 (H)  70 - 99 mg/dL Final    BUN 05/21/2024 13  8 - 23 mg/dL Final    Creatinine 05/21/2024 0.7  0.5 - 0.9 mg/dL Final    Est, Glom Filt Rate

## 2024-06-10 ENCOUNTER — TELEMEDICINE (OUTPATIENT)
Dept: FAMILY MEDICINE CLINIC | Age: 84
End: 2024-06-10
Payer: MEDICARE

## 2024-06-10 DIAGNOSIS — Z00.00 MEDICARE ANNUAL WELLNESS VISIT, SUBSEQUENT: Primary | ICD-10-CM

## 2024-06-10 PROCEDURE — G0439 PPPS, SUBSEQ VISIT: HCPCS | Performed by: FAMILY MEDICINE

## 2024-06-10 PROCEDURE — 1123F ACP DISCUSS/DSCN MKR DOCD: CPT | Performed by: FAMILY MEDICINE

## 2024-06-10 SDOH — ECONOMIC STABILITY: FOOD INSECURITY: WITHIN THE PAST 12 MONTHS, YOU WORRIED THAT YOUR FOOD WOULD RUN OUT BEFORE YOU GOT MONEY TO BUY MORE.: NEVER TRUE

## 2024-06-10 SDOH — ECONOMIC STABILITY: INCOME INSECURITY: HOW HARD IS IT FOR YOU TO PAY FOR THE VERY BASICS LIKE FOOD, HOUSING, MEDICAL CARE, AND HEATING?: NOT HARD AT ALL

## 2024-06-10 SDOH — ECONOMIC STABILITY: FOOD INSECURITY: WITHIN THE PAST 12 MONTHS, THE FOOD YOU BOUGHT JUST DIDN'T LAST AND YOU DIDN'T HAVE MONEY TO GET MORE.: NEVER TRUE

## 2024-06-10 ASSESSMENT — PATIENT HEALTH QUESTIONNAIRE - PHQ9
2. FEELING DOWN, DEPRESSED OR HOPELESS: NOT AT ALL
SUM OF ALL RESPONSES TO PHQ QUESTIONS 1-9: 0
SUM OF ALL RESPONSES TO PHQ9 QUESTIONS 1 & 2: 0
SUM OF ALL RESPONSES TO PHQ QUESTIONS 1-9: 0
1. LITTLE INTEREST OR PLEASURE IN DOING THINGS: NOT AT ALL
SUM OF ALL RESPONSES TO PHQ QUESTIONS 1-9: 0
SUM OF ALL RESPONSES TO PHQ QUESTIONS 1-9: 0

## 2024-06-10 ASSESSMENT — LIFESTYLE VARIABLES
HOW OFTEN DO YOU HAVE A DRINK CONTAINING ALCOHOL: NEVER
HOW MANY STANDARD DRINKS CONTAINING ALCOHOL DO YOU HAVE ON A TYPICAL DAY: PATIENT DOES NOT DRINK

## 2024-06-10 NOTE — PROGRESS NOTES
Medicare Annual Wellness Visit    Lyn Lombardi is here for Medicare AWV    Assessment & Plan     Recommendations for Preventive Services Due: see orders and patient instructions/AVS.  Recommended screening schedule for the next 5-10 years is provided to the patient in written form: see Patient Instructions/AVS.     No follow-ups on file.     Subjective       Patient's complete Health Risk Assessment and screening values have been reviewed and are found in Flowsheets. The following problems were reviewed today and where indicated follow up appointments were made and/or referrals ordered.    No Positive Risk Factors identified today.                                  Objective      Patient-Reported Vitals  Patient-Reported Height: 4'11\"            Allergies   Allergen Reactions    Codeine Nausea Only     Prior to Visit Medications    Medication Sig Taking? Authorizing Provider   Cholecalciferol (VITAMIN D3) 50 MCG (2000 UT) CAPS Take 1 capsule by mouth daily Yes Tru Gould MD   donepezil (ARICEPT) 10 MG tablet Take one tablet by mouth nightly Yes Tru Gould MD   ferrous sulfate (FE TABS 325) 325 (65 Fe) MG EC tablet Take 1 tablet by mouth daily (with breakfast) Yes Tru Gould MD   lisinopril (PRINIVIL;ZESTRIL) 20 MG tablet Take 1 tablet by mouth daily Yes Leo Martins DO   memantine (NAMENDA) 5 MG tablet TAKE 1 TABLET BY MOUTH TWICE  DAILY Yes Tru Gould MD   atorvastatin (LIPITOR) 40 MG tablet TAKE 1 TABLET BY MOUTH EVERY  NIGHT AT BEDTIME Yes Brayan Conrad MD   isosorbide mononitrate (IMDUR) 30 MG extended release tablet TAKE 1 TABLET BY MOUTH DAILY Yes Eboni Rodriguez, APRN - CNP   alendronate (FOSAMAX) 70 MG tablet TAKE 1 TABLET BY MOUTH WEEKLY  WITH 8 OZ OF PLAIN WATER 30  MINUTES BEFORE FIRST FOOD, DRINK OR MEDS. STAY UPRIGHT FOR 30  MINS Yes Tru Gould MD   nitroGLYCERIN (NITROSTAT) 0.4 MG SL tablet Place 1 tablet under the tongue every 5 minutes as needed for Chest pain Yes

## 2024-07-05 RX ORDER — ISOSORBIDE MONONITRATE 30 MG/1
TABLET, EXTENDED RELEASE ORAL
Qty: 100 TABLET | Refills: 2 | Status: SHIPPED | OUTPATIENT
Start: 2024-07-05

## 2024-10-22 RX ORDER — ATORVASTATIN CALCIUM 40 MG/1
40 TABLET, FILM COATED ORAL NIGHTLY
Qty: 100 TABLET | Refills: 3 | Status: SHIPPED | OUTPATIENT
Start: 2024-10-22

## 2024-11-01 ENCOUNTER — OFFICE VISIT (OUTPATIENT)
Dept: GYNECOLOGIC ONCOLOGY | Age: 84
End: 2024-11-01

## 2024-11-01 VITALS
BODY MASS INDEX: 25.8 KG/M2 | HEART RATE: 84 BPM | DIASTOLIC BLOOD PRESSURE: 77 MMHG | OXYGEN SATURATION: 99 % | SYSTOLIC BLOOD PRESSURE: 169 MMHG | HEIGHT: 59 IN | WEIGHT: 128 LBS

## 2024-11-01 DIAGNOSIS — Z85.42 HISTORY OF ENDOMETRIAL CANCER: Primary | ICD-10-CM

## 2024-11-01 ASSESSMENT — ENCOUNTER SYMPTOMS
RESPIRATORY NEGATIVE: 1
GASTROINTESTINAL NEGATIVE: 1
EYES NEGATIVE: 1

## 2024-11-01 NOTE — PROGRESS NOTES
[facility] Gynecologic [facility]  [address] [facility], [facility] [address], [address]  [facility] [address]    Lyn Lombardi present for her endometrial cancer surveillance visit.     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    She initially presented to her local gynecologist Dr. Caryn Wilson with concerns of post menopausal bleeding in December 2020. A pelvic ultrasound obtained on 1/12/21 revealed uterus measuring 6.8 x 3.8 x 3.2 cm, the endometrial stripe thickness of 25 mm. Bilateral ovaries were within normal limits, there was no evidence of free fluid.     She underwent a D&C and polypectomy on 1/20/21 and final pathology revealed  A moderately differentiated endometrioid adenocarcinoma. She was therefore referred to [facility] for further evaluation and treatment.     Treatment options were discussed and patient was deemed an appropriate surgical candidate.     Her pre operative  was within normal at 12 units on 2/9/2021.    Final pathology revealed a well differentiated endometrioid adenocarcinoma, FIGO grade 1, limited to endometrium, surgical margins negative. Tumor size 3.3 cm in largest diameter. Cervix negative for neoplasm. Bilateral fallopian tubes and ovaries were benign. No lymphovascular invasion present. Pelvic washings were negative for malignancy. MMR testing was normal.    She did well post operatively and was discharged home on POD #1.    She has remained on surveillance and done well with no evidence of recurrent disease.     Today, she has no concerns. She denies abdominal or pelvic pain.  No vaginal bleeding or discharge.  Normal urination and bowel habits. Appetite is stable, denies nausea or vomiting. No chest pain or shortness of breath. No new

## 2024-11-01 NOTE — PROGRESS NOTES
Review of Systems   Constitutional: Negative.    HENT:  Negative.     Eyes: Negative.    Respiratory: Negative.     Cardiovascular: Negative.    Gastrointestinal: Negative.    Endocrine: Negative.    Genitourinary: Negative.     Musculoskeletal: Negative.    Skin: Negative.    Neurological: Negative.    Hematological:  Bruises/bleeds easily.

## 2024-11-11 ENCOUNTER — HOSPITAL ENCOUNTER (OUTPATIENT)
Age: 84
Discharge: HOME | End: 2024-11-11
Payer: MEDICARE

## 2024-11-11 DIAGNOSIS — E78.2 MIXED HYPERLIPIDEMIA: ICD-10-CM

## 2024-11-11 DIAGNOSIS — R73.01 IMPAIRED FASTING BLOOD SUGAR: ICD-10-CM

## 2024-11-11 DIAGNOSIS — I10 ESSENTIAL HYPERTENSION: ICD-10-CM

## 2024-11-11 LAB
ALBUMIN SERPL-MCNC: 4.5 G/DL (ref 3.5–5.2)
ALBUMIN/GLOB SERPL: 1.5 {RATIO} (ref 1–2.5)
ALP SERPL-CCNC: 67 U/L (ref 35–104)
ALT SERPL-CCNC: 14 U/L (ref 5–33)
ANION GAP SERPL CALCULATED.3IONS-SCNC: 11 MMOL/L (ref 9–17)
AST SERPL-CCNC: 23 U/L
BASOPHILS # BLD: 0.07 K/UL (ref 0–0.2)
BASOPHILS NFR BLD: 1 % (ref 0–2)
BILIRUB SERPL-MCNC: 1.3 MG/DL (ref 0.3–1.2)
BUN SERPL-MCNC: 10 MG/DL (ref 8–23)
BUN/CREAT SERPL: 14 (ref 9–20)
CALCIUM SERPL-MCNC: 9.9 MG/DL (ref 8.6–10.4)
CHLORIDE SERPL-SCNC: 106 MMOL/L (ref 98–107)
CHOLEST SERPL-MCNC: 123 MG/DL (ref 0–199)
CHOLESTEROL/HDL RATIO: 1.9
CO2 SERPL-SCNC: 29 MMOL/L (ref 20–31)
CREAT SERPL-MCNC: 0.7 MG/DL (ref 0.5–0.9)
EOSINOPHIL # BLD: 0.07 K/UL (ref 0–0.44)
EOSINOPHILS RELATIVE PERCENT: 1 % (ref 1–4)
ERYTHROCYTE [DISTWIDTH] IN BLOOD BY AUTOMATED COUNT: 12.7 % (ref 11.8–14.4)
EST. AVERAGE GLUCOSE BLD GHB EST-MCNC: 105 MG/DL
GFR, ESTIMATED: 85 ML/MIN/1.73M2
GLUCOSE SERPL-MCNC: 112 MG/DL (ref 70–99)
HBA1C MFR BLD: 5.3 % (ref 4–6)
HCT VFR BLD AUTO: 43.4 % (ref 36.3–47.1)
HDLC SERPL-MCNC: 64 MG/DL
HGB BLD-MCNC: 14.3 G/DL (ref 11.9–15.1)
IMM GRANULOCYTES # BLD AUTO: <0.03 K/UL (ref 0–0.3)
IMM GRANULOCYTES NFR BLD: 0 %
LDLC SERPL CALC-MCNC: 41 MG/DL (ref 0–100)
LYMPHOCYTES NFR BLD: 2.46 K/UL (ref 1.1–3.7)
LYMPHOCYTES RELATIVE PERCENT: 36 % (ref 24–43)
MCH RBC QN AUTO: 32.3 PG (ref 25.2–33.5)
MCHC RBC AUTO-ENTMCNC: 32.9 G/DL (ref 25.2–33.5)
MCV RBC AUTO: 98 FL (ref 82.6–102.9)
MONOCYTES NFR BLD: 0.5 K/UL (ref 0.1–1.2)
MONOCYTES NFR BLD: 7 % (ref 3–12)
NEUTROPHILS NFR BLD: 55 % (ref 36–65)
NEUTS SEG NFR BLD: 3.79 K/UL (ref 1.5–8.1)
NRBC BLD-RTO: 0 PER 100 WBC
PLATELET # BLD AUTO: 177 K/UL (ref 138–453)
PMV BLD AUTO: 9.3 FL (ref 8.1–13.5)
POTASSIUM SERPL-SCNC: 5 MMOL/L (ref 3.7–5.3)
PROT SERPL-MCNC: 7.5 G/DL (ref 6.4–8.3)
RBC # BLD AUTO: 4.43 M/UL (ref 3.95–5.11)
SODIUM SERPL-SCNC: 146 MMOL/L (ref 135–144)
TRIGL SERPL-MCNC: 92 MG/DL
VLDLC SERPL CALC-MCNC: 18 MG/DL (ref 1–30)
WBC OTHER # BLD: 6.9 K/UL (ref 3.5–11.3)

## 2024-11-11 PROCEDURE — 80061 LIPID PANEL: CPT

## 2024-11-11 PROCEDURE — 83036 HEMOGLOBIN GLYCOSYLATED A1C: CPT

## 2024-11-11 PROCEDURE — 85025 COMPLETE CBC W/AUTO DIFF WBC: CPT

## 2024-11-11 PROCEDURE — 80053 COMPREHEN METABOLIC PANEL: CPT

## 2024-11-11 PROCEDURE — 36415 COLL VENOUS BLD VENIPUNCTURE: CPT

## 2024-11-18 ENCOUNTER — OFFICE VISIT (OUTPATIENT)
Dept: FAMILY MEDICINE CLINIC | Age: 84
End: 2024-11-18
Payer: MEDICARE

## 2024-11-18 ENCOUNTER — NURSE ONLY (OUTPATIENT)
Dept: LAB | Age: 84
End: 2024-11-18
Payer: MEDICARE

## 2024-11-18 ENCOUNTER — OFFICE VISIT (OUTPATIENT)
Dept: CARDIOLOGY | Age: 84
End: 2024-11-18
Payer: MEDICARE

## 2024-11-18 VITALS
WEIGHT: 126 LBS | DIASTOLIC BLOOD PRESSURE: 70 MMHG | BODY MASS INDEX: 25.4 KG/M2 | HEIGHT: 59 IN | HEART RATE: 57 BPM | SYSTOLIC BLOOD PRESSURE: 148 MMHG

## 2024-11-18 VITALS
SYSTOLIC BLOOD PRESSURE: 132 MMHG | HEIGHT: 59 IN | HEART RATE: 77 BPM | WEIGHT: 126.4 LBS | BODY MASS INDEX: 25.48 KG/M2 | DIASTOLIC BLOOD PRESSURE: 72 MMHG | OXYGEN SATURATION: 98 %

## 2024-11-18 DIAGNOSIS — E78.00 PURE HYPERCHOLESTEROLEMIA: ICD-10-CM

## 2024-11-18 DIAGNOSIS — I25.10 CORONARY ARTERY DISEASE INVOLVING NATIVE CORONARY ARTERY OF NATIVE HEART WITHOUT ANGINA PECTORIS: ICD-10-CM

## 2024-11-18 DIAGNOSIS — Z23 NEED FOR VACCINATION: ICD-10-CM

## 2024-11-18 DIAGNOSIS — K25.4 CHRONIC GASTRIC ULCER WITH HEMORRHAGE: ICD-10-CM

## 2024-11-18 DIAGNOSIS — E66.811 CLASS 1 OBESITY DUE TO EXCESS CALORIES WITHOUT SERIOUS COMORBIDITY IN ADULT, UNSPECIFIED BMI: ICD-10-CM

## 2024-11-18 DIAGNOSIS — Z23 NEED FOR VACCINATION: Primary | ICD-10-CM

## 2024-11-18 DIAGNOSIS — C54.1 ENDOMETRIAL CANCER (HCC): ICD-10-CM

## 2024-11-18 DIAGNOSIS — E78.5 HYPERLIPIDEMIA WITH TARGET LDL LESS THAN 70: ICD-10-CM

## 2024-11-18 DIAGNOSIS — D50.9 IRON DEFICIENCY ANEMIA, UNSPECIFIED IRON DEFICIENCY ANEMIA TYPE: ICD-10-CM

## 2024-11-18 DIAGNOSIS — I10 HYPERTENSION, UNSPECIFIED TYPE: ICD-10-CM

## 2024-11-18 DIAGNOSIS — I25.119 CORONARY ARTERY DISEASE INVOLVING NATIVE HEART WITH ANGINA PECTORIS, UNSPECIFIED VESSEL OR LESION TYPE (HCC): ICD-10-CM

## 2024-11-18 DIAGNOSIS — I51.89 DIASTOLIC DYSFUNCTION: ICD-10-CM

## 2024-11-18 DIAGNOSIS — R73.01 IMPAIRED FASTING BLOOD SUGAR: ICD-10-CM

## 2024-11-18 DIAGNOSIS — E66.09 CLASS 1 OBESITY DUE TO EXCESS CALORIES WITHOUT SERIOUS COMORBIDITY IN ADULT, UNSPECIFIED BMI: ICD-10-CM

## 2024-11-18 DIAGNOSIS — I10 ESSENTIAL HYPERTENSION: Primary | ICD-10-CM

## 2024-11-18 DIAGNOSIS — Z23 NEED FOR IMMUNIZATION AGAINST INFLUENZA: ICD-10-CM

## 2024-11-18 DIAGNOSIS — I50.32 CHRONIC DIASTOLIC HEART FAILURE (HCC): ICD-10-CM

## 2024-11-18 DIAGNOSIS — I25.10 CORONARY ARTERY DISEASE INVOLVING NATIVE CORONARY ARTERY OF NATIVE HEART WITHOUT ANGINA PECTORIS: Primary | ICD-10-CM

## 2024-11-18 DIAGNOSIS — M85.80 OSTEOPENIA, UNSPECIFIED LOCATION: ICD-10-CM

## 2024-11-18 DIAGNOSIS — R41.3 MEMORY LOSS: ICD-10-CM

## 2024-11-18 PROCEDURE — 99214 OFFICE O/P EST MOD 30 MIN: CPT | Performed by: INTERNAL MEDICINE

## 2024-11-18 PROCEDURE — 1123F ACP DISCUSS/DSCN MKR DOCD: CPT | Performed by: INTERNAL MEDICINE

## 2024-11-18 PROCEDURE — 3077F SYST BP >= 140 MM HG: CPT | Performed by: INTERNAL MEDICINE

## 2024-11-18 PROCEDURE — PBSHW INFLUENZA, FLUAD TRIVALENT, (AGE 65 Y+), IM, PRESERVATIVE FREE, 0.5ML: Performed by: FAMILY MEDICINE

## 2024-11-18 PROCEDURE — 93005 ELECTROCARDIOGRAM TRACING: CPT | Performed by: INTERNAL MEDICINE

## 2024-11-18 PROCEDURE — 90653 IIV ADJUVANT VACCINE IM: CPT | Performed by: FAMILY MEDICINE

## 2024-11-18 PROCEDURE — 1159F MED LIST DOCD IN RCRD: CPT | Performed by: INTERNAL MEDICINE

## 2024-11-18 PROCEDURE — PBSHW COVID-19, COMIRNATY, (AGE 12Y+), IM, PF, 30MCG/0.3ML: Performed by: FAMILY MEDICINE

## 2024-11-18 PROCEDURE — 99212 OFFICE O/P EST SF 10 MIN: CPT | Performed by: INTERNAL MEDICINE

## 2024-11-18 PROCEDURE — 93010 ELECTROCARDIOGRAM REPORT: CPT | Performed by: INTERNAL MEDICINE

## 2024-11-18 PROCEDURE — 1126F AMNT PAIN NOTED NONE PRSNT: CPT | Performed by: INTERNAL MEDICINE

## 2024-11-18 PROCEDURE — 91320 SARSCV2 VAC 30MCG TRS-SUC IM: CPT | Performed by: FAMILY MEDICINE

## 2024-11-18 PROCEDURE — 99213 OFFICE O/P EST LOW 20 MIN: CPT | Performed by: FAMILY MEDICINE

## 2024-11-18 PROCEDURE — 3078F DIAST BP <80 MM HG: CPT | Performed by: INTERNAL MEDICINE

## 2024-11-18 ASSESSMENT — ENCOUNTER SYMPTOMS
RHINORRHEA: 0
CONSTIPATION: 0
RESPIRATORY NEGATIVE: 1
COUGH: 0
EYE PAIN: 0
ABDOMINAL PAIN: 0
EYES NEGATIVE: 1
EYE DISCHARGE: 0
ALLERGIC/IMMUNOLOGIC NEGATIVE: 1
SORE THROAT: 0
SHORTNESS OF BREATH: 0
CHEST TIGHTNESS: 0
DIARRHEA: 0

## 2024-11-18 NOTE — PROGRESS NOTES
in the care of this patient, please do not hesitate to call if you have any questions.    TUSHAR Youssef, FRANCK, CECILIO ELLIOTT  [facility]  [facility]  [phone]

## 2024-11-18 NOTE — PROGRESS NOTES
may be nerves/eating too fast.  Currently no interval issues of concern on review.             Past Medical History:   Diagnosis Date    Abnormal uterine bleeding (AUB) 01/2021    Anxiety     daughter reports since Covid    Asteroid hyalosis of right eye     Bruises easily     per daughter    Coronary artery disease     status post acute non-Q wave myocardial infarction 08/23/2012, status post drug eluting stent placement in the LAD and 2 drug eluting stents in the left circumflex artery 08/23/2012.    COVID-19 06/30/2020    confusion, SOB, weakness x 6-8 weeks; was hospitalized and did receive plasma    Diastolic dysfunction     grade 1.     Gastric ulcer with hemorrhage 03/03/2015    gastric and esophageal ulcers due to nsaid    Hyperlipidemia     Hypertension     Lives alone     Memory loss     daughter reports since Covid    Mild mitral regurgitation     Mild tricuspid regurgitation     Obesity     Patient in clinical research study 06/27/2020    Expanded Access to Convalescent Plasma for COVID-19 date of completed 6/30/2020    Poor intravenous access     instructed to hydrate for surgery    Vitreous floaters     left eye.      Past Surgical History:   Procedure Laterality Date    CARDIAC CATHETERIZATION Left 08/23/2012    left main artery normal, LAD with mid 60 to 70% stenosis, FFR was 0.76, left circumflex artery with mid 90% stenosis with thrombus, RCA with mild irregularities, preserved left ventricular systolic function, ejection fraction estimated around 50%, status post drug eluting stent placement in the LAD and 2 drug eluting stents in the left circumflex artery.     CHOLECYSTECTOMY  01/01/1978    DILATION AND CURETTAGE OF UTERUS N/A 01/20/2021    D & C HYSTEROSCOPY Polypectomy performed by Caryn Wilson MD at [facility] OR    ENDOSCOPY, COLON, DIAGNOSTIC  03/05/2015    esophageal/gastric ulcer; few diverticuli    HYSTERECTOMY (CERVIX STATUS UNKNOWN) N/A 2/23/2021    XI ROBOTIC LAPAROSCOPIC MODIFIED RADICAL

## 2024-11-18 NOTE — PATIENT INSTRUCTIONS
Hospital Outpatient Visit on 11/11/2024   Component Date Value Ref Range Status    Hemoglobin A1C 11/11/2024 5.3  4.0 - 6.0 % Final    Estimated Avg Glucose 11/11/2024 105  mg/dL Final    Comment: The ADA and AACC recommend providing the estimated average glucose result to permit better   patient understanding of their HBA1c result.      WBC 11/11/2024 6.9  3.5 - 11.3 k/uL Final    RBC 11/11/2024 4.43  3.95 - 5.11 m/uL Final    Hemoglobin 11/11/2024 14.3  11.9 - 15.1 g/dL Final    Hematocrit 11/11/2024 43.4  36.3 - 47.1 % Final    MCV 11/11/2024 98.0  82.6 - 102.9 fL Final    MCH 11/11/2024 32.3  25.2 - 33.5 pg Final    MCHC 11/11/2024 32.9  25.2 - 33.5 g/dL Final    RDW 11/11/2024 12.7  11.8 - 14.4 % Final    Platelets 11/11/2024 177  138 - 453 k/uL Final    MPV 11/11/2024 9.3  8.1 - 13.5 fL Final    NRBC Automated 11/11/2024 0.0  0.0 per 100 WBC Final    Neutrophils % 11/11/2024 55  36 - 65 % Final    Lymphocytes % 11/11/2024 36  24 - 43 % Final    Monocytes % 11/11/2024 7  3 - 12 % Final    Eosinophils % 11/11/2024 1  1 - 4 % Final    Basophils % 11/11/2024 1  0 - 2 % Final    Immature Granulocytes % 11/11/2024 0  0 % Final    Neutrophils Absolute 11/11/2024 3.79  1.50 - 8.10 k/uL Final    Lymphocytes Absolute 11/11/2024 2.46  1.10 - 3.70 k/uL Final    Monocytes Absolute 11/11/2024 0.50  0.10 - 1.20 k/uL Final    Eosinophils Absolute 11/11/2024 0.07  0.00 - 0.44 k/uL Final    Basophils Absolute 11/11/2024 0.07  0.00 - 0.20 k/uL Final    Immature Granulocytes Absolute 11/11/2024 <0.03  0.00 - 0.30 k/uL Final    Sodium 11/11/2024 146 (H)  135 - 144 mmol/L Final    Potassium 11/11/2024 5.0  3.7 - 5.3 mmol/L Final    Chloride 11/11/2024 106  98 - 107 mmol/L Final    CO2 11/11/2024 29  20 - 31 mmol/L Final    Anion Gap 11/11/2024 11  9 - 17 mmol/L Final    Glucose 11/11/2024 112 (H)  70 - 99 mg/dL Final    BUN 11/11/2024 10  8 - 23 mg/dL Final    Creatinine 11/11/2024 0.7  0.5 - 0.9 mg/dL Final    Est, Glom Filt Rate

## 2024-12-02 RX ORDER — MEMANTINE HYDROCHLORIDE 5 MG/1
5 TABLET ORAL 2 TIMES DAILY
Qty: 200 TABLET | Refills: 2 | OUTPATIENT
Start: 2024-12-02

## 2025-01-24 DIAGNOSIS — I10 ESSENTIAL HYPERTENSION: ICD-10-CM

## 2025-01-27 RX ORDER — LISINOPRIL 20 MG/1
20 TABLET ORAL DAILY
Qty: 100 TABLET | Refills: 2 | Status: SHIPPED | OUTPATIENT
Start: 2025-01-27

## 2025-01-27 RX ORDER — MEMANTINE HYDROCHLORIDE 5 MG/1
5 TABLET ORAL 2 TIMES DAILY
Qty: 120 TABLET | Refills: 5 | Status: SHIPPED | OUTPATIENT
Start: 2025-01-27

## 2025-01-27 NOTE — TELEPHONE ENCOUNTER
Lyn called requesting a refill of the below medication which has been pended for you:     Requested Prescriptions     Pending Prescriptions Disp Refills    memantine (NAMENDA) 5 MG tablet [Pharmacy Med Name: Memantine HCl 5 MG Oral Tablet] 120 tablet 5     Sig: TAKE 1 TABLET BY MOUTH TWICE  DAILY       Last Appointment Date: 11/18/2024  Next Appointment Date: 5/19/2025    Allergies   Allergen Reactions    Codeine Nausea Only

## 2025-02-03 RX ORDER — ISOSORBIDE MONONITRATE 30 MG/1
TABLET, EXTENDED RELEASE ORAL
Qty: 100 TABLET | Refills: 2 | Status: SHIPPED | OUTPATIENT
Start: 2025-02-03

## 2025-03-03 RX ORDER — DONEPEZIL HYDROCHLORIDE 10 MG/1
TABLET, FILM COATED ORAL
Qty: 90 TABLET | Refills: 3 | OUTPATIENT
Start: 2025-03-03

## 2025-05-09 ENCOUNTER — HOSPITAL ENCOUNTER (OUTPATIENT)
Age: 85
Discharge: HOME | End: 2025-05-09
Payer: MEDICARE

## 2025-05-09 DIAGNOSIS — E78.5 HYPERLIPIDEMIA WITH TARGET LDL LESS THAN 70: ICD-10-CM

## 2025-05-09 DIAGNOSIS — R73.01 IMPAIRED FASTING BLOOD SUGAR: ICD-10-CM

## 2025-05-09 DIAGNOSIS — I10 ESSENTIAL HYPERTENSION: ICD-10-CM

## 2025-05-09 LAB
ALBUMIN SERPL-MCNC: 4.7 G/DL (ref 3.5–5.2)
ALBUMIN/GLOB SERPL: 1.7 {RATIO} (ref 1–2.5)
ALP SERPL-CCNC: 68 U/L (ref 35–104)
ALT SERPL-CCNC: 15 U/L (ref 10–35)
ANION GAP SERPL CALCULATED.3IONS-SCNC: 12 MMOL/L (ref 9–16)
AST SERPL-CCNC: 25 U/L (ref 10–35)
BASOPHILS # BLD: 0.06 K/UL (ref 0–0.2)
BASOPHILS NFR BLD: 1 % (ref 0–2)
BILIRUB SERPL-MCNC: 1.4 MG/DL (ref 0–1.2)
BUN SERPL-MCNC: 11 MG/DL (ref 8–23)
BUN/CREAT SERPL: 14 (ref 9–20)
CALCIUM SERPL-MCNC: 10 MG/DL (ref 8.6–10.4)
CHLORIDE SERPL-SCNC: 104 MMOL/L (ref 98–107)
CHOLEST SERPL-MCNC: 139 MG/DL (ref 0–199)
CHOLESTEROL/HDL RATIO: 1.8
CO2 SERPL-SCNC: 25 MMOL/L (ref 20–31)
CREAT SERPL-MCNC: 0.8 MG/DL (ref 0.6–0.9)
EOSINOPHIL # BLD: 0.04 K/UL (ref 0–0.44)
EOSINOPHILS RELATIVE PERCENT: 1 % (ref 1–4)
ERYTHROCYTE [DISTWIDTH] IN BLOOD BY AUTOMATED COUNT: 12.6 % (ref 11.8–14.4)
EST. AVERAGE GLUCOSE BLD GHB EST-MCNC: 111 MG/DL
GFR, ESTIMATED: 73 ML/MIN/1.73M2
GLUCOSE SERPL-MCNC: 103 MG/DL (ref 74–99)
HBA1C MFR BLD: 5.5 % (ref 4–6)
HCT VFR BLD AUTO: 47.1 % (ref 36.3–47.1)
HDLC SERPL-MCNC: 79 MG/DL
HGB BLD-MCNC: 15.6 G/DL (ref 11.9–15.1)
IMM GRANULOCYTES # BLD AUTO: <0.03 K/UL (ref 0–0.3)
IMM GRANULOCYTES NFR BLD: 0 %
LDLC SERPL CALC-MCNC: 40 MG/DL (ref 0–100)
LYMPHOCYTES NFR BLD: 1.83 K/UL (ref 1.1–3.7)
LYMPHOCYTES RELATIVE PERCENT: 29 % (ref 24–43)
MCH RBC QN AUTO: 32 PG (ref 25.2–33.5)
MCHC RBC AUTO-ENTMCNC: 33.1 G/DL (ref 25.2–33.5)
MCV RBC AUTO: 96.7 FL (ref 82.6–102.9)
MONOCYTES NFR BLD: 0.43 K/UL (ref 0.1–1.2)
MONOCYTES NFR BLD: 7 % (ref 3–12)
NEUTROPHILS NFR BLD: 62 % (ref 36–65)
NEUTS SEG NFR BLD: 3.93 K/UL (ref 1.5–8.1)
NRBC BLD-RTO: 0 PER 100 WBC
PLATELET # BLD AUTO: 183 K/UL (ref 138–453)
PMV BLD AUTO: 9.7 FL (ref 8.1–13.5)
POTASSIUM SERPL-SCNC: 4.3 MMOL/L (ref 3.7–5.3)
PROT SERPL-MCNC: 7.5 G/DL (ref 6.6–8.7)
RBC # BLD AUTO: 4.87 M/UL (ref 3.95–5.11)
SODIUM SERPL-SCNC: 141 MMOL/L (ref 136–145)
TRIGL SERPL-MCNC: 102 MG/DL
VLDLC SERPL CALC-MCNC: 20 MG/DL (ref 1–30)
WBC OTHER # BLD: 6.3 K/UL (ref 3.5–11.3)

## 2025-05-09 PROCEDURE — 36415 COLL VENOUS BLD VENIPUNCTURE: CPT

## 2025-05-09 PROCEDURE — 80061 LIPID PANEL: CPT

## 2025-05-09 PROCEDURE — 85025 COMPLETE CBC W/AUTO DIFF WBC: CPT

## 2025-05-09 PROCEDURE — 83036 HEMOGLOBIN GLYCOSYLATED A1C: CPT

## 2025-05-09 PROCEDURE — 80053 COMPREHEN METABOLIC PANEL: CPT

## 2025-05-14 ENCOUNTER — OFFICE VISIT (OUTPATIENT)
Dept: FAMILY MEDICINE CLINIC | Age: 85
End: 2025-05-14
Payer: MEDICARE

## 2025-05-14 VITALS
WEIGHT: 132.6 LBS | HEIGHT: 59 IN | HEART RATE: 50 BPM | DIASTOLIC BLOOD PRESSURE: 82 MMHG | OXYGEN SATURATION: 100 % | BODY MASS INDEX: 26.73 KG/M2 | SYSTOLIC BLOOD PRESSURE: 134 MMHG

## 2025-05-14 DIAGNOSIS — K25.4 CHRONIC GASTRIC ULCER WITH HEMORRHAGE: ICD-10-CM

## 2025-05-14 DIAGNOSIS — I51.89 DIASTOLIC DYSFUNCTION: ICD-10-CM

## 2025-05-14 DIAGNOSIS — D50.9 IRON DEFICIENCY ANEMIA, UNSPECIFIED IRON DEFICIENCY ANEMIA TYPE: ICD-10-CM

## 2025-05-14 DIAGNOSIS — E78.5 HYPERLIPIDEMIA WITH TARGET LDL LESS THAN 70: Primary | ICD-10-CM

## 2025-05-14 DIAGNOSIS — E66.09 CLASS 1 OBESITY DUE TO EXCESS CALORIES WITHOUT SERIOUS COMORBIDITY IN ADULT, UNSPECIFIED BMI: ICD-10-CM

## 2025-05-14 DIAGNOSIS — I10 ESSENTIAL HYPERTENSION: ICD-10-CM

## 2025-05-14 DIAGNOSIS — M85.80 OSTEOPENIA, UNSPECIFIED LOCATION: ICD-10-CM

## 2025-05-14 DIAGNOSIS — I25.10 CORONARY ARTERY DISEASE INVOLVING NATIVE CORONARY ARTERY OF NATIVE HEART WITHOUT ANGINA PECTORIS: ICD-10-CM

## 2025-05-14 DIAGNOSIS — E78.00 PURE HYPERCHOLESTEROLEMIA: ICD-10-CM

## 2025-05-14 DIAGNOSIS — E66.811 CLASS 1 OBESITY DUE TO EXCESS CALORIES WITHOUT SERIOUS COMORBIDITY IN ADULT, UNSPECIFIED BMI: ICD-10-CM

## 2025-05-14 DIAGNOSIS — C54.1 ENDOMETRIAL CANCER (HCC): ICD-10-CM

## 2025-05-14 DIAGNOSIS — R41.3 MEMORY LOSS: ICD-10-CM

## 2025-05-14 DIAGNOSIS — R73.01 IMPAIRED FASTING BLOOD SUGAR: ICD-10-CM

## 2025-05-14 PROCEDURE — 1123F ACP DISCUSS/DSCN MKR DOCD: CPT | Performed by: FAMILY MEDICINE

## 2025-05-14 PROCEDURE — 1159F MED LIST DOCD IN RCRD: CPT | Performed by: FAMILY MEDICINE

## 2025-05-14 PROCEDURE — 99214 OFFICE O/P EST MOD 30 MIN: CPT | Performed by: FAMILY MEDICINE

## 2025-05-14 PROCEDURE — 99213 OFFICE O/P EST LOW 20 MIN: CPT

## 2025-05-14 PROCEDURE — 3079F DIAST BP 80-89 MM HG: CPT | Performed by: FAMILY MEDICINE

## 2025-05-14 PROCEDURE — 3075F SYST BP GE 130 - 139MM HG: CPT | Performed by: FAMILY MEDICINE

## 2025-05-14 RX ORDER — DONEPEZIL HYDROCHLORIDE 10 MG/1
TABLET, FILM COATED ORAL
Qty: 90 TABLET | Refills: 3 | Status: SHIPPED | OUTPATIENT
Start: 2025-05-14

## 2025-05-14 SDOH — ECONOMIC STABILITY: FOOD INSECURITY: WITHIN THE PAST 12 MONTHS, YOU WORRIED THAT YOUR FOOD WOULD RUN OUT BEFORE YOU GOT MONEY TO BUY MORE.: NEVER TRUE

## 2025-05-14 SDOH — ECONOMIC STABILITY: FOOD INSECURITY: WITHIN THE PAST 12 MONTHS, THE FOOD YOU BOUGHT JUST DIDN'T LAST AND YOU DIDN'T HAVE MONEY TO GET MORE.: NEVER TRUE

## 2025-05-14 ASSESSMENT — PATIENT HEALTH QUESTIONNAIRE - PHQ9
SUM OF ALL RESPONSES TO PHQ QUESTIONS 1-9: 0
SUM OF ALL RESPONSES TO PHQ QUESTIONS 1-9: 0
1. LITTLE INTEREST OR PLEASURE IN DOING THINGS: NOT AT ALL
2. FEELING DOWN, DEPRESSED OR HOPELESS: NOT AT ALL
SUM OF ALL RESPONSES TO PHQ QUESTIONS 1-9: 0
SUM OF ALL RESPONSES TO PHQ QUESTIONS 1-9: 0

## 2025-05-14 ASSESSMENT — ENCOUNTER SYMPTOMS
SHORTNESS OF BREATH: 0
EYE DISCHARGE: 0
RESPIRATORY NEGATIVE: 1
SORE THROAT: 0
DIARRHEA: 0
RHINORRHEA: 0
CHEST TIGHTNESS: 0
EYES NEGATIVE: 1
COUGH: 0
ALLERGIC/IMMUNOLOGIC NEGATIVE: 1
ABDOMINAL PAIN: 0
EYE PAIN: 0
CONSTIPATION: 0

## 2025-05-14 NOTE — PROGRESS NOTES
cooking or driving now.       Some swallowing issues.  Usually meat.  She feels it is anxiety or nervousness at the time, trying to eat too fast.  Declines work up at present.  Denies issues with swallowing at present.   Dtr has witnessed issues at times, usually eating out.  Nothing of significance over the last interval  . Lyn doesn't recall or report issues.  Seems quiescent over the last 6 months.      Flu and covid vaccines .  Offered tdap and rsv.     Mild bilirubin elevations in the last year.  Not progressing. S/p cholecystectomy 1978.

## 2025-05-16 NOTE — PROGRESS NOTES
fluid intake, or urination. No tremor.  Hematologic/Lymphatic: No abnormal bruising or bleeding, blood clots or swollen lymph nodes.  Allergic/Immunologic: No nasal congestion or hives.    Physical exam:    LMP  (LMP Unknown)     Vitals as above.   Alert and oriented x 3.    No JVD, or carotid bruits.   Lungs are clear to auscultation.   Heart sounds are regular, normal, no murmur.   Abdomen is soft, no tenderness.  Extremities No peripheral edema.    Meds:    Current Outpatient Medications:     donepezil (ARICEPT) 10 MG tablet, Take one tablet by mouth nightly, Disp: 90 tablet, Rfl: 3    isosorbide mononitrate (IMDUR) 30 MG extended release tablet, TAKE 1 TABLET BY MOUTH DAILY, Disp: 100 tablet, Rfl: 2    memantine (NAMENDA) 5 MG tablet, TAKE 1 TABLET BY MOUTH TWICE  DAILY, Disp: 120 tablet, Rfl: 5    lisinopril (PRINIVIL;ZESTRIL) 20 MG tablet, TAKE 1 TABLET BY MOUTH DAILY, Disp: 100 tablet, Rfl: 2    atorvastatin (LIPITOR) 40 MG tablet, TAKE 1 TABLET BY MOUTH EVERY  NIGHT AT BEDTIME, Disp: 100 tablet, Rfl: 3    Cholecalciferol (VITAMIN D3) 50 MCG (2000 UT) CAPS, Take 1 capsule by mouth daily, Disp: 90 capsule, Rfl: 3    ferrous sulfate (FE TABS 325) 325 (65 Fe) MG EC tablet, Take 1 tablet by mouth daily (with breakfast), Disp: 90 tablet, Rfl: 3    alendronate (FOSAMAX) 70 MG tablet, TAKE 1 TABLET BY MOUTH WEEKLY  WITH 8 OZ OF PLAIN WATER 30  MINUTES BEFORE FIRST FOOD, DRINK OR MEDS. STAY UPRIGHT FOR 30  MINS, Disp: 12 tablet, Rfl: 1    nitroGLYCERIN (NITROSTAT) 0.4 MG SL tablet, Place 1 tablet under the tongue every 5 minutes as needed for Chest pain (Patient not taking: Reported on 5/14/2025), Disp: 25 tablet, Rfl: 1    ascorbic acid (VITAMIN C) 500 MG tablet, Take 1 tablet by mouth 2 times daily Indications: patient only takes in the Winter With ana paula hips, Disp: , Rfl:     Multiple Vitamins-Minerals (MULTIVITAMIN PO), Take 1 tablet by mouth daily., Disp: , Rfl:     LABS  Lab Results   Component Value Date

## 2025-05-19 ENCOUNTER — OFFICE VISIT (OUTPATIENT)
Dept: CARDIOLOGY | Age: 85
End: 2025-05-19
Payer: MEDICARE

## 2025-05-19 VITALS
HEART RATE: 59 BPM | BODY MASS INDEX: 24.8 KG/M2 | SYSTOLIC BLOOD PRESSURE: 130 MMHG | DIASTOLIC BLOOD PRESSURE: 86 MMHG | WEIGHT: 123 LBS | HEIGHT: 59 IN

## 2025-05-19 DIAGNOSIS — I25.10 CORONARY ARTERY DISEASE INVOLVING NATIVE CORONARY ARTERY OF NATIVE HEART WITHOUT ANGINA PECTORIS: ICD-10-CM

## 2025-05-19 DIAGNOSIS — I50.32 CHRONIC DIASTOLIC HEART FAILURE (HCC): Primary | ICD-10-CM

## 2025-05-19 PROCEDURE — 3079F DIAST BP 80-89 MM HG: CPT | Performed by: NURSE PRACTITIONER

## 2025-05-19 PROCEDURE — 99214 OFFICE O/P EST MOD 30 MIN: CPT | Performed by: NURSE PRACTITIONER

## 2025-05-19 PROCEDURE — 1123F ACP DISCUSS/DSCN MKR DOCD: CPT | Performed by: NURSE PRACTITIONER

## 2025-05-19 PROCEDURE — 93010 ELECTROCARDIOGRAM REPORT: CPT | Performed by: NURSE PRACTITIONER

## 2025-05-19 PROCEDURE — 1159F MED LIST DOCD IN RCRD: CPT | Performed by: NURSE PRACTITIONER

## 2025-05-19 PROCEDURE — 93005 ELECTROCARDIOGRAM TRACING: CPT | Performed by: NURSE PRACTITIONER

## 2025-05-19 PROCEDURE — 1126F AMNT PAIN NOTED NONE PRSNT: CPT | Performed by: NURSE PRACTITIONER

## 2025-05-19 PROCEDURE — 3075F SYST BP GE 130 - 139MM HG: CPT | Performed by: NURSE PRACTITIONER

## 2025-05-19 RX ORDER — NITROGLYCERIN 0.4 MG/1
0.4 TABLET SUBLINGUAL EVERY 5 MIN PRN
Qty: 25 TABLET | Refills: 1 | Status: SHIPPED | OUTPATIENT
Start: 2025-05-19

## 2025-05-29 ENCOUNTER — APPOINTMENT (OUTPATIENT)
Dept: CT IMAGING | Age: 85
End: 2025-05-29
Payer: MEDICARE

## 2025-05-29 ENCOUNTER — HOSPITAL ENCOUNTER (EMERGENCY)
Age: 85
Discharge: ANOTHER HOSPITAL | End: 2025-05-29
Attending: STUDENT IN AN ORGANIZED HEALTH CARE EDUCATION/TRAINING PROGRAM
Payer: MEDICARE

## 2025-05-29 ENCOUNTER — HOSPITAL ENCOUNTER (INPATIENT)
Age: 85
LOS: 12 days | Discharge: HOME | DRG: 643 | End: 2025-06-10
Attending: EMERGENCY MEDICINE | Admitting: INTERNAL MEDICINE
Payer: MEDICARE

## 2025-05-29 ENCOUNTER — APPOINTMENT (OUTPATIENT)
Dept: GENERAL RADIOLOGY | Age: 85
DRG: 643 | End: 2025-05-29
Payer: MEDICARE

## 2025-05-29 VITALS
RESPIRATION RATE: 16 BRPM | SYSTOLIC BLOOD PRESSURE: 135 MMHG | HEART RATE: 75 BPM | TEMPERATURE: 97.8 F | DIASTOLIC BLOOD PRESSURE: 90 MMHG | BODY MASS INDEX: 24.84 KG/M2 | HEIGHT: 59 IN | OXYGEN SATURATION: 96 %

## 2025-05-29 DIAGNOSIS — S70.11XA THIGH HEMATOMA, RIGHT, INITIAL ENCOUNTER: ICD-10-CM

## 2025-05-29 DIAGNOSIS — E87.1 HYPONATREMIA: ICD-10-CM

## 2025-05-29 DIAGNOSIS — I20.9 ANGINA PECTORIS: ICD-10-CM

## 2025-05-29 DIAGNOSIS — T14.8XXA HEMATOMA: ICD-10-CM

## 2025-05-29 DIAGNOSIS — I48.0 PAROXYSMAL ATRIAL FIBRILLATION (HCC): ICD-10-CM

## 2025-05-29 DIAGNOSIS — I21.4 NSTEMI (NON-ST ELEVATED MYOCARDIAL INFARCTION) (HCC): Primary | ICD-10-CM

## 2025-05-29 DIAGNOSIS — R41.82 ALTERED MENTAL STATUS, UNSPECIFIED ALTERED MENTAL STATUS TYPE: ICD-10-CM
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
## 2025-05-29 LAB
ALBUMIN SERPL-MCNC: 4.1 G/DL (ref 3.5–5.2)
ALBUMIN/GLOB SERPL: 1.8 {RATIO} (ref 1–2.5)
ALP SERPL-CCNC: 61 U/L (ref 35–104)
ALT SERPL-CCNC: 31 U/L (ref 10–35)
AMPHET UR QL SCN: NEGATIVE
ANION GAP SERPL CALCULATED.3IONS-SCNC: 15 MMOL/L (ref 9–16)
AST SERPL-CCNC: 66 U/L (ref 10–35)
BACTERIA URNS QL MICRO: ABNORMAL
BARBITURATES UR QL SCN: NEGATIVE
BASOPHILS # BLD: 0.03 K/UL (ref 0–0.2)
BASOPHILS NFR BLD: 0 % (ref 0–2)
BENZODIAZ UR QL: NEGATIVE
BILIRUB SERPL-MCNC: 1.8 MG/DL (ref 0–1.2)
BILIRUB UR QL STRIP: NEGATIVE
BILIRUB UR QL STRIP: NEGATIVE
BUN SERPL-MCNC: 5 MG/DL (ref 8–23)
BUN/CREAT SERPL: 7 (ref 9–20)
CALCIUM SERPL-MCNC: 8.6 MG/DL (ref 8.6–10.4)
CANNABINOIDS UR QL SCN: NEGATIVE
CHARACTER UR: ABNORMAL
CHLORIDE SERPL-SCNC: 82 MMOL/L (ref 98–107)
CK SERPL-CCNC: 1123 U/L (ref 26–192)
CLARITY UR: CLEAR
CLARITY UR: CLEAR
CO2 SERPL-SCNC: 17 MMOL/L (ref 20–31)
COCAINE UR QL SCN: NEGATIVE
COLOR UR: YELLOW
COLOR UR: YELLOW
CREAT SERPL-MCNC: 0.7 MG/DL (ref 0.6–0.9)
EOSINOPHIL # BLD: <0.03 K/UL (ref 0–0.44)
EOSINOPHILS RELATIVE PERCENT: 0 % (ref 1–4)
EPI CELLS #/AREA URNS HPF: ABNORMAL /HPF (ref 0–5)
ERYTHROCYTE [DISTWIDTH] IN BLOOD BY AUTOMATED COUNT: 11.2 % (ref 11.8–14.4)
ETHANOL PERCENT: NORMAL %
ETHANOLAMINE SERPL-MCNC: <10 MG/DL (ref 0–0.08)
FENTANYL UR QL: NEGATIVE
GFR, ESTIMATED: 85 ML/MIN/1.73M2
GLUCOSE SERPL-MCNC: 133 MG/DL (ref 74–99)
GLUCOSE UR STRIP-MCNC: ABNORMAL MG/DL
GLUCOSE UR STRIP-MCNC: ABNORMAL MG/DL
HCT VFR BLD AUTO: 39.4 % (ref 36.3–47.1)
HGB BLD-MCNC: 14.4 G/DL (ref 11.9–15.1)
HGB UR QL STRIP.AUTO: ABNORMAL
HGB UR QL STRIP.AUTO: ABNORMAL
IMM GRANULOCYTES # BLD AUTO: 0.07 K/UL (ref 0–0.3)
IMM GRANULOCYTES NFR BLD: 0 %
INR PPP: 1.1
KETONES UR STRIP-MCNC: ABNORMAL MG/DL
KETONES UR STRIP-MCNC: ABNORMAL MG/DL
LEUKOCYTE ESTERASE UR QL STRIP: NEGATIVE
LEUKOCYTE ESTERASE UR QL STRIP: NEGATIVE
LYMPHOCYTES NFR BLD: 0.88 K/UL (ref 1.1–3.7)
LYMPHOCYTES RELATIVE PERCENT: 6 % (ref 24–43)
MCH RBC QN AUTO: 32.7 PG (ref 25.2–33.5)
MCHC RBC AUTO-ENTMCNC: 36.5 G/DL (ref 25.2–33.5)
MCV RBC AUTO: 89.5 FL (ref 82.6–102.9)
METHADONE UR QL: NEGATIVE
MONOCYTES NFR BLD: 1.21 K/UL (ref 0.1–1.2)
MONOCYTES NFR BLD: 8 % (ref 3–12)
MYOGLOBIN SERPL-MCNC: 910 NG/ML (ref 25–58)
NEUTROPHILS NFR BLD: 86 % (ref 36–65)
NEUTS SEG NFR BLD: 13.46 K/UL (ref 1.5–8.1)
NITRITE UR QL STRIP: NEGATIVE
NITRITE UR QL STRIP: NEGATIVE
NRBC BLD-RTO: 0 PER 100 WBC
OPIATES UR QL SCN: NEGATIVE
OXYCODONE UR QL SCN: NEGATIVE
PARTIAL THROMBOPLASTIN TIME: 31.1 SEC (ref 23.9–33.8)
PCP UR QL SCN: NEGATIVE
PH UR STRIP: 5 [PH] (ref 5–8)
PH UR STRIP: 5.5 [PH] (ref 5–6)
PLATELET # BLD AUTO: 147 K/UL (ref 138–453)
PMV BLD AUTO: 10 FL (ref 8.1–13.5)
POTASSIUM SERPL-SCNC: 4.9 MMOL/L (ref 3.7–5.3)
PROT SERPL-MCNC: 6.4 G/DL (ref 6.6–8.7)
PROT UR STRIP-MCNC: ABNORMAL MG/DL
PROT UR STRIP-MCNC: ABNORMAL MG/DL
PROTHROMBIN TIME: 14.5 SEC (ref 11.5–14.2)
RBC # BLD AUTO: 4.4 M/UL (ref 3.95–5.11)
RBC #/AREA URNS HPF: ABNORMAL /HPF (ref 0–4)
SODIUM SERPL-SCNC: 114 MMOL/L (ref 136–145)
SP GR UR STRIP: 1.02 (ref 1.01–1.02)
SP GR UR STRIP: 1.02 (ref 1–1.03)
TEST INFORMATION: NORMAL
TROPONIN I SERPL HS-MCNC: 101 NG/L (ref 0–14)
UROBILINOGEN UR STRIP-ACNC: NORMAL EU/DL (ref 0–1)
UROBILINOGEN UR STRIP-ACNC: NORMAL EU/DL (ref 0–1)
WBC #/AREA URNS HPF: ABNORMAL /HPF (ref 0–4)
WBC OTHER # BLD: 15.7 K/UL (ref 3.5–11.3)

## 2025-05-29 PROCEDURE — 85025 COMPLETE CBC W/AUTO DIFF WBC: CPT

## 2025-05-29 PROCEDURE — 85520 HEPARIN ASSAY: CPT

## 2025-05-29 PROCEDURE — 86901 BLOOD TYPING SEROLOGIC RH(D): CPT

## 2025-05-29 PROCEDURE — 85610 PROTHROMBIN TIME: CPT

## 2025-05-29 PROCEDURE — 81001 URINALYSIS AUTO W/SCOPE: CPT

## 2025-05-29 PROCEDURE — 2580000003 HC RX 258

## 2025-05-29 PROCEDURE — 85730 THROMBOPLASTIN TIME PARTIAL: CPT

## 2025-05-29 PROCEDURE — 82947 ASSAY GLUCOSE BLOOD QUANT: CPT

## 2025-05-29 PROCEDURE — 5A09457 ASSISTANCE WITH RESPIRATORY VENTILATION, 24-96 CONSECUTIVE HOURS, CONTINUOUS POSITIVE AIRWAY PRESSURE: ICD-10-PCS | Performed by: INTERNAL MEDICINE

## 2025-05-29 PROCEDURE — 93005 ELECTROCARDIOGRAM TRACING: CPT

## 2025-05-29 PROCEDURE — 83874 ASSAY OF MYOGLOBIN: CPT

## 2025-05-29 PROCEDURE — 96374 THER/PROPH/DIAG INJ IV PUSH: CPT

## 2025-05-29 PROCEDURE — 82306 VITAMIN D 25 HYDROXY: CPT

## 2025-05-29 PROCEDURE — 80307 DRUG TEST PRSMV CHEM ANLYZR: CPT

## 2025-05-29 PROCEDURE — 84520 ASSAY OF UREA NITROGEN: CPT

## 2025-05-29 PROCEDURE — 31500 INSERT EMERGENCY AIRWAY: CPT

## 2025-05-29 PROCEDURE — 83036 HEMOGLOBIN GLYCOSYLATED A1C: CPT

## 2025-05-29 PROCEDURE — 30233N1 TRANSFUSION OF NONAUTOLOGOUS RED BLOOD CELLS INTO PERIPHERAL VEIN, PERCUTANEOUS APPROACH: ICD-10-PCS | Performed by: INTERNAL MEDICINE

## 2025-05-29 PROCEDURE — 84300 ASSAY OF URINE SODIUM: CPT

## 2025-05-29 PROCEDURE — 80051 ELECTROLYTE PANEL: CPT

## 2025-05-29 PROCEDURE — 2580000003 HC RX 258: Performed by: STUDENT IN AN ORGANIZED HEALTH CARE EDUCATION/TRAINING PROGRAM

## 2025-05-29 PROCEDURE — 86850 RBC ANTIBODY SCREEN: CPT

## 2025-05-29 PROCEDURE — 93005 ELECTROCARDIOGRAM TRACING: CPT | Performed by: STUDENT IN AN ORGANIZED HEALTH CARE EDUCATION/TRAINING PROGRAM

## 2025-05-29 PROCEDURE — 83935 ASSAY OF URINE OSMOLALITY: CPT

## 2025-05-29 PROCEDURE — 36415 COLL VENOUS BLD VENIPUNCTURE: CPT

## 2025-05-29 PROCEDURE — 82550 ASSAY OF CK (CPK): CPT

## 2025-05-29 PROCEDURE — G0480 DRUG TEST DEF 1-7 CLASSES: HCPCS

## 2025-05-29 PROCEDURE — 82570 ASSAY OF URINE CREATININE: CPT

## 2025-05-29 PROCEDURE — 82607 VITAMIN B-12: CPT

## 2025-05-29 PROCEDURE — 70486 CT MAXILLOFACIAL W/O DYE: CPT

## 2025-05-29 PROCEDURE — 82436 ASSAY OF URINE CHLORIDE: CPT

## 2025-05-29 PROCEDURE — 71045 X-RAY EXAM CHEST 1 VIEW: CPT

## 2025-05-29 PROCEDURE — 99223 1ST HOSP IP/OBS HIGH 75: CPT | Performed by: SURGERY

## 2025-05-29 PROCEDURE — 6360000002 HC RX W HCPCS: Performed by: STUDENT IN AN ORGANIZED HEALTH CARE EDUCATION/TRAINING PROGRAM

## 2025-05-29 PROCEDURE — 6810039000 HC L1 TRAUMA ALERT

## 2025-05-29 PROCEDURE — 82565 ASSAY OF CREATININE: CPT

## 2025-05-29 PROCEDURE — 2000000000 HC ICU R&B

## 2025-05-29 PROCEDURE — 6360000002 HC RX W HCPCS

## 2025-05-29 PROCEDURE — 90714 TD VACC NO PRESV 7 YRS+ IM: CPT | Performed by: STUDENT IN AN ORGANIZED HEALTH CARE EDUCATION/TRAINING PROGRAM

## 2025-05-29 PROCEDURE — 90471 IMMUNIZATION ADMIN: CPT | Performed by: STUDENT IN AN ORGANIZED HEALTH CARE EDUCATION/TRAINING PROGRAM

## 2025-05-29 PROCEDURE — 71250 CT THORAX DX C-: CPT

## 2025-05-29 PROCEDURE — 84484 ASSAY OF TROPONIN QUANT: CPT

## 2025-05-29 PROCEDURE — 72125 CT NECK SPINE W/O DYE: CPT

## 2025-05-29 PROCEDURE — 83930 ASSAY OF BLOOD OSMOLALITY: CPT

## 2025-05-29 PROCEDURE — 2500000003 HC RX 250 WO HCPCS: Performed by: STUDENT IN AN ORGANIZED HEALTH CARE EDUCATION/TRAINING PROGRAM

## 2025-05-29 PROCEDURE — 70450 CT HEAD/BRAIN W/O DYE: CPT

## 2025-05-29 PROCEDURE — 99285 EMERGENCY DEPT VISIT HI MDM: CPT

## 2025-05-29 PROCEDURE — 82805 BLOOD GASES W/O2 SATURATION: CPT

## 2025-05-29 PROCEDURE — 85027 COMPLETE CBC AUTOMATED: CPT

## 2025-05-29 PROCEDURE — 82040 ASSAY OF SERUM ALBUMIN: CPT

## 2025-05-29 PROCEDURE — 80053 COMPREHEN METABOLIC PANEL: CPT

## 2025-05-29 PROCEDURE — 84443 ASSAY THYROID STIM HORMONE: CPT

## 2025-05-29 PROCEDURE — 84439 ASSAY OF FREE THYROXINE: CPT

## 2025-05-29 PROCEDURE — 99291 CRITICAL CARE FIRST HOUR: CPT

## 2025-05-29 PROCEDURE — 86900 BLOOD TYPING SEROLOGIC ABO: CPT

## 2025-05-29 RX ORDER — PROPOFOL 10 MG/ML
5-50 INJECTION, EMULSION INTRAVENOUS CONTINUOUS
Status: DISCONTINUED | OUTPATIENT
Start: 2025-05-29 | End: 2025-05-31

## 2025-05-29 RX ORDER — ETOMIDATE 2 MG/ML
INJECTION INTRAVENOUS DAILY PRN
Status: COMPLETED | OUTPATIENT
Start: 2025-05-29 | End: 2025-05-29

## 2025-05-29 RX ORDER — 0.9 % SODIUM CHLORIDE 0.9 %
1000 INTRAVENOUS SOLUTION INTRAVENOUS ONCE
Status: COMPLETED | OUTPATIENT
Start: 2025-05-29 | End: 2025-05-29

## 2025-05-29 RX ORDER — ONDANSETRON 2 MG/ML
4 INJECTION INTRAMUSCULAR; INTRAVENOUS EVERY 6 HOURS PRN
Status: DISCONTINUED | OUTPATIENT
Start: 2025-05-29 | End: 2025-06-10 | Stop reason: HOSPADM

## 2025-05-29 RX ORDER — SUCCINYLCHOLINE CHLORIDE 20 MG/ML
INJECTION INTRAMUSCULAR; INTRAVENOUS DAILY PRN
Status: COMPLETED | OUTPATIENT
Start: 2025-05-29 | End: 2025-05-29

## 2025-05-29 RX ORDER — PROPOFOL 10 MG/ML
INJECTION, EMULSION INTRAVENOUS
Status: COMPLETED
Start: 2025-05-29 | End: 2025-05-29

## 2025-05-29 RX ORDER — ENOXAPARIN SODIUM 100 MG/ML
40 INJECTION SUBCUTANEOUS DAILY
Status: DISCONTINUED | OUTPATIENT
Start: 2025-05-30 | End: 2025-05-30

## 2025-05-29 RX ORDER — ONDANSETRON 4 MG/1
4 TABLET, ORALLY DISINTEGRATING ORAL EVERY 8 HOURS PRN
Status: DISCONTINUED | OUTPATIENT
Start: 2025-05-29 | End: 2025-06-10 | Stop reason: HOSPADM

## 2025-05-29 RX ADMIN — SODIUM CHLORIDE 1000 ML: 9 INJECTION, SOLUTION INTRAVENOUS at 21:37

## 2025-05-29 RX ADMIN — ETOMIDATE 20 MG: 2 INJECTION, SOLUTION INTRAVENOUS at 21:13

## 2025-05-29 RX ADMIN — CLOSTRIDIUM TETANI TOXOID ANTIGEN (FORMALDEHYDE INACTIVATED) AND CORYNEBACTERIUM DIPHTHERIAE TOXOID ANTIGEN (FORMALDEHYDE INACTIVATED) 0.5 ML: 5; 2 INJECTION, SUSPENSION INTRAMUSCULAR at 22:12

## 2025-05-29 RX ADMIN — SUCCINYLCHOLINE CHLORIDE 100 MG: 20 INJECTION, SOLUTION INTRAMUSCULAR; INTRAVENOUS at 21:14

## 2025-05-29 RX ADMIN — PROPOFOL 20 MCG/KG/MIN: 10 INJECTION, EMULSION INTRAVENOUS at 23:37

## 2025-05-29 RX ADMIN — SODIUM CHLORIDE, PRESERVATIVE FREE 20 MG: 5 INJECTION INTRAVENOUS at 23:59

## 2025-05-29 ASSESSMENT — LIFESTYLE VARIABLES
HOW MANY STANDARD DRINKS CONTAINING ALCOHOL DO YOU HAVE ON A TYPICAL DAY: PATIENT UNABLE TO ANSWER
HOW OFTEN DO YOU HAVE A DRINK CONTAINING ALCOHOL: PATIENT UNABLE TO ANSWER

## 2025-05-29 ASSESSMENT — PULMONARY FUNCTION TESTS: PIF_VALUE: 15

## 2025-05-30 ENCOUNTER — APPOINTMENT (OUTPATIENT)
Dept: GENERAL RADIOLOGY | Age: 85
DRG: 643 | End: 2025-05-30
Payer: MEDICARE

## 2025-05-30 ENCOUNTER — APPOINTMENT (OUTPATIENT)
Age: 85
DRG: 643 | End: 2025-05-30
Payer: MEDICARE

## 2025-05-30 PROBLEM — I21.4 NSTEMI (NON-ST ELEVATED MYOCARDIAL INFARCTION) (HCC): Status: ACTIVE | Noted: 2025-05-30

## 2025-05-30 LAB
25(OH)D3 SERPL-MCNC: 44.7 NG/ML (ref 30–100)
ALBUMIN SERPL-MCNC: 3.5 G/DL (ref 3.5–5.2)
ALBUMIN SERPL-MCNC: 4.4 G/DL (ref 3.5–5.2)
ALBUMIN/GLOB SERPL: 1.8 {RATIO} (ref 1–2.5)
ALLEN TEST: POSITIVE
ALP SERPL-CCNC: 54 U/L (ref 35–104)
ALT SERPL-CCNC: 29 U/L (ref 10–35)
ANION GAP SERPL CALCULATED.3IONS-SCNC: 10 MMOL/L (ref 9–16)
ANION GAP SERPL CALCULATED.3IONS-SCNC: 11 MMOL/L (ref 9–16)
ANION GAP SERPL CALCULATED.3IONS-SCNC: 11 MMOL/L (ref 9–16)
ANION GAP SERPL CALCULATED.3IONS-SCNC: 12 MMOL/L (ref 9–16)
ANION GAP SERPL CALCULATED.3IONS-SCNC: 14 MMOL/L (ref 9–16)
ANION GAP SERPL CALCULATED.3IONS-SCNC: 9 MMOL/L (ref 9–16)
ANTI-XA UNFRAC HEPARIN: 0.41 IU/L
ANTI-XA UNFRAC HEPARIN: 0.59 IU/L
ANTI-XA UNFRAC HEPARIN: 0.82 IU/L
ANTI-XA UNFRAC HEPARIN: 0.84 IU/L
ANTI-XA UNFRAC HEPARIN: <0.1 IU/L
APAP SERPL-MCNC: <5 UG/ML (ref 10–30)
AST SERPL-CCNC: 53 U/L (ref 10–35)
B PARAP IS1001 DNA NPH QL NAA+NON-PROBE: NOT DETECTED
B PERT DNA SPEC QL NAA+PROBE: NOT DETECTED
BACTERIA URNS QL MICRO: NORMAL
BASOPHILS # BLD: 0.03 K/UL (ref 0–0.2)
BASOPHILS NFR BLD: 0 % (ref 0–2)
BILIRUB DIRECT SERPL-MCNC: 0.7 MG/DL (ref 0–0.2)
BILIRUB INDIRECT SERPL-MCNC: 0.9 MG/DL (ref 0–1)
BILIRUB SERPL-MCNC: 1.6 MG/DL (ref 0–1.2)
BLOOD BANK SPECIMEN: ABNORMAL
BNP SERPL-MCNC: 4581 PG/ML (ref 0–450)
BODY TEMPERATURE: 37
BUN SERPL-MCNC: 5 MG/DL (ref 8–23)
BUN SERPL-MCNC: 6 MG/DL (ref 8–23)
BUN SERPL-MCNC: 7 MG/DL (ref 8–23)
C PNEUM DNA NPH QL NAA+NON-PROBE: NOT DETECTED
CA-I BLD-SCNC: 1.02 MMOL/L (ref 1.13–1.33)
CALCIUM SERPL-MCNC: 8 MG/DL (ref 8.6–10.4)
CALCIUM SERPL-MCNC: 8.1 MG/DL (ref 8.6–10.4)
CALCIUM SERPL-MCNC: 8.2 MG/DL (ref 8.6–10.4)
CALCIUM SERPL-MCNC: 8.3 MG/DL (ref 8.6–10.4)
CALCIUM SERPL-MCNC: 8.5 MG/DL (ref 8.6–10.4)
CALCIUM SERPL-MCNC: 8.6 MG/DL (ref 8.6–10.4)
CALCIUM SERPL-MCNC: 8.8 MG/DL (ref 8.6–10.4)
CASTS #/AREA URNS LPF: NORMAL /LPF (ref 0–8)
CHLORIDE SERPL-SCNC: 116 MMOL/L (ref 98–107)
CHLORIDE SERPL-SCNC: 88 MMOL/L (ref 98–107)
CHLORIDE SERPL-SCNC: 89 MMOL/L (ref 98–107)
CHLORIDE SERPL-SCNC: 91 MMOL/L (ref 98–107)
CHLORIDE SERPL-SCNC: 91 MMOL/L (ref 98–107)
CHLORIDE SERPL-SCNC: 92 MMOL/L (ref 98–107)
CHLORIDE SERPL-SCNC: 93 MMOL/L (ref 98–107)
CHLORIDE SERPL-SCNC: 93 MMOL/L (ref 98–107)
CHLORIDE SERPL-SCNC: 95 MMOL/L (ref 98–107)
CHLORIDE SERPL-SCNC: 96 MMOL/L (ref 98–107)
CHLORIDE UR-SCNC: 83 MMOL/L
CK SERPL-CCNC: 1070 U/L (ref 26–192)
CK SERPL-CCNC: 1299 U/L (ref 26–192)
CK SERPL-CCNC: 667 U/L (ref 26–192)
CK SERPL-CCNC: 874 U/L (ref 26–192)
CO2 SERPL-SCNC: 16 MMOL/L (ref 20–31)
CO2 SERPL-SCNC: 17 MMOL/L (ref 20–31)
CO2 SERPL-SCNC: 18 MMOL/L (ref 20–31)
CO2 SERPL-SCNC: 18 MMOL/L (ref 20–31)
COHGB MFR BLD: 1.2 % (ref 0–5)
CREAT SERPL-MCNC: 0.5 MG/DL (ref 0.6–0.9)
CREAT SERPL-MCNC: 0.6 MG/DL (ref 0.6–0.9)
CREAT UR-MCNC: 51.7 MG/DL (ref 28–217)
ECHO AR MAX VEL PISA: 2.4 M/S
ECHO AV AREA PEAK VELOCITY: 1.7 CM2
ECHO AV AREA VTI: 1.9 CM2
ECHO AV AREA/BSA PEAK VELOCITY: 1.1 CM2/M2
ECHO AV AREA/BSA VTI: 1.2 CM2/M2
ECHO AV MEAN GRADIENT: 4 MMHG
ECHO AV MEAN VELOCITY: 0.9 M/S
ECHO AV PEAK GRADIENT: 9 MMHG
ECHO AV PEAK VELOCITY: 1.5 M/S
ECHO AV REGURGITANT PHT: 603 MS
ECHO AV VELOCITY RATIO: 0.6
ECHO AV VTI: 31.9 CM
ECHO BSA: 1.56 M2
ECHO EST RA PRESSURE: 8 MMHG
ECHO IVC PROX: 1.9 CM
ECHO LA AREA 2C: 13.8 CM2
ECHO LA AREA 4C: 11.4 CM2
ECHO LA DIAMETER INDEX: 1.96 CM/M2
ECHO LA DIAMETER: 3 CM
ECHO LA MAJOR AXIS: 4.7 CM
ECHO LA MINOR AXIS: 4.6 CM
ECHO LA VOL BP: 27 ML (ref 22–52)
ECHO LA VOL MOD A2C: 34 ML (ref 22–52)
ECHO LA VOL MOD A4C: 22 ML (ref 22–52)
ECHO LA VOL/BSA BIPLANE: 18 ML/M2 (ref 16–34)
ECHO LA VOLUME INDEX MOD A2C: 22 ML/M2 (ref 16–34)
ECHO LA VOLUME INDEX MOD A4C: 14 ML/M2 (ref 16–34)
ECHO LV E' LATERAL VELOCITY: 6.74 CM/S
ECHO LV E' SEPTAL VELOCITY: 6.85 CM/S
ECHO LV EDV A2C: 49 ML
ECHO LV EDV A4C: 46 ML
ECHO LV EDV INDEX A4C: 30 ML/M2
ECHO LV EDV NDEX A2C: 32 ML/M2
ECHO LV EF PHYSICIAN: 60 %
ECHO LV EJECTION FRACTION A2C: 63 %
ECHO LV EJECTION FRACTION A4C: 66 %
ECHO LV EJECTION FRACTION BIPLANE: 64 % (ref 55–100)
ECHO LV ESV A2C: 18 ML
ECHO LV ESV A4C: 16 ML
ECHO LV ESV INDEX A2C: 12 ML/M2
ECHO LV ESV INDEX A4C: 10 ML/M2
ECHO LV INTERNAL DIMENSION DIASTOLE INDEX: 2.75 CM/M2
ECHO LV INTERNAL DIMENSION DIASTOLIC: 4.2 CM (ref 3.9–5.3)
ECHO LV IVSD: 0.7 CM (ref 0.6–0.9)
ECHO LV MASS 2D: 101.3 G (ref 67–162)
ECHO LV MASS INDEX 2D: 66.2 G/M2 (ref 43–95)
ECHO LV POSTERIOR WALL DIASTOLIC: 0.9 CM (ref 0.6–0.9)
ECHO LV RELATIVE WALL THICKNESS RATIO: 0.43
ECHO LVOT AREA: 2.8 CM2
ECHO LVOT AV VTI INDEX: 0.66
ECHO LVOT DIAM: 1.9 CM
ECHO LVOT MEAN GRADIENT: 1 MMHG
ECHO LVOT PEAK GRADIENT: 3 MMHG
ECHO LVOT PEAK VELOCITY: 0.9 M/S
ECHO LVOT STROKE VOLUME INDEX: 38.9 ML/M2
ECHO LVOT SV: 59.5 ML
ECHO LVOT VTI: 21 CM
ECHO MV A VELOCITY: 0.8 M/S
ECHO MV AREA VTI: 1.6 CM2
ECHO MV E DECELERATION TIME (DT): 272 MS
ECHO MV E VELOCITY: 1.06 M/S
ECHO MV E/A RATIO: 1.33
ECHO MV E/E' LATERAL: 15.73
ECHO MV E/E' RATIO (AVERAGED): 15.6
ECHO MV E/E' SEPTAL: 15.47
ECHO MV LVOT VTI INDEX: 1.74
ECHO MV MAX VELOCITY: 1 M/S
ECHO MV MEAN GRADIENT: 2 MMHG
ECHO MV MEAN VELOCITY: 0.6 M/S
ECHO MV PEAK GRADIENT: 4 MMHG
ECHO MV VTI: 36.5 CM
ECHO PV MAX VELOCITY: 1 M/S
ECHO PV PEAK GRADIENT: 4 MMHG
ECHO RA AREA 4C: 8.7 CM2
ECHO RA END SYSTOLIC VOLUME APICAL 4 CHAMBER INDEX BSA: 10 ML/M2
ECHO RA VOLUME: 16 ML
ECHO RIGHT VENTRICULAR SYSTOLIC PRESSURE (RVSP): 32 MMHG
ECHO RV BASAL DIMENSION: 2.4 CM
ECHO RV INTERNAL DIMENSION: 2.4 CM
ECHO RV MID DIMENSION: 2.7 CM
ECHO RV TAPSE: 1.6 CM (ref 1.7–?)
ECHO TV REGURGITANT MAX VELOCITY: 2.47 M/S
ECHO TV REGURGITANT PEAK GRADIENT: 24 MMHG
EOSINOPHIL # BLD: <0.03 K/UL (ref 0–0.44)
EOSINOPHILS RELATIVE PERCENT: 0 % (ref 1–4)
EPI CELLS #/AREA URNS HPF: NORMAL /HPF (ref 0–5)
ERYTHROCYTE [DISTWIDTH] IN BLOOD BY AUTOMATED COUNT: 11.3 % (ref 11.8–14.4)
ERYTHROCYTE [DISTWIDTH] IN BLOOD BY AUTOMATED COUNT: 11.6 % (ref 11.8–14.4)
EST. AVERAGE GLUCOSE BLD GHB EST-MCNC: 111 MG/DL
ETHANOL PERCENT: <0.01 %
ETHANOL PERCENT: <0.01 %
ETHANOLAMINE SERPL-MCNC: <10 MG/DL (ref 0–0.08)
ETHANOLAMINE SERPL-MCNC: <10 MG/DL (ref 0–0.08)
FIO2 ON VENT: ABNORMAL %
FIO2: 40
FLUAV RNA NPH QL NAA+NON-PROBE: NOT DETECTED
FLUBV RNA NPH QL NAA+NON-PROBE: NOT DETECTED
GFR, ESTIMATED: 88 ML/MIN/1.73M2
GFR, ESTIMATED: >90 ML/MIN/1.73M2
GLOBULIN SER CALC-MCNC: 2 G/DL
GLUCOSE BLD-MCNC: 146 MG/DL (ref 74–100)
GLUCOSE BLD-MCNC: 94 MG/DL (ref 65–105)
GLUCOSE SERPL-MCNC: 124 MG/DL (ref 74–99)
GLUCOSE SERPL-MCNC: 126 MG/DL (ref 74–99)
GLUCOSE SERPL-MCNC: 129 MG/DL (ref 74–99)
GLUCOSE SERPL-MCNC: 131 MG/DL (ref 74–99)
GLUCOSE SERPL-MCNC: 132 MG/DL (ref 74–99)
GLUCOSE SERPL-MCNC: 135 MG/DL (ref 74–99)
GLUCOSE SERPL-MCNC: 136 MG/DL (ref 74–99)
GLUCOSE SERPL-MCNC: 169 MG/DL (ref 74–99)
HADV DNA NPH QL NAA+NON-PROBE: NOT DETECTED
HBA1C MFR BLD: 5.5 % (ref 4–6)
HCO3 VENOUS: 16.4 MMOL/L (ref 24–30)
HCOV 229E RNA NPH QL NAA+NON-PROBE: NOT DETECTED
HCOV HKU1 RNA NPH QL NAA+NON-PROBE: NOT DETECTED
HCOV NL63 RNA NPH QL NAA+NON-PROBE: NOT DETECTED
HCOV OC43 RNA NPH QL NAA+NON-PROBE: NOT DETECTED
HCT VFR BLD AUTO: 37.3 % (ref 36.3–47.1)
HCT VFR BLD AUTO: 44 % (ref 36.3–47.1)
HGB BLD-MCNC: 13.2 G/DL (ref 11.9–15.1)
HGB BLD-MCNC: 15.9 G/DL (ref 11.9–15.1)
HMPV RNA NPH QL NAA+NON-PROBE: NOT DETECTED
HPIV1 RNA NPH QL NAA+NON-PROBE: NOT DETECTED
HPIV2 RNA NPH QL NAA+NON-PROBE: NOT DETECTED
HPIV3 RNA NPH QL NAA+NON-PROBE: NOT DETECTED
HPIV4 RNA NPH QL NAA+NON-PROBE: NOT DETECTED
IMM GRANULOCYTES # BLD AUTO: 0.07 K/UL (ref 0–0.3)
IMM GRANULOCYTES NFR BLD: 1 %
INR PPP: 1.1
INR PPP: 1.3
L PNEUMO1 AG UR QL IA.RAPID: NEGATIVE
LYMPHOCYTES NFR BLD: 1.82 K/UL (ref 1.1–3.7)
LYMPHOCYTES RELATIVE PERCENT: 13 % (ref 24–43)
M PNEUMO DNA NPH QL NAA+NON-PROBE: NOT DETECTED
MAGNESIUM SERPL-MCNC: 1.5 MG/DL (ref 1.6–2.4)
MAGNESIUM SERPL-MCNC: 2 MG/DL (ref 1.6–2.4)
MCH RBC QN AUTO: 32.3 PG (ref 25.2–33.5)
MCH RBC QN AUTO: 32.5 PG (ref 25.2–33.5)
MCHC RBC AUTO-ENTMCNC: 35.4 G/DL (ref 28.4–34.8)
MCHC RBC AUTO-ENTMCNC: 36.1 G/DL (ref 28.4–34.8)
MCV RBC AUTO: 90 FL (ref 82.6–102.9)
MCV RBC AUTO: 91.2 FL (ref 82.6–102.9)
MODE: ABNORMAL
MONOCYTES NFR BLD: 1.19 K/UL (ref 0.1–1.2)
MONOCYTES NFR BLD: 9 % (ref 3–12)
MYOGLOBIN SERPL-MCNC: 215 NG/ML (ref 25–58)
MYOGLOBIN SERPL-MCNC: 237 NG/ML (ref 25–58)
MYOGLOBIN SERPL-MCNC: 802 NG/ML (ref 25–58)
NEGATIVE BASE EXCESS, ART: 5.9 MMOL/L (ref 0–2)
NEGATIVE BASE EXCESS, VEN: 8 MMOL/L (ref 0–2)
NEUTROPHILS NFR BLD: 78 % (ref 36–65)
NEUTS SEG NFR BLD: 10.93 K/UL (ref 1.5–8.1)
NRBC BLD-RTO: 0 PER 100 WBC
NRBC BLD-RTO: 0 PER 100 WBC
O2 DELIVERY DEVICE: ABNORMAL
O2 SAT, VEN: 78.9 % (ref 60–85)
OSMOLALITY SERPL: 312 MOSM/KG (ref 275–295)
OSMOLALITY UR: 577 MOSM/KG (ref 80–1300)
PARTIAL THROMBOPLASTIN TIME: 26.6 SEC (ref 23–36.5)
PCO2 VENOUS: 31.5 MM HG (ref 39–55)
PH VENOUS: 7.33 (ref 7.32–7.42)
PHOSPHATE SERPL-MCNC: 2.1 MG/DL (ref 2.5–4.5)
PHOSPHATE SERPL-MCNC: 2.6 MG/DL (ref 2.5–4.5)
PLATELET # BLD AUTO: 163 K/UL (ref 138–453)
PLATELET # BLD AUTO: 176 K/UL (ref 138–453)
PMV BLD AUTO: 9.6 FL (ref 8.1–13.5)
PMV BLD AUTO: 9.9 FL (ref 8.1–13.5)
PO2 VENOUS: 45.1 MM HG (ref 30–50)
POC HCO3: 17.9 MMOL/L (ref 21–28)
POC LACTIC ACID: 0.9 MMOL/L (ref 0.56–1.39)
POC O2 SATURATION: 99.7 % (ref 94–98)
POC PCO2: 29.6 MM HG (ref 35–48)
POC PH: 7.39 (ref 7.35–7.45)
POC PO2: 192.5 MM HG (ref 83–108)
POTASSIUM SERPL-SCNC: 3.3 MMOL/L (ref 3.7–5.3)
POTASSIUM SERPL-SCNC: 3.7 MMOL/L (ref 3.7–5.3)
POTASSIUM SERPL-SCNC: 3.8 MMOL/L (ref 3.7–5.3)
POTASSIUM SERPL-SCNC: 3.8 MMOL/L (ref 3.7–5.3)
POTASSIUM SERPL-SCNC: 4 MMOL/L (ref 3.7–5.3)
POTASSIUM SERPL-SCNC: 4.7 MMOL/L (ref 3.7–5.3)
POTASSIUM SERPL-SCNC: 4.7 MMOL/L (ref 3.7–5.3)
POTASSIUM SERPL-SCNC: 5 MMOL/L (ref 3.7–5.3)
PROT SERPL-MCNC: 5.5 G/DL (ref 6.6–8.7)
PROTHROMBIN TIME: 14.4 SEC (ref 11.7–14.9)
PROTHROMBIN TIME: 16.7 SEC (ref 11.7–14.9)
RBC # BLD AUTO: 4.09 M/UL (ref 3.95–5.11)
RBC # BLD AUTO: 4.89 M/UL (ref 3.95–5.11)
RBC #/AREA URNS HPF: NORMAL /HPF (ref 0–4)
RSV RNA NPH QL NAA+NON-PROBE: NOT DETECTED
RV+EV RNA NPH QL NAA+NON-PROBE: NOT DETECTED
SALICYLATES SERPL-MCNC: <0.5 MG/DL (ref 0–10)
SAMPLE SITE: ABNORMAL
SARS-COV-2 RNA NPH QL NAA+NON-PROBE: NOT DETECTED
SODIUM SERPL-SCNC: 115 MMOL/L (ref 136–145)
SODIUM SERPL-SCNC: 116 MMOL/L (ref 136–145)
SODIUM SERPL-SCNC: 118 MMOL/L (ref 136–145)
SODIUM SERPL-SCNC: 119 MMOL/L (ref 136–145)
SODIUM SERPL-SCNC: 119 MMOL/L (ref 136–145)
SODIUM SERPL-SCNC: 120 MMOL/L (ref 136–145)
SODIUM SERPL-SCNC: 121 MMOL/L (ref 136–145)
SODIUM SERPL-SCNC: 122 MMOL/L (ref 136–145)
SODIUM SERPL-SCNC: 123 MMOL/L (ref 136–145)
SODIUM SERPL-SCNC: 147 MMOL/L (ref 136–145)
SODIUM UR-SCNC: 115 MMOL/L
SPECIMEN DESCRIPTION: NORMAL
T4 FREE SERPL-MCNC: 1.5 NG/DL (ref 0.92–1.68)
TROPONIN I SERPL HS-MCNC: 103 NG/L (ref 0–14)
TROPONIN I SERPL HS-MCNC: 106 NG/L (ref 0–14)
TROPONIN I SERPL HS-MCNC: 56 NG/L (ref 0–14)
TROPONIN I SERPL HS-MCNC: 85 NG/L (ref 0–14)
TROPONIN I SERPL HS-MCNC: 93 NG/L (ref 0–14)
TSH SERPL DL<=0.05 MIU/L-ACNC: 3.9 UIU/ML (ref 0.27–4.2)
VIT B12 SERPL-MCNC: 752 PG/ML (ref 232–1245)
WBC #/AREA URNS HPF: NORMAL /HPF (ref 0–5)
WBC OTHER # BLD: 14.1 K/UL (ref 3.5–11.3)
WBC OTHER # BLD: 15.2 K/UL (ref 3.5–11.3)

## 2025-05-30 PROCEDURE — 80143 DRUG ASSAY ACETAMINOPHEN: CPT

## 2025-05-30 PROCEDURE — 36600 WITHDRAWAL OF ARTERIAL BLOOD: CPT

## 2025-05-30 PROCEDURE — 74018 RADEX ABDOMEN 1 VIEW: CPT

## 2025-05-30 PROCEDURE — 06HY33Z INSERTION OF INFUSION DEVICE INTO LOWER VEIN, PERCUTANEOUS APPROACH: ICD-10-PCS

## 2025-05-30 PROCEDURE — 83880 ASSAY OF NATRIURETIC PEPTIDE: CPT

## 2025-05-30 PROCEDURE — 85520 HEPARIN ASSAY: CPT

## 2025-05-30 PROCEDURE — 85610 PROTHROMBIN TIME: CPT

## 2025-05-30 PROCEDURE — 80307 DRUG TEST PRSMV CHEM ANLYZR: CPT

## 2025-05-30 PROCEDURE — 80051 ELECTROLYTE PANEL: CPT

## 2025-05-30 PROCEDURE — 87205 SMEAR GRAM STAIN: CPT

## 2025-05-30 PROCEDURE — 0202U NFCT DS 22 TRGT SARS-COV-2: CPT

## 2025-05-30 PROCEDURE — 87070 CULTURE OTHR SPECIMN AEROBIC: CPT

## 2025-05-30 PROCEDURE — 99291 CRITICAL CARE FIRST HOUR: CPT | Performed by: INTERNAL MEDICINE

## 2025-05-30 PROCEDURE — 80179 DRUG ASSAY SALICYLATE: CPT

## 2025-05-30 PROCEDURE — 84484 ASSAY OF TROPONIN QUANT: CPT

## 2025-05-30 PROCEDURE — 2580000003 HC RX 258

## 2025-05-30 PROCEDURE — 2580000003 HC RX 258: Performed by: EMERGENCY MEDICINE

## 2025-05-30 PROCEDURE — 89220 SPUTUM SPECIMEN COLLECTION: CPT

## 2025-05-30 PROCEDURE — 82947 ASSAY GLUCOSE BLOOD QUANT: CPT

## 2025-05-30 PROCEDURE — 6360000002 HC RX W HCPCS

## 2025-05-30 PROCEDURE — 85025 COMPLETE CBC W/AUTO DIFF WBC: CPT

## 2025-05-30 PROCEDURE — 6360000002 HC RX W HCPCS: Performed by: EMERGENCY MEDICINE

## 2025-05-30 PROCEDURE — 94002 VENT MGMT INPAT INIT DAY: CPT

## 2025-05-30 PROCEDURE — 84100 ASSAY OF PHOSPHORUS: CPT

## 2025-05-30 PROCEDURE — 51702 INSERT TEMP BLADDER CATH: CPT

## 2025-05-30 PROCEDURE — 82803 BLOOD GASES ANY COMBINATION: CPT

## 2025-05-30 PROCEDURE — G0480 DRUG TEST DEF 1-7 CLASSES: HCPCS

## 2025-05-30 PROCEDURE — 83874 ASSAY OF MYOGLOBIN: CPT

## 2025-05-30 PROCEDURE — 2500000003 HC RX 250 WO HCPCS

## 2025-05-30 PROCEDURE — 99223 1ST HOSP IP/OBS HIGH 75: CPT | Performed by: INTERNAL MEDICINE

## 2025-05-30 PROCEDURE — 82330 ASSAY OF CALCIUM: CPT

## 2025-05-30 PROCEDURE — 94761 N-INVAS EAR/PLS OXIMETRY MLT: CPT

## 2025-05-30 PROCEDURE — 80048 BASIC METABOLIC PNL TOTAL CA: CPT

## 2025-05-30 PROCEDURE — 83605 ASSAY OF LACTIC ACID: CPT

## 2025-05-30 PROCEDURE — 87449 NOS EACH ORGANISM AG IA: CPT

## 2025-05-30 PROCEDURE — 04HY32Z INSERTION OF MONITORING DEVICE INTO LOWER ARTERY, PERCUTANEOUS APPROACH: ICD-10-PCS

## 2025-05-30 PROCEDURE — 93306 TTE W/DOPPLER COMPLETE: CPT

## 2025-05-30 PROCEDURE — 83735 ASSAY OF MAGNESIUM: CPT

## 2025-05-30 PROCEDURE — 3E043XZ INTRODUCTION OF VASOPRESSOR INTO CENTRAL VEIN, PERCUTANEOUS APPROACH: ICD-10-PCS

## 2025-05-30 PROCEDURE — 2000000000 HC ICU R&B

## 2025-05-30 PROCEDURE — 80076 HEPATIC FUNCTION PANEL: CPT

## 2025-05-30 PROCEDURE — 87641 MR-STAPH DNA AMP PROBE: CPT

## 2025-05-30 PROCEDURE — 37799 UNLISTED PX VASCULAR SURGERY: CPT

## 2025-05-30 PROCEDURE — 82550 ASSAY OF CK (CPK): CPT

## 2025-05-30 RX ORDER — HEPARIN SODIUM 1000 [USP'U]/ML
60 INJECTION, SOLUTION INTRAVENOUS; SUBCUTANEOUS ONCE
Status: COMPLETED | OUTPATIENT
Start: 2025-05-30 | End: 2025-05-30

## 2025-05-30 RX ORDER — NOREPINEPHRINE BITARTRATE 0.06 MG/ML
INJECTION, SOLUTION INTRAVENOUS
Status: DISPENSED
Start: 2025-05-30 | End: 2025-05-30

## 2025-05-30 RX ORDER — HEPARIN SODIUM 1000 [USP'U]/ML
30 INJECTION, SOLUTION INTRAVENOUS; SUBCUTANEOUS PRN
Status: DISCONTINUED | OUTPATIENT
Start: 2025-05-30 | End: 2025-06-01

## 2025-05-30 RX ORDER — HEPARIN SODIUM 10000 [USP'U]/100ML
5-30 INJECTION, SOLUTION INTRAVENOUS CONTINUOUS
Status: DISCONTINUED | OUTPATIENT
Start: 2025-05-30 | End: 2025-06-01

## 2025-05-30 RX ORDER — HEPARIN SODIUM 1000 [USP'U]/ML
60 INJECTION, SOLUTION INTRAVENOUS; SUBCUTANEOUS PRN
Status: DISCONTINUED | OUTPATIENT
Start: 2025-05-30 | End: 2025-06-01

## 2025-05-30 RX ORDER — POTASSIUM CHLORIDE 1500 MG/1
40 TABLET, EXTENDED RELEASE ORAL ONCE
Status: DISCONTINUED | OUTPATIENT
Start: 2025-05-30 | End: 2025-05-30

## 2025-05-30 RX ORDER — POTASSIUM CHLORIDE 29.8 MG/ML
20 INJECTION INTRAVENOUS
Status: COMPLETED | OUTPATIENT
Start: 2025-05-30 | End: 2025-05-30

## 2025-05-30 RX ORDER — NOREPINEPHRINE BITARTRATE 0.06 MG/ML
1-100 INJECTION, SOLUTION INTRAVENOUS CONTINUOUS
Status: DISCONTINUED | OUTPATIENT
Start: 2025-05-30 | End: 2025-05-31

## 2025-05-30 RX ORDER — 3% SODIUM CHLORIDE 3 G/100ML
25 INJECTION, SOLUTION INTRAVENOUS CONTINUOUS
Status: DISCONTINUED | OUTPATIENT
Start: 2025-05-30 | End: 2025-05-30

## 2025-05-30 RX ORDER — PROPOFOL 10 MG/ML
INJECTION, EMULSION INTRAVENOUS
Status: COMPLETED
Start: 2025-05-30 | End: 2025-05-30

## 2025-05-30 RX ORDER — MAGNESIUM SULFATE IN WATER 40 MG/ML
2000 INJECTION, SOLUTION INTRAVENOUS ONCE
Status: COMPLETED | OUTPATIENT
Start: 2025-05-30 | End: 2025-05-30

## 2025-05-30 RX ORDER — CALCIUM GLUCONATE 20 MG/ML
2000 INJECTION, SOLUTION INTRAVENOUS ONCE
Status: COMPLETED | OUTPATIENT
Start: 2025-05-30 | End: 2025-05-30

## 2025-05-30 RX ORDER — NOREPINEPHRINE BITARTRATE 0.06 MG/ML
1-100 INJECTION, SOLUTION INTRAVENOUS CONTINUOUS
Status: DISCONTINUED | OUTPATIENT
Start: 2025-05-30 | End: 2025-05-30

## 2025-05-30 RX ADMIN — CALCIUM GLUCONATE 2000 MG: 20 INJECTION, SOLUTION INTRAVENOUS at 08:12

## 2025-05-30 RX ADMIN — POTASSIUM CHLORIDE 20 MEQ: 29.8 INJECTION, SOLUTION INTRAVENOUS at 13:35

## 2025-05-30 RX ADMIN — Medication 5 MCG/MIN: at 23:18

## 2025-05-30 RX ADMIN — SODIUM CHLORIDE, PRESERVATIVE FREE 20 MG: 5 INJECTION INTRAVENOUS at 08:09

## 2025-05-30 RX ADMIN — HEPARIN SODIUM 12 UNITS/KG/HR: 10000 INJECTION, SOLUTION INTRAVENOUS at 02:46

## 2025-05-30 RX ADMIN — HYALURONIDASE (HUMAN RECOMBINANT) 15 UNITS: 150 INJECTION, SOLUTION SUBCUTANEOUS at 00:13

## 2025-05-30 RX ADMIN — MAGNESIUM SULFATE HEPTAHYDRATE 2000 MG: 40 INJECTION, SOLUTION INTRAVENOUS at 10:26

## 2025-05-30 RX ADMIN — DEXTROSE 500 ML: 5 SOLUTION INTRAVENOUS at 09:24

## 2025-05-30 RX ADMIN — PROPOFOL 15 MCG/KG/MIN: 10 INJECTION, EMULSION INTRAVENOUS at 11:39

## 2025-05-30 RX ADMIN — SODIUM CHLORIDE, PRESERVATIVE FREE 20 MG: 5 INJECTION INTRAVENOUS at 20:42

## 2025-05-30 RX ADMIN — Medication 25 MCG/HR: at 02:31

## 2025-05-30 RX ADMIN — HEPARIN SODIUM 3300 UNITS: 1000 INJECTION INTRAVENOUS; SUBCUTANEOUS at 02:46

## 2025-05-30 RX ADMIN — POTASSIUM CHLORIDE 20 MEQ: 29.8 INJECTION, SOLUTION INTRAVENOUS at 15:14

## 2025-05-30 ASSESSMENT — PULMONARY FUNCTION TESTS
PIF_VALUE: 24
PIF_VALUE: 17
PIF_VALUE: 15
PIF_VALUE: 16
PIF_VALUE: 16
PIF_VALUE: 14

## 2025-05-30 NOTE — PROCEDURES
PROCEDURE NOTE - ARTERIAL LINE PLACEMENT    PATIENT NAME: Lyn Lombardi  MEDICAL RECORD NO. [id number]  DATE: 5/30/2025  ATTENDING PHYSICIAN:  JOAQUIN Aguilar       PREOPERATIVE DIAGNOSIS:  Need for blood pressure monitoring  POSTOPERATIVE DIAGNOSIS:  Same  PROCEDURE PERFORMED: Right Radial Arterial Line Insertion  PERFORMING PHYSICIAN:  Vikash Joseph DO  ESTIMATED BLOOD LOSS:  Less than 25 ml  COMPLICATIONS:  None immediately appreciated.     DISCUSSION:  Lyn Lombardi is a 84 y.o. female who requires invasive pressure monitoring. The history and physical examination were reviewed and confirmed.      CONSENT: Unable to be obtained due to the emergent nature of this procedure.     PROCEDURE:  A timeout was initiated by the bedside nurse and was confirmed by those present.  The patient was placed in a supine position. The skin overlying the Right Femoral was prepped with chlorhexadine. Through this region, the introducer needle through catheter was inserted into until pulsatile bright blood was seen in collection tubing. Guidewire was advanced with no resistance. Catheter was advanced into the artery, wire was pulled with brisk bleeding noted. Pressure monitoring setup was connected to the catheter, it aspirated and flushed easily. The catheter was secured to the wrist with 3-0 silk.     No immediate complication was evident.  All sponge, instrument and needle counts were correct at the completion of the procedure.      Vikash Joseph DO  2:22 AM, 5/30/25

## 2025-05-30 NOTE — CONSULTS
Comprehensive Nutrition Assessment    Type and Reason for Visit:  Initial, Consult (Tube Feeding Order and Management)    Nutrition Recommendations/Plan:   Start Tube Feedings of Standard without Fiber (Osmolite 1.2) formula at 10 mL/hr with advancement to goal 45 mL/hr.  Will provide 1296 kcals (+propofol kcals), 60 gm protein daily.  Monitor TF tolerance and rate of propofol - modify as needed.  Will monitor labs, weights, and plan of care.     Malnutrition Assessment:  Malnutrition Status:  Insufficient data (05/30/25 1207)    Context:  Acute Illness     Findings of the 6 clinical characteristics of malnutrition:  Energy Intake:  Unable to assess  Weight Loss:  No weight loss     Body Fat Loss:  Unable to assess   Muscle Mass Loss:  Mild muscle mass loss Clavicles (pectoralis & deltoids), Temples (temporalis)  Fluid Accumulation:  No fluid accumulation   Strength:  Not Performed    Nutrition Assessment:    Consulted for Tube Feedings Order and Management.  Admitted after being found down/unresponsive.  PMH: CAD, HTN, hyperlipidemia, GERD, dementia.  Pt currently intubated and sedated.   Propofol running at 6.7 mL/hr at visit.  Discussed start of Tube Feedings with RN.  Weights reviewed - mostly stable history noted.  Labs reviewed: Na 121 mmol/L, Mg 1.5 mg/dL.  Meds include: Mag Sulfate replacement.    Nutrition Related Findings:    Labs/Meds reviewed. Wound Type: None       Current Nutrition Intake & Therapies:    Average Meal Intake: NPO  Average Supplements Intake: NPO  Diet NPO  Additional Calorie Sources:  Propofol at 6.7 mL/hr = 177 kcals/day.    Anthropometric Measures:  Height: 149.9 cm (4' 11.02\")  Ideal Body Weight (IBW): 95 lbs (43 kg)    Admission Body Weight: 55.8 kg (123 lb 0.3 oz)  Current Body Weight: 59.3 kg (130 lb 11.7 oz), 137.6 % IBW. Weight Source: Bed scale  Current BMI (kg/m2): 26.4           Weight Adjustment For: No Adjustment                 BMI Categories: Overweight (BMI  25.0-29.9)    Estimated Daily Nutrient Needs:  Energy Requirements Based On: Kcal/kg  Weight Used for Energy Requirements: Admission  Energy (kcal/day): 9458-7948 kcals/day  Weight Used for Protein Requirements: Admission  Protein (g/day): 50-70 gm pro/day  Method Used for Fluid Requirements: Defer to provider  Fluid (ml/day): per MD    Nutrition Diagnosis:   Inadequate oral intake related to impaired respiratory function as evidenced by NPO or clear liquid status due to medical condition (need for enteral nutrition support)    Nutrition Interventions:   Food and/or Nutrient Delivery: Start Tube Feeding  Nutrition Education/Counseling: No recommendation at this time  Coordination of Nutrition Care: Continue to monitor while inpatient  Plan of Care discussed with: RN    Goals:  Goals: Tolerate nutrition support at goal rate, by next RD assessment  Type of Goal: New goal  Previous Goal Met: New Goal    Nutrition Monitoring and Evaluation:   Behavioral-Environmental Outcomes: None Identified  Food/Nutrient Intake Outcomes: Enteral Nutrition Intake/Tolerance, Progression of Nutrition  Physical Signs/Symptoms Outcomes: Biochemical Data, GI Status, Fluid Status or Edema, Hemodynamic Status, Weight, Skin, Nutrition Focused Physical Findings    Discharge Planning:    Too soon to determine     Elke Rivera RD, LD  Contact: [phone]

## 2025-05-30 NOTE — ED NOTES
Dried blood around nares/mouth.  Bruise to r axilla.  Skin tear to left hand.  Erythema around umbilicus.

## 2025-05-30 NOTE — ED PROVIDER NOTES
[facility] Emergency Department  [address] [address]  Phone: [phone]        [facility] EMERGENCY DEPARTMENT  EMERGENCY DEPARTMENT ENCOUNTER      Pt Name: Lyn Lombardi  MRN: [id number]  Birthdate 1940  Date of evaluation: 5/29/2025  Provider: Anuradha Dean DO    CHIEF COMPLAINT       Chief Complaint   Patient presents with    unresponsive       HISTORY OF PRESENT ILLNESS   (Location/Symptom, Timing/Onset,Context/Setting, Quality, Duration, Modifying Factors, Severity)  Note limiting factors.     Lyn Lombardi is a 84 y.o. female who presents to the emergency department with EMS.  Patient found down, unresponsive.  Found down by family.  Unknown last known well.  Patient reportedly gets Meals on Wheels every day, if patient does not answer her door family numbers to us to be called, no family member was called today so family is assuming that Meals on Wheels was able to make contact with patient sometime around 11 AM.  Unknown if patient takes any blood thinners, family does not believe so.  EMS did bring some of patient's medications, patient takes medication for blood pressure as well as Alzheimer's, do not have any blood thinners or antiplatelets.  Unable to obtain history from patient due to acuity of situation as well as patient's status.    Nursing Notes were reviewed.    REVIEW OF SYSTEMS       Review of Systems   Unable to perform ROS: Patient unresponsive       PAST MEDICAL HISTORY     Past Medical History:   Diagnosis Date    Abnormal uterine bleeding (AUB) 01/2021    Anxiety     daughter reports since Covid    Asteroid hyalosis of right eye     Bruises easily     per daughter    Coronary artery disease     status post acute non-Q wave myocardial infarction 08/23/2012, status post drug eluting stent placement in the LAD and 2 drug eluting stents in the left circumflex artery 08/23/2012.    COVID-19 06/30/2020    confusion, SOB, weakness x 6-8 weeks; was

## 2025-05-30 NOTE — H&P
Critical Care - History and Physical Examination    Patient's Name: Lyn Lombardi  Patient's YOB: 1940  Age / Sex: 84 y.o. female  Medical Record Number: [id number]  Patient's Acct/billing number: [id number]    Date of Admission: 5/29/2025 10:41 PM  Length of Stay: 1 Days  Date of History and Physical Examination: 5/30/2025     Primary Care Physician: Tru Gould MD  Attending Physician: Judd Greenberg MD      Code Status: Full Code     Chief complaint: Altered mental status/hyponatremia    Reason for ICU Admission: Altered mental status/hyponatremia    HISTORY OF PRESENT ILLNESS:      History was obtained from chart review.      Lyn Lombardi is a 84 y.o. female  who presented with altered mental status    Patient brought to [facility] via EMS after being found down, unresponsive.  Unknown last known well.  Facial abrasions with potential concern for trauma.  Patient GCS 6 on arrival.  Patient intubated for airway protection given low GCS with full set of labs and diagnostic imaging performed.  Patient transferred to [place] for higher level care/concern for trauma.    Of note, patient lives at home, history of Alzheimer's disease, receives Meals on Wheels.    Labs notable for sodium 114, elevated CK, mild, troponin, stable creatinine, elevated white count 15K, relatively unremarkable urine, negative CT head without acute findings, no acute fractures of the face, negative CT cervical spine, no acute findings on CT chest abdomen pelvis, degenerative findings in the lumbar spine and thoracic but no acute fracture.    On arrival patient evaluated in the trauma bay with labs obtained.  Initial venous blood gas 7.335/31.5/45.1/16.4    Nephrology was consulted with recommendation for 3% sodium bolus 250 cc which was given in the ED peripherally and extravasated into the arm with subsequent hyaluronidase given.  Additional recommendations by nephrology for 3% sodium infusion at 25 cc an  Imdur, memantine, lisinopril, atorvastatin, vitamin D3, ferrous sulfate, Fosamax, vitamin C, multivitamin    On my exam, patient is intubated and sedated with propofol, abdomen nondistended, withdrawing to pain, pupils responsive, cough.  Teeth about to fall out.    Big Picture PLAN: Follow nephrology recommendations, every 2 hours sodiums, let nephrology know if sodium corrects/changes greater than 114/2019 over the next 24 hours since admit.  3% and 25 cc an hour.  Follow cardiology recommendations for elevated troponin and trend for NSTEMI as well as continue to follow CK mild.  Patient require likely central line and A-line    N:  # Altered mental status, history of memory loss/Alzheimer's disease  GCS 6T (E: 1 V: 1T M: 4), AnO x N/a,   AED: N/a, Pain: Fent gtt, Sed: Prop gtt Agitation: N/a, Sleep: N/a,   Paralytic: N/a  AP tx: ASA N/a, Plavix N/a  - Home meds: donepezil, memantine,     CV:  # History of coronary disease,   VS: Stable, BP Goals: MAP greater than 65, HTN PRN's: N/A, Pressors: N/A,   EKG: TBD, Trop: Elevated // to trend, Echo: Ordered   -Home meds to restart when able: Imdur, memantine, lisinopril, atorvastatin, vitamin D3, ferrous sulfate, Fosamax, vitamin C, multivitamin  - Patient with history of coronary artery disease with stenting last done in 2012.  Elevated troponin as below.  To trend.  - Will consult cardiology to follow on admission, heparin infusion    Recent Labs     Units 05/29/25  2209   TROPHS ng/L 101*      Recent Labs     Units 05/29/25  2209   MYOGLOBIN ng/mL 910*        P:  # Intubated for airway protection  O2 Status: Vent, CXR: Mild pulmonary vascular congestion at presentation stable ET tube    VENT Settings: PRVC RR: 16, TV: - (-cc/kg), PEEP: 5, FiO2: 30%    -BG PH: - /CO2: - /PAO2: - /HCO3: -   P/F: -, SBT: -  -Maintain intubated    GI:    Diet: N.p.o., BM Reg: N/A, Nausea: Zofran, NG/OG/PEG: N/a  - Consider placing OG to be able to get p.o. meds    R:  # Hyponatremia 114

## 2025-05-30 NOTE — CONSULTS
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  6 mmol in the next 24 hours.  Hypotonic fluids being administered to reduce rapid correction  Check electrolytes Q4 hourly  Will follow      HISTORY OF PRESENTING ILLNESS                 This is a 84 y.o. female who has been transferred from [facility] for further management.  She presented with altered mental status changes.  She was found unresponsive.  Unknown last known well.  Facial abrasions with potential concerns for trauma.  GCS scale was 6 on arrival.  Patient was intubated for airway protection.  Of note patient lives at home with history Alzheimer's disease receives Meals on Wheels.    Initial assessment showed stable vitals.  Investigations demonstrated hyponatremia sodium 114 likely related CKD and stable creatinine with neutrophilic leukocytosis.  Neuroimaging was unremarkable for any skeletal or visceral injury    Nephrology was consulted for hyponatremia.  Urine studies included reflective of SIADH.  He received 3% solution and 250 bolus in the emergency department.  Subsequently recheck sodium was ordered on a sequential basis.  Renal function is fairly stable.    Urine output  No history of contrast exposure  No history of hypotension  No history of NSAID/ACE/ARB/nephrotoxic agents  No history suggestive of obstruction  No history of nausea/vomiting/diarrhoea  No history of TONY in past  No history of recurrent UTI infection/surgeries to KUB          PAST MEDICAL HISTORY         Diagnosis Date    Abnormal uterine bleeding (AUB) 01/2021    Anxiety     daughter reports since Covid    Asteroid hyalosis of right eye     Bruises easily     per daughter  BILATERAL SALPINGO-OOPHORECTOMY, CYTOLOGIC WASHINGS, SENTINEL LYMPHNODE MAPPINGS, SENTINEL LYMPHNODE BIOPSIES    UPPER GASTROINTESTINAL ENDOSCOPY  04/16/2015    healed gastric ulcer       MEDICATIONS     Home Meds:                Medications Prior to Admission: nitroGLYCERIN (NITROSTAT) 0.4 MG SL tablet, Place 1 tablet under the tongue every 5 minutes as needed for Chest pain  donepezil (ARICEPT) 10 MG tablet, Take one tablet by mouth nightly  isosorbide mononitrate (IMDUR) 30 MG extended release tablet, TAKE 1 TABLET BY MOUTH DAILY  memantine (NAMENDA) 5 MG tablet, TAKE 1 TABLET BY MOUTH TWICE  DAILY  lisinopril (PRINIVIL;ZESTRIL) 20 MG tablet, TAKE 1 TABLET BY MOUTH DAILY  atorvastatin (LIPITOR) 40 MG tablet, TAKE 1 TABLET BY MOUTH EVERY  NIGHT AT BEDTIME  Cholecalciferol (VITAMIN D3) 50 MCG (2000 UT) CAPS, Take 1 capsule by mouth daily  ferrous sulfate (FE TABS 325) 325 (65 Fe) MG EC tablet, Take 1 tablet by mouth daily (with breakfast)  alendronate (FOSAMAX) 70 MG tablet, TAKE 1 TABLET BY MOUTH WEEKLY  WITH 8 OZ OF PLAIN WATER 30  MINUTES BEFORE FIRST FOOD, DRINK OR MEDS. STAY UPRIGHT FOR 30  MINS  ascorbic acid (VITAMIN C) 500 MG tablet, Take 1 tablet by mouth 2 times daily With ana paula hips  Multiple Vitamins-Minerals (MULTIVITAMIN PO), Take 1 tablet by mouth daily.  Scheduled Meds:    magnesium sulfate  2,000 mg IntraVENous Once    dextrose 5%  500 mL IntraVENous Once    propofol        famotidine (PEPCID) injection  20 mg IntraVENous BID     Continuous Infusions:    fentaNYL 50 mcg/hr (05/30/25 0846)    heparin (PORCINE) Infusion 12 Units/kg/hr (05/30/25 0849)    propofol 25 mcg/kg/min (05/30/25 0730)     PRN Meds:  heparin (porcine), heparin (porcine), propofol, ondansetron **OR** ondansetron    ALLERGY     Codeine       SOCIAL HISTORY     Social History     Socioeconomic History    Marital status: Single     Spouse name: Not on file    Number of children: Not on file    Years of education: Not on file

## 2025-05-30 NOTE — CONSULTS
[facility]   Consult Note         Today's Date: 5/30/2025  Patient Name: Lyn Lombardi  Date of admission: 5/29/2025 10:41 PM  Patient's age: 84 y.o., 1940  Admission Dx: Acute hyponatremia [E87.1]  NSTEMI (non-ST elevated myocardial infarction) (HCC) [I21.4]    Reason for Consult:  Cardiac evaluation    Requesting Physician: Judd Juan MD    REASON FOR CONSULT: Elevated troponin    History Obtained From:  Patient, chart, staff, records    HISTORY OF PRESENT ILLNESS:      The patient is a 84 y.o. female with past medical history significant for CAD s/p CINDY to LAD 2012, hypertension, hyperlipidemia, GERD, and dementia who is admitted to the hospital for found down and brought to the hospital and subsequently intubated for airway protection.      Troponin 101/103/93/106/85  Chest x-ray showed mild pulmonary vascular congestion.  Also found to be severely hyponatremic    Past Medical History:   has a past medical history of Abnormal uterine bleeding (AUB), Anxiety, Asteroid hyalosis of right eye, Bruises easily, Coronary artery disease, COVID-19, Diastolic dysfunction, Gastric ulcer with hemorrhage, Hyperlipidemia, Hypertension, Lives alone, Memory loss, Mild mitral regurgitation, Mild tricuspid regurgitation, Obesity, Patient in clinical research study, Poor intravenous access, and Vitreous floaters.    Past Surgical History:   has a past surgical history that includes Cholecystectomy (01/01/1978); Cardiac catheterization (Left, 08/23/2012); Endoscopy, colon, diagnostic (03/05/2015); Upper gastrointestinal endoscopy (04/16/2015); Dilation and curettage of uterus (N/A, 01/20/2021); Total abdominal hysterectomy w/ bilateral salpingoophorectomy (02/23/2021); and Hysterectomy (N/A, 02/23/2021).     Home Medications:    Prior to Admission medications    Medication Sig Start Date End Date Taking? Authorizing Provider   nitroGLYCERIN (NITROSTAT) 0.4 MG SL tablet Place 1 tablet under the tongue  every 5 minutes as needed for Chest pain 5/19/25   Eboni Rodriguez APRN - CNP   donepezil (ARICEPT) 10 MG tablet Take one tablet by mouth nightly 5/14/25   Tru Gould MD   isosorbide mononitrate (IMDUR) 30 MG extended release tablet TAKE 1 TABLET BY MOUTH DAILY 2/3/25   Eboni Rodriguez APRN - CNP   memantine (NAMENDA) 5 MG tablet TAKE 1 TABLET BY MOUTH TWICE  DAILY 1/27/25   Tru Gould MD   lisinopril (PRINIVIL;ZESTRIL) 20 MG tablet TAKE 1 TABLET BY MOUTH DAILY 1/27/25   Sarika Roche APRN - CNP   atorvastatin (LIPITOR) 40 MG tablet TAKE 1 TABLET BY MOUTH EVERY  NIGHT AT BEDTIME 10/22/24   Eboni Rodriguez APRN - CNP   Cholecalciferol (VITAMIN D3) 50 MCG (2000 UT) CAPS Take 1 capsule by mouth daily 5/24/24   Tru Gould MD   ferrous sulfate (FE TABS 325) 325 (65 Fe) MG EC tablet Take 1 tablet by mouth daily (with breakfast) 5/24/24   Tru Gould MD   alendronate (FOSAMAX) 70 MG tablet TAKE 1 TABLET BY MOUTH WEEKLY  WITH 8 OZ OF PLAIN WATER 30  MINUTES BEFORE FIRST FOOD, DRINK OR MEDS. STAY UPRIGHT FOR 30  MINS 10/9/23   Tru Gould MD   ascorbic acid (VITAMIN C) 500 MG tablet Take 1 tablet by mouth 2 times daily With ana paula hips    Provider, MD Yadira   Multiple Vitamins-Minerals (MULTIVITAMIN PO) Take 1 tablet by mouth daily.    Provider, MD Yadira       calcium gluconate, 2,000 mg, IntraVENous, Once    magnesium sulfate, 2,000 mg, IntraVENous, Once    famotidine (PEPCID) injection, 20 mg, IntraVENous, BID      Allergies:  Codeine    Social History:   reports that she has never smoked. She has been exposed to tobacco smoke. She has never used smokeless tobacco. She reports that she does not currently use alcohol. She reports that she does not use drugs.     Family History: family history includes Cancer in her sister; Heart Attack in her father; Prostate Cancer in her brother. No h/o sudden cardiac death.    REVIEW OF SYSTEMS:    Unable to  Statement  I have discussed the case of Lyn Lombardi including pertinent history and exam findings with the student/resident/fellow. I have seen and examined the patient and the key elements of the encounter have been performed by me. I agree with the assessment, plan and orders as documented by the resident With changes made to the note.     Electronically signed by Edgar Bailey MD on 5/30/2025 at 12:05 PM.    [facility]      [phone]

## 2025-05-30 NOTE — PLAN OF CARE
Problem: Respiratory - Adult  Goal: Achieves optimal ventilation and oxygenation  5/30/2025 0551 by Renu Rollins RCP  Outcome: Progressing     Problem: OXYGENATION/RESPIRATORY FUNCTION  Goal: Patient will maintain patent airway  Outcome: Ongoing  Goal: Patient will achieve/maintain normal respiratory rate/effort  Respiratory rate and effort will be within normal limits for the patient  Outcome: Ongoing    Problem: MECHANICAL VENTILATION  Goal: Patient will maintain patent airway  Outcome: Ongoing  Goal: Oral health is maintained or improved  Outcome: Ongoing  Goal: ET tube will be managed safely  Outcome: Ongoing  Goal: Ability to express needs and understand communication  Outcome: Ongoing  Goal: Mobility/activity is maintained at optimum level for patient  Outcome: Ongoing    Problem: ASPIRATION PRECAUTIONS  Goal: Patient’s risk of aspiration is minimized  Outcome: Ongoing    Problem: SKIN INTEGRITY  Goal: Skin integrity is maintained or improved  Outcome: Ongoing

## 2025-05-30 NOTE — PLAN OF CARE
Problem: Neurosensory - Adult  Goal: Achieves stable or improved neurological status  Outcome: Progressing  Goal: Absence of seizures  Outcome: Progressing  Goal: Remains free of injury related to seizures activity  Outcome: Progressing  Goal: Achieves maximal functionality and self care  Outcome: Progressing     Problem: Respiratory - Adult  Goal: Achieves optimal ventilation and oxygenation  Outcome: Progressing     Problem: Cardiovascular - Adult  Goal: Maintains optimal cardiac output and hemodynamic stability  Outcome: Progressing  Goal: Absence of cardiac dysrhythmias or at baseline  Outcome: Progressing     Problem: Musculoskeletal - Adult  Goal: Return mobility to safest level of function  Outcome: Progressing  Goal: Maintain proper alignment of affected body part  Outcome: Progressing     Problem: Metabolic/Fluid and Electrolytes - Adult  Goal: Electrolytes maintained within normal limits  Outcome: Progressing  Goal: Hemodynamic stability and optimal renal function maintained  Outcome: Progressing  Goal: Glucose maintained within prescribed range  Outcome: Progressing     Problem: Hematologic - Adult  Goal: Maintains hematologic stability  Outcome: Progressing     Problem: Safety - Adult  Goal: Free from fall injury  Outcome: Progressing

## 2025-05-30 NOTE — ED PROVIDER NOTES
[facility] EMERGENCY DEPARTMENT  Emergency Department Encounter  Emergency Medicine Resident     Pt Name:Lyn Lombardi  MRN: [id number]  Birthdate 1940  Date of evaluation: 5/30/25  PCP:  Tru Gould MD  Note Started: 3:00 AM EDT      CHIEF COMPLAINT       Chief Complaint   Patient presents with    Fall       HISTORY OF PRESENT ILLNESS  (Location/Symptom, Timing/Onset, Context/Setting, Quality, Duration, Modifying Factors, Severity.)      Lyn Lombardi is a 84 y.o. female who presents with altered mental status.  Patient presents as a transfer from an outside facility.  Apparently patient was found down presumed fall.  Had blood around the nose and mouth.  Was altered.  GCS of 5.  Intubated at outside facility.  Patient had laboratory studies drawn and CT scans obtained prior to transfer.  And route scans reassesses resulted showing no acute injuries.  Laboratory studies reviewed showing a sodium of 114.  Apparently was given 3% saline by [facility] crew and route.  Unfortunately most appears to have infiltrated into the soft tissues of the arm.  Remainder of history limited due to patient condition    PAST MEDICAL / SURGICAL / SOCIAL / FAMILY HISTORY      has a past medical history of Abnormal uterine bleeding (AUB), Anxiety, Asteroid hyalosis of right eye, Bruises easily, Coronary artery disease, COVID-19, Diastolic dysfunction, Gastric ulcer with hemorrhage, Hyperlipidemia, Hypertension, Lives alone, Memory loss, Mild mitral regurgitation, Mild tricuspid regurgitation, Obesity, Patient in clinical research study, Poor intravenous access, and Vitreous floaters.       has a past surgical history that includes Cholecystectomy (01/01/1978); Cardiac catheterization (Left, 08/23/2012); Endoscopy, colon, diagnostic (03/05/2015); Upper gastrointestinal endoscopy (04/16/2015); Dilation and curettage of uterus (N/A, 01/20/2021); Total abdominal hysterectomy w/ bilateral salpingoophorectomy (02/23/2021); and  Authorizing Provider   nitroGLYCERIN (NITROSTAT) 0.4 MG SL tablet Place 1 tablet under the tongue every 5 minutes as needed for Chest pain 5/19/25   Eboni Rodriguez APRN - CNP   donepezil (ARICEPT) 10 MG tablet Take one tablet by mouth nightly 5/14/25   Tru Gould MD   isosorbide mononitrate (IMDUR) 30 MG extended release tablet TAKE 1 TABLET BY MOUTH DAILY 2/3/25   Eboni Rodriguez APRN - CNP   memantine (NAMENDA) 5 MG tablet TAKE 1 TABLET BY MOUTH TWICE  DAILY 1/27/25   Tru Gould MD   lisinopril (PRINIVIL;ZESTRIL) 20 MG tablet TAKE 1 TABLET BY MOUTH DAILY 1/27/25   Sarika Roche APRN - CNP   atorvastatin (LIPITOR) 40 MG tablet TAKE 1 TABLET BY MOUTH EVERY  NIGHT AT BEDTIME 10/22/24   Eboni Rodriguez APRN - CNP   Cholecalciferol (VITAMIN D3) 50 MCG (2000 UT) CAPS Take 1 capsule by mouth daily 5/24/24   Tru Gould MD   ferrous sulfate (FE TABS 325) 325 (65 Fe) MG EC tablet Take 1 tablet by mouth daily (with breakfast) 5/24/24   Tru Gould MD   alendronate (FOSAMAX) 70 MG tablet TAKE 1 TABLET BY MOUTH WEEKLY  WITH 8 OZ OF PLAIN WATER 30  MINUTES BEFORE FIRST FOOD, DRINK OR MEDS. STAY UPRIGHT FOR 30  MINS 10/9/23   Tru Gould MD   ascorbic acid (VITAMIN C) 500 MG tablet Take 1 tablet by mouth 2 times daily With ana paula hips    ProviderYadira MD   Multiple Vitamins-Minerals (MULTIVITAMIN PO) Take 1 tablet by mouth daily.    ProviderYadira MD         REVIEW OF SYSTEMS       Review of Systems   Unable to perform ROS: Intubated       PHYSICAL EXAM      INITIAL VITALS:   /62   Pulse 69   Temp 97.8 °F (36.6 °C)   Resp 17   Wt 55.8 kg (123 lb 0.3 oz)   LMP  (LMP Unknown)   SpO2 100%   BMI 24.85 kg/m²     Physical Exam  Constitutional:       Comments: Intubated without sedation   HENT:      Head: Normocephalic and atraumatic.      Comments: There are some dried blood around the nose and mouth without obvious dental or nasal trauma  Eyes:

## 2025-05-30 NOTE — PLAN OF CARE
Problem: Neurosensory - Adult  Goal: Achieves stable or improved neurological status  5/30/2025 1802 by Nereida Brar RN  Outcome: Progressing  5/30/2025 0426 by Ander Mcnamara RN  Outcome: Progressing  Goal: Absence of seizures  5/30/2025 1802 by Nereida Brar RN  Outcome: Progressing  5/30/2025 0426 by Adner Mcnamara RN  Outcome: Progressing  Goal: Remains free of injury related to seizures activity  5/30/2025 1802 by Nereida Brar RN  Outcome: Progressing  5/30/2025 0426 by Ander Mcnamara RN  Outcome: Progressing  Goal: Achieves maximal functionality and self care  5/30/2025 1802 by Nereida Brar RN  Outcome: Progressing  5/30/2025 0426 by Ander Mcnamara RN  Outcome: Progressing     Problem: Respiratory - Adult  Goal: Achieves optimal ventilation and oxygenation  5/30/2025 1802 by Nereida Brar RN  Outcome: Progressing  5/30/2025 0551 by Renu Rollins RCP  Outcome: Progressing  5/30/2025 0426 by Ander Mcnamara RN  Outcome: Progressing     Problem: Safety - Medical Restraint  Goal: Remains free of injury from restraints (Restraint for Interference with Medical Device)  Description: INTERVENTIONS:1. Determine that other, less restrictive measures have been tried or would not be effective before applying the restraint2. Evaluate the patient's condition at the time of restraint application3. Inform patient/family regarding the reason for restraint4. Q2H: Monitor safety, psychosocial status, comfort, nutrition and hydration  Outcome: Progressing  Flowsheets  Taken 5/30/2025 1800  Remains free of injury from restraints (restraint for interference with medical device): Every 2 hours: Monitor safety, psychosocial status, comfort, nutrition and hydration  Taken 5/30/2025 1600  Remains free of injury from restraints (restraint for interference with medical device): Every 2 hours: Monitor safety, psychosocial status, comfort, nutrition and hydration  Taken 5/30/2025 1400  Remains free of injury from

## 2025-05-30 NOTE — PROCEDURES
PROCEDURE NOTE - CENTRAL VENOUS LINE PLACEMENT    PATIENT NAME: Lyn Lombardi  MEDICAL RECORD NO. [id number]  DATE: 5/30/2025  ATTENDING PHYSICIAN: JOAQUIN Aguilar     PREOPERATIVE DIAGNOSIS:  vascular access, poor peripheral access, and long term access  POSTOPERATIVE DIAGNOSIS:  Same  PROCEDURE PERFORMED:  Right Femoral Vein Central Line Insertion  PERFORMING PHYSICIAN: Vikash Joseph DO  ANESTHESIA:  Local utilizing 1% lidocaine  ESTIMATED BLOOD LOSS:  Less than 25 ml  COMPLICATIONS:  None immediately appreciated.     DISCUSSION:  Lyn Lombardi is a 84 y.o.-year-old female who requires central IV access vascular access and poor peripheral access. The history and physical examination were reviewed and confirmed.      CONSENT: Unable to be obtained due to the emergent nature of this procedure.     PROCEDURE:  A timeout was initiated by the bedside nurse and was confirmed by those present.  The patient was placed in a supine position. The skin overlying the Right Femoral Vein was prepped with chlorhexadine and draped in sterile fashion. The skin was infiltrated with local anesthetic. The vessel and surrounding anatomy was visualized using ultrasound. Through the anesthetized region, the introducer needle was inserted into the femoral vein returning dark red non pulsatile blood. A guidewire was placed through the center of the needle with no resistance. Ultrasound confirmed presence of wire in the vein. A small incision made in the skin with a #11 scalpel blade. The dilator was inserted into the skin and vein over guidewire using Seldinger technique. The dilator was then removed and the 7F 20cm catheter was placed in the vein over the guidewire using Seldinger technique. The guidewire was then removed and all ports aspirated and flushed appropriately. The catheter then secured using silk suture and a temporary sterile dressing was applied.  No immediate complication was evident.  All sponge, instrument and needle

## 2025-05-30 NOTE — ED PROVIDER NOTES
[facility]     Emergency Department     Faculty Attestation    I performed a history and physical examination of the patient and discussed management with the resident. I reviewed the resident’s note and agree with the documented findings including all diagnostic interpretations and plan of care. Any areas of disagreement are noted on the chart. I was personally present for the key portions of any procedures. I have documented in the chart those procedures where I was not present during the key portions. I have reviewed the emergency nurses triage note. I agree with the chief complaint, past medical history, past surgical history, allergies, medications, social and family history as documented unless otherwise noted below. Documentation of the HPI, Physical Exam and Medical Decision Making performed by eris is based on my personal performance of the HPI, PE and MDM. For Physician Assistant/ Nurse Practitioner cases/documentation I have personally evaluated this patient and have completed at least one if not all key elements of the E/M (history, physical exam, and MDM). Additional findings are as noted.    Primary Care Physician: Tru Gould MD    Note Started: 11:46 PM EDT     VITAL SIGNS:   weight is 55.8 kg (123 lb 0.3 oz). Her temperature is 97.8 °F (36.6 °C). Her blood pressure is 176/67 (abnormal) and her pulse is 73. Her respiration is 13 and oxygen saturation is 100%.      Medical Decision Making  Transfer from outlying facility, found down, low GCS and so was intubated.  CT scans were obtained prior to flight although had not resulted.  Review of CT scans show no obvious traumatic injury however labs showed a sodium of 114.  She did receive 250 mL of 3% saline however there is appearance that it infiltrated into the right bicep.  Pharmacy to enact infiltration protocol.  Repeat labs.  Trauma alert based on concerns for traumatic injury with

## 2025-05-30 NOTE — ED NOTES
Pt moved to ED stretcher.   Pt  intubated with 7.5 ETT secured at 22 cm at the teeth.   Stephenson catheter in place.

## 2025-05-30 NOTE — H&P
TRAUMA H&P/CONSULT    PATIENT NAME: Lyn Lombardi  YOB: 1940  MEDICAL RECORD NO. [id number]   DATE: 5/29/2025  PRIMARY CARE PHYSICIAN: Tru Gould MD  PATIENT EVALUATED AT THE REQUEST OF DR. Collins    ACTIVATION   Trauma Alert    IMPRESSION AND PLAN:     Found down, unknown down time   Pan scans from OSH reviewed- no acute traumatic injuries    Geriatric labs, UA, myoglobin, and CK ordered- f/u results   No acute traumatic injuries found     Hyponatremia    3% bolus started by [facility] (infiltrated)   Repeat BNP stat   Dispo per ED- recommend MICU     If intracranial hemorrhage is present, is it a:  [] BIG 1  [] BIG 2  [] BIG 3- n/a   If chest wall injury: Rib score- n/a    CONSULT SERVICES    Other: MICU        HISTORY:     Chief Complaint:  \"Found down/ hyponatremia \"    GENERAL DATA  Patient information was obtained from EMS personnel and past medical records.  History/Exam limitations: due to condition.  Injury Date: 5/29/25  Approximate Injury Time: unknown- presumed well at ~1100        Transport mode: Helicopter  Referring Hospital: [facility]    SETTING OF TRAUMATIC EVENT   Location : Home  Specific Details of Location: Other: unknown     MECHANISM OF INJURY    Other: unknown     HISTORY:     Lyn Lombardi is a female was transferred from [facility] after being found down for unknown time and noted to have decreased mental status. 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 No blood thinners. Was found down and unresponsive so EMS called. Has small amount of dried blood on face. Was intubated due to initial GCS of 6. [facility] stated that [place] [facility] put in a bite block w/ the ET tube which later caused some bleeding around her bottom teeth- there were no traumatic tooth injuries noted prior to the intubation. Pan scans were obtained but patient was transferred prior to final reads or labs being resulted. Just prior to

## 2025-05-30 NOTE — ED PROVIDER NOTES
FACULTY SIGN-OUT  ADDENDUM       Patient: Lyn Lombardi   MRN: [id number]  PCP:  Tru Gould MD  Note Started: 5/29/25, 11:12 PM EDT  Attestation  I was available and discussed any additional care issues that arose and coordinated the management plans with the resident(s) caring for the patient during my duty period. Any areas of disagreement with resident's documentation of care or procedures are noted on the chart. I was personally present for the key portions of any/all procedures during my duty period. I have documented in the chart those procedures where I was not present during the key portions.   The patient's initial evaluation and plan have been discussed with the prior provider who initially evaluated the patient.      Pertinent Comments:  The patient is a 84 y.o. female taken in signout with altered mental status initially thought to be trauma secondary to abrasions and was intubated secondary to low mental status prior to arrival.   CTs were negative but sodium low at 114.   Hypertonic saline was given prior to arrival however appears most is infiltrated currently receiving hyaluronic acid as well as further management by trauma surgery in regards to this as well.   We are awaiting further trauma clearance and ICU admission    Repeat sodium here is 147.   Again, hypertonic saline was given prior to arrival in our emergency room.   It appears that most of this did get introduced into the subcu tissues unfortunately because of IV infiltration.   This may have been drawn \"downstream\" from infiltration site as well for this laboratory.    ICU was informed who is already admitted the patient.  Repeat sodium being obtained now.      Repeat sodium was obtained and came back at 116 consistent with previous results      ED COURSE      The patient was given the following medications:  Orders Placed This Encounter   Medications    hyaluronidase human (HYLENEX) injection 15 Units       RECENT VITALS:   BP: (!)

## 2025-05-30 NOTE — PROGRESS NOTES
Evaluation for Spine Clearance:    Pt is a 84 y.o. female who was admitted on 5/29/25 s/p being found down. No complaints 2/2 GCS.     C-Spine precautions of C-collar with spinal neutrality maintained since arrival with current exam directed at further evaluation of spine for clearance purposes.    Pt chart and current images reviewed.  CT C-Spine negative for acute fracture, subluxation, or traumatic injury.      C-collar removed w/ c-spine neutrality maintained.  Patient is intubated and sedated but no grimacing or change to vitals with palpation of spinous processes and axial loading. Full ROM completed with no noted issues.     C-spine is considered cleared w/out need for further imaging, evaluation, or continuation of c-collar.  TLS considered clear w/out need for further imagine, evaluation, or continuation of supine bedrest precautions.    Electronically signed by Miranda Schultz MD on 5/29/2025 at 11:59 PM

## 2025-05-30 NOTE — ED NOTES
[facility] brought patient in from home after having an unwitnessed fall at unknown time. Pt is unresponsive, GCS 8. [facility] had IO in L tibia, that flows with no complications. C-collar in place, on mattress splint. Pt has NRM mask on @ 15 Lpm. . Dr. Dean immediately at bedside on patient arrival.   Pupils were 2 mm bilateral, sluggish. Patient does have pulses in all extremities. Patient has decorticate posturing.   @ 2113, given etomidate 20mg IO   @ 2114 Sux 100 mg IO     ET 7.5 ET tube placed 22 cm at the teeth    Stephenson in place with urine return. Urine sent to lab.     20G IV was placed on L AC as [facility] was landing.

## 2025-05-30 NOTE — PROGRESS NOTES
[facility] [place] [facility]     Emergency/Trauma Note    PATIENT NAME: Lyn Lombardi    Shift date: 5/29/25  Shift day: Thursday   Shift # 3    Room # TRAUMA A/TRAUMAA   Name: Lyn Lombardi            Age: 84 y.o.  Gender: female          Scientologist: Zoroastrianism   Place of Pentecostal:     Trauma/Incident type: Adult Trauma Alert  Admit Date & Time: 5/29/2025 10:41 PM  TRAUMA NAME:     ADVANCE DIRECTIVES IN CHART?  No    NAME OF DECISION MAKER:     RELATIONSHIP OF DECISION MAKER TO PATIENT:     PATIENT/EVENT DESCRIPTION:  Lyn Lombardi is a 84 y.o. female who arrived via EMS as a a Alert. Patient presents after n unwitnessed FALL.  Patient presents unresponsive and intubated. Pt to be admitted to TRAUMA A/TRAUMAA.         SPIRITUAL ASSESSMENT-INTERVENTION-OUTCOME:   was ministry of presence. Per EMS no contact with family.   will contact family members. Patient's family on their way to the hospital.   spoke to patient's daughter Terese Fontenot .    PATIENT BELONGINGS:  No belongings noted    ANY BELONGINGS OF SIGNIFICANT VALUE NOTED:  NO    REGISTRATION STAFF NOTIFIED?  NO      WHAT IS YOUR SPIRITUAL CARE PLAN FOR THIS PATIENT?:   Chaplains are available 24/7 via [employer].    Electronically signed by Chaplain Greg, on 5/29/2025 at 11:07 PM.  [facility]  [phone]

## 2025-05-31 LAB
ALLEN TEST: ABNORMAL
ANION GAP SERPL CALCULATED.3IONS-SCNC: 11 MMOL/L (ref 9–16)
ANION GAP SERPL CALCULATED.3IONS-SCNC: 9 MMOL/L (ref 9–16)
ANTI-XA UNFRAC HEPARIN: 0.26 IU/L
ANTI-XA UNFRAC HEPARIN: 0.32 IU/L
ANTI-XA UNFRAC HEPARIN: 0.46 IU/L
BASOPHILS # BLD: <0.03 K/UL (ref 0–0.2)
BASOPHILS NFR BLD: 0 % (ref 0–2)
BUN SERPL-MCNC: 7 MG/DL (ref 8–23)
CALCIUM SERPL-MCNC: 8.7 MG/DL (ref 8.6–10.4)
CHLORIDE SERPL-SCNC: 92 MMOL/L (ref 98–107)
CHLORIDE SERPL-SCNC: 95 MMOL/L (ref 98–107)
CHLORIDE SERPL-SCNC: 96 MMOL/L (ref 98–107)
CHLORIDE SERPL-SCNC: 97 MMOL/L (ref 98–107)
CK SERPL-CCNC: 430 U/L (ref 26–192)
CK SERPL-CCNC: 546 U/L (ref 26–192)
CK SERPL-CCNC: 755 U/L (ref 26–192)
CK SERPL-CCNC: 840 U/L (ref 26–192)
CO2 SERPL-SCNC: 17 MMOL/L (ref 20–31)
CO2 SERPL-SCNC: 18 MMOL/L (ref 20–31)
CO2 SERPL-SCNC: 19 MMOL/L (ref 20–31)
CO2 SERPL-SCNC: 19 MMOL/L (ref 20–31)
CREAT SERPL-MCNC: 0.7 MG/DL (ref 0.6–0.9)
EKG ATRIAL RATE: 75 BPM
EKG Q-T INTERVAL: 394 MS
EKG QRS DURATION: 88 MS
EKG QTC CALCULATION (BAZETT): 443 MS
EKG R AXIS: 71 DEGREES
EKG T AXIS: 66 DEGREES
EKG VENTRICULAR RATE: 76 BPM
EOSINOPHIL # BLD: <0.03 K/UL (ref 0–0.44)
EOSINOPHILS RELATIVE PERCENT: 0 % (ref 1–4)
ERYTHROCYTE [DISTWIDTH] IN BLOOD BY AUTOMATED COUNT: 12.3 % (ref 11.8–14.4)
FIO2: 30
GFR, ESTIMATED: 85 ML/MIN/1.73M2
GLUCOSE BLD-MCNC: 132 MG/DL (ref 65–105)
GLUCOSE BLD-MCNC: 140 MG/DL (ref 74–100)
GLUCOSE SERPL-MCNC: 133 MG/DL (ref 74–99)
HCT VFR BLD AUTO: 35.2 % (ref 36.3–47.1)
HGB BLD-MCNC: 12.5 G/DL (ref 11.9–15.1)
IMM GRANULOCYTES # BLD AUTO: 0.07 K/UL (ref 0–0.3)
IMM GRANULOCYTES NFR BLD: 0 %
LYMPHOCYTES NFR BLD: 1.39 K/UL (ref 1.1–3.7)
LYMPHOCYTES RELATIVE PERCENT: 9 % (ref 24–43)
MCH RBC QN AUTO: 32.5 PG (ref 25.2–33.5)
MCHC RBC AUTO-ENTMCNC: 35.5 G/DL (ref 28.4–34.8)
MCV RBC AUTO: 91.4 FL (ref 82.6–102.9)
MICROORGANISM SPEC CULT: NORMAL
MICROORGANISM/AGENT SPEC: NORMAL
MODE: ABNORMAL
MONOCYTES NFR BLD: 1.44 K/UL (ref 0.1–1.2)
MONOCYTES NFR BLD: 9 % (ref 3–12)
MRSA, DNA, NASAL: NEGATIVE
MYOGLOBIN SERPL-MCNC: 217 NG/ML (ref 25–58)
MYOGLOBIN SERPL-MCNC: 243 NG/ML (ref 25–58)
MYOGLOBIN SERPL-MCNC: 484 NG/ML (ref 25–58)
MYOGLOBIN SERPL-MCNC: 514 NG/ML (ref 25–58)
NEGATIVE BASE EXCESS, ART: 2 MMOL/L (ref 0–2)
NEUTROPHILS NFR BLD: 82 % (ref 36–65)
NEUTS SEG NFR BLD: 13.29 K/UL (ref 1.5–8.1)
NRBC BLD-RTO: 0 PER 100 WBC
O2 DELIVERY DEVICE: ABNORMAL
OSMOLALITY SERPL: 258 MOSM/KG (ref 275–295)
OSMOLALITY UR: 629 MOSM/KG (ref 80–1300)
PLATELET # BLD AUTO: 151 K/UL (ref 138–453)
PMV BLD AUTO: 9.7 FL (ref 8.1–13.5)
POC HCO3: 21.8 MMOL/L (ref 21–28)
POC LACTIC ACID: 1.2 MMOL/L (ref 0.56–1.39)
POC O2 SATURATION: 98.8 % (ref 94–98)
POC PCO2: 33.4 MM HG (ref 35–48)
POC PH: 7.42 (ref 7.35–7.45)
POC PO2: 120 MM HG (ref 83–108)
POTASSIUM SERPL-SCNC: 4.1 MMOL/L (ref 3.7–5.3)
POTASSIUM SERPL-SCNC: 4.3 MMOL/L (ref 3.7–5.3)
POTASSIUM SERPL-SCNC: 4.5 MMOL/L (ref 3.7–5.3)
POTASSIUM SERPL-SCNC: 4.6 MMOL/L (ref 3.7–5.3)
POTASSIUM SERPL-SCNC: 4.7 MMOL/L (ref 3.7–5.3)
POTASSIUM SERPL-SCNC: 4.8 MMOL/L (ref 3.7–5.3)
PROCALCITONIN SERPL-MCNC: 0.21 NG/ML (ref 0–0.09)
RBC # BLD AUTO: 3.85 M/UL (ref 3.95–5.11)
SAMPLE SITE: ABNORMAL
SODIUM SERPL-SCNC: 120 MMOL/L (ref 136–145)
SODIUM SERPL-SCNC: 122 MMOL/L (ref 136–145)
SODIUM SERPL-SCNC: 124 MMOL/L (ref 136–145)
SODIUM SERPL-SCNC: 125 MMOL/L (ref 136–145)
SODIUM UR-SCNC: <20 MMOL/L
SPECIMEN DESCRIPTION: NORMAL
SPECIMEN DESCRIPTION: NORMAL
TSH SERPL DL<=0.05 MIU/L-ACNC: 3.13 UIU/ML (ref 0.27–4.2)
WBC OTHER # BLD: 16.2 K/UL (ref 3.5–11.3)

## 2025-05-31 PROCEDURE — 94640 AIRWAY INHALATION TREATMENT: CPT

## 2025-05-31 PROCEDURE — 84300 ASSAY OF URINE SODIUM: CPT

## 2025-05-31 PROCEDURE — 99232 SBSQ HOSP IP/OBS MODERATE 35: CPT | Performed by: INTERNAL MEDICINE

## 2025-05-31 PROCEDURE — 84145 PROCALCITONIN (PCT): CPT

## 2025-05-31 PROCEDURE — 83935 ASSAY OF URINE OSMOLALITY: CPT

## 2025-05-31 PROCEDURE — 82947 ASSAY GLUCOSE BLOOD QUANT: CPT

## 2025-05-31 PROCEDURE — 84443 ASSAY THYROID STIM HORMONE: CPT

## 2025-05-31 PROCEDURE — 2580000003 HC RX 258: Performed by: INTERNAL MEDICINE

## 2025-05-31 PROCEDURE — 2000000000 HC ICU R&B

## 2025-05-31 PROCEDURE — 99222 1ST HOSP IP/OBS MODERATE 55: CPT | Performed by: INTERNAL MEDICINE

## 2025-05-31 PROCEDURE — 2580000003 HC RX 258

## 2025-05-31 PROCEDURE — 83605 ASSAY OF LACTIC ACID: CPT

## 2025-05-31 PROCEDURE — 94761 N-INVAS EAR/PLS OXIMETRY MLT: CPT

## 2025-05-31 PROCEDURE — 80051 ELECTROLYTE PANEL: CPT

## 2025-05-31 PROCEDURE — 85025 COMPLETE CBC W/AUTO DIFF WBC: CPT

## 2025-05-31 PROCEDURE — 6370000000 HC RX 637 (ALT 250 FOR IP): Performed by: INTERNAL MEDICINE

## 2025-05-31 PROCEDURE — 83874 ASSAY OF MYOGLOBIN: CPT

## 2025-05-31 PROCEDURE — 83930 ASSAY OF BLOOD OSMOLALITY: CPT

## 2025-05-31 PROCEDURE — 99291 CRITICAL CARE FIRST HOUR: CPT | Performed by: INTERNAL MEDICINE

## 2025-05-31 PROCEDURE — 82803 BLOOD GASES ANY COMBINATION: CPT

## 2025-05-31 PROCEDURE — 6370000000 HC RX 637 (ALT 250 FOR IP)

## 2025-05-31 PROCEDURE — 37799 UNLISTED PX VASCULAR SURGERY: CPT

## 2025-05-31 PROCEDURE — 6360000002 HC RX W HCPCS: Performed by: STUDENT IN AN ORGANIZED HEALTH CARE EDUCATION/TRAINING PROGRAM

## 2025-05-31 PROCEDURE — 6360000002 HC RX W HCPCS

## 2025-05-31 PROCEDURE — 87040 BLOOD CULTURE FOR BACTERIA: CPT

## 2025-05-31 PROCEDURE — 93010 ELECTROCARDIOGRAM REPORT: CPT | Performed by: INTERNAL MEDICINE

## 2025-05-31 PROCEDURE — 80048 BASIC METABOLIC PNL TOTAL CA: CPT

## 2025-05-31 PROCEDURE — 36415 COLL VENOUS BLD VENIPUNCTURE: CPT

## 2025-05-31 PROCEDURE — 2700000000 HC OXYGEN THERAPY PER DAY

## 2025-05-31 PROCEDURE — 94003 VENT MGMT INPAT SUBQ DAY: CPT

## 2025-05-31 PROCEDURE — 85520 HEPARIN ASSAY: CPT

## 2025-05-31 PROCEDURE — 82550 ASSAY OF CK (CPK): CPT

## 2025-05-31 RX ORDER — NOREPINEPHRINE BITARTRATE 0.06 MG/ML
1-100 INJECTION, SOLUTION INTRAVENOUS CONTINUOUS
Status: DISCONTINUED | OUTPATIENT
Start: 2025-05-31 | End: 2025-06-02

## 2025-05-31 RX ORDER — IPRATROPIUM BROMIDE AND ALBUTEROL SULFATE 2.5; .5 MG/3ML; MG/3ML
1 SOLUTION RESPIRATORY (INHALATION)
Status: DISCONTINUED | OUTPATIENT
Start: 2025-05-31 | End: 2025-06-02

## 2025-05-31 RX ORDER — ATORVASTATIN CALCIUM 40 MG/1
40 TABLET, FILM COATED ORAL NIGHTLY
Status: DISCONTINUED | OUTPATIENT
Start: 2025-05-31 | End: 2025-06-10 | Stop reason: HOSPADM

## 2025-05-31 RX ORDER — MIDODRINE HYDROCHLORIDE 5 MG/1
2.5 TABLET ORAL ONCE
Status: DISCONTINUED | OUTPATIENT
Start: 2025-05-31 | End: 2025-05-31

## 2025-05-31 RX ORDER — DONEPEZIL HYDROCHLORIDE 10 MG/1
10 TABLET, FILM COATED ORAL NIGHTLY
Status: DISCONTINUED | OUTPATIENT
Start: 2025-05-31 | End: 2025-06-10 | Stop reason: HOSPADM

## 2025-05-31 RX ORDER — 0.9 % SODIUM CHLORIDE 0.9 %
500 INTRAVENOUS SOLUTION INTRAVENOUS ONCE
Status: COMPLETED | OUTPATIENT
Start: 2025-05-31 | End: 2025-05-31

## 2025-05-31 RX ORDER — ASPIRIN 81 MG/1
81 TABLET, CHEWABLE ORAL DAILY
Status: DISCONTINUED | OUTPATIENT
Start: 2025-05-31 | End: 2025-06-10 | Stop reason: HOSPADM

## 2025-05-31 RX ORDER — MEMANTINE HYDROCHLORIDE 5 MG/1
5 TABLET ORAL 2 TIMES DAILY
Status: DISCONTINUED | OUTPATIENT
Start: 2025-05-31 | End: 2025-06-10 | Stop reason: HOSPADM

## 2025-05-31 RX ORDER — SODIUM CHLORIDE FOR INHALATION 0.9 %
3 VIAL, NEBULIZER (ML) INHALATION EVERY 4 HOURS PRN
Status: DISCONTINUED | OUTPATIENT
Start: 2025-05-31 | End: 2025-06-10 | Stop reason: HOSPADM

## 2025-05-31 RX ORDER — ACETAMINOPHEN 325 MG/1
650 TABLET ORAL EVERY 4 HOURS PRN
Status: DISCONTINUED | OUTPATIENT
Start: 2025-05-31 | End: 2025-06-10 | Stop reason: HOSPADM

## 2025-05-31 RX ADMIN — HEPARIN SODIUM 12 UNITS/KG/HR: 10000 INJECTION, SOLUTION INTRAVENOUS at 20:30

## 2025-05-31 RX ADMIN — BENZOCAINE AND MENTHOL 1 LOZENGE: 15; 3.6 LOZENGE ORAL at 17:00

## 2025-05-31 RX ADMIN — IPRATROPIUM BROMIDE AND ALBUTEROL SULFATE 1 DOSE: .5; 2.5 SOLUTION RESPIRATORY (INHALATION) at 20:29

## 2025-05-31 RX ADMIN — HEPARIN SODIUM 1700 UNITS: 1000 INJECTION INTRAVENOUS; SUBCUTANEOUS at 05:27

## 2025-05-31 RX ADMIN — SODIUM CHLORIDE, PRESERVATIVE FREE 20 MG: 5 INJECTION INTRAVENOUS at 07:53

## 2025-05-31 RX ADMIN — RACEPINEPHRINE HYDROCHLORIDE 0.5 ML: 11.25 SOLUTION RESPIRATORY (INHALATION) at 20:29

## 2025-05-31 RX ADMIN — RACEPINEPHRINE HYDROCHLORIDE 0.5 ML: 11.25 SOLUTION RESPIRATORY (INHALATION) at 14:55

## 2025-05-31 RX ADMIN — HEPARIN SODIUM 12 UNITS/KG/HR: 10000 INJECTION, SOLUTION INTRAVENOUS at 05:29

## 2025-05-31 RX ADMIN — DEXTROSE 500 ML: 5 SOLUTION INTRAVENOUS at 07:38

## 2025-05-31 RX ADMIN — SODIUM CHLORIDE, PRESERVATIVE FREE 20 MG: 5 INJECTION INTRAVENOUS at 20:13

## 2025-05-31 RX ADMIN — PIPERACILLIN AND TAZOBACTAM 3375 MG: 3; .375 INJECTION, POWDER, LYOPHILIZED, FOR SOLUTION INTRAVENOUS at 20:21

## 2025-05-31 RX ADMIN — IPRATROPIUM BROMIDE AND ALBUTEROL SULFATE 1 DOSE: .5; 2.5 SOLUTION RESPIRATORY (INHALATION) at 07:46

## 2025-05-31 RX ADMIN — Medication 7.5 MG: at 17:24

## 2025-05-31 RX ADMIN — IPRATROPIUM BROMIDE AND ALBUTEROL SULFATE 1 DOSE: .5; 2.5 SOLUTION RESPIRATORY (INHALATION) at 11:10

## 2025-05-31 RX ADMIN — SODIUM CHLORIDE 500 ML: 0.9 INJECTION, SOLUTION INTRAVENOUS at 15:27

## 2025-05-31 RX ADMIN — PROPOFOL 20 MCG/KG/MIN: 10 INJECTION, EMULSION INTRAVENOUS at 02:30

## 2025-05-31 RX ADMIN — PIPERACILLIN AND TAZOBACTAM 3375 MG: 3; .375 INJECTION, POWDER, LYOPHILIZED, FOR SOLUTION INTRAVENOUS at 14:28

## 2025-05-31 RX ADMIN — IPRATROPIUM BROMIDE AND ALBUTEROL SULFATE 1 DOSE: .5; 2.5 SOLUTION RESPIRATORY (INHALATION) at 14:55

## 2025-05-31 RX ADMIN — ACETAMINOPHEN 650 MG: 325 TABLET ORAL at 11:11

## 2025-05-31 RX ADMIN — MEMANTINE 5 MG: 5 TABLET ORAL at 08:30

## 2025-05-31 RX ADMIN — ASPIRIN 81 MG: 81 TABLET, CHEWABLE ORAL at 08:30

## 2025-05-31 ASSESSMENT — PULMONARY FUNCTION TESTS
PIF_VALUE: 15
PIF_VALUE: 13
PIF_VALUE: 15
PIF_VALUE: 11

## 2025-05-31 ASSESSMENT — PAIN SCALES - GENERAL: PAINLEVEL_OUTOF10: 0

## 2025-05-31 NOTE — PROGRESS NOTES
Renal Progress Note    Patient :  Lyn Lombardi; 84 y.o. MRN# [id number]  Location:  3024/3024-01  Attending:  Judd Juan MD  Admit Date:  5/29/2025   Hospital Day: 2    Subjective:     Originally patient was brought into [facility] on 5/29/2025, after being found down and unresponsive.  Originally she was found to be have hyponatremia with a sodium of 114 and a CT of the head had no acute findings.    Yesterday her sodium had trended up from 118-123.  Earlier this morning her sodium went up to 125, at that time she was given a bolus of D5 and her sodium has trended back down to 122.  Currently she is not receiving any IV fluids.  She is receiving Levophed.  This morning she was able to be extubated.  Blood pressures are primarily in the 110s, has been as high as 130 systolic  She has had good urine output 2.7 L over the past 24 hours.  Her daughter is at bedside today and reports she is at her mental baseline although she does have difficulty with memory overall.    Outpatient Medications:     Medications Prior to Admission: nitroGLYCERIN (NITROSTAT) 0.4 MG SL tablet, Place 1 tablet under the tongue every 5 minutes as needed for Chest pain  donepezil (ARICEPT) 10 MG tablet, Take one tablet by mouth nightly  isosorbide mononitrate (IMDUR) 30 MG extended release tablet, TAKE 1 TABLET BY MOUTH DAILY  memantine (NAMENDA) 5 MG tablet, TAKE 1 TABLET BY MOUTH TWICE  DAILY  lisinopril (PRINIVIL;ZESTRIL) 20 MG tablet, TAKE 1 TABLET BY MOUTH DAILY  atorvastatin (LIPITOR) 40 MG tablet, TAKE 1 TABLET BY MOUTH EVERY  NIGHT AT BEDTIME  Cholecalciferol (VITAMIN D3) 50 MCG (2000 UT) CAPS, Take 1 capsule by mouth daily  ferrous sulfate (FE TABS 325) 325 (65 Fe) MG EC tablet, Take 1 tablet by mouth daily (with breakfast)  alendronate (FOSAMAX) 70 MG tablet, TAKE 1 TABLET BY MOUTH WEEKLY  WITH 8 OZ OF PLAIN WATER 30  MINUTES BEFORE FIRST FOOD, DRINK OR MEDS. STAY UPRIGHT FOR 30  MINS  ascorbic acid (VITAMIN C) 500 MG tablet,  to SIADH, urine osmolality 577, urine sodium 115, was admitted with a sodium of 114, most recently is up to 122  Altered mental sensorium likely secondary to hyponatremia along with worsening of her lumbar disease  Previously on mechanical ventilation    Plan:   Continue to check electrolytes every 4 hours  Plan to continue to correct sodium 6 mmol over the next 24 hours  Will follow    Nutrition   Please ensure that patient is on a renal diet/TF. Avoid nephrotoxic drugs/contrast exposure.    MEÑO Rhodes  [facility]     This note is created with the assistance of a speech-recognition program. While intending to generate a document that actually reflects the content of the visit, no guarantees can be provided that every mistake has been identified and corrected by editing.    Attending Physician Statement  I have discussed the care of Lyn Lombardi, including pertinent history and exam findings,  with the CNP. I have reviewed the key elements of all parts of the encounter with the CNP.  I agree with the assessment, plan and orders as documented by the CNP  Presented with altered mental sensorium secondary to hyponatremia sodium 114 correcting appropriately.    Yosvany Benítez MD MD, MRCP (), FACP   5/31/2025 3:20 PM    [facility]

## 2025-05-31 NOTE — PROGRESS NOTES
heparin (PORCINE) Infusion 12 Units/kg/hr (05/31/25 0718)    norepinephrine 4 mcg/min (05/31/25 0718)    propofol 20 mcg/kg/min (05/31/25 0718)       PRN Meds:   heparin (porcine), 60 Units/kg, PRN  heparin (porcine), 30 Units/kg, PRN  ondansetron, 4 mg, Q8H PRN   Or  ondansetron, 4 mg, Q6H PRN        SUPPORT DEVICES: [] Ventilator [] BIPAP  [] Nasal Cannula [] Room Air    VENT SETTINGS (Comprehensive) (if applicable):  Vent Information  Equipment Changed: Expiratory Filter, HME  Ventilator Initiate: Yes  Vent Mode: AC/PRVC  Additional Respiratory Assessments  Pulse: 76  Respirations: 16  SpO2: 100 %  ETCO2 (mmHg): 33 mmHg  Humidification Source: HME  Circuit Condensation: Drained  Lab Results   Component Value Date/Time    MODE [facility] 05/31/2025 04:46 AM       ABGs:   No results found for: \"PH\", \"PCO2\", \"PO2\", \"HCO3\", \"O2SAT\"    DATA:  Complete Blood Count:   Recent Labs     05/29/25  2314 05/30/25  0551 05/31/25  0445   WBC 15.2* 14.1* 16.2*   RBC 4.89 4.09 3.85*   HGB 15.9* 13.2 12.5   HCT 44.0 37.3 35.2*   MCV 90.0 91.2 91.4   MCH 32.5 32.3 32.5   MCHC 36.1* 35.4* 35.5*   RDW 11.3* 11.6* 12.3    163 151   MPV 9.6 9.9 9.7        Last 3 Blood Glucose:   Recent Labs     05/30/25  0211 05/30/25  0439 05/30/25  0551 05/30/25  0824 05/30/25  1144 05/30/25  1313 05/30/25  1525 05/31/25  0445   GLUCOSE 131* 132* 126* 135* 169* 124* 136* 133*        PT/INR:    Lab Results   Component Value Date/Time    PROTIME 16.7 05/30/2025 05:51 AM    INR 1.3 05/30/2025 05:51 AM     PTT:    Lab Results   Component Value Date/Time    APTT 26.6 05/29/2025 11:14 PM       Basic Metabolic Profile:   Recent Labs     05/30/25  0009 05/30/25  0211 05/30/25  0551 05/30/25  0824 05/30/25  1144 05/30/25  1313 05/30/25  1525 05/30/25  1849 05/30/25  2312 05/31/25  0318 05/31/25  0445   NA  --    < > 115*   < > 118* 119* 120*   < > 123* 124* 125*   K  --    < > 4.0   < > 3.3* 3.7 4.7   < > 4.7 4.6 4.7   CL  --    < > 89*   < > 92* 91* 93*   <  > 96* 96* 97*   CO2  --    < > 16*   < > 16* 17* 17*   < > 18* 19* 19*   BUN  --    < > 7*   < > 6* 6* 7*  --   --   --  7*   CREATININE  --    < > 0.5*   < > 0.5* 0.5* 0.5*  --   --   --  0.7   GLUCOSE  --    < > 126*   < > 169* 124* 136*  --   --   --  133*   MG  --   --  1.5*  --  2.0  --   --   --   --   --   --    PHOS 2.1*  --  2.6  --   --   --   --   --   --   --   --     < > = values in this interval not displayed.       Liver Function:  Recent Labs     05/30/25  0551   ALT 29   AST 53*   ALKPHOS 54   BILITOT 1.6*       Magnesium:   Lab Results   Component Value Date/Time    MG 2.0 05/30/2025 11:44 AM    MG 1.5 05/30/2025 05:51 AM    MG 2.0 03/04/2015 05:41 AM     Phosphorus:   Lab Results   Component Value Date/Time    PHOS 2.6 05/30/2025 05:51 AM    PHOS 2.1 05/30/2025 12:09 AM     Ionized Calcium:   Lab Results   Component Value Date/Time    CAION 1.02 05/30/2025 05:51 AM        Urinalysis:   Lab Results   Component Value Date/Time    NITRU NEGATIVE 05/29/2025 11:40 PM    COLORU Yellow 05/29/2025 11:40 PM    PHUR 5.0 05/29/2025 11:40 PM    PHUR 5.5 02/09/2021 10:36 AM    WBCUA None 05/29/2025 11:40 PM    RBCUA 2 TO 5 05/29/2025 11:40 PM    MUCUS NOT REPORTED 02/09/2021 10:36 AM    TRICHOMONAS NOT REPORTED 02/09/2021 10:36 AM    YEAST NOT REPORTED 02/09/2021 10:36 AM    BACTERIA None 05/29/2025 11:40 PM    LEUKOCYTESUR NEGATIVE 05/29/2025 11:40 PM    UROBILINOGEN Normal 05/29/2025 11:40 PM    BILIRUBINUR NEGATIVE 05/29/2025 11:40 PM    GLUCOSEU 2+ 05/29/2025 11:40 PM    KETUA SMALL 05/29/2025 11:40 PM    AMORPHOUS NOT REPORTED 02/09/2021 10:36 AM       HgBA1c:    Lab Results   Component Value Date/Time    LABA1C 5.5 05/29/2025 11:14 PM     TSH:    Lab Results   Component Value Date/Time    TSH 3.13 05/31/2025 04:45 AM     Lactic Acid: No results found for: \"LACTA\"   Troponin: No results for input(s): \"TROPONINI\" in the last 72 hours.    Microbiology:    Other Labs:  Results for orders placed or performed  of ICU  Start pt on ASA and Statin  Echo ordered showing EF 64%. Mild AR, MR and TR  Pulmonary:  Fluid overload  Seen on CXR  Continue to monitor respiratory status  Duonebs  Intubated and sedated  30/16/360/5  GI/Nutrition  Continue tube feeds   Pepcid  Renal/Fluid/Electrolyte  Hyponatremia   Mild rhabdo  Nephro on board  Pt started on D5 this am:Na at 125  Na range 122-124  Daily BMP   Electrolyte panel q4  Trend CK    ID  Leukocytosis   Daily CBC  Pt febrile this am with increasing WBC  Will obtain procal  If elevated will add Zosyn to cover aspiration pneumonia  Repeat blood cultures  Resp panel, Resp cx and blood cx negative thus far  Hematology:  H/o anemia  Continue to monitor  Endocrine:   glucose controlled  No requiring any insulin support  Glucose <180    DVT Prophylaxis  EPC cuffs  Heparin    CODE STATUS: Full Code      Jennifer Wagoner MD  Internal Medicine Resident, PGY-1  [place], [place]  5/31/2025,7:24 AM

## 2025-05-31 NOTE — PLAN OF CARE
Problem: Neurosensory - Adult  Goal: Achieves stable or improved neurological status  5/31/2025 1636 by Nereida Brar RN  Outcome: Progressing  5/31/2025 0605 by Stephy Newby RN  Outcome: Progressing  Flowsheets (Taken 5/30/2025 2000)  Achieves stable or improved neurological status: Assess for and report changes in neurological status     Problem: Respiratory - Adult  Goal: Achieves optimal ventilation and oxygenation  Outcome: Progressing     Problem: Metabolic/Fluid and Electrolytes - Adult  Goal: Electrolytes maintained within normal limits  5/31/2025 1636 by Nereida Brar RN  Outcome: Progressing  5/31/2025 0605 by Stephy Newby RN  Outcome: Progressing  Flowsheets (Taken 5/30/2025 2000)  Electrolytes maintained within normal limits: Monitor labs and assess patient for signs and symptoms of electrolyte imbalances  Goal: Hemodynamic stability and optimal renal function maintained  5/31/2025 1636 by Nereida Brar RN  Outcome: Progressing  5/31/2025 0605 by Stephy Newby RN  Outcome: Progressing  Flowsheets (Taken 5/30/2025 2000)  Hemodynamic stability and optimal renal function maintained: Monitor labs and assess for signs and symptoms of volume excess or deficit  Goal: Glucose maintained within prescribed range  5/31/2025 1636 by Nereida Brar RN  Outcome: Progressing  5/31/2025 0605 by Stephy Newby RN  Outcome: Progressing  Flowsheets (Taken 5/30/2025 2000)  Glucose maintained within prescribed range: Monitor blood glucose as ordered

## 2025-05-31 NOTE — PROGRESS NOTES
Order obtained for extubation.  SpO2 of 99 on 30% FiO2.   Patient extubated and placed on 3 liters/min via nasal cannula.   Post extubation SpO2 is 98% with HR  87 bpm and RR 18 breaths/min.    Patient had moderate cough that was productive of tan sputum.  Extubation Well tolerated by patient..   Breath Sounds: clear/dim    Jostin Mcdonnell RCP   12:51 PM

## 2025-05-31 NOTE — PLAN OF CARE
Problem: Safety - Medical Restraint  Goal: Remains free of injury from restraints (Restraint for Interference with Medical Device)  Description: INTERVENTIONS:1. Determine that other, less restrictive measures have been tried or would not be effective before applying the restraint2. Evaluate the patient's condition at the time of restraint application3. Inform patient/family regarding the reason for restraint4. Q2H: Monitor safety, psychosocial status, comfort, nutrition and hydration  Outcome: Completed  Flowsheets  Taken 5/31/2025 1200  Remains free of injury from restraints (restraint for interference with medical device): Every 2 hours: Monitor safety, psychosocial status, comfort, nutrition and hydration  Taken 5/31/2025 1000  Remains free of injury from restraints (restraint for interference with medical device): Every 2 hours: Monitor safety, psychosocial status, comfort, nutrition and hydration  Taken 5/31/2025 0800  Remains free of injury from restraints (restraint for interference with medical device): Every 2 hours: Monitor safety, psychosocial status, comfort, nutrition and hydration      Pt had an evist after being exposed to hand, foot and mouth. Pt c/o sore throat, flat red dots on hands and throat. Denies itching. Denies fever, HA and fatigue. Dx with viral illness. Pt is scheduled for VV with PCP tomorrow but states she doesn't need appt. Just needs to know how long she is contagious for? I will cancel VV.     LOV with Lamar Neri MD , 1/16/2024

## 2025-05-31 NOTE — PLAN OF CARE
Problem: Neurosensory - Adult  Goal: Achieves stable or improved neurological status  5/31/2025 0605 by Stephy Newby RN  Outcome: Progressing  Flowsheets (Taken 5/30/2025 2000)  Achieves stable or improved neurological status: Assess for and report changes in neurological status     Problem: Neurosensory - Adult  Goal: Absence of seizures  5/31/2025 0605 by Stephy Newby RN  Outcome: Progressing  Flowsheets (Taken 5/30/2025 2000)  Absence of seizures: Monitor for seizure activity.  If seizure occurs, document type and location of movements and any associated apnea     Problem: Neurosensory - Adult  Goal: Remains free of injury related to seizures activity  5/31/2025 0605 by Stephy Newby RN  Outcome: Progressing  Flowsheets (Taken 5/30/2025 2000)  Remains free of injury related to seizure activity: Maintain airway, patient safety  and administer oxygen as ordered     Problem: Neurosensory - Adult  Goal: Achieves maximal functionality and self care  5/31/2025 0605 by Stephy Newby RN  Outcome: Progressing     Problem: Cardiovascular - Adult  Goal: Maintains optimal cardiac output and hemodynamic stability  5/31/2025 0605 by Stephy Newby RN  Outcome: Progressing  Flowsheets (Taken 5/30/2025 2000)  Maintains optimal cardiac output and hemodynamic stability: Monitor blood pressure and heart rate     Problem: Cardiovascular - Adult  Goal: Absence of cardiac dysrhythmias or at baseline  5/31/2025 0605 by Stephy Newby RN  Outcome: Progressing  Flowsheets (Taken 5/30/2025 2000)  Absence of cardiac dysrhythmias or at baseline: Monitor cardiac rate and rhythm     Problem: Musculoskeletal - Adult  Goal: Return mobility to safest level of function  5/31/2025 0605 by Stephy Newby RN  Outcome: Progressing  Flowsheets (Taken 5/30/2025 2000)  Return Mobility to Safest Level of Function: Assess patient stability and activity tolerance for standing, transferring and ambulating with or without  assistive devices     Problem: Musculoskeletal - Adult  Goal: Maintain proper alignment of affected body part  5/31/2025 0605 by Stephy Newby RN  Outcome: Progressing  Flowsheets (Taken 5/30/2025 2000)  Maintain proper alignment of affected body part: Support and protect limb and body alignment per provider's orders     Problem: Metabolic/Fluid and Electrolytes - Adult  Goal: Electrolytes maintained within normal limits  5/31/2025 0605 by Stephy Newby RN  Outcome: Progressing  Flowsheets (Taken 5/30/2025 2000)  Electrolytes maintained within normal limits: Monitor labs and assess patient for signs and symptoms of electrolyte imbalances     Problem: Metabolic/Fluid and Electrolytes - Adult  Goal: Hemodynamic stability and optimal renal function maintained  5/31/2025 0605 by Stephy Newby RN  Outcome: Progressing  Flowsheets (Taken 5/30/2025 2000)  Hemodynamic stability and optimal renal function maintained: Monitor labs and assess for signs and symptoms of volume excess or deficit

## 2025-05-31 NOTE — CARE COORDINATION
Case Management Assessment  Initial Evaluation    Date/Time of Evaluation: 5/31/2025 3:54 PM  Assessment Completed by: KRISTIAN SHORT RN    If patient is discharged prior to next notation, then this note serves as note for discharge by case management.    Patient Name: Lyn Lombardi                   YOB: 1940  Diagnosis: Acute hyponatremia [E87.1]  NSTEMI (non-ST elevated myocardial infarction) (HCC) [I21.4]                   Date / Time: 5/29/2025 10:41 PM    Patient Admission Status: Inpatient   Readmission Risk (Low < 19, Mod (19-27), High > 27): Readmission Risk Score: 10.8    Current PCP: Tru Gould MD  PCP verified by CM? (P) Yes    Chart Reviewed: Yes      History Provided by: (P) Child/Family (Daughter Loni via phone call)  Patient Orientation: Other (see comment) (Sleeping)    Patient Cognition: (P) Other (see comment) (recently extubated)    Hospitalization in the last 30 days (Readmission):  No    If yes, Readmission Assessment in  Navigator will be completed.    Advance Directives:      Code Status: Full Code   Patient's Primary Decision Maker is: (P) Legal Next of Kin    Primary Decision Maker (Active): Terese Fontenot - Child - [phone]    Secondary Decision Maker: Meg Wheeler - Child - [phone]    Discharge Planning:    Patient lives with: (P) Alone Type of Home: (P) House  Primary Care Giver: (P) Self  Patient Support Systems include: (P) Children   Current Financial resources: (P) Medicare  Current community resources:    Current services prior to admission: (P) Durable Medical Equipment, Meals On Wheels            Current DME: (P) Walker, Wheelchair, Other (Comment) (Scooter)            Type of Home Care services:  (P) None    ADLS  Prior functional level: (P) Assistance with the following:, Shopping, Cooking  Current functional level: (P) Assistance with the following:, Bathing, Dressing, Toileting, Cooking, Housework, Feeding, Shopping, Mobility    PT AM-PAC:    dialogue that supports the patient's individualized plan of care/goals and shares the quality data associated with the providers was provided to:     Patient Representative Name:       The Patient and/or Patient Representative Agree with the Discharge Plan?      KRISTIAN SHORT RN  Case Management Department  Ph: [phone]

## 2025-06-01 ENCOUNTER — APPOINTMENT (OUTPATIENT)
Age: 85
DRG: 643 | End: 2025-06-01
Payer: MEDICARE

## 2025-06-01 PROBLEM — R79.89 ELEVATED TROPONIN: Status: ACTIVE | Noted: 2025-06-01

## 2025-06-01 LAB
ABO + RH BLD: NORMAL
ALBUMIN SERPL-MCNC: 2.7 G/DL (ref 3.5–5.2)
ALBUMIN/GLOB SERPL: 1.4 {RATIO} (ref 1–2.5)
ALP SERPL-CCNC: 60 U/L (ref 35–104)
ALT SERPL-CCNC: 22 U/L (ref 10–35)
AMPHETAMINE: NEGATIVE NG/ML
ANION GAP SERPL CALCULATED.3IONS-SCNC: 11 MMOL/L (ref 9–16)
ANION GAP SERPL CALCULATED.3IONS-SCNC: 11 MMOL/L (ref 9–16)
ANION GAP SERPL CALCULATED.3IONS-SCNC: 8 MMOL/L (ref 9–16)
ANION GAP SERPL CALCULATED.3IONS-SCNC: 9 MMOL/L (ref 9–16)
ANTI-XA UNFRAC HEPARIN: 0.26 IU/L
ARM BAND NUMBER: NORMAL
AST SERPL-CCNC: 37 U/L (ref 10–35)
BARBITURATES: NEGATIVE NG/ML
BASOPHILS # BLD: <0.03 K/UL (ref 0–0.2)
BASOPHILS NFR BLD: 0 % (ref 0–2)
BENZODIAZEPINES: POSITIVE NG/ML
BILIRUB DIRECT SERPL-MCNC: 0.5 MG/DL (ref 0–0.2)
BILIRUB INDIRECT SERPL-MCNC: 0.5 MG/DL (ref 0–1)
BILIRUB SERPL-MCNC: 1 MG/DL (ref 0–1.2)
BLOOD BANK SAMPLE EXPIRATION: NORMAL
BLOOD GROUP ANTIBODIES SERPL: NEGATIVE
BUN SERPL-MCNC: 10 MG/DL (ref 8–23)
BUPRENORPHINE: NEGATIVE NG/ML
CALCIUM SERPL-MCNC: 8.2 MG/DL (ref 8.6–10.4)
CHLORIDE SERPL-SCNC: 100 MMOL/L (ref 98–107)
CHLORIDE SERPL-SCNC: 100 MMOL/L (ref 98–107)
CHLORIDE SERPL-SCNC: 102 MMOL/L (ref 98–107)
CHLORIDE SERPL-SCNC: 103 MMOL/L (ref 98–107)
CHLORIDE SERPL-SCNC: 104 MMOL/L (ref 98–107)
CHLORIDE SERPL-SCNC: 97 MMOL/L (ref 98–107)
CK SERPL-CCNC: 689 U/L (ref 26–192)
CK SERPL-CCNC: 775 U/L (ref 26–192)
CK SERPL-CCNC: 781 U/L (ref 26–192)
CK SERPL-CCNC: 878 U/L (ref 26–192)
CO2 SERPL-SCNC: 19 MMOL/L (ref 20–31)
CO2 SERPL-SCNC: 19 MMOL/L (ref 20–31)
CO2 SERPL-SCNC: 20 MMOL/L (ref 20–31)
COCAINE: NEGATIVE NG/ML
CREAT SERPL-MCNC: 0.7 MG/DL (ref 0.6–0.9)
DAT POLY-SP REAG RBC QL: NEGATIVE
DRUGS OF ABUSE COMMENT: NORMAL
EOSINOPHIL # BLD: <0.03 K/UL (ref 0–0.44)
EOSINOPHILS RELATIVE PERCENT: 0 % (ref 1–4)
ERYTHROCYTE [DISTWIDTH] IN BLOOD BY AUTOMATED COUNT: 12.6 % (ref 11.8–14.4)
FOLATE SERPL-MCNC: 16.7 NG/ML (ref 4.8–24.2)
GFR, ESTIMATED: 85 ML/MIN/1.73M2
GLOBULIN SER CALC-MCNC: 2 G/DL
GLUCOSE SERPL-MCNC: 132 MG/DL (ref 74–99)
HAPTOGLOB SERPL-MCNC: 119 MG/DL (ref 30–200)
HCT VFR BLD AUTO: 20.1 % (ref 36.3–47.1)
HCT VFR BLD AUTO: 22.5 % (ref 36.3–47.1)
HCT VFR BLD AUTO: 23.1 % (ref 36.3–47.1)
HCT VFR BLD AUTO: 24 % (ref 36.3–47.1)
HCT VFR BLD AUTO: 27 % (ref 36.3–47.1)
HGB BLD-MCNC: 7.2 G/DL (ref 11.9–15.1)
HGB BLD-MCNC: 7.8 G/DL (ref 11.9–15.1)
HGB BLD-MCNC: 8 G/DL (ref 11.9–15.1)
HGB BLD-MCNC: 8.4 G/DL (ref 11.9–15.1)
HGB BLD-MCNC: 9.3 G/DL (ref 11.9–15.1)
IMM GRANULOCYTES # BLD AUTO: 0.04 K/UL (ref 0–0.3)
IMM GRANULOCYTES NFR BLD: 0 %
IMM RETICS NFR: 14.5 % (ref 2.7–18.3)
IRON SATN MFR SERPL: 11 % (ref 20–55)
IRON SERPL-MCNC: 13 UG/DL (ref 37–145)
LACTIC ACID, WHOLE BLOOD: 1.1 MMOL/L (ref 0.7–2.1)
LACTIC ACID, WHOLE BLOOD: 1.4 MMOL/L (ref 0.7–2.1)
LACTIC ACID, WHOLE BLOOD: 1.7 MMOL/L (ref 0.7–2.1)
LDH SERPL-CCNC: 158 U/L (ref 135–214)
LYMPHOCYTES NFR BLD: 1.24 K/UL (ref 1.1–3.7)
LYMPHOCYTES RELATIVE PERCENT: 11 % (ref 24–43)
MCH RBC QN AUTO: 32.5 PG (ref 25.2–33.5)
MCHC RBC AUTO-ENTMCNC: 34.6 G/DL (ref 28.4–34.8)
MCV RBC AUTO: 93.9 FL (ref 82.6–102.9)
METHADONE: NEGATIVE NG/ML
METHAMPHETAMINE: NEGATIVE NG/ML
MONOCYTES NFR BLD: 0.95 K/UL (ref 0.1–1.2)
MONOCYTES NFR BLD: 8 % (ref 3–12)
MYOGLOBIN SERPL-MCNC: 508 NG/ML (ref 25–58)
MYOGLOBIN SERPL-MCNC: 578 NG/ML (ref 25–58)
MYOGLOBIN SERPL-MCNC: 601 NG/ML (ref 25–58)
MYOGLOBIN SERPL-MCNC: 671 NG/ML (ref 25–58)
NEUTROPHILS NFR BLD: 80 % (ref 36–65)
NEUTS SEG NFR BLD: 9.2 K/UL (ref 1.5–8.1)
NRBC BLD-RTO: 0 PER 100 WBC
OPIATES: NEGATIVE NG/ML
OXYCODONE: NEGATIVE NG/ML
PHENCYCLIDINE: NEGATIVE NG/ML
PLATELET # BLD AUTO: ABNORMAL K/UL (ref 138–453)
PLATELET, FLUORESCENCE: 124 K/UL (ref 138–453)
PLATELETS.RETICULATED NFR BLD AUTO: 3.9 % (ref 1.1–10.3)
POTASSIUM SERPL-SCNC: 3.9 MMOL/L (ref 3.7–5.3)
POTASSIUM SERPL-SCNC: 4 MMOL/L (ref 3.7–5.3)
POTASSIUM SERPL-SCNC: 4.2 MMOL/L (ref 3.7–5.3)
POTASSIUM SERPL-SCNC: 4.3 MMOL/L (ref 3.7–5.3)
POTASSIUM SERPL-SCNC: 4.4 MMOL/L (ref 3.7–5.3)
POTASSIUM SERPL-SCNC: 4.5 MMOL/L (ref 3.7–5.3)
PROT SERPL-MCNC: 4.7 G/DL (ref 6.6–8.7)
RBC # BLD AUTO: 2.46 M/UL (ref 3.95–5.11)
RETIC HEMOGLOBIN: 35.8 PG (ref 28.2–35.7)
RETICS # AUTO: 0.05 M/UL (ref 0.03–0.08)
RETICS/RBC NFR AUTO: 2.1 % (ref 0.5–1.9)
SODIUM SERPL-SCNC: 125 MMOL/L (ref 136–145)
SODIUM SERPL-SCNC: 129 MMOL/L (ref 136–145)
SODIUM SERPL-SCNC: 129 MMOL/L (ref 136–145)
SODIUM SERPL-SCNC: 130 MMOL/L (ref 136–145)
SODIUM SERPL-SCNC: 134 MMOL/L (ref 136–145)
SODIUM SERPL-SCNC: 134 MMOL/L (ref 136–145)
THC: NEGATIVE NG/ML
TIBC SERPL-MCNC: 121 UG/DL (ref 250–450)
UNSATURATED IRON BINDING CAPACITY: 108 UG/DL (ref 112–347)
VIT B12 SERPL-MCNC: 1268 PG/ML (ref 232–1245)
WBC OTHER # BLD: 11.5 K/UL (ref 3.5–11.3)

## 2025-06-01 PROCEDURE — 92610 EVALUATE SWALLOWING FUNCTION: CPT

## 2025-06-01 PROCEDURE — 85018 HEMOGLOBIN: CPT

## 2025-06-01 PROCEDURE — 80076 HEPATIC FUNCTION PANEL: CPT

## 2025-06-01 PROCEDURE — 80048 BASIC METABOLIC PNL TOTAL CA: CPT

## 2025-06-01 PROCEDURE — 86901 BLOOD TYPING SEROLOGIC RH(D): CPT

## 2025-06-01 PROCEDURE — 97166 OT EVAL MOD COMPLEX 45 MIN: CPT

## 2025-06-01 PROCEDURE — 6360000002 HC RX W HCPCS

## 2025-06-01 PROCEDURE — 6370000000 HC RX 637 (ALT 250 FOR IP)

## 2025-06-01 PROCEDURE — 6370000000 HC RX 637 (ALT 250 FOR IP): Performed by: INTERNAL MEDICINE

## 2025-06-01 PROCEDURE — 83540 ASSAY OF IRON: CPT

## 2025-06-01 PROCEDURE — 86923 COMPATIBILITY TEST ELECTRIC: CPT

## 2025-06-01 PROCEDURE — 99232 SBSQ HOSP IP/OBS MODERATE 35: CPT | Performed by: INTERNAL MEDICINE

## 2025-06-01 PROCEDURE — P9016 RBC LEUKOCYTES REDUCED: HCPCS

## 2025-06-01 PROCEDURE — 2580000003 HC RX 258

## 2025-06-01 PROCEDURE — 85014 HEMATOCRIT: CPT

## 2025-06-01 PROCEDURE — 83605 ASSAY OF LACTIC ACID: CPT

## 2025-06-01 PROCEDURE — 2000000000 HC ICU R&B

## 2025-06-01 PROCEDURE — 94761 N-INVAS EAR/PLS OXIMETRY MLT: CPT

## 2025-06-01 PROCEDURE — 85025 COMPLETE CBC W/AUTO DIFF WBC: CPT

## 2025-06-01 PROCEDURE — 80051 ELECTROLYTE PANEL: CPT

## 2025-06-01 PROCEDURE — 82607 VITAMIN B-12: CPT

## 2025-06-01 PROCEDURE — 86900 BLOOD TYPING SEROLOGIC ABO: CPT

## 2025-06-01 PROCEDURE — 74174 CTA ABD&PLVS W/CONTRAST: CPT

## 2025-06-01 PROCEDURE — 85520 HEPARIN ASSAY: CPT

## 2025-06-01 PROCEDURE — 85055 RETICULATED PLATELET ASSAY: CPT

## 2025-06-01 PROCEDURE — 82550 ASSAY OF CK (CPK): CPT

## 2025-06-01 PROCEDURE — 86880 COOMBS TEST DIRECT: CPT

## 2025-06-01 PROCEDURE — 83550 IRON BINDING TEST: CPT

## 2025-06-01 PROCEDURE — 83874 ASSAY OF MYOGLOBIN: CPT

## 2025-06-01 PROCEDURE — 86850 RBC ANTIBODY SCREEN: CPT

## 2025-06-01 PROCEDURE — 83615 LACTATE (LD) (LDH) ENZYME: CPT

## 2025-06-01 PROCEDURE — 94640 AIRWAY INHALATION TREATMENT: CPT

## 2025-06-01 PROCEDURE — 99291 CRITICAL CARE FIRST HOUR: CPT | Performed by: INTERNAL MEDICINE

## 2025-06-01 PROCEDURE — 97530 THERAPEUTIC ACTIVITIES: CPT

## 2025-06-01 PROCEDURE — 82746 ASSAY OF FOLIC ACID SERUM: CPT

## 2025-06-01 PROCEDURE — 99233 SBSQ HOSP IP/OBS HIGH 50: CPT | Performed by: STUDENT IN AN ORGANIZED HEALTH CARE EDUCATION/TRAINING PROGRAM

## 2025-06-01 PROCEDURE — 85045 AUTOMATED RETICULOCYTE COUNT: CPT

## 2025-06-01 PROCEDURE — 97535 SELF CARE MNGMENT TRAINING: CPT

## 2025-06-01 PROCEDURE — 83010 ASSAY OF HAPTOGLOBIN QUANT: CPT

## 2025-06-01 PROCEDURE — 36430 TRANSFUSION BLD/BLD COMPNT: CPT

## 2025-06-01 PROCEDURE — 6360000004 HC RX CONTRAST MEDICATION

## 2025-06-01 RX ORDER — IOPAMIDOL 755 MG/ML
100 INJECTION, SOLUTION INTRAVASCULAR
Status: COMPLETED | OUTPATIENT
Start: 2025-06-01 | End: 2025-06-01

## 2025-06-01 RX ORDER — SODIUM CHLORIDE 1 G/1
2 TABLET ORAL 2 TIMES DAILY WITH MEALS
Status: DISCONTINUED | OUTPATIENT
Start: 2025-06-02 | End: 2025-06-02

## 2025-06-01 RX ORDER — SODIUM CHLORIDE 9 MG/ML
INJECTION, SOLUTION INTRAVENOUS PRN
Status: DISCONTINUED | OUTPATIENT
Start: 2025-06-01 | End: 2025-06-06

## 2025-06-01 RX ORDER — SODIUM CHLORIDE 9 MG/ML
INJECTION, SOLUTION INTRAVENOUS PRN
Status: DISCONTINUED | OUTPATIENT
Start: 2025-06-01 | End: 2025-06-10 | Stop reason: HOSPADM

## 2025-06-01 RX ADMIN — Medication 7.5 MG: at 15:33

## 2025-06-01 RX ADMIN — IPRATROPIUM BROMIDE AND ALBUTEROL SULFATE 1 DOSE: .5; 2.5 SOLUTION RESPIRATORY (INHALATION) at 12:45

## 2025-06-01 RX ADMIN — IPRATROPIUM BROMIDE AND ALBUTEROL SULFATE 1 DOSE: .5; 2.5 SOLUTION RESPIRATORY (INHALATION) at 08:01

## 2025-06-01 RX ADMIN — SODIUM CHLORIDE, PRESERVATIVE FREE 40 MG: 5 INJECTION INTRAVENOUS at 08:28

## 2025-06-01 RX ADMIN — SODIUM CHLORIDE, PRESERVATIVE FREE 40 MG: 5 INJECTION INTRAVENOUS at 20:17

## 2025-06-01 RX ADMIN — IOPAMIDOL 100 ML: 755 INJECTION, SOLUTION INTRAVENOUS at 09:28

## 2025-06-01 RX ADMIN — DONEPEZIL HYDROCHLORIDE 10 MG: 10 TABLET, FILM COATED ORAL at 20:11

## 2025-06-01 RX ADMIN — ACETAMINOPHEN 650 MG: 325 TABLET ORAL at 20:13

## 2025-06-01 RX ADMIN — PIPERACILLIN AND TAZOBACTAM 3375 MG: 3; .375 INJECTION, POWDER, LYOPHILIZED, FOR SOLUTION INTRAVENOUS at 05:13

## 2025-06-01 RX ADMIN — MEMANTINE 5 MG: 5 TABLET ORAL at 20:12

## 2025-06-01 RX ADMIN — PIPERACILLIN AND TAZOBACTAM 3375 MG: 3; .375 INJECTION, POWDER, LYOPHILIZED, FOR SOLUTION INTRAVENOUS at 20:44

## 2025-06-01 RX ADMIN — IPRATROPIUM BROMIDE AND ALBUTEROL SULFATE 1 DOSE: .5; 2.5 SOLUTION RESPIRATORY (INHALATION) at 20:40

## 2025-06-01 RX ADMIN — PIPERACILLIN AND TAZOBACTAM 3375 MG: 3; .375 INJECTION, POWDER, LYOPHILIZED, FOR SOLUTION INTRAVENOUS at 13:21

## 2025-06-01 RX ADMIN — SODIUM CHLORIDE: 0.9 INJECTION, SOLUTION INTRAVENOUS at 20:40

## 2025-06-01 RX ADMIN — DESMOPRESSIN ACETATE 40 MG: 0.2 TABLET ORAL at 20:11

## 2025-06-01 ASSESSMENT — PAIN SCALES - GENERAL
PAINLEVEL_OUTOF10: 0
PAINLEVEL_OUTOF10: 0

## 2025-06-01 NOTE — PLAN OF CARE
Problem: Neurosensory - Adult  Goal: Achieves stable or improved neurological status  5/31/2025 2332 by Sarah Nielson RN  Outcome: Progressing     Problem: Respiratory - Adult  Goal: Achieves optimal ventilation and oxygenation  5/31/2025 2332 by Sarah Nielson RN  Outcome: Progressing     Problem: Cardiovascular - Adult  Goal: Maintains optimal cardiac output and hemodynamic stability  5/31/2025 2332 by Sarah Nielson RN  Outcome: Progressing     Problem: Cardiovascular - Adult  Goal: Absence of cardiac dysrhythmias or at baseline  5/31/2025 2332 by Sarah Nielson RN  Outcome: Progressing     Problem: Musculoskeletal - Adult  Goal: Return mobility to safest level of function  5/31/2025 2332 by Sarah Nielson RN  Outcome: Progressing     Problem: Metabolic/Fluid and Electrolytes - Adult  Goal: Electrolytes maintained within normal limits  5/31/2025 2332 by Sarah Nielson RN  Outcome: Progressing     Problem: Hematologic - Adult  Goal: Maintains hematologic stability  5/31/2025 2332 by Sarah Nielson RN  Outcome: Progressing     Problem: Safety - Adult  Goal: Free from fall injury  5/31/2025 2332 by Sarah Nielson RN  Outcome: Progressing  Flowsheets (Taken 5/31/2025 2324)  Free From Fall Injury: Instruct family/caregiver on patient safety     Problem: Pain  Goal: Verbalizes/displays adequate comfort level or baseline comfort level  5/31/2025 2332 by Sarah Nielson RN  Outcome: Progressing     Problem: Nutrition Deficit:  Goal: Optimize nutritional status  5/31/2025 2332 by Sarah Nielson RN  Outcome: Progressing     Problem: Skin/Tissue Integrity  Goal: Skin integrity remains intact  Description: 1.  Monitor for areas of redness and/or skin breakdown2.  Assess vascular access sites hourly3.  Every 4-6 hours minimum:  Change oxygen saturation probe site4.  Every 4-6 hours:  If on nasal continuous positive airway pressure, respiratory therapy assess nares and determine need for appliance change or resting

## 2025-06-01 NOTE — PROGRESS NOTES
Occupational Therapy    Occupational Therapy Initial Evaluation  Facility/Department: [facility]   Patient Name: Lyn Lombardi        MRN: [id number]    : 1940    Date of Service: 2025    Chief Complaint   Patient presents with    Fall     Past Medical History:  has a past medical history of Abnormal uterine bleeding (AUB), Anxiety, Asteroid hyalosis of right eye, Bruises easily, Coronary artery disease, COVID-19, Diastolic dysfunction, Gastric ulcer with hemorrhage, Hyperlipidemia, Hypertension, Lives alone, Memory loss, Mild mitral regurgitation, Mild tricuspid regurgitation, Obesity, Patient in clinical research study, Poor intravenous access, and Vitreous floaters.  Past Surgical History:  has a past surgical history that includes Cholecystectomy (1978); Cardiac catheterization (Left, 2012); Endoscopy, colon, diagnostic (2015); Upper gastrointestinal endoscopy (2015); Dilation and curettage of uterus (N/A, 2021); Total abdominal hysterectomy w/ bilateral salpingoophorectomy (2021); and Hysterectomy (N/A, 2021).    Discharge Recommendations  Discharge Recommendations: Continue to assess pending progress, Patient would benefit from continued therapy after discharge  OT Equipment Recommendations  Equipment Needed: No    Assessment  Performance deficits / Impairments: Decreased functional mobility ;Decreased ADL status;Decreased ROM;Decreased strength;Decreased safe awareness;Decreased endurance;Decreased balance;Decreased high-level IADLs;Decreased coordination  Assessment: Patient is normaly independent with functional mobility, personal ADLs and household tasks who presents to OT eval with above deficit affecting ability to particpate in ADLs/ADL related mobility. At this time patient requires up to mod assist with functional mobility and up to min assist with UB ADLs, up to max assist LB ADLs. Patient would benefit from continued therapy during stay and

## 2025-06-01 NOTE — PLAN OF CARE
Problem: Cardiovascular - Adult  Goal: Maintains optimal cardiac output and hemodynamic stability  Outcome: Progressing  Goal: Absence of cardiac dysrhythmias or at baseline  Outcome: Progressing     Problem: Metabolic/Fluid and Electrolytes - Adult  Goal: Electrolytes maintained within normal limits  Outcome: Progressing  Goal: Hemodynamic stability and optimal renal function maintained  Outcome: Progressing  Goal: Glucose maintained within prescribed range  Outcome: Progressing     Problem: Musculoskeletal - Adult  Goal: Return mobility to safest level of function  Outcome: Progressing  Goal: Maintain proper alignment of affected body part  Outcome: Progressing     Problem: Safety - Adult  Goal: Free from fall injury  Outcome: Progressing

## 2025-06-01 NOTE — PROGRESS NOTES
[facility]   Progress Note                   Date:   6/1/2025  Patient name: Lyn Lombardi  Date of admission:  5/29/2025 10:41 PM  MRN:   [id number]  YOB: 1940  PCP: Tru Gould MD    Reason for Admission:     Subjective:       Patient seen and examined at bedside.  Overnight events noted.  Patient more alert today, oriented to self.  Doing well from the cardiac standpoint, denies any chest pain, shortness of breath.    Labs, imaging and vitals reviewed.    Medications:   Scheduled Meds:   [Held by provider] atorvastatin  40 mg Oral Nightly    [Held by provider] donepezil  10 mg Oral Nightly    [Held by provider] memantine  5 mg Oral BID    [Held by provider] aspirin  81 mg Oral Daily    ipratropium 0.5 mg-albuterol 2.5 mg  1 Dose Inhalation 4x Daily RT    piperacillin-tazobactam  3,375 mg IntraVENous Q8H    famotidine (PEPCID) injection  20 mg IntraVENous BID     Continuous Infusions:   norepinephrine Stopped (05/31/25 1659)    heparin (PORCINE) Infusion 12 Units/kg/hr (05/31/25 2030)       CBC:   Recent Labs     05/30/25  0551 05/31/25 0445   WBC 14.1* 16.2*   HGB 13.2 12.5   HCT 37.3 35.2*    151     BMP:   Recent Labs     05/30/25  1525 05/30/25  1849 05/31/25  0445 05/31/25  0805 05/31/25  1535 05/31/25 2027   *   < > 125*   < > 120* 124*   K 4.7   < > 4.7   < > 4.1 4.3   CO2 17*   < > 19*   < > 17* 18*   BUN 7*  --  7*  --   --   --    CREATININE 0.5*  --  0.7  --   --   --    LABGLOM >90  --  85  --   --   --    GLUCOSE 136*  --  133*  --   --   --     < > = values in this interval not displayed.     Lab Results   Component Value Date/Time    BILITOT 1.6 05/30/2025 05:51 AM    BILIDIR 0.7 05/30/2025 05:51 AM    ALKPHOS 54 05/30/2025 05:51 AM    AST 53 05/30/2025 05:51 AM    ALT 29 05/30/2025 05:51 AM     BNP : No results for input(s): \"BNP\" in the last 72 hours.  Lab Results   Component Value Date/Time    PROBNP 4,581 05/30/2025 05:51 AM     PT/INR :   Recent  1.6 cm.    Mild aortic regurgitation.    Mitral mitral regurgitation.    Mild tricuspid regurgitation. RVSP 32 mmHg.    Aortic Valve: Mild regurgitation.    Signed by: Trena Parsons MD on 5/30/2025  5:21 PM        Hospital Problems           Last Modified POA    * (Principal) Acute hyponatremia 5/29/2025 Yes    NSTEMI (non-ST elevated myocardial infarction) (HCC) 5/30/2025 Yes       Assessment:   Elevated troponin likely secondary to type II NSTEMI secondary to demand ischemia.  CAD- CINDY to LAD and LCX 08/2012-  Admitted for Acute symp Hyponatremia  Acute drop in hemoglobin 15-7, CTA with right thigh hematoma: Vascular following  Mild rhabdo  HTN  Hyperlipidemia  History of anemia, H/oh gastric and esophageal ulcers    Plan:   Echo completed.  EF preserved, no significant wall motion.  DC heparin, completed 48 hours, also patient is anemic (hemoglobin down from 15->7)  Plan for stress test before discharge  Continue K above 2 and potassium above 4.    Please follow the rounding attending's attestation for final recommendations.     Alexus Andersen MD  Fellow, cardiovascular diseases  [facility]    I performed a history and physical examination of the patient and discussed management with the resident. I reviewed the resident’s note and agree with the documented findings and plan of care. Any areas of disagreement are noted on the chart. I was personally present for the key portions of any procedures. I have documented in the chart those procedures where I was not present during the key portions. I have personally evaluated this patient and have completed at least one if not all key elements of the E/M (history, physical exam, and MDM). Additional findings are as noted.    Shane Mcqueen MD, FACC, [facility]  Cardiology, Interventional Cardiology, [facility]

## 2025-06-01 NOTE — PROGRESS NOTES
Facility/Department: [facility] CAR 3- MICU   CLINICAL BEDSIDE SWALLOW EVALUATION    NAME: Lyn Lombardi  : 1940  MRN: [id number]    ADMISSION DATE: 2025  ADMITTING DIAGNOSIS: has Coronary artery disease involving native coronary artery of native heart without angina pectoris; Hyperlipidemia with target LDL less than 70; Chronic diastolic heart failure (HCC); Anemia; GI bleed; Shingles; Esophageal ulcer; Gastric ulcer; Gastric ulcer with hemorrhage; Coronary artery disease involving native heart with angina pectoris; Diastolic dysfunction; Mixed hyperlipidemia; Obesity; Pneumonia due to COVID-19 virus; Postmenopausal bleeding; Endometrial polyp; Post-menopausal bleeding; Endometrial cancer (HCC); Post-operative pain; Memory loss; Essential hypertension; Sinus bradycardia; Acute hyponatremia; and NSTEMI (non-ST elevated myocardial infarction) (HCC) on their problem list.    Date of Eval: 2025  Evaluating Therapist: CONOR THOMPSON    Current Diet level:  Current Diet : NPO  Current Liquid Diet : NPO    Primary Complaint  Lyn Lombardi is a 84 y.o. female  who presented with altered mental status     Patient brought to [facility] via EMS after being found down, unresponsive.  Unknown last known well.  Facial abrasions with potential concern for trauma.  Patient GCS 6 on arrival.  Patient intubated for airway protection given low GCS with full set of labs and diagnostic imaging performed.  Patient transferred to [place] for higher level care/concern for trauma.     Of note, patient lives at home, history of Alzheimer's disease, receives Meals on Wheels.     Labs notable for sodium 114, elevated CK, mild, troponin, stable creatinine, elevated white count 15K, relatively unremarkable urine, negative CT head without acute findings, no acute fractures of the face, negative CT cervical spine, no acute findings on CT chest abdomen pelvis, degenerative findings in the lumbar spine and thoracic but no acute  fracture.     On arrival patient evaluated in the trauma bay with labs obtained.  Initial venous blood gas 7.335/31.5/45.1/16.4    Pain:  Pain Assessment  Pain Assessment: None - Denies Pain  Pain Level: 0    Reason for Referral  Lyn Lombardi was referred for a bedside swallow evaluation to assess the efficiency of her swallow function, identify signs and symptoms of aspiration and make recommendations regarding safe dietary consistencies, effective compensatory strategies, and safe eating environment.    Impression  Pt. With mild extended mastication and minimal lingual residual noted with soft solids.  Suspect premature spill.  + occasional oral holding.  + s/s of aspiration with thin liquid.  No s/s of aspiration with nectar thick liquid, puree and soft solid.  ST recommends Dysphagia Soft and Bite-Sized (Dysphagia III) (IDDSI Level 6) with Mildly Thick (Nectar) (IDDSI Level 2) liquid, meds in puree.  Recommend small sips and bites, only feed when alert and awake and upright at 90 degrees for all PO intake.  Recommend close monitoring for overt/clinical s/s of aspiration and D/C PO intake and complete Modified Barium Swallow Study should they occur.  Results and recommendations reported to RN.    Dysphagia Diagnosis: Mild to moderate oral stage dysphagia  Dysphagia Outcome Severity Scale: Level 4: Mild moderate dysphagia- Intermittent supervision/cueing. One - two diet consistencies restricted     Treatment Plan  Requires SLP Intervention: Yes  Duration of Treatment: 3-5 x week  D/C Recommendations: Ongoing speech therapy is recommended during this hospitalization  Frequency: 3-5 x week    Recommended Diet and Intervention  Diet Solids Recommendation: Soft & Bite Sized  Liquid Consistency Recommendation: Mildly Thick (Nectar)  Recommended Form of Meds: Meds in puree  Therapeutic Interventions: Patient/Family education;Diet tolerance monitoring    Compensatory Swallowing Strategies  Compensatory Swallowing

## 2025-06-01 NOTE — PROGRESS NOTES
unresponsive.  Unknown last known well.  Facial abrasions with potential concern for trauma.  Patient GCS 6 on arrival.  Patient intubated for airway protection given low GCS with full set of labs and diagnostic imaging performed.  Patient transferred to [place] for higher level care/concern for trauma.     Of note, patient lives at home, history of Alzheimer's disease, receives Meals on Wheels.     Labs notable for sodium 114, elevated CK, mild, troponin, stable creatinine, elevated white count 15K, relatively unremarkable urine, negative CT head without acute findings, no acute fractures of the face, negative CT cervical spine, no acute findings on CT chest abdomen pelvis, degenerative findings in the lumbar spine and thoracic but no acute fracture.     On arrival patient evaluated in the trauma bay with labs obtained.  Initial venous blood gas 7.335/31.5/45.1/16.4     Nephrology was consulted with recommendation for 3% sodium bolus 250 cc which was given in the ED peripherally and extravasated into the arm with subsequent hyaluronidase given.  Additional recommendations by nephrology for 3% sodium infusion at 25 cc an hour and to be contacted if sodium was less than 114 or greater than 119 over the next 24 hours.  Additionally to check sodium every 2 hours.     Notable PMHx: Anxiety, Alzheimer's/memory loss, hypertension, hyperlipidemia, GERD gastric ulcer, coronary artery disease with stent in the LAD August 2012, hysterectomy, cholecystectomy     Notable Home Meds: Nitroglycerin, donepezil, Imdur, memantine, lisinopril, atorvastatin, vitamin D3, ferrous sulfate, Fosamax, vitamin C, multivitamin    OBJECTIVE:     VITAL SIGNS:  Patient Vitals for the past 8 hrs:   BP Temp Temp src Pulse Resp SpO2 Weight   06/01/25 0700 -- -- -- 83 12 99 % --   06/01/25 0630 -- -- -- 90 14 100 % --   06/01/25 0600 -- 99.4 °F (37.4 °C) -- 87 11 97 % 56.1 kg (123 lb 10.9 oz)   06/01/25 0530 -- 98.9 °F (37.2 °C) -- 89 12 97 % --  mg-albuterol 2.5 mg  1 Dose Inhalation 4x Daily RT    piperacillin-tazobactam  3,375 mg IntraVENous Q8H    famotidine (PEPCID) injection  20 mg IntraVENous BID       Continuous Infusions:   norepinephrine Stopped (05/31/25 1659)    [Held by provider] heparin (PORCINE) Infusion Stopped (06/01/25 0530)       PRN Meds:   acetaminophen, 650 mg, Q4H PRN  racepinephrine HCl, 0.5 mL, Q4H PRN  sodium chloride nebulizer, 3 mL, Q4H PRN  benzocaine-menthol, 1 lozenge, Q2H PRN  [Held by provider] heparin (porcine), 60 Units/kg, PRN  [Held by provider] heparin (porcine), 30 Units/kg, PRN  ondansetron, 4 mg, Q8H PRN   Or  ondansetron, 4 mg, Q6H PRN        SUPPORT DEVICES: [] Ventilator [] BIPAP  [] Nasal Cannula [] Room Air    VENT SETTINGS (Comprehensive) (if applicable):  Vent Information  Equipment Changed: Expiratory Filter, HME  Ventilator Initiate: Yes  Vent Mode: CPAP/PS  Additional Respiratory Assessments  Pulse: 83  Respirations: 12  SpO2: 99 %  ETCO2 (mmHg): 28 mmHg  Humidification Source: HME  Circuit Condensation: Drained  Lab Results   Component Value Date/Time    MODE [facility] 05/31/2025 04:46 AM       ABGs:   No results found for: \"PH\", \"PCO2\", \"PO2\", \"HCO3\", \"O2SAT\"    DATA:  Complete Blood Count:   Recent Labs     05/29/25  2314 05/30/25  0551 05/31/25  0445 06/01/25  0428 06/01/25  0617   WBC 15.2* 14.1* 16.2* 11.5*  --    RBC 4.89 4.09 3.85* 2.46*  --    HGB 15.9* 13.2 12.5 8.0* 7.8*   HCT 44.0 37.3 35.2* 23.1* 22.5*   MCV 90.0 91.2 91.4 93.9  --    MCH 32.5 32.3 32.5 32.5  --    MCHC 36.1* 35.4* 35.5* 34.6  --    RDW 11.3* 11.6* 12.3 12.6  --     163 151 See Reflexed IPF Result  --    MPV 9.6 9.9 9.7  --   --         Last 3 Blood Glucose:   Recent Labs     05/30/25  0439 05/30/25  0551 05/30/25  0824 05/30/25  1144 05/30/25  1313 05/30/25  1525 05/31/25  0445 06/01/25  0359   GLUCOSE 132* 126* 135* 169* 124* 136* 133* 132*        PT/INR:    Lab Results   Component Value Date/Time    PROTIME 16.7 05/30/2025  Range    Troponin, High Sensitivity 85 (HH) 0 - 14 ng/L   CK   Result Value Ref Range    Total CK 1,070 (H) 26 - 192 U/L   Basic Metabolic Panel w/ Reflex to MG   Result Value Ref Range    Sodium 116 (LL) 136 - 145 mmol/L    Potassium 3.8 3.7 - 5.3 mmol/L    Chloride 88 (L) 98 - 107 mmol/L    CO2 16 (L) 20 - 31 mmol/L    Anion Gap 12 9 - 16 mmol/L    Glucose 131 (H) 74 - 99 mg/dL    BUN 6 (L) 8 - 23 mg/dL    Creatinine 0.5 (L) 0.6 - 0.9 mg/dL    Est, Glom Filt Rate >90 >60 mL/min/1.73m2    Calcium 8.3 (L) 8.6 - 10.4 mg/dL   Basic Metabolic Panel w/ Reflex to MG   Result Value Ref Range    Sodium 119 (LL) 136 - 145 mmol/L    Potassium 3.8 3.7 - 5.3 mmol/L    Chloride 91 (L) 98 - 107 mmol/L    CO2 18 (L) 20 - 31 mmol/L    Anion Gap 10 9 - 16 mmol/L    Glucose 132 (H) 74 - 99 mg/dL    BUN 6 (L) 8 - 23 mg/dL    Creatinine 0.5 (L) 0.6 - 0.9 mg/dL    Est, Glom Filt Rate >90 >60 mL/min/1.73m2    Calcium 8.0 (L) 8.6 - 10.4 mg/dL   Anti-XA, Heparin   Result Value Ref Range    Anti-XA Unfrac Heparin <0.10 IU/L   Osmolality   Result Value Ref Range    Serum Osmolality 312 (H) 275 - 295 mOsm/kg   Anti-Xa, Unfractionated Heparin   Result Value Ref Range    Anti-XA Unfrac Heparin 0.84 IU/L   Basic Metabolic Panel w/ Reflex to MG   Result Value Ref Range    Sodium 121 (L) 136 - 145 mmol/L    Potassium 3.7 3.7 - 5.3 mmol/L    Chloride 93 (L) 98 - 107 mmol/L    CO2 17 (L) 20 - 31 mmol/L    Anion Gap 11 9 - 16 mmol/L    Glucose 135 (H) 74 - 99 mg/dL    BUN 7 (L) 8 - 23 mg/dL    Creatinine 0.5 (L) 0.6 - 0.9 mg/dL    Est, Glom Filt Rate >90 >60 mL/min/1.73m2    Calcium 8.1 (L) 8.6 - 10.4 mg/dL   Basic Metabolic Panel w/ Reflex to MG   Result Value Ref Range    Sodium 118 (LL) 136 - 145 mmol/L    Potassium 3.3 (L) 3.7 - 5.3 mmol/L    Chloride 92 (L) 98 - 107 mmol/L    CO2 16 (L) 20 - 31 mmol/L    Anion Gap 10 9 - 16 mmol/L    Glucose 169 (H) 74 - 99 mg/dL    BUN 6 (L) 8 - 23 mg/dL    Creatinine 0.5 (L) 0.6 - 0.9 mg/dL    Est, Glom Filt

## 2025-06-01 NOTE — CONSENT
Informed Consent for Blood Component Transfusion Note    I have discussed with the daughter, Terese, and the rationale for blood component transfusion; its benefits in treating or preventing fatigue, organ damage, or death; and its risk which includes mild transfusion reactions, rare risk of blood borne infection, or more serious but rare reactions. I have discussed the alternatives to transfusion, including the risk and consequences of not receiving transfusion. The daughter had an opportunity to ask questions and had agreed to proceed with transfusion of blood components.    Electronically signed by Juan Luis Bell MD on 6/1/25 at 11:26 AM EDT

## 2025-06-01 NOTE — PROGRESS NOTES
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  Awake alert oriented.  Small hematoma over the medial right thigh.  CK levels improving.  Sodium correcting appropriately.  Urine output decent.  Renal function stable.    OBJECTIVE     Vitals:    06/01/25 1345 06/01/25 1400 06/01/25 1415 06/01/25 1430   BP: (!) 109/40      Pulse: 100 (!) 107 (!) 110 100   Resp: 14 13 23 15   Temp: 99.6 °F (37.6 °C) 99.6 °F (37.6 °C)     TempSrc:       SpO2: 100% 98%  96%   Weight:       Height:         24HR INTAKE/OUTPUT:    Intake/Output Summary (Last 24 hours) at 6/1/2025 1448  Last data filed at 6/1/2025 1336  Gross per 24 hour   Intake 1136.11 ml   Output 2200 ml   Net -1063.89 ml       General appearance:Awake, alert, in no acute distress  HEENT: PERRLA  Respiratory::vesicular breath sounds,no wheeze/crackles  Cardiovascular:S1 S2 normal,no gallop or organic murmur.  Abdomen:Non tender/non distended.Bowel sounds present  Extremities: No Cyanosis or Clubbing,Lower extremity edema  Neurological:.No abnormalities of mood, affect, memory, mentation, or behavior are noted      MEDICATIONS     Scheduled Meds:    pantoprazole (PROTONIX) 40 mg in sodium chloride (PF) 0.9 % 10 mL injection  40 mg IntraVENous Q12H    [Held by provider] atorvastatin  40 mg Oral Nightly    [Held by provider] donepezil  10 mg Oral Nightly    [Held by provider] memantine  5 mg Oral BID  < > 19*   < > 20 20  --  20   BUN 5*   < > 7*   < > 7*  --  7*  --  10  --   --   --    CREATININE 0.7   < > 0.5*   < > 0.5*  --  0.7  --  0.7  --   --   --    CALCIUM 8.6   < > 8.2*   < > 8.5*  --  8.7  --  8.2*  --   --   --    BILITOT 1.8*  --  1.6*  --   --   --   --   --   --   --  1.0  --    ALKPHOS 61  --  54  --   --   --   --   --   --   --  60  --    AST 66*  --  53*  --   --   --   --   --   --   --  37*  --    ALT 31  --  29  --   --   --   --   --   --   --  22  --     < > = values in this interval not displayed.       Last 3 CBC:  Recent Labs     05/29/25  2314 05/30/25  0551 05/31/25  0445 06/01/25  0428 06/01/25  0617 06/01/25  1021   WBC 15.2* 14.1* 16.2* 11.5*  --   --    RBC 4.89 4.09 3.85* 2.46*  --   --    HGB 15.9* 13.2 12.5 8.0* 7.8* 7.2*   HCT 44.0 37.3 35.2* 23.1* 22.5* 20.1*   MCV 90.0 91.2 91.4 93.9  --   --    MCH 32.5 32.3 32.5 32.5  --   --    MCHC 36.1* 35.4* 35.5* 34.6  --   --    RDW 11.3* 11.6* 12.3 12.6  --   --     163 151 See Reflexed IPF Result  --   --    MPV 9.6 9.9 9.7  --   --   --        Please do not hesitate to call with questions    This note is created with the assistance of a speech-recognition program. While intending to generate a document that actually reflects the content of the visit, no guarantees can be provided that every mistake has been identified and corrected by editing    Yosvany Kasmani, MD MD, MRCP ([place]), FACP   6/1/2025 2:48 PM  [facility] hypoosmolar euvolemic hyponatremia secondary to SIADH as reflected by urine studies

## 2025-06-01 NOTE — CONSULTS
Division of Vascular Surgery        New Consult      Physician Requesting Consult:  Dr. Juan    Reason for Consult:   R thigh hematoma      History of Present Illness:      Lyn Lombardi is a 84 y.o. woman who presented after being found down. Pt was GCS 6 on arrival and was intubated for airway protection. No traumatic injuries noted on initial scans. Pt was admitted to MICU where she was treated for hyponatremia and rhabdomyolysis. Pt has been extubated and now being treated for aspiration pneumonia. Since admission pt has had a drop in hemoglobin 14 > 13 > 12 > 8 > 7.8. CTA scan was completed today of the abdomen/pelvis demonstrating right thigh hematoma without active extravasation for which vascular surgery was consulted. Pt has a femoral central and arterial line on the right side.     Medical History:     Past Medical History:   Diagnosis Date    Abnormal uterine bleeding (AUB) 01/2021    Anxiety     daughter reports since Covid    Asteroid hyalosis of right eye     Bruises easily     per daughter    Coronary artery disease     status post acute non-Q wave myocardial infarction 08/23/2012, status post drug eluting stent placement in the LAD and 2 drug eluting stents in the left circumflex artery 08/23/2012.    COVID-19 06/30/2020    confusion, SOB, weakness x 6-8 weeks; was hospitalized and did receive plasma    Diastolic dysfunction     grade 1.     Gastric ulcer with hemorrhage 03/03/2015    gastric and esophageal ulcers due to nsaid    Hyperlipidemia     Hypertension     Lives alone     Memory loss     daughter reports since Covid    Mild mitral regurgitation     Mild tricuspid regurgitation     Obesity     Patient in clinical research study 06/27/2020    Expanded Access to Convalescent Plasma for COVID-19 date of completed 6/30/2020    Poor intravenous access     instructed to hydrate for surgery    Vitreous floaters     left eye.        Surgical History:     Past Surgical History:   Procedure

## 2025-06-02 ENCOUNTER — APPOINTMENT (OUTPATIENT)
Dept: VASCULAR LAB | Age: 85
DRG: 643 | End: 2025-06-02
Payer: MEDICARE

## 2025-06-02 PROBLEM — T14.8XXA HEMATOMA: Status: ACTIVE | Noted: 2025-06-02

## 2025-06-02 LAB
ANION GAP SERPL CALCULATED.3IONS-SCNC: 10 MMOL/L (ref 9–16)
ANION GAP SERPL CALCULATED.3IONS-SCNC: 8 MMOL/L (ref 9–16)
ANION GAP SERPL CALCULATED.3IONS-SCNC: 9 MMOL/L (ref 9–16)
BASOPHILS # BLD: 0.04 K/UL (ref 0–0.2)
BASOPHILS NFR BLD: 0 % (ref 0–2)
BUN SERPL-MCNC: 11 MG/DL (ref 8–23)
CALCIUM SERPL-MCNC: 8.3 MG/DL (ref 8.6–10.4)
CHLORIDE SERPL-SCNC: 104 MMOL/L (ref 98–107)
CHLORIDE SERPL-SCNC: 105 MMOL/L (ref 98–107)
CHLORIDE SERPL-SCNC: 106 MMOL/L (ref 98–107)
CK SERPL-CCNC: 644 U/L (ref 26–192)
CO2 SERPL-SCNC: 20 MMOL/L (ref 20–31)
CO2 SERPL-SCNC: 21 MMOL/L (ref 20–31)
CO2 SERPL-SCNC: 21 MMOL/L (ref 20–31)
CREAT SERPL-MCNC: 0.8 MG/DL (ref 0.6–0.9)
EOSINOPHIL # BLD: <0.03 K/UL (ref 0–0.44)
EOSINOPHILS RELATIVE PERCENT: 0 % (ref 1–4)
ERYTHROCYTE [DISTWIDTH] IN BLOOD BY AUTOMATED COUNT: 14.3 % (ref 11.8–14.4)
GFR, ESTIMATED: 73 ML/MIN/1.73M2
GLUCOSE BLD-MCNC: 180 MG/DL (ref 65–105)
GLUCOSE SERPL-MCNC: 140 MG/DL (ref 74–99)
HCT VFR BLD AUTO: 21.9 % (ref 36.3–47.1)
HCT VFR BLD AUTO: 22.5 % (ref 36.3–47.1)
HGB BLD-MCNC: 7.8 G/DL (ref 11.9–15.1)
HGB BLD-MCNC: 8 G/DL (ref 11.9–15.1)
IMM GRANULOCYTES # BLD AUTO: 0.06 K/UL (ref 0–0.3)
IMM GRANULOCYTES NFR BLD: 1 %
LYMPHOCYTES NFR BLD: 1.56 K/UL (ref 1.1–3.7)
LYMPHOCYTES RELATIVE PERCENT: 15 % (ref 24–43)
MCH RBC QN AUTO: 31.9 PG (ref 25.2–33.5)
MCHC RBC AUTO-ENTMCNC: 35.6 G/DL (ref 28.4–34.8)
MCV RBC AUTO: 89.6 FL (ref 82.6–102.9)
MONOCYTES NFR BLD: 1.06 K/UL (ref 0.1–1.2)
MONOCYTES NFR BLD: 10 % (ref 3–12)
MYOGLOBIN SERPL-MCNC: 518 NG/ML (ref 25–58)
NEUTROPHILS NFR BLD: 74 % (ref 36–65)
NEUTS SEG NFR BLD: 7.79 K/UL (ref 1.5–8.1)
NRBC BLD-RTO: 0 PER 100 WBC
PLATELET # BLD AUTO: ABNORMAL K/UL (ref 138–453)
PLATELET, FLUORESCENCE: 135 K/UL (ref 138–453)
PLATELETS.RETICULATED NFR BLD AUTO: 2.4 % (ref 1.1–10.3)
POTASSIUM SERPL-SCNC: 3.7 MMOL/L (ref 3.7–5.3)
POTASSIUM SERPL-SCNC: 3.7 MMOL/L (ref 3.7–5.3)
POTASSIUM SERPL-SCNC: 3.8 MMOL/L (ref 3.7–5.3)
RBC # BLD AUTO: 2.51 M/UL (ref 3.95–5.11)
SODIUM SERPL-SCNC: 134 MMOL/L (ref 136–145)
SODIUM SERPL-SCNC: 135 MMOL/L (ref 136–145)
SODIUM SERPL-SCNC: 135 MMOL/L (ref 136–145)
SURGICAL PATHOLOGY REPORT: NORMAL
WBC OTHER # BLD: 10.5 K/UL (ref 3.5–11.3)

## 2025-06-02 PROCEDURE — 80051 ELECTROLYTE PANEL: CPT

## 2025-06-02 PROCEDURE — 85055 RETICULATED PLATELET ASSAY: CPT

## 2025-06-02 PROCEDURE — 92526 ORAL FUNCTION THERAPY: CPT

## 2025-06-02 PROCEDURE — 99233 SBSQ HOSP IP/OBS HIGH 50: CPT | Performed by: INTERNAL MEDICINE

## 2025-06-02 PROCEDURE — 6370000000 HC RX 637 (ALT 250 FOR IP)

## 2025-06-02 PROCEDURE — 97116 GAIT TRAINING THERAPY: CPT

## 2025-06-02 PROCEDURE — 99222 1ST HOSP IP/OBS MODERATE 55: CPT | Performed by: SURGERY

## 2025-06-02 PROCEDURE — 99291 CRITICAL CARE FIRST HOUR: CPT | Performed by: INTERNAL MEDICINE

## 2025-06-02 PROCEDURE — 2580000003 HC RX 258

## 2025-06-02 PROCEDURE — 80048 BASIC METABOLIC PNL TOTAL CA: CPT

## 2025-06-02 PROCEDURE — 83874 ASSAY OF MYOGLOBIN: CPT

## 2025-06-02 PROCEDURE — 6370000000 HC RX 637 (ALT 250 FOR IP): Performed by: INTERNAL MEDICINE

## 2025-06-02 PROCEDURE — 51798 US URINE CAPACITY MEASURE: CPT

## 2025-06-02 PROCEDURE — 82550 ASSAY OF CK (CPK): CPT

## 2025-06-02 PROCEDURE — 85025 COMPLETE CBC W/AUTO DIFF WBC: CPT

## 2025-06-02 PROCEDURE — 2060000000 HC ICU INTERMEDIATE R&B

## 2025-06-02 PROCEDURE — 6360000002 HC RX W HCPCS

## 2025-06-02 PROCEDURE — 82947 ASSAY GLUCOSE BLOOD QUANT: CPT

## 2025-06-02 PROCEDURE — 94640 AIRWAY INHALATION TREATMENT: CPT

## 2025-06-02 PROCEDURE — 93926 LOWER EXTREMITY STUDY: CPT

## 2025-06-02 PROCEDURE — 85014 HEMATOCRIT: CPT

## 2025-06-02 PROCEDURE — 97162 PT EVAL MOD COMPLEX 30 MIN: CPT

## 2025-06-02 PROCEDURE — 99232 SBSQ HOSP IP/OBS MODERATE 35: CPT | Performed by: INTERNAL MEDICINE

## 2025-06-02 PROCEDURE — 85018 HEMOGLOBIN: CPT

## 2025-06-02 PROCEDURE — 94761 N-INVAS EAR/PLS OXIMETRY MLT: CPT

## 2025-06-02 RX ORDER — 0.9 % SODIUM CHLORIDE 0.9 %
500 INTRAVENOUS SOLUTION INTRAVENOUS ONCE
Status: COMPLETED | OUTPATIENT
Start: 2025-06-03 | End: 2025-06-03

## 2025-06-02 RX ADMIN — PIPERACILLIN AND TAZOBACTAM 3375 MG: 3; .375 INJECTION, POWDER, LYOPHILIZED, FOR SOLUTION INTRAVENOUS at 21:30

## 2025-06-02 RX ADMIN — PIPERACILLIN AND TAZOBACTAM 3375 MG: 3; .375 INJECTION, POWDER, LYOPHILIZED, FOR SOLUTION INTRAVENOUS at 13:46

## 2025-06-02 RX ADMIN — DONEPEZIL HYDROCHLORIDE 10 MG: 10 TABLET, FILM COATED ORAL at 21:33

## 2025-06-02 RX ADMIN — SODIUM CHLORIDE, PRESERVATIVE FREE 40 MG: 5 INJECTION INTRAVENOUS at 08:43

## 2025-06-02 RX ADMIN — MEMANTINE 5 MG: 5 TABLET ORAL at 08:49

## 2025-06-02 RX ADMIN — SODIUM CHLORIDE, PRESERVATIVE FREE 40 MG: 5 INJECTION INTRAVENOUS at 20:14

## 2025-06-02 RX ADMIN — IPRATROPIUM BROMIDE AND ALBUTEROL SULFATE 1 DOSE: .5; 2.5 SOLUTION RESPIRATORY (INHALATION) at 09:31

## 2025-06-02 RX ADMIN — IRON SUCROSE 200 MG: 20 INJECTION, SOLUTION INTRAVENOUS at 09:50

## 2025-06-02 RX ADMIN — DESMOPRESSIN ACETATE 40 MG: 0.2 TABLET ORAL at 20:13

## 2025-06-02 RX ADMIN — PIPERACILLIN AND TAZOBACTAM 3375 MG: 3; .375 INJECTION, POWDER, LYOPHILIZED, FOR SOLUTION INTRAVENOUS at 05:52

## 2025-06-02 RX ADMIN — IPRATROPIUM BROMIDE AND ALBUTEROL SULFATE 1 DOSE: .5; 2.5 SOLUTION RESPIRATORY (INHALATION) at 12:56

## 2025-06-02 RX ADMIN — MEMANTINE 5 MG: 5 TABLET ORAL at 20:13

## 2025-06-02 RX ADMIN — SODIUM CHLORIDE 2 G: 1 TABLET ORAL at 08:45

## 2025-06-02 ASSESSMENT — PAIN SCALES - GENERAL: PAINLEVEL_OUTOF10: 0

## 2025-06-02 NOTE — PROGRESS NOTES
Speech Language Pathology  [facility]    Dysphagia Treatment Note    Date: 6/2/2025  Patient’s Name: Lyn Lombardi  MRN: [id number]  Diagnosis: Dysphagia  Patient Active Problem List   Diagnosis Code    Coronary artery disease involving native coronary artery of native heart without angina pectoris I25.10    Hyperlipidemia with target LDL less than 70 E78.5    Chronic diastolic heart failure (HCC) I50.32    Anemia D64.9    GI bleed K92.2    Shingles B02.9    Esophageal ulcer K22.10    Gastric ulcer K25.9    Gastric ulcer with hemorrhage K25.4    Coronary artery disease involving native heart with angina pectoris I25.119    Diastolic dysfunction I51.89    Mixed hyperlipidemia E78.2    Obesity E66.9    Pneumonia due to COVID-19 virus U07.1, J12.82    Postmenopausal bleeding N95.0    Endometrial polyp N84.0    Post-menopausal bleeding N95.0    Endometrial cancer ([facility]) C54.1    Post-operative pain G89.18    Memory loss R41.3    Essential hypertension I10    Sinus bradycardia R00.1    Acute hyponatremia E87.1    NSTEMI (non-ST elevated myocardial infarction) ([facility]) I21.4    Elevated troponin R79.89       Pain: 0/10    Dysphagia Treatment  Treatment time:2558-2257    Subjective: [x] Alert [x] Cooperative     [x] Confused     [] Agitated    [] Lethargic    Objective/Assessment:    Pt seen for follow up after a bedside swallow study was completed and ST recommended a Dysphagia Soft and Bite-Sized (Dysphagia III) (IDDSI Level 6) with Mildly Thick (Nectar) (IDDSI Level 2) diet.      Pt was observed with trials of soft solid, puree, nectar thick liquid and thin liquid.  Pt with extended mastication with soft solids, min oral residual.  Pt with no overt s/s of aspiration noted with nectar thick liquid, puree and soft solid trials.  Pt. With s/s of aspiration (coughing, audible swallow) noted 2/6 trials with thin liquid. Pt was provided with education re:  safe swallow strategies/aspiration precautions (90

## 2025-06-02 NOTE — PROGRESS NOTES
Physician Progress Note      PATIENT:               NEHA GLOVER  CSN #:                  [id number]  :                       1940  ADMIT DATE:       2025 10:41 PM  DISCH DATE:  RESPONDING  PROVIDER #:        KATHY HOYT          QUERY TEXT:    Please document if you are evaluating and/or treating:    The clinical indicators include:  Admit 25    Trauma H&P: \"Was found down and unresponsive so EMS called. Has small amount   of dried blood on face.\"    Critical Care H&P: \"found down, unresponsive. Unknown last known well. Facial   abrasions with potential concern for trauma.\"    LABS: Total CK 1123, 1299, 1070. Myoglobin: 910, 802, Serum Na 114.    MAR: IV D50 500 ml bolus, 3% NACL Gtt.  Options provided:  -- Traumatic rhabdomyolysis  -- Nontraumatic rhabdomyolysis  -- Other - I will add my own diagnosis  -- Disagree - Not applicable / Not valid  -- Disagree - Clinically unable to determine / Unknown  -- Refer to Clinical Documentation Reviewer    PROVIDER RESPONSE TEXT:    This patient has traumatic rhabdomyolysis.    Query created by: Neha Glass on 2025 11:10 AM      Electronically signed by:  KATHY HOYT 2025 6:56 AM

## 2025-06-02 NOTE — PLAN OF CARE
Problem: Respiratory - Adult  Goal: Achieves optimal ventilation and oxygenation  6/1/2025 2043 by Christal Cochran, VALE  Outcome: Progressing   BRONCHOSPASM/BRONCHOCONSTRICTION     [x]         IMPROVE AERATION/BREATH SOUNDS  [x]   ADMINISTER BRONCHODILATOR THERAPY AS APPROPRIATE  [x]   ASSESS BREATH SOUNDS  []   IMPLEMENT AEROSOL/MDI PROTOCOL  [x]   PATIENT EDUCATION AS NEEDED

## 2025-06-02 NOTE — TRANSITION OF CARE
Critical care team - Resident sign-out to medicine service      Date and time: 6/2/2025 12:13 PM  Patient's name:  Lyn Lombardi  Medical Record Number: [id number]  Patient's account/billing number: [id number]  Patient's YOB: 1940  Age: 84 y.o.  Date of Admission: 5/29/2025 10:41 PM  Length of stay during current admission: 4    Primary Care Physician: rTu Gould MD    Code Status: Full Code    Mode of physician to physician communication:        [] Via telephone   [] In person     Date and time of sign-out: 6/2/2025 12:14 PM    Accepting Internal Medicine resident: Dr. Wagoner    Accepting Medicine team: IM Team 3    Accepting team's attending: Dr. Sheriff    Patient's current ICU Bed:  2024     Patient's assigned bed on floor:  434        [x] Med-Surg Monitored [] Step-down       [] Psychiatry ICU       [] Psych floor     Reason for ICU admission:     Patient is known to have Alzheimer's dementia    Patient was found unresponsive and GCS was low on arrival.  She was intubated and initially presented to [facility].  She was transferred to [facility] for ongoing ICU care.  Sodium was found to be 114 and department was consulted.  She was given 1 dose of tolvaptan and managed with fluid restriction and salt difficult afterwards.  Sodium has gradually improved and latest level is 135.  Nephrology suggest continuing with fluid restriction and monitoring sodium.    Her troponin was high and she has been reviewed by cardiology.  Echo shows a preserved ejection fraction and no other obvious abnormality.  Cardiology planning on doing stress test before discharge.    Patient had significant drop in hemoglobin since admission with a 4 point drop yesterday.  Initially was given was unremarkable.  Repeat CT abdomen pelvis did not show any cause/source of bleeding yesterday.  She is known to have right thigh hematoma which is a baseline comparing the CT scans as well.  She had right femoral art/CVC line.

## 2025-06-02 NOTE — PLAN OF CARE
Problem: Neurosensory - Adult  Goal: Achieves stable or improved neurological status  6/2/2025 0208 by Sarah Nielson RN  Outcome: Progressing     Problem: Respiratory - Adult  Goal: Achieves optimal ventilation and oxygenation  6/2/2025 0208 by Sarah Nielson RN  Outcome: Progressing     Problem: Cardiovascular - Adult  Goal: Maintains optimal cardiac output and hemodynamic stability  6/2/2025 0208 by Sarah Nielson RN  Outcome: Progressing     Problem: Musculoskeletal - Adult  Goal: Return mobility to safest level of function  6/2/2025 0208 by Sarah Nielson RN  Outcome: Progressing     Problem: Metabolic/Fluid and Electrolytes - Adult  Goal: Electrolytes maintained within normal limits  6/2/2025 0208 by Sarah Nielson RN  Outcome: Progressing     Problem: Metabolic/Fluid and Electrolytes - Adult  Goal: Hemodynamic stability and optimal renal function maintained  6/2/2025 0208 by Sarah Nielson RN  Outcome: Progressing     Problem: Hematologic - Adult  Goal: Maintains hematologic stability  6/2/2025 0208 by Sarah Nielson RN  Outcome: Progressing     Problem: Safety - Adult  Goal: Free from fall injury  6/2/2025 0208 by Sarah Nielson RN  Outcome: Progressing     Problem: Pain  Goal: Verbalizes/displays adequate comfort level or baseline comfort level  6/2/2025 0208 by Sarah Nielson RN  Outcome: Progressing     Problem: Nutrition Deficit:  Goal: Optimize nutritional status  6/2/2025 0208 by Sarah Nielson RN  Outcome: Progressing     Problem: Skin/Tissue Integrity  Goal: Skin integrity remains intact  Description: 1.  Monitor for areas of redness and/or skin breakdown2.  Assess vascular access sites hourly3.  Every 4-6 hours minimum:  Change oxygen saturation probe site4.  Every 4-6 hours:  If on nasal continuous positive airway pressure, respiratory therapy assess nares and determine need for appliance change or resting period  6/2/2025 0208 by Sarah Nielson RN  Outcome: Progressing     Problem: ABCDS Injury  Assessment  Goal: Absence of physical injury  6/2/2025 0208 by Sarah Nielson, RN  Outcome: Progressing

## 2025-06-02 NOTE — CARE COORDINATION
SBIRT deferred due to patient's history of Alzheimer's disease.             Alcohol Screening and Brief Intervention        Deferred [x]    Completed on: 6/2/2025   ELLIE Espinoza

## 2025-06-02 NOTE — CARE COORDINATION
Case Management   Daily Progress Note       Patient Name: Lyn Lombardi                   YOB: 1940  Diagnosis: Acute hyponatremia [E87.1]  NSTEMI (non-ST elevated myocardial infarction) (HCC) [I21.4]                       GMLOS: 4.1 days  Length of Stay: 4  days    Anticipated Discharge Date: Two or more days before discharge    Readmission Risk (Low < 19, Mod (19-27), High > 27): Readmission Risk Score: 17.2        Current Transitional Plan    [x] Home Independently    [] Home with HC    [] Skilled Nursing Facility    [] Acute Rehabilitation    [] Long Term Acute Care (LTAC)    [] Other:     Plan for the Stay (Medical Management) :  RA, SLP/PT/OT ordered  Plan for Stress test before DC. H & H Q 8hrs.       Workflow Continuation (Additional Notes) :  Goal is home, children will transport, Monitor for additional needs.       DEVON FOFANA RN  June 2, 2025      \ly

## 2025-06-02 NOTE — PLAN OF CARE
R thigh hematoma without enlargement or worsening overlying skin changes. Thigh is soft and non-tender. No further intervention from vascular surgery at this time. Can continue compression. Vascular surgery to sign off. Please contact the vascular pager with questions or concerns.       Poly George DO   General Surgery PGY-4  6/2/25 7:43 AM

## 2025-06-02 NOTE — PROGRESS NOTES
[facility]   Progress Note                   Date:   6/2/2025  Patient name: Lyn Lombardi  Date of admission:  5/29/2025 10:41 PM  MRN:   [id number]  YOB: 1940  PCP: Tru Gould MD    Reason for Admission:     Subjective:       Patient seen and examined at bedside.    -No acute events overnight.  -Afebrile, hemodynamically stable, saturating well on room air  -Patient was sleeping in bedside chair on exam, awakens easily to voice, she is A&O to name only. She denied any acute complaints. Denied any chest pain or shortness of breath.       Labs, imaging and vitals reviewed.    Medications:   Scheduled Meds:   pantoprazole (PROTONIX) 40 mg in sodium chloride (PF) 0.9 % 10 mL injection  40 mg IntraVENous Q12H    atorvastatin  40 mg Oral Nightly    donepezil  10 mg Oral Nightly    memantine  5 mg Oral BID    [Held by provider] aspirin  81 mg Oral Daily    ipratropium 0.5 mg-albuterol 2.5 mg  1 Dose Inhalation 4x Daily RT    piperacillin-tazobactam  3,375 mg IntraVENous Q8H     Continuous Infusions:   sodium chloride      sodium chloride Stopped (06/02/25 0552)    norepinephrine Stopped (05/31/25 1659)       CBC:   Recent Labs     06/01/25  0428 06/01/25  0617 06/01/25  2316 06/02/25  0415   WBC 11.5*  --   --  10.5   HGB 8.0*   < > 8.4* 8.0*   HCT 23.1*   < > 24.0* 22.5*   PLT See Reflexed IPF Result  --   --  See Reflexed IPF Result    < > = values in this interval not displayed.     BMP:   Recent Labs     06/01/25  0359 06/01/25  0617 06/02/25  0415 06/02/25  0605   *   < > 135* 135*   K 4.5   < > 3.7 3.7   CO2 20   < > 21 21   BUN 10  --  11  --    CREATININE 0.7  --  0.8  --    LABGLOM 85  --  73  --    GLUCOSE 132*  --  140*  --     < > = values in this interval not displayed.     Lab Results   Component Value Date/Time    BILITOT 1.0 06/01/2025 08:36 AM    BILIDIR 0.5 06/01/2025 08:36 AM    ALKPHOS 60 06/01/2025 08:36 AM    AST 37 06/01/2025 08:36 AM    ALT 22 06/01/2025  Med[facility]   of Medicine [facility]  [facility]  [phone]

## 2025-06-02 NOTE — PROGRESS NOTES
[facility]     Department of Internal Medicine - Staff Internal Medicine Service   ICU PATIENT TRANSFER NOTE        Patient:  Lyn Lombardi  YOB: 1940  MRN: [id number]     Acct: [id number]     Admit date: 5/29/2025    Code Status:-  Full code     Reason for ICU Admission:-  Hyponatremia     SUPPORT DEVICES: [] Ventilator [] BIPAP  [] Nasal Cannula [x] Room Air    Consultations:- [x] Cardiology [x] Nephrology  [] Hemo onco  [] GI                               [] ID [] ENT  [] Rheum [] Endo   []Physiotherapy                                 Others:-     NUTRITION:  [] NPO [] Tube Feeding (Specify: ) [] TPN  [x] PO    Central Lines:- [x] No   [] Yes           If yes - Days/Date of Insertion.    Pt seen,examined and Chart reviewed.    ICU COURSE:    The patient is a 84 y.o. female who a PMHx of Alzheimer's, HTN, HLD, GERD, CAD w stent August 2012 who initially presented to the ICU on 5/29 after being found down unresponsive.  Patient initially arrived to [facility] with a GCS of 6 and was intubated for airway protection.  Initial lab work showed a sodium of 114 elevated CK, elevated troponin and elevated WBC.  Patient underwent trauma workup with CT head, CT C-spine, CT chest abdomen pelvis CT lumbar and thoracic spine all of which were unremarkable.    Pt transferred to [facility] for further evaluation. During her ICU stay her sodium was corrected, currently at 134. Pt was found to have elevated WBC, fever and elevated procal for which she was started on Zosyn to cover aspiration pneumonia. Pt had improvement in mentation and was extubated on 5/31 which she has been tolerating well.     Plan was for transfer out of the ICU on 6/1 however, pt was noted to have a significant drop in hgb from 12 to 8. ASA and Heparin were held. Vascular duplex of R thigh ordered which did show R thigh hematoma. Vascular surgery was consulted who recommended compression stockings and monitor. CTA  \"LDL\" in the last 72 hours.    Invalid input(s): \"LDLCALC\"  ABGs: No results found for: \"PH\", \"PCO2\", \"PO2\", \"HCO3\", \"O2SAT\"  Thyroid:   Lab Results   Component Value Date/Time    TSH 3.13 05/31/2025 04:45 AM        Urinalysis: No results for input(s): \"BACTERIA\", \"BLOODU\", \"CLARITYU\", \"COLORU\", \"PHUR\", \"PROTEINU\", \"RBCUA\", \"SPECGRAV\", \"BILIRUBINUR\", \"NITRU\", \"WBCUA\", \"LEUKOCYTESUR\", \"GLUCOSEU\" in the last 72 hours.    Assessment and Plan:    Principal Problem:    Acute hyponatremia  Active Problems:    Elevated troponin    NSTEMI (non-ST elevated myocardial infarction) (HCC)  Resolved Problems:    * No resolved hospital problems. *      Hyponatremia  Na + improved  Nephro on board  Electrolytes  Q12  Recommending fluid restrict will cont to monitor    NSTEMI  Cardio on board  Underwent Echo showing preserved EF  Planning for stress test prior to d/c    Aspiration pneumonia  Continue Zosyn until 6/4    L thigh hematoma  Anemia secondary to above  ASA and DVT prophylaxis on hold  F/u H&H q8  Transfuse if <7  Iron panel consistent with iron deficiency   Reach out to vascular if worsening  Continue Protonix  No active signs of GI bleed    Concern for dental injury  Pt has failed swallow study x2 due to this  Recommend dental f/u as OP  SLP recommending dysphagia diet    H/o dementia   Continue Memantine and Donepezil    HLD  Continue Statin      Jennifer Wagoner MD             Department of Internal Medicine, PGY-1  [facility] [place]           6/2/2025, 1:59 PM

## 2025-06-02 NOTE — PROGRESS NOTES
INTENSIVE CARE UNIT  Resident Physician Progress Note    Patient - Lyn Lombardi  Date of Admission -  5/29/2025 10:41 PM  Date of Evaluation -  6/2/2025  Room and Bed Number -  3024/3024-01   Hospital Day - 4    SUBJECTIVE:     TODAY:      Feeding :ADULT DIET; Dysphagia - Soft and Bite Sized; Mildly Thick (Nectar); 1500 ml - SLT requested   Fluids - NA   Family updated  Analgesic Tylenol   Sedation  None   Thrombo-prophylaxis EPCs, Heparin withheld due to drop in Hb   Mobility minimal  Heads up 30 Degrees  Ulcer prophylaxis  Protonix IV   Glycemic control Glucose remaining normal, started on tube feeds  Spontaneous breathing trial none  Bowel regimen/urine output Colace, UOP at 1.4 L in 24 hrs Indwelling catheter/lines NG tube, godinez, r fem art line, R fem CVC  de-escalation  none    Patient alert, oriented x 1, denies to be in pain/distress  Sitting up and having breakfast   Vitals stable - not on pressors or oxygen   Small hematoma over the medial right thigh - no other obvious hematoma, bruising over left elbow - hematoma has been wrapped in ace bandage - no obvious expansion beyond the wrapping     CK improving 1123>546 > 840 > 689   Na+: 118>119>120>122>123>124 > 125 > 129 > 130 > 135    Pt off pressors   Significant drop in Hb 14 > 13 > 12 > 8 > 7.8 > 9.3 > 8.4 > 8   CBC shows increasing WBC 14.1>16.2 > 11     Concern for pt bit ET tube or had a dental injury during her fall. Does appear to have loose teeth - follow up with dentist as OP     Brief Plan:  Hemolysis screen - negative so far   Peripheral smear - pending   CTA abdo/pelvis - hematoma right thigh - likely source of dropping Hb   Vascular surgery consulted - recommends compression and monitoring   Monitor H&H 6 hourly  Received 1 x PRBC yesterday    Monitor sodium and continue management as per nephro    Swallow evaluation done yesterday - appreciate recs -started on soft diet   Continue zosyn for aspiration pneumonia   Remove CVC and art  line  Transfer out of ICU today     HISTORY OF PRESENT ILLNESS:    History was obtained from chart review.       Lyn Lombardi is a 84 y.o. female  who presented with altered mental status     Patient brought to [facility] via EMS after being found down, unresponsive.  Unknown last known well.  Facial abrasions with potential concern for trauma.  Patient GCS 6 on arrival.  Patient intubated for airway protection given low GCS with full set of labs and diagnostic imaging performed.  Patient transferred to [place] for higher level care/concern for trauma.     Of note, patient lives at home, history of Alzheimer's disease, receives Meals on Wheels.     Labs notable for sodium 114, elevated CK, mild, troponin, stable creatinine, elevated white count 15K, relatively unremarkable urine, negative CT head without acute findings, no acute fractures of the face, negative CT cervical spine, no acute findings on CT chest abdomen pelvis, degenerative findings in the lumbar spine and thoracic but no acute fracture.     On arrival patient evaluated in the trauma bay with labs obtained.  Initial venous blood gas 7.335/31.5/45.1/16.4     Nephrology was consulted with recommendation for 3% sodium bolus 250 cc which was given in the ED peripherally and extravasated into the arm with subsequent hyaluronidase given.  Additional recommendations by nephrology for 3% sodium infusion at 25 cc an hour and to be contacted if sodium was less than 114 or greater than 119 over the next 24 hours.  Additionally to check sodium every 2 hours.     Notable PMHx: Anxiety, Alzheimer's/memory loss, hypertension, hyperlipidemia, GERD gastric ulcer, coronary artery disease with stent in the LAD August 2012, hysterectomy, cholecystectomy     Notable Home Meds: Nitroglycerin, donepezil, Imdur, memantine, lisinopril, atorvastatin, vitamin D3, ferrous sulfate, Fosamax, vitamin C, multivitamin    OBJECTIVE:     VITAL SIGNS:  Patient Vitals for the past  %    MCV 93.9 82.6 - 102.9 fL    MCH 32.5 25.2 - 33.5 pg    MCHC 34.6 28.4 - 34.8 g/dL    RDW 12.6 11.8 - 14.4 %    Platelets See Reflexed IPF Result 138 - 453 k/uL    Platelet, Fluorescence 124 (L) 138 - 453 k/uL    Platelet, Immature Fraction 3.9 1.1 - 10.3 %    NRBC Automated 0.0 0.0 per 100 WBC    Neutrophils % 80 (H) 36 - 65 %    Lymphocytes % 11 (L) 24 - 43 %    Monocytes % 8 3 - 12 %    Eosinophils % 0 (L) 1 - 4 %    Basophils % 0 0 - 2 %    Immature Granulocytes % 0 0 %    Neutrophils Absolute 9.20 (H) 1.50 - 8.10 k/uL    Lymphocytes Absolute 1.24 1.10 - 3.70 k/uL    Monocytes Absolute 0.95 0.10 - 1.20 k/uL    Eosinophils Absolute <0.03 0.00 - 0.44 k/uL    Basophils Absolute <0.03 0.00 - 0.20 k/uL    Immature Granulocytes Absolute 0.04 0.00 - 0.30 k/uL   Lactic Acid   Result Value Ref Range    Lactic Acid, Whole Blood 1.1 0.7 - 2.1 mmol/L   Hemoglobin and Hematocrit   Result Value Ref Range    Hemoglobin 7.8 (L) 11.9 - 15.1 g/dL    Hematocrit 22.5 (L) 36.3 - 47.1 %   CK   Result Value Ref Range    Total  (H) 26 - 192 U/L   Myoglobin, Blood   Result Value Ref Range    Myoglobin 671 (H) 25 - 58 ng/mL   CK   Result Value Ref Range    Total  (H) 26 - 192 U/L   Myoglobin, Blood   Result Value Ref Range    Myoglobin 508 (H) 25 - 58 ng/mL   Electrolyte Panel   Result Value Ref Range    Sodium 129 (L) 136 - 145 mmol/L    Potassium 4.3 3.7 - 5.3 mmol/L    Chloride 100 98 - 107 mmol/L    CO2 20 20 - 31 mmol/L    Anion Gap 9 9 - 16 mmol/L   Electrolyte Panel   Result Value Ref Range    Sodium 134 (L) 136 - 145 mmol/L    Potassium 3.9 3.7 - 5.3 mmol/L    Chloride 103 98 - 107 mmol/L    CO2 20 20 - 31 mmol/L    Anion Gap 11 9 - 16 mmol/L   Hemoglobin and Hematocrit   Result Value Ref Range    Hemoglobin 7.2 (L) 11.9 - 15.1 g/dL    Hematocrit 20.1 (L) 36.3 - 47.1 %   Hemoglobin and Hematocrit   Result Value Ref Range    Hemoglobin 9.3 (L) 11.9 - 15.1 g/dL    Hematocrit 27.0 (L) 36.3 - 47.1 %   Lactic Acid

## 2025-06-02 NOTE — PROGRESS NOTES
NEPHROLOGY PROGRESS NOTE          SUBJECTIVE   Patient was seen and examined.  Patient was sitting comfortably.  She was alert and awake.  Patient voiced no complaint.  Her CKs are improving and most recent CK is 644.  Her sodium has improved the pattern of sodium in our as   Latest Reference Range & Units 05/31/25 23:45 06/01/25 03:59 06/01/25 06:17 06/01/25 10:21 06/01/25 14:43 06/01/25 18:11 06/02/25 01:32 06/02/25 04:15 06/02/25 06:05   Sodium 136 - 145 mmol/L 125 (L) 129 (L) 130 (L) 129 (L) 134 (L) 134 (L) 134 (L) 135 (L) 135 (L)     OBJECTIVE     Vitals:    06/02/25 0600 06/02/25 0630 06/02/25 0700 06/02/25 0830   BP:       Pulse: 83 75 73 87   Resp: 16 11 10 18   Temp: 99 °F (37.2 °C) 99 °F (37.2 °C) 99 °F (37.2 °C) 99.1 °F (37.3 °C)   TempSrc:       SpO2: 98% 93% 98% 100%   Weight:       Height:         24HR INTAKE/OUTPUT:    Intake/Output Summary (Last 24 hours) at 6/2/2025 0934  Last data filed at 6/2/2025 0850  Gross per 24 hour   Intake 467.9 ml   Output 1182 ml   Net -714.1 ml       General appearance: Awake and alert x 3  HEENT: Pupils reactive to light   respiratory: Bilateral air entry and clear  Cardiovascular: S1 and S2 audible no S3  Abdomen:Non tender/non distended.Bowel sounds present  Extremities: No edema  Neurological:.No abnormalities of mood, affect, memory, mentation, or behavior are noted      MEDICATIONS     Scheduled Meds:    iron sucrose  200 mg IntraVENous Once    pantoprazole (PROTONIX) 40 mg in sodium chloride (PF) 0.9 % 10 mL injection  40 mg IntraVENous Q12H    sodium chloride  2 g Oral BID WC    atorvastatin  40 mg Oral Nightly    donepezil  10 mg Oral Nightly    memantine  5 mg Oral BID    [Held by provider] aspirin  81 mg Oral Daily    ipratropium 0.5 mg-albuterol 2.5 mg  1 Dose Inhalation 4x Daily RT    piperacillin-tazobactam  3,375 mg IntraVENous Q8H     Continuous Infusions:    sodium chloride      sodium chloride Stopped (06/02/25 0552)    norepinephrine Stopped (05/31/25    ALT  --   --   --   --  22  --   --   --   --     < > = values in this interval not displayed.       Last 3 CBC:  Recent Labs     05/31/25  0445 06/01/25  0428 06/01/25  0617 06/01/25  1443 06/01/25  2316 06/02/25  0415   WBC 16.2* 11.5*  --   --   --  10.5   RBC 3.85* 2.46*  --   --   --  2.51*   HGB 12.5 8.0*   < > 9.3* 8.4* 8.0*   HCT 35.2* 23.1*   < > 27.0* 24.0* 22.5*   MCV 91.4 93.9  --   --   --  89.6   MCH 32.5 32.5  --   --   --  31.9   MCHC 35.5* 34.6  --   --   --  35.6*   RDW 12.3 12.6  --   --   --  14.3    See Reflexed IPF Result  --   --   --  See Reflexed IPF Result   MPV 9.7  --   --   --   --   --     < > = values in this interval not displayed.     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  Patient was on salt tablet and also received a dose of tolvaptan yesterday.  Sodium is now up to 135.  Altered mental sensorium likely secondary to hyponatremia with worsening of her Alzheimer's disease.   On mechanical ventilation for airway: Improved patient is now extubated and maintaining oxygen saturation  Alzheimer's dementia  History of coronary artery disease status post stent placement  History of hypertension    PLAN     Continue fluid restriction   Discontinue salt tablet for now   Will follow sodium level    Please do not hesitate to call with questions    This note is created with the assistance of a speech-recognition program. While intending to generate a document that actually reflects the content of the visit, no guarantees can be provided that every mistake has been identified and corrected by editing    Krzysztof Yepez MD  6/2/2025 9:34 AM  [facility] hypoosmolar euvolemic hyponatremia secondary to SIADH as reflected by urine studies

## 2025-06-02 NOTE — PROGRESS NOTES
Physical Therapy  Facility/Department: [address] ONC/MED SURG   Physical Therapy Initial Evaluation    Patient Name: Lyn Lombardi        MRN: [id number]    : 1940    Date of Service: 2025    Chief Complaint   Patient presents with    Fall     Past Medical History:  has a past medical history of Abnormal uterine bleeding (AUB), Anxiety, Asteroid hyalosis of right eye, Bruises easily, Coronary artery disease, COVID-19, Diastolic dysfunction, Gastric ulcer with hemorrhage, Hyperlipidemia, Hypertension, Lives alone, Memory loss, Mild mitral regurgitation, Mild tricuspid regurgitation, Obesity, Patient in clinical research study, Poor intravenous access, and Vitreous floaters.  Past Surgical History:  has a past surgical history that includes Cholecystectomy (1978); Cardiac catheterization (Left, 2012); Endoscopy, colon, diagnostic (2015); Upper gastrointestinal endoscopy (2015); Dilation and curettage of uterus (N/A, 2021); Total abdominal hysterectomy w/ bilateral salpingoophorectomy (2021); and Hysterectomy (N/A, 2021).    Discharge Recommendations  Discharge Recommendations: Patient would benefit from continued therapy after discharge       Assessment  Body Structures, Functions, Activity Limitations Requiring Skilled Therapeutic Intervention: Decreased functional mobility , Decreased strength, Decreased balance, Decreased cognition, Decreased safe awareness  Assessment: Oriented to self only.  Able to stand with minimal assistance only, but then very unsteady, tipped the walker.  Ambulated only 15' due to the likeliness of falling. Patient will need further PT to regain functional independence.  Therapy Prognosis: Good  Decision Making: Medium Complexity  Requires PT Follow-Up: Yes  Activity Tolerance  Activity Tolerance: Treatment limited secondary to decreased cognition  Safety Devices  Type of Devices: Gait belt, Nurse notified, All fall risk precautions in place,  injury in the past year?: Yes  Receives Help From: Family  Prior Level of Assist for ADLs: Independent  Prior Level of Assist for Homemaking: Independent (Gets meals on wheels)  Homemaking Responsibilities: Yes  Prior Level of Assist for Ambulation: Independent household ambulator, with or without device  Prior Level of Assist for Transfers: Independent  Active : No  Patient's  Info: Children  Occupation: Retired  Type of Occupation: \"factory\"  Additional Comments: Has a son and two daughters who live near her in [place], per patient.    Vision/Hearing  Vision  Vision: Within Functional Limits (Unable to read her room number on dry erase board. Able to count therapist's fingers held up without delay.)  Hearing  Hearing: Within functional limits    Objective  Orientation  Orientation Level: Oriented to person;Disoriented to time;Disoriented to situation;Disoriented to place (After a pause, \"hospital\" but unable to provide any further details.  Stated the year as 1920 and unable to correct the century when therapist cued her to her mistake.)  Cognition  Overall Cognitive Status: Exceptions  Arousal/Alertness: Appropriate responses to stimuli  Following Commands: Follows one step commands with increased time  Attention Span: Attends with cues to redirect  Memory: Decreased recall of recent events;Decreased recall of precautions  Safety Judgement: Decreased awareness of need for assistance  Problem Solving: Decreased awareness of errors;Assistance required to identify errors made;Assistance required to correct errors made  Insights: Decreased awareness of deficits  Initiation: Requires cues for some  Sequencing: Requires cues for some    Observation/Palpation  Observation: Two bruises on her jaw, her tongue is purple. Has an Ace wrap around right thigh with hematoma under it. See restriction section concerning hematoma.        Strength RLE  Strength RLE: Exception  R Hip Flexion: 3+/5  R Hip Extension:

## 2025-06-02 NOTE — PLAN OF CARE
Problem: Neurosensory - Adult  Goal: Achieves stable or improved neurological status  6/2/2025 1526 by Judi Zafar RN  Outcome: Progressing  6/2/2025 0208 by Sarah Nielson RN  Outcome: Progressing  Goal: Achieves maximal functionality and self care  Outcome: Progressing     Problem: Respiratory - Adult  Goal: Achieves optimal ventilation and oxygenation  6/2/2025 1526 by Judi Zafar RN  Outcome: Progressing  6/2/2025 0208 by Sarah Nielson RN  Outcome: Progressing     Problem: Cardiovascular - Adult  Goal: Maintains optimal cardiac output and hemodynamic stability  6/2/2025 1526 by Judi Zafar RN  Outcome: Progressing  6/2/2025 0208 by Sarah Nielson RN  Outcome: Progressing     Problem: Musculoskeletal - Adult  Goal: Return mobility to safest level of function  6/2/2025 1526 by Judi Zafar RN  Outcome: Progressing  6/2/2025 0208 by Sarah Nielson RN  Outcome: Progressing     Problem: Metabolic/Fluid and Electrolytes - Adult  Goal: Electrolytes maintained within normal limits  6/2/2025 1526 by Judi Zafar RN  Outcome: Progressing  6/2/2025 0208 by Sarah Nielson RN  Outcome: Progressing  Goal: Hemodynamic stability and optimal renal function maintained  6/2/2025 0208 by Sarah Nielson RN  Outcome: Progressing     Problem: Hematologic - Adult  Goal: Maintains hematologic stability  6/2/2025 0208 by Sarah Nielson RN  Outcome: Progressing     Problem: Safety - Adult  Goal: Free from fall injury  6/2/2025 1526 by Judi Zafar RN  Outcome: Progressing  6/2/2025 0208 by Sarah Nielson RN  Outcome: Progressing     Problem: Discharge Planning  Goal: Discharge to home or other facility with appropriate resources  Outcome: Progressing     Problem: Pain  Goal: Verbalizes/displays adequate comfort level or baseline comfort level  6/2/2025 0208 by Sarah Nielson RN  Outcome: Progressing     Problem: Nutrition Deficit:  Goal: Optimize nutritional status  6/2/2025 1526 by Judi Zafar  RN  Outcome: Progressing  6/2/2025 0208 by Sarah Nielson RN  Outcome: Progressing     Problem: Skin/Tissue Integrity  Goal: Skin integrity remains intact  Description: 1.  Monitor for areas of redness and/or skin breakdown2.  Assess vascular access sites hourly3.  Every 4-6 hours minimum:  Change oxygen saturation probe site4.  Every 4-6 hours:  If on nasal continuous positive airway pressure, respiratory therapy assess nares and determine need for appliance change or resting period  6/2/2025 1526 by Judi Zafar RN  Outcome: Progressing  Flowsheets  Taken 6/2/2025 1100  Skin Integrity Remains Intact: Monitor for areas of redness and/or skin breakdown  Taken 6/2/2025 0800  Skin Integrity Remains Intact: Monitor for areas of redness and/or skin breakdown  6/2/2025 0208 by Sarah Nielson RN  Outcome: Progressing     Problem: ABCDS Injury Assessment  Goal: Absence of physical injury  6/2/2025 0208 by Sarah Nielson RN  Outcome: Progressing

## 2025-06-03 PROBLEM — S70.11XA THIGH HEMATOMA, RIGHT, INITIAL ENCOUNTER: Status: ACTIVE | Noted: 2025-06-03

## 2025-06-03 PROBLEM — R41.82 ALTERED MENTAL STATUS: Status: ACTIVE | Noted: 2025-06-03

## 2025-06-03 LAB
ANION GAP SERPL CALCULATED.3IONS-SCNC: 10 MMOL/L (ref 9–16)
ANION GAP SERPL CALCULATED.3IONS-SCNC: 9 MMOL/L (ref 9–16)
BACTERIA URNS QL MICRO: ABNORMAL
BASOPHILS # BLD: 0.05 K/UL (ref 0–0.2)
BASOPHILS NFR BLD: 1 % (ref 0–2)
BILIRUB UR QL STRIP: NEGATIVE
BUN SERPL-MCNC: 6 MG/DL (ref 8–23)
CALCIUM SERPL-MCNC: 8 MG/DL (ref 8.6–10.4)
CASTS #/AREA URNS LPF: ABNORMAL /LPF (ref 0–8)
CHLORIDE SERPL-SCNC: 109 MMOL/L (ref 98–107)
CHLORIDE SERPL-SCNC: 109 MMOL/L (ref 98–107)
CLARITY UR: CLEAR
CO2 SERPL-SCNC: 21 MMOL/L (ref 20–31)
CO2 SERPL-SCNC: 22 MMOL/L (ref 20–31)
COLOR UR: YELLOW
CREAT SERPL-MCNC: 0.6 MG/DL (ref 0.6–0.9)
DATE, STOOL #1: ABNORMAL
ECHO BSA: 1.56 M2
EOSINOPHIL # BLD: 0.06 K/UL (ref 0–0.44)
EOSINOPHILS RELATIVE PERCENT: 1 % (ref 1–4)
EPI CELLS #/AREA URNS HPF: ABNORMAL /HPF (ref 0–5)
ERYTHROCYTE [DISTWIDTH] IN BLOOD BY AUTOMATED COUNT: 15.2 % (ref 11.8–14.4)
GFR, ESTIMATED: 88 ML/MIN/1.73M2
GLUCOSE BLD-MCNC: 115 MG/DL (ref 65–105)
GLUCOSE BLD-MCNC: 122 MG/DL (ref 65–105)
GLUCOSE SERPL-MCNC: 112 MG/DL (ref 74–99)
GLUCOSE UR STRIP-MCNC: NEGATIVE MG/DL
HCT VFR BLD AUTO: 20.8 % (ref 36.3–47.1)
HCT VFR BLD AUTO: 25.5 % (ref 36.3–47.1)
HCT VFR BLD AUTO: 27.4 % (ref 36.3–47.1)
HCT VFR BLD AUTO: 27.8 % (ref 36.3–47.1)
HEMOCCULT SP1 STL QL: POSITIVE
HGB BLD-MCNC: 6.9 G/DL (ref 11.9–15.1)
HGB BLD-MCNC: 8.4 G/DL (ref 11.9–15.1)
HGB BLD-MCNC: 9 G/DL (ref 11.9–15.1)
HGB BLD-MCNC: 9.1 G/DL (ref 11.9–15.1)
HGB UR QL STRIP.AUTO: NEGATIVE
IMM GRANULOCYTES # BLD AUTO: 0.11 K/UL (ref 0–0.3)
IMM GRANULOCYTES NFR BLD: 1 %
KETONES UR STRIP-MCNC: NEGATIVE MG/DL
LEUKOCYTE ESTERASE UR QL STRIP: ABNORMAL
LYMPHOCYTES NFR BLD: 1.64 K/UL (ref 1.1–3.7)
LYMPHOCYTES RELATIVE PERCENT: 18 % (ref 24–43)
MCH RBC QN AUTO: 31 PG (ref 25.2–33.5)
MCHC RBC AUTO-ENTMCNC: 32.9 G/DL (ref 28.4–34.8)
MCV RBC AUTO: 94.1 FL (ref 82.6–102.9)
MONOCYTES NFR BLD: 1.1 K/UL (ref 0.1–1.2)
MONOCYTES NFR BLD: 12 % (ref 3–12)
NEUTROPHILS NFR BLD: 67 % (ref 36–65)
NEUTS SEG NFR BLD: 6.03 K/UL (ref 1.5–8.1)
NITRITE UR QL STRIP: NEGATIVE
NRBC BLD-RTO: 0 PER 100 WBC
PATH REV BLD -IMP: NORMAL
PH UR STRIP: 5.5 [PH] (ref 5–8)
PLATELET # BLD AUTO: 146 K/UL (ref 138–453)
PMV BLD AUTO: 10.1 FL (ref 8.1–13.5)
POTASSIUM SERPL-SCNC: 3.4 MMOL/L (ref 3.7–5.3)
POTASSIUM SERPL-SCNC: 3.7 MMOL/L (ref 3.7–5.3)
PROT UR STRIP-MCNC: ABNORMAL MG/DL
RBC # BLD AUTO: 2.71 M/UL (ref 3.95–5.11)
RBC # BLD: ABNORMAL 10*6/UL
RBC #/AREA URNS HPF: ABNORMAL /HPF (ref 0–4)
SODIUM SERPL-SCNC: 139 MMOL/L (ref 136–145)
SODIUM SERPL-SCNC: 141 MMOL/L (ref 136–145)
SP GR UR STRIP: 1.02 (ref 1–1.03)
TIME, STOOL #1: 1300
UROBILINOGEN UR STRIP-ACNC: NORMAL EU/DL (ref 0–1)
VAS RIGHT CFA PROX PSV: 138 CM/S
VAS RIGHT CFA VEL RATIO: 0.7
VAS RIGHT EXT ILIAC DIST PSV: 202 CM/S
VAS RIGHT SFA MID PSV: 142 CM/S
WBC #/AREA URNS HPF: ABNORMAL /HPF (ref 0–5)
WBC OTHER # BLD: 9 K/UL (ref 3.5–11.3)

## 2025-06-03 PROCEDURE — 6360000002 HC RX W HCPCS

## 2025-06-03 PROCEDURE — 36430 TRANSFUSION BLD/BLD COMPNT: CPT

## 2025-06-03 PROCEDURE — 81001 URINALYSIS AUTO W/SCOPE: CPT

## 2025-06-03 PROCEDURE — 99233 SBSQ HOSP IP/OBS HIGH 50: CPT | Performed by: NURSE PRACTITIONER

## 2025-06-03 PROCEDURE — 0T9B70Z DRAINAGE OF BLADDER WITH DRAINAGE DEVICE, VIA NATURAL OR ARTIFICIAL OPENING: ICD-10-PCS | Performed by: PHYSICIAN ASSISTANT

## 2025-06-03 PROCEDURE — 93926 LOWER EXTREMITY STUDY: CPT | Performed by: STUDENT IN AN ORGANIZED HEALTH CARE EDUCATION/TRAINING PROGRAM

## 2025-06-03 PROCEDURE — 2060000000 HC ICU INTERMEDIATE R&B

## 2025-06-03 PROCEDURE — 97116 GAIT TRAINING THERAPY: CPT

## 2025-06-03 PROCEDURE — 80048 BASIC METABOLIC PNL TOTAL CA: CPT

## 2025-06-03 PROCEDURE — 2580000003 HC RX 258

## 2025-06-03 PROCEDURE — 82947 ASSAY GLUCOSE BLOOD QUANT: CPT

## 2025-06-03 PROCEDURE — 99232 SBSQ HOSP IP/OBS MODERATE 35: CPT | Performed by: INTERNAL MEDICINE

## 2025-06-03 PROCEDURE — 6370000000 HC RX 637 (ALT 250 FOR IP)

## 2025-06-03 PROCEDURE — 51702 INSERT TEMP BLADDER CATH: CPT

## 2025-06-03 PROCEDURE — 36415 COLL VENOUS BLD VENIPUNCTURE: CPT

## 2025-06-03 PROCEDURE — 85018 HEMOGLOBIN: CPT

## 2025-06-03 PROCEDURE — 94761 N-INVAS EAR/PLS OXIMETRY MLT: CPT

## 2025-06-03 PROCEDURE — 99222 1ST HOSP IP/OBS MODERATE 55: CPT | Performed by: STUDENT IN AN ORGANIZED HEALTH CARE EDUCATION/TRAINING PROGRAM

## 2025-06-03 PROCEDURE — 82272 OCCULT BLD FECES 1-3 TESTS: CPT

## 2025-06-03 PROCEDURE — 97530 THERAPEUTIC ACTIVITIES: CPT

## 2025-06-03 PROCEDURE — 85014 HEMATOCRIT: CPT

## 2025-06-03 PROCEDURE — P9016 RBC LEUKOCYTES REDUCED: HCPCS

## 2025-06-03 PROCEDURE — 85025 COMPLETE CBC W/AUTO DIFF WBC: CPT

## 2025-06-03 PROCEDURE — 80051 ELECTROLYTE PANEL: CPT

## 2025-06-03 PROCEDURE — 51798 US URINE CAPACITY MEASURE: CPT

## 2025-06-03 PROCEDURE — 99233 SBSQ HOSP IP/OBS HIGH 50: CPT | Performed by: INTERNAL MEDICINE

## 2025-06-03 RX ORDER — SODIUM CHLORIDE 9 MG/ML
INJECTION, SOLUTION INTRAVENOUS PRN
Status: DISCONTINUED | OUTPATIENT
Start: 2025-06-03 | End: 2025-06-06

## 2025-06-03 RX ORDER — POTASSIUM CHLORIDE 7.45 MG/ML
10 INJECTION INTRAVENOUS PRN
Status: DISCONTINUED | OUTPATIENT
Start: 2025-06-03 | End: 2025-06-10 | Stop reason: HOSPADM

## 2025-06-03 RX ORDER — POTASSIUM CHLORIDE 1500 MG/1
40 TABLET, EXTENDED RELEASE ORAL PRN
Status: DISCONTINUED | OUTPATIENT
Start: 2025-06-03 | End: 2025-06-10 | Stop reason: HOSPADM

## 2025-06-03 RX ADMIN — MEMANTINE 5 MG: 5 TABLET ORAL at 08:35

## 2025-06-03 RX ADMIN — PIPERACILLIN AND TAZOBACTAM 3375 MG: 3; .375 INJECTION, POWDER, LYOPHILIZED, FOR SOLUTION INTRAVENOUS at 17:21

## 2025-06-03 RX ADMIN — MEMANTINE 5 MG: 5 TABLET ORAL at 21:00

## 2025-06-03 RX ADMIN — POTASSIUM CHLORIDE 40 MEQ: 1500 TABLET, EXTENDED RELEASE ORAL at 21:00

## 2025-06-03 RX ADMIN — DONEPEZIL HYDROCHLORIDE 10 MG: 10 TABLET, FILM COATED ORAL at 21:00

## 2025-06-03 RX ADMIN — SODIUM CHLORIDE, PRESERVATIVE FREE 40 MG: 5 INJECTION INTRAVENOUS at 08:35

## 2025-06-03 RX ADMIN — SODIUM CHLORIDE 500 ML: 0.9 INJECTION, SOLUTION INTRAVENOUS at 00:02

## 2025-06-03 RX ADMIN — IRON SUCROSE 100 MG: 20 INJECTION, SOLUTION INTRAVENOUS at 08:35

## 2025-06-03 RX ADMIN — DESMOPRESSIN ACETATE 40 MG: 0.2 TABLET ORAL at 21:00

## 2025-06-03 RX ADMIN — SODIUM CHLORIDE, PRESERVATIVE FREE 40 MG: 5 INJECTION INTRAVENOUS at 21:00

## 2025-06-03 RX ADMIN — PIPERACILLIN AND TAZOBACTAM 3375 MG: 3; .375 INJECTION, POWDER, LYOPHILIZED, FOR SOLUTION INTRAVENOUS at 05:58

## 2025-06-03 NOTE — PROGRESS NOTES
NEPHROLOGY PROGRESS NOTE          SUBJECTIVE   Afebrile, hemodynamically stable, on room air  Labs showing Na 139, Cr 0.6, bicarb 21, k 3.7  UOP good, not charted    OBJECTIVE     Vitals:    06/03/25 0319 06/03/25 0708 06/03/25 0728 06/03/25 0806   BP: (!) 109/50 (!) 118/46 (!) 109/48    Pulse: 74 65  78   Resp: 11 11 15 17   Temp: 98 °F (36.7 °C) 97.6 °F (36.4 °C) 97.5 °F (36.4 °C)    TempSrc: Temporal Temporal     SpO2:  99% 100% 97%   Weight:       Height:         24HR INTAKE/OUTPUT:    Intake/Output Summary (Last 24 hours) at 6/3/2025 0936  Last data filed at 6/3/2025 0319  Gross per 24 hour   Intake 1310.2 ml   Output 140 ml   Net 1170.2 ml       General appearance: Awake and alert x 3  HEENT: Pupils reactive to light   respiratory: Bilateral air entry and clear  Cardiovascular: S1 and S2 audible no S3  Abdomen:Non tender/non distended.Bowel sounds present  Extremities: No edema  Neurological:.No abnormalities of mood, affect, memory, mentation, or behavior are noted      MEDICATIONS     Scheduled Meds:    piperacillin-tazobactam  3,375 mg IntraVENous Q8H    iron sucrose  100 mg IntraVENous Daily    pantoprazole (PROTONIX) 40 mg in sodium chloride (PF) 0.9 % 10 mL injection  40 mg IntraVENous Q12H    atorvastatin  40 mg Oral Nightly    donepezil  10 mg Oral Nightly    memantine  5 mg Oral BID    [Held by provider] aspirin  81 mg Oral Daily     Continuous Infusions:    sodium chloride      sodium chloride      sodium chloride Stopped (06/02/25 1346)     PRN Meds:  sodium chloride, sodium chloride, sodium chloride, acetaminophen, racepinephrine HCl, sodium chloride nebulizer, benzocaine-menthol, ondansetron **OR** ondansetron  Home Meds:                Medications Prior to Admission: nitroGLYCERIN (NITROSTAT) 0.4 MG SL tablet, Place 1 tablet under the tongue every 5 minutes as needed for Chest pain  donepezil (ARICEPT) 10 MG tablet, Take one tablet by mouth nightly  isosorbide mononitrate (IMDUR) 30 MG extended   --   --   --   --   --  10.1    < > = values in this interval not displayed.     ASSESSMENT     Euvolemic hyponatremia down to 114 secondary to SIADH as reflected by urine studies.  Patient was on salt tablet and also received a dose of tolvaptan yesterday.  Sodium is now up to 139.  Altered mental sensorium likely secondary to hyponatremia with worsening of her Alzheimer's disease.   Initially on mechanical ventilation for airway: Improved patient is now extubated and maintaining oxygen saturation  Alzheimer's dementia  History of coronary artery disease status post stent placement  History of hypertension    PLAN     Continue fluid restriction 1.5 L  Stephenson per urology for urinary retention  Na improved, Nephrology will sign off at this time    Please do not hesitate to call if any questions    Jai Alonzo MD  6/3/2025 9:36 AM    Attending Physician Statement  I have discussed the care of this patient, including pertinent history and exam findings, with the Resident/CNP. I have seen and examined the patient myself. I have reviewed and edited the key elements of all parts of the encounter with the Resident/CNP.  I agree with the assessment, plan and orders as documented by the Resident/CNP. In addition Patient was seen and examined.  No new issues reported overnight.  Sodium levels much improved and now off of salt tab.  Since renal function is normal and sodium levels much improved, nephrology will sign off.  Please call with any other questions.    Perez Flores MD   [facility]    This note is created with the assistance of a speech-recognition program. While intending to generate a document that actually reflects the content of the visit, no guarantees can be provided that every mistake has been identified and corrected by editing.

## 2025-06-03 NOTE — PLAN OF CARE
Problem: Neurosensory - Adult  Goal: Achieves stable or improved neurological status  6/3/2025 1122 by Angeline Childs RN  Outcome: Progressing  6/3/2025 0651 by Jo Roberts RN  Outcome: Progressing  Flowsheets (Taken 6/2/2025 1929)  Achieves stable or improved neurological status:   Assess for and report changes in neurological status   Initiate measures to prevent increased intracranial pressure   Maintain blood pressure and fluid volume within ordered parameters to optimize cerebral perfusion and minimize risk of hemorrhage   Monitor temperature, glucose, and sodium. Initiate appropriate interventions as ordered  Goal: Absence of seizures  6/3/2025 1122 by Angeline Childs RN  Outcome: Progressing  6/3/2025 0651 by Jo Roberts RN  Outcome: Progressing  Flowsheets (Taken 6/2/2025 1929)  Absence of seizures: Monitor for seizure activity.  If seizure occurs, document type and location of movements and any associated apnea  Goal: Remains free of injury related to seizures activity  6/3/2025 1122 by Angeline Childs RN  Outcome: Progressing  6/3/2025 0651 by Jo Roberts RN  Outcome: Progressing  Flowsheets (Taken 6/2/2025 1929)  Remains free of injury related to seizure activity:   Maintain airway, patient safety  and administer oxygen as ordered   Monitor patient for seizure activity, document and report duration and description of seizure to Licensed Independent Practitioner  Goal: Achieves maximal functionality and self care  6/3/2025 1122 by Angeline Childs RN  Outcome: Progressing  6/3/2025 0651 by Jo Roberts RN  Outcome: Progressing  Flowsheets (Taken 6/2/2025 1929)  Achieves maximal functionality and self care: Monitor swallowing and airway patency with patient fatigue and changes in neurological status     Problem: Respiratory - Adult  Goal: Achieves optimal ventilation and oxygenation  6/3/2025 1122 by Angeline Childs RN  Outcome: Progressing  6/3/2025 0651 by Jo Roberts RN  Outcome: Progressing  Flowsheets (Taken

## 2025-06-03 NOTE — PROGRESS NOTES
[facility]  Internal Medicine Teaching Residency Program  Inpatient Daily Progress Note  ______________________________________________________________________________    Patient: Lyn Lombardi  YOB: 1940   MRN: [id number]    Acct: [id number]     Room: 0434/0434-01  Admit date: 5/29/2025  Today's date: 06/03/25  Number of days in the hospital: 5  Current Code Status: Full Code   SUBJECTIVE   Admitting Diagnosis: Acute hyponatremia      - Patient seen and examined at bedside. Chart and results reviewed.  -Pt transferred out of the ICU overnight  Hgb noted to be 6.9 requiring 1uPRBC  No active signs of GIB  Vascular surgery updated  Vascular duplex pseudoaneurysm ordered  Electrolyte panel and daily labs are pending  Sodium 135 yesterday am      BRIEF HISTORY     The patient is a 84 y.o. female who a PMHx of Alzheimer's, HTN, HLD, GERD, CAD w stent August 2012 who initially presented to the ICU on 5/29 after being found down unresponsive.  Patient initially arrived to [facility] with a GCS of 6 and was intubated for airway protection.  Initial lab work showed a sodium of 114 elevated CK, elevated troponin and elevated WBC.  Patient underwent trauma workup with CT head, CT C-spine, CT chest abdomen pelvis CT lumbar and thoracic spine all of which were unremarkable.     Pt transferred to [facility] for further evaluation. During her ICU stay her sodium was corrected, currently at 134. Pt was found to have elevated WBC, fever and elevated procal for which she was started on Zosyn to cover aspiration pneumonia. Pt had improvement in mentation and was extubated on 5/31 which she has been tolerating well.      Plan was for transfer out of the ICU on 6/1 however, pt was noted to have a significant drop in hgb from 12 to 8. ASA and Heparin were held. Vascular duplex of R thigh ordered which did show R thigh hematoma. Vascular surgery was consulted who recommended  05/09/2025    HDL 79 05/09/2025    TRIG 102 05/09/2025     LIVER PROFILE:   Recent Labs     06/01/25  0836   AST 37*   ALT 22   BILIDIR 0.5*   BILITOT 1.0   ALKPHOS 60      MICROBIOLOGY:   Lab Results   Component Value Date/Time    CULTURE NO GROWTH 2 DAYS 05/31/2025 01:49 PM       Imaging:    CTA ABDOMEN PELVIS W WO CONTRAST  Result Date: 6/1/2025  1. No evidence of active arterial gastrointestinal hemorrhage.  Large right medial thigh hematoma without evidence of active arterial hemorrhage. 2. Mucoid impaction in the right lower lobe with posterior lower lobe airspace disease consistent with atelectasis or pneumonia. 3. Small amount of free pelvic fluid which is nonspecific.     XR ABDOMEN FOR NG/OG/NE TUBE PLACEMENT  Result Date: 5/30/2025  Endo gastric tube tip projects over the distal stomach.     XR ABDOMEN FOR NG/OG/NE TUBE PLACEMENT  Result Date: 5/30/2025  1. Enteric tube tip and side hole project at the gastric body/antrum.     XR ABDOMEN FOR NG/OG/NE TUBE PLACEMENT  Result Date: 5/30/2025  1. Enteric tube tip and side hole project at the lower esophagus.     XR ABDOMEN FOR NG/OG/NE TUBE PLACEMENT  Result Date: 5/30/2025  Gastric tube with the tip in the distal esophagus.     XR ABDOMEN FOR NG/OG/NE TUBE PLACEMENT  Result Date: 5/30/2025  Enteric tube tip projects over the lower thorax in the region of the known hiatal hernia.     XR CHEST PORTABLE  Result Date: 5/29/2025  Lines and tubes as above. Mild pulmonary vascular congestion.  No focal consolidation.     CT THORACIC SPINE BONY RECONSTRUCTION  Result Date: 5/29/2025  1. No acute abnormality of the thoracic or lumbar spine related to the fall. 2. Mild-to-moderate multilevel degenerative disc disease and osteophyte formation in the thoracic spine. 3. Mild degenerative disc disease and facet arthropathy in the lower lumbar spine. No significant focal canal stenosis.     CT LUMBAR SPINE BONY RECONSTRUCTION  Result Date: 5/29/2025  1. No acute

## 2025-06-03 NOTE — CONSULTS
Division of Vascular Surgery        New Consult      Physician Requesting Consult:  Mary    Reason for Consult:   Right groin hematoma    Chief Complaint:     Right groin hematoma    History of Present Illness:      Lyn Lombardi is a 84 y.o. woman who presented after being found down. Pt was GCS 6 on arrival and was intubated for airway protection. No traumatic injuries noted on initial scans. Pt was admitted to MICU where she was treated for hyponatremia and rhabdomyolysis. Pt has been extubated and now being treated for aspiration pneumonia. Since admission pt has had a drop in hemoglobin 14 > 13 > 12 > 8 > 7.8. CTA scan was completed today of the abdomen/pelvis demonstrating right thigh hematoma without active extravasation for which vascular surgery was consulted. Pt has a femoral central and arterial line on the right side.     Pseudoaneurysm study was negative on 6/2.  Vascular surgery was reconsulted due to drop in hemoglobin.  There had been no changes in the hematoma size or character.    Medical History:     Past Medical History:   Diagnosis Date    Abnormal uterine bleeding (AUB) 01/2021    Anxiety     daughter reports since Covid    Asteroid hyalosis of right eye     Bruises easily     per daughter    Coronary artery disease     status post acute non-Q wave myocardial infarction 08/23/2012, status post drug eluting stent placement in the LAD and 2 drug eluting stents in the left circumflex artery 08/23/2012.    COVID-19 06/30/2020    confusion, SOB, weakness x 6-8 weeks; was hospitalized and did receive plasma    Diastolic dysfunction     grade 1.     Gastric ulcer with hemorrhage 03/03/2015    gastric and esophageal ulcers due to nsaid    Hyperlipidemia     Hypertension     Lives alone     Memory loss     daughter reports since Covid    Mild mitral regurgitation     Mild tricuspid regurgitation     Obesity     Patient in clinical research study 06/27/2020    Expanded Access to Convalescent  surgical intervention at this time. Central line and arterial line in place and functioning. Ok to continue to use  Pseudoaneurysm study ordered    Electronically signed by Yudi Cruz DO on 6/3/25 at 4:55 AM [facility] [place]  O: [phone]

## 2025-06-03 NOTE — PLAN OF CARE
Problem: Neurosensory - Adult  Goal: Achieves stable or improved neurological status  Outcome: Progressing  Flowsheets (Taken 6/2/2025 1929)  Achieves stable or improved neurological status:   Assess for and report changes in neurological status   Initiate measures to prevent increased intracranial pressure   Maintain blood pressure and fluid volume within ordered parameters to optimize cerebral perfusion and minimize risk of hemorrhage   Monitor temperature, glucose, and sodium. Initiate appropriate interventions as ordered  Goal: Absence of seizures  Outcome: Progressing  Flowsheets (Taken 6/2/2025 1929)  Absence of seizures: Monitor for seizure activity.  If seizure occurs, document type and location of movements and any associated apnea  Goal: Remains free of injury related to seizures activity  Outcome: Progressing  Flowsheets (Taken 6/2/2025 1929)  Remains free of injury related to seizure activity:   Maintain airway, patient safety  and administer oxygen as ordered   Monitor patient for seizure activity, document and report duration and description of seizure to Licensed Independent Practitioner  Goal: Achieves maximal functionality and self care  Outcome: Progressing  Flowsheets (Taken 6/2/2025 1929)  Achieves maximal functionality and self care: Monitor swallowing and airway patency with patient fatigue and changes in neurological status     Problem: Respiratory - Adult  Goal: Achieves optimal ventilation and oxygenation  Outcome: Progressing  Flowsheets (Taken 6/2/2025 1929)  Achieves optimal ventilation and oxygenation:   Assess for changes in respiratory status   Position to facilitate oxygenation and minimize respiratory effort   Oxygen supplementation based on oxygen saturation or arterial blood gases   Assess for changes in mentation and behavior     Problem: Cardiovascular - Adult  Goal: Maintains optimal cardiac output and hemodynamic stability  Outcome: Progressing  Flowsheets (Taken 6/2/2025  Intact:   Monitor for areas of redness and/or skin breakdown   Assess vascular access sites hourly  Taken 6/2/2025 1929  Skin Integrity Remains Intact:   Monitor for areas of redness and/or skin breakdown   Assess vascular access sites hourly     Problem: ABCDS Injury Assessment  Goal: Absence of physical injury  Outcome: Progressing  Flowsheets (Taken 6/2/2025 2000)  Absence of Physical Injury: Implement safety measures based on patient assessment

## 2025-06-03 NOTE — CONSULTS
Mera Cuevas, Felipe, Ramon, Merna, Denisa, Jame, & Raul   Urology Consultation      Patient:  Lyn Lombardi  MRN: [id number]  YOB: 1940    REASON FOR CONSULT:  difficult catheterization    HISTORY OF PRESENT ILLNESS:   The patient is a 84 y.o. female who presents after being found down, with AMS, ultimately was intubated for airway protection. She as in ICU being treated for pneumonia, rhabdo, and hyponatremia. She had a godinez catheter in until yesterday after being extubated and transferred out of ICU. It was removed and patient was having very low urine output with low bladder scans until this morning. She was given boluses of fluids overnight and was bladder scanned for <200mL early AM, then repeat bladder scan was 696mL. Then >700mL on bladder scan recheck. Nursing attempted multiple times to straight catheterize patient but all were unsuccessful, thus urology consulted. She currently is not oriented, and so unable to offer any pertinent history. No formal  history per chart review. She is s/p SHARIF with BSO in 2021. She has had two loose Bms over the last 24 hours.     Patient's old records, notes and chart reviewed and summarized above.    Past Medical History:    Past Medical History:   Diagnosis Date    Abnormal uterine bleeding (AUB) 01/2021    Anxiety     daughter reports since Covid    Asteroid hyalosis of right eye     Bruises easily     per daughter    Coronary artery disease     status post acute non-Q wave myocardial infarction 08/23/2012, status post drug eluting stent placement in the LAD and 2 drug eluting stents in the left circumflex artery 08/23/2012.    COVID-19 06/30/2020    confusion, SOB, weakness x 6-8 weeks; was hospitalized and did receive plasma    Diastolic dysfunction     grade 1.     Gastric ulcer with hemorrhage 03/03/2015    gastric and esophageal ulcers due to nsaid    Hyperlipidemia     Hypertension     Lives alone     Memory loss     daughter reports since Covid    Bladder: Non-tender and not distended  Ext: 2+ DP pulses bilaterally  Skin: No rashes or bruising present  Lymphatics: No palpable lymphadenopathy  Pelvic exam: No discharge. Urethral meatus retracted in to vaginal vault.    Labs:  Recent Labs     06/01/25  0428 06/01/25  0617 06/02/25 0415 06/02/25  1125 06/03/25  0138 06/03/25  0644 06/03/25  1110   WBC 11.5*  --  10.5  --   --  9.0  --    HGB 8.0*   < > 8.0*   < > 6.9* 8.4* 9.1*   HCT 23.1*   < > 22.5*   < > 20.8* 25.5* 27.4*   MCV 93.9  --  89.6  --   --  94.1  --    PLT See Reflexed IPF Result  --  See Reflexed IPF Result  --   --  146  --     < > = values in this interval not displayed.     Recent Labs     06/01/25  0359 06/01/25  0617 06/02/25  0415 06/02/25  0605 06/03/25  0644   *   < > 135* 135* 139   K 4.5   < > 3.7 3.7 3.7      < > 106 105 109*   CO2 20   < > 21 21 21   BUN 10  --  11  --  6*   CREATININE 0.7  --  0.8  --  0.6    < > = values in this interval not displayed.       No results for input(s): \"COLORU\", \"PHUR\", \"LABCAST\", \"WBCUA\", \"RBCUA\", \"MUCUS\", \"TRICHOMONAS\", \"YEAST\", \"BACTERIA\", \"CLARITYU\", \"SPECGRAV\", \"LEUKOCYTESUR\", \"UROBILINOGEN\", \"BILIRUBINUR\", \"BLOODU\" in the last 72 hours.    Invalid input(s): \"NITRATE\", \"GLUCOSEUKETONESUAMORPHOUS\"    Culture results:  No urine cultures    -----------------------------------------------------------------  Imaging Results:    CT CHEST ABDOMEN PELVIS WO CONTRAST Additional Contrast? None  Result Date: 5/29/2025  EXAMINATION: CT OF THE CHEST, ABDOMEN, AND PELVIS WITHOUT CONTRAST 5/29/2025 9:59 pm TECHNIQUE: CT of the chest, abdomen and pelvis was performed without the administration of intravenous contrast. Multiplanar reformatted images are provided for review. Automated exposure control, iterative reconstruction, and/or weight based adjustment of the mA/kV was utilized to reduce the radiation dose to as low as reasonably achievable. COMPARISON: None HISTORY: ORDERING SYSTEM PROVIDED

## 2025-06-03 NOTE — PROCEDURES
PROCEDURE NOTE  Date: 6/3/2025   Name: Lyn Lombardi  YOB: 1940    Procedures    Urology Update: Godinez placed    16fr godinez catheter placed under sterile procedure, with 10 cc sterile water inflated into balloon and drainage bag connected. >500 cc out into godinez bag, clear yellow. Urethra was retracted and unable to visualize.     Patient tolerated procedure well.  Please monitor urine output, hand irrigate prn, call with questions.    Recommend void trial EARLY AM day of anticipated discharge, however to reduce risk of CAUTI, ok to remove godinez tomorrow at 0600 to repeat void trial. If she fails void trial again, recommend maintaing godinez catheter at discharge and outpatient void trial in urology office in 1-2 weeks.    Kajal Washington PA-C  Urology Service   6/3/2025 1:02 PM

## 2025-06-03 NOTE — PLAN OF CARE
Vascular surgery re-consulted overnight for concern for bleeding from hematoma. Hgb has dropped to 6.9 from 7.8 since yesterday.  Hematoma evaluated again this morning and remains the same size.  The hematoma is soft with overlying ecchymosis however there is no blistering or other concerns for skin breakdown.  The hematoma appears to be the same size as when we saw it yesterday morning.  There is no pulsatility and it is not rapidly expanding.  The previous central and arterial lines have been removed. Pseudoaneurysm duplex ordered by primary team is negative for pseudoaneurysm or active extravasation.  Vascular surgery will again sign off as the hematoma has not changed in size and is unlikely to be the cause of her continued drop in hemoglobin.  We recommend further workup by the primary team for other causes of her anemia.  Please contact us if there is any change in the hematoma size or concern for overlying skin breakdown.       Poly George,    General Surgery PGY-4  6/3/25 7:16 AM

## 2025-06-03 NOTE — PROGRESS NOTES
Comprehensive Nutrition Assessment    Type and Reason for Visit:  Reassess    Nutrition Recommendations/Plan:   Start Magic cup ONS BID (chocolate)  Provide thickened drinks with each meal from dietary and between meals as able from unit fridge  No bowel movement x 5 days per chart? Consider start bowel regimen per MD     Malnutrition Assessment:  Malnutrition Status:  Moderate malnutrition (06/03/25 1454)  suspect  Context:  Acute Illness     Findings of the 6 clinical characteristics of malnutrition:  Energy Intake:  Mild decrease in energy intake  Weight Loss:  Greater than 5% over 1 month     Body Fat Loss:  Unable to assess     Muscle Mass Loss:  Mild muscle mass loss Clavicles (pectoralis & deltoids), Temples (temporalis)  Fluid Accumulation:  No fluid accumulation     Strength:  Not Performed    Nutrition Assessment:    Follow up. Pt is off sedation, extubated. TF discontinued. Transferred out of ICU. Currently on a soft and bite sized diet with mildly thick liquids per SLP recommendations. Consuming % at meals per nursing documentation. Pt reports having a good appetite and eating okay. Pt is agreeable to chocolate magic cups. Discussed intake with RN, RN also reporting pt is eating okay at meal though states pt is not drinking much and not producing enough urine. Discussed fluid intake with pt, noted unopened juice (thickened) at bedside. Pt reports drinking mostly water at home, also likes milk with cereal, coffee, gatorade, chocolate milkshakes. Thickened chocolate Ensure and provided thickened water in cups at bedside, pt sipping ensure. Writer put several pre-thickened drinks in fridge (fofana, milks, juices) and gatorade and Ensure to be thickened, d/w RN. Weight fluctuations of note with clinically significant wt loss of 6.8% over 1 month, will continue to monitor.    Nutrition Related Findings:    Meds/labs reviewed. No BM per chart? No bowel regimen noted Wound Type: Surgical Incision,  Pressure Injury, Stage I (Stage 1 PI to sacrum)       Current Nutrition Intake & Therapies:    Average Meal Intake: 26-50%, 51-75%, %  Average Supplements Intake: None Ordered  ADULT DIET; Dysphagia - Soft and Bite Sized; Mildly Thick (Nectar); 1500 ml  Additional Calorie Sources:  None    Anthropometric Measures:  Height: 149 cm (4' 10.66\")  Ideal Body Weight (IBW): 93 lbs (42 kg)    Admission Body Weight: 55.8 kg (123 lb 0.3 oz)  Current Body Weight: 56.3 kg (124 lb 1.9 oz), 137.6 % IBW. Weight Source: Bed scale  Current BMI (kg/m2): 25.4  Weight Adjustment For: No Adjustment  BMI Categories: Overweight (BMI 25.0-29.9)    Estimated Daily Nutrient Needs:  Energy Requirements Based On: Kcal/kg  Weight Used for Energy Requirements: Current  Energy (kcal/day): 1727-2180 kcals/day  Weight Used for Protein Requirements: Admission  Protein (g/day): 50-70 gm pro/day  Method Used for Fluid Requirements: Defer to provider  Fluid (ml/day): per MD    Nutrition Diagnosis:   Inadequate oral intake related to swallowing difficulty as evidenced by swallow study results, intake 26-50%, intake 51-75%, other (hx of dementia)    Nutrition Interventions:   Food and/or Nutrient Delivery: Continue Current Diet, Start Oral Nutrition Supplement  Nutrition Education/Counseling: No recommendation at this time  Coordination of Nutrition Care: Continue to monitor while inpatient  Plan of Care discussed with: RN, pt    Goals:  Goals: Meet at least 75% of estimated needs, prior to discharge  Type of Goal: New goal  Previous Goal Met: Goal(s) Achieved    Nutrition Monitoring and Evaluation:   Behavioral-Environmental Outcomes: None Identified  Food/Nutrient Intake Outcomes: Food and Nutrient Intake, Supplement Intake  Physical Signs/Symptoms Outcomes: Chewing or Swallowing, Weight, Constipation    Discharge Planning:    Continue current diet, Continue Oral Nutrition Supplement     Sarah aNvarro RD  Contact: [phone]

## 2025-06-03 NOTE — PROGRESS NOTES
Physical Therapy  Facility/Department: [address] ONC/MED SURG   Physical Therapy Daily Treatment Note    Patient Name: Lyn Lombardi        MRN: [id number]    : 1940    Date of Service: 6/3/2025    Chief Complaint   Patient presents with    Fall     Past Medical History:  has a past medical history of Abnormal uterine bleeding (AUB), Anxiety, Asteroid hyalosis of right eye, Bruises easily, Coronary artery disease, COVID-19, Diastolic dysfunction, Gastric ulcer with hemorrhage, Hyperlipidemia, Hypertension, Lives alone, Memory loss, Mild mitral regurgitation, Mild tricuspid regurgitation, Obesity, Patient in clinical research study, Poor intravenous access, and Vitreous floaters.  Past Surgical History:  has a past surgical history that includes Cholecystectomy (1978); Cardiac catheterization (Left, 2012); Endoscopy, colon, diagnostic (2015); Upper gastrointestinal endoscopy (2015); Dilation and curettage of uterus (N/A, 2021); Total abdominal hysterectomy w/ bilateral salpingoophorectomy (2021); and Hysterectomy (N/A, 2021).    Discharge Recommendations  Discharge Recommendations: Therapy recommended at discharge  PT Equipment Recommendations  Equipment Needed: Yes  Mobility Devices: Walker  Walker: Rolling    Assessment  Body Structures, Functions, Activity Limitations Requiring Skilled Therapeutic Intervention: Decreased functional mobility ;Decreased strength;Decreased balance;Decreased cognition;Decreased safe awareness  Assessment: Pt able to ambulate 40 ft with rw, Shabbir for balance and safety.  Pt Shabbir for transfers, Shabbir for bed mobility. Pt is unsafe to return to previous living situation due to limited mobility and strength. Pt will benefit from continued therapy to improve deficits and progress function.  Therapy Prognosis: Good  Activity Tolerance  Activity Tolerance: Patient tolerated treatment well;Patient limited by fatigue  Safety Devices  Type of Devices:

## 2025-06-03 NOTE — PROGRESS NOTES
[facility]   Progress Note                   Date:   6/3/2025  Patient name: Lyn Lombardi  Date of admission:  5/29/2025 10:41 PM  MRN:   [id number]  YOB: 1940  PCP: Tru Gould MD    Reason for Admission: Acute hyponatremia [E87.1]  NSTEMI (non-ST elevated myocardial infarction) (HCC) [I21.4]    Subjective:       Clinical Changes / Abnormalities: Pt seen and examined in the room.  Patient resting in bed. Pt denies any CP or sob.  Labs, vitals and tele reviewed- SR 84   Patient with antibiotics infusing.     Medications:   Scheduled Meds:   piperacillin-tazobactam  3,375 mg IntraVENous Q8H    iron sucrose  100 mg IntraVENous Daily    pantoprazole (PROTONIX) 40 mg in sodium chloride (PF) 0.9 % 10 mL injection  40 mg IntraVENous Q12H    atorvastatin  40 mg Oral Nightly    donepezil  10 mg Oral Nightly    memantine  5 mg Oral BID    [Held by provider] aspirin  81 mg Oral Daily     Continuous Infusions:   sodium chloride      sodium chloride      sodium chloride Stopped (06/02/25 1346)     CBC:   Recent Labs     06/01/25  0428 06/01/25  0617 06/02/25  0415 06/02/25  1125 06/03/25  0138 06/03/25  0644   WBC 11.5*  --  10.5  --   --  9.0   HGB 8.0*   < > 8.0* 7.8* 6.9* 8.4*   PLT See Reflexed IPF Result  --  See Reflexed IPF Result  --   --  146    < > = values in this interval not displayed.     BMP:    Recent Labs     06/01/25  0359 06/01/25  0617 06/02/25  0415 06/02/25  0605 06/03/25  0644   *   < > 135* 135* 139   K 4.5   < > 3.7 3.7 3.7      < > 106 105 109*   CO2 20   < > 21 21 21   BUN 10  --  11  --  6*   CREATININE 0.7  --  0.8  --  0.6   GLUCOSE 132*  --  140*  --  112*    < > = values in this interval not displayed.     Hepatic:   Recent Labs     06/01/25  0836   AST 37*   ALT 22   BILITOT 1.0   ALKPHOS 60     Troponin: No results for input(s): \"TROPHS\" in the last 72 hours.  BNP: No results for input(s): \"BNP\" in the last 72 hours.  Lipids: No results for  of anemia, H/o gastric and esophageal ulcers    Patient Active Problem List:     Coronary artery disease involving native coronary artery of native heart without angina pectoris     Hyperlipidemia with target LDL less than 70     Chronic diastolic heart failure (HCC)     Anemia     GI bleed     Shingles     Esophageal ulcer     Gastric ulcer     Gastric ulcer with hemorrhage     Coronary artery disease involving native heart with angina pectoris     Diastolic dysfunction     Mixed hyperlipidemia     Obesity     Pneumonia due to COVID-19 virus     Postmenopausal bleeding     Endometrial polyp     Post-menopausal bleeding     Endometrial cancer (HCC)     Post-operative pain     Memory loss     Essential hypertension     Sinus bradycardia     Acute hyponatremia     NSTEMI (non-ST elevated myocardial infarction) (HCC)     Elevated troponin     Hematoma     Thigh hematoma, right, initial encounter      Plan of Treatment:   Vitals stable.   Keep K >4 and Mg >2   Aspiration pneumonia- on ATBs.   Right thigh/groin hematoma with vascular following. S/P negative pseudoaneurysm study.   Monitor H&Hs-   Will plan for stress testing when ok per primary d/t pneumonia.   Will continue to monitor.     Electronically signed by MEÑO Dia CNP on 6/3/2025 at 9:38 AM  [facility].  [phone]

## 2025-06-04 ENCOUNTER — APPOINTMENT (OUTPATIENT)
Dept: NUCLEAR MEDICINE | Age: 85
DRG: 643 | End: 2025-06-04
Payer: MEDICARE

## 2025-06-04 ENCOUNTER — HOSPITAL ENCOUNTER (INPATIENT)
Age: 85
Discharge: HOME | DRG: 643 | End: 2025-06-06
Payer: MEDICARE

## 2025-06-04 VITALS — SYSTOLIC BLOOD PRESSURE: 162 MMHG | HEART RATE: 77 BPM | DIASTOLIC BLOOD PRESSURE: 67 MMHG

## 2025-06-04 LAB
7-AMINOCLONAZEPAM: <5 NG/ML
ABO/RH: NORMAL
ALPHA HYDROXYALPRAZOLAM: <5 NG/ML
ALPRAZOLAM: <5 NG/ML
ANION GAP SERPL CALCULATED.3IONS-SCNC: 9 MMOL/L (ref 9–16)
ANION GAP SERPL CALCULATED.3IONS-SCNC: 9 MMOL/L (ref 9–16)
ANTIBODY SCREEN: NEGATIVE
ARM BAND NUMBER: NORMAL
BASOPHILS # BLD: 0.07 K/UL (ref 0–0.2)
BASOPHILS NFR BLD: 1 % (ref 0–2)
BLOOD BANK BLOOD PRODUCT EXPIRATION DATE: NORMAL
BLOOD BANK BLOOD PRODUCT EXPIRATION DATE: NORMAL
BLOOD BANK DISPENSE STATUS: NORMAL
BLOOD BANK DISPENSE STATUS: NORMAL
BLOOD BANK ISBT PRODUCT BLOOD TYPE: 5100
BLOOD BANK ISBT PRODUCT BLOOD TYPE: 9500
BLOOD BANK PRODUCT CODE: NORMAL
BLOOD BANK PRODUCT CODE: NORMAL
BLOOD BANK SAMPLE EXPIRATION: NORMAL
BLOOD BANK UNIT TYPE AND RH: NORMAL
BLOOD BANK UNIT TYPE AND RH: NORMAL
BPU ID: NORMAL
BPU ID: NORMAL
BUN SERPL-MCNC: 7 MG/DL (ref 8–23)
CALCIUM SERPL-MCNC: 8.4 MG/DL (ref 8.6–10.4)
CHLORDIAZEPOXIDE: <20 NG/ML
CHLORIDE SERPL-SCNC: 109 MMOL/L (ref 98–107)
CHLORIDE SERPL-SCNC: 110 MMOL/L (ref 98–107)
CLONAZEPAM: <5 NG/ML
CO2 SERPL-SCNC: 20 MMOL/L (ref 20–31)
CO2 SERPL-SCNC: 21 MMOL/L (ref 20–31)
COMPONENT: NORMAL
COMPONENT: NORMAL
CREAT SERPL-MCNC: 0.6 MG/DL (ref 0.6–0.9)
CROSSMATCH RESULT: NORMAL
CROSSMATCH RESULT: NORMAL
DIAZEPAM: <5 NG/ML
ECHO BSA: 1.56 M2
EOSINOPHIL # BLD: 0.08 K/UL (ref 0–0.44)
EOSINOPHILS RELATIVE PERCENT: 1 % (ref 1–4)
ERYTHROCYTE [DISTWIDTH] IN BLOOD BY AUTOMATED COUNT: 15.1 % (ref 11.8–14.4)
GFR, ESTIMATED: 88 ML/MIN/1.73M2
GLUCOSE SERPL-MCNC: 110 MG/DL (ref 74–99)
HCT VFR BLD AUTO: 25.7 % (ref 36.3–47.1)
HCT VFR BLD AUTO: 27 % (ref 36.3–47.1)
HCT VFR BLD AUTO: 27.2 % (ref 36.3–47.1)
HCT VFR BLD AUTO: 29.4 % (ref 36.3–47.1)
HGB BLD-MCNC: 8.6 G/DL (ref 11.9–15.1)
HGB BLD-MCNC: 8.9 G/DL (ref 11.9–15.1)
HGB BLD-MCNC: 9.4 G/DL (ref 11.9–15.1)
HGB BLD-MCNC: 9.4 G/DL (ref 11.9–15.1)
IMM GRANULOCYTES # BLD AUTO: 0.16 K/UL (ref 0–0.3)
IMM GRANULOCYTES NFR BLD: 2 %
LORAZEPAM: <20 NG/ML
LYMPHOCYTES NFR BLD: 2.3 K/UL (ref 1.1–3.7)
LYMPHOCYTES RELATIVE PERCENT: 25 % (ref 24–43)
MAGNESIUM SERPL-MCNC: 2.2 MG/DL (ref 1.6–2.4)
MCH RBC QN AUTO: 31.1 PG (ref 25.2–33.5)
MCHC RBC AUTO-ENTMCNC: 33 G/DL (ref 28.4–34.8)
MCV RBC AUTO: 94.4 FL (ref 82.6–102.9)
MIDAZOLAM: <20 NG/ML
MIDAZOLAM: <20 NG/ML
MONOCYTES NFR BLD: 1.18 K/UL (ref 0.1–1.2)
MONOCYTES NFR BLD: 13 % (ref 3–12)
NEUTROPHILS NFR BLD: 58 % (ref 36–65)
NEUTS SEG NFR BLD: 5.54 K/UL (ref 1.5–8.1)
NORDIAZEPAM: <20 NG/ML
NRBC BLD-RTO: 0.2 PER 100 WBC
NUC STRESS EJECTION FRACTION: 70 %
OXAZEPAM: <20 NG/ML
PLATELET # BLD AUTO: 178 K/UL (ref 138–453)
PMV BLD AUTO: 9.6 FL (ref 8.1–13.5)
POTASSIUM SERPL-SCNC: 3.5 MMOL/L (ref 3.7–5.3)
POTASSIUM SERPL-SCNC: 3.9 MMOL/L (ref 3.7–5.3)
RBC # BLD AUTO: 2.86 M/UL (ref 3.95–5.11)
RBC # BLD: ABNORMAL 10*6/UL
SODIUM SERPL-SCNC: 139 MMOL/L (ref 136–145)
SODIUM SERPL-SCNC: 139 MMOL/L (ref 136–145)
STRESS BASELINE DIAS BP: 67 MMHG
STRESS BASELINE HR: 80 BPM
STRESS BASELINE SYS BP: 162 MMHG
STRESS ESTIMATED WORKLOAD: 1 METS
STRESS PEAK DIAS BP: 67 MMHG
STRESS PEAK SYS BP: 162 MMHG
STRESS PERCENT HR ACHIEVED: 68 %
STRESS POST PEAK HR: 93 BPM
STRESS RATE PRESSURE PRODUCT: NORMAL BPM*MMHG
STRESS TARGET HR: 136 BPM
TEMAZEPAM: <20 NG/ML
TID: 0.79
TRANSFUSION STATUS: NORMAL
TRANSFUSION STATUS: NORMAL
UNIT DIVISION: 0
UNIT DIVISION: 0
UNIT ISSUE DATE/TIME: NORMAL
UNIT ISSUE DATE/TIME: NORMAL
WBC OTHER # BLD: 9.3 K/UL (ref 3.5–11.3)

## 2025-06-04 PROCEDURE — 99233 SBSQ HOSP IP/OBS HIGH 50: CPT | Performed by: NURSE PRACTITIONER

## 2025-06-04 PROCEDURE — 6370000000 HC RX 637 (ALT 250 FOR IP)

## 2025-06-04 PROCEDURE — 93016 CV STRESS TEST SUPVJ ONLY: CPT | Performed by: INTERNAL MEDICINE

## 2025-06-04 PROCEDURE — 99233 SBSQ HOSP IP/OBS HIGH 50: CPT | Performed by: INTERNAL MEDICINE

## 2025-06-04 PROCEDURE — 6360000002 HC RX W HCPCS

## 2025-06-04 PROCEDURE — 93018 CV STRESS TEST I&R ONLY: CPT | Performed by: INTERNAL MEDICINE

## 2025-06-04 PROCEDURE — 51798 US URINE CAPACITY MEASURE: CPT

## 2025-06-04 PROCEDURE — 3430000000 HC RX DIAGNOSTIC RADIOPHARMACEUTICAL: Performed by: NURSE PRACTITIONER

## 2025-06-04 PROCEDURE — 85014 HEMATOCRIT: CPT

## 2025-06-04 PROCEDURE — 97530 THERAPEUTIC ACTIVITIES: CPT

## 2025-06-04 PROCEDURE — 85018 HEMOGLOBIN: CPT

## 2025-06-04 PROCEDURE — 2500000003 HC RX 250 WO HCPCS: Performed by: NURSE PRACTITIONER

## 2025-06-04 PROCEDURE — 51702 INSERT TEMP BLADDER CATH: CPT

## 2025-06-04 PROCEDURE — 6360000002 HC RX W HCPCS: Performed by: NURSE PRACTITIONER

## 2025-06-04 PROCEDURE — A9500 TC99M SESTAMIBI: HCPCS | Performed by: NURSE PRACTITIONER

## 2025-06-04 PROCEDURE — 93017 CV STRESS TEST TRACING ONLY: CPT

## 2025-06-04 PROCEDURE — 85025 COMPLETE CBC W/AUTO DIFF WBC: CPT

## 2025-06-04 PROCEDURE — 6370000000 HC RX 637 (ALT 250 FOR IP): Performed by: INTERNAL MEDICINE

## 2025-06-04 PROCEDURE — 80051 ELECTROLYTE PANEL: CPT

## 2025-06-04 PROCEDURE — 2060000000 HC ICU INTERMEDIATE R&B

## 2025-06-04 PROCEDURE — 36415 COLL VENOUS BLD VENIPUNCTURE: CPT

## 2025-06-04 PROCEDURE — 97535 SELF CARE MNGMENT TRAINING: CPT

## 2025-06-04 PROCEDURE — 83735 ASSAY OF MAGNESIUM: CPT

## 2025-06-04 PROCEDURE — 78452 HT MUSCLE IMAGE SPECT MULT: CPT

## 2025-06-04 PROCEDURE — 2580000003 HC RX 258

## 2025-06-04 PROCEDURE — 80048 BASIC METABOLIC PNL TOTAL CA: CPT

## 2025-06-04 RX ORDER — AMINOPHYLLINE 25 MG/ML
50 INJECTION, SOLUTION INTRAVENOUS PRN
Status: DISCONTINUED | OUTPATIENT
Start: 2025-06-04 | End: 2025-06-04

## 2025-06-04 RX ORDER — SODIUM CHLORIDE 0.9 % (FLUSH) 0.9 %
10 SYRINGE (ML) INJECTION PRN
Status: DISCONTINUED | OUTPATIENT
Start: 2025-06-04 | End: 2025-06-10 | Stop reason: HOSPADM

## 2025-06-04 RX ORDER — ATROPINE SULFATE 0.1 MG/ML
0.5 INJECTION INTRAVENOUS EVERY 5 MIN PRN
Status: CANCELLED | OUTPATIENT
Start: 2025-06-04 | End: 2025-06-04

## 2025-06-04 RX ORDER — PANTOPRAZOLE SODIUM 40 MG/1
40 TABLET, DELAYED RELEASE ORAL
Status: DISCONTINUED | OUTPATIENT
Start: 2025-06-04 | End: 2025-06-05

## 2025-06-04 RX ORDER — METOPROLOL TARTRATE 1 MG/ML
5 INJECTION, SOLUTION INTRAVENOUS EVERY 5 MIN PRN
Status: DISCONTINUED | OUTPATIENT
Start: 2025-06-04 | End: 2025-06-04

## 2025-06-04 RX ORDER — TETRAKIS(2-METHOXYISOBUTYLISOCYANIDE)COPPER(I) TETRAFLUOROBORATE 1 MG/ML
37.7 INJECTION, POWDER, LYOPHILIZED, FOR SOLUTION INTRAVENOUS
Status: COMPLETED | OUTPATIENT
Start: 2025-06-04 | End: 2025-06-04

## 2025-06-04 RX ORDER — ENOXAPARIN SODIUM 100 MG/ML
40 INJECTION SUBCUTANEOUS DAILY
Status: DISCONTINUED | OUTPATIENT
Start: 2025-06-04 | End: 2025-06-10 | Stop reason: HOSPADM

## 2025-06-04 RX ORDER — METOPROLOL TARTRATE 1 MG/ML
5 INJECTION, SOLUTION INTRAVENOUS EVERY 5 MIN PRN
Status: CANCELLED | OUTPATIENT
Start: 2025-06-04 | End: 2025-06-04

## 2025-06-04 RX ORDER — SODIUM CHLORIDE 0.9 % (FLUSH) 0.9 %
5-40 SYRINGE (ML) INJECTION PRN
Status: CANCELLED | OUTPATIENT
Start: 2025-06-04 | End: 2025-06-04

## 2025-06-04 RX ORDER — REGADENOSON 0.08 MG/ML
0.4 INJECTION, SOLUTION INTRAVENOUS
Status: COMPLETED | OUTPATIENT
Start: 2025-06-04 | End: 2025-06-04

## 2025-06-04 RX ORDER — REGADENOSON 0.08 MG/ML
0.4 INJECTION, SOLUTION INTRAVENOUS
Status: CANCELLED | OUTPATIENT
Start: 2025-06-04

## 2025-06-04 RX ORDER — SODIUM CHLORIDE 9 MG/ML
500 INJECTION, SOLUTION INTRAVENOUS CONTINUOUS PRN
Status: CANCELLED | OUTPATIENT
Start: 2025-06-04 | End: 2025-06-04

## 2025-06-04 RX ORDER — ATROPINE SULFATE 0.1 MG/ML
0.5 INJECTION INTRAVENOUS EVERY 5 MIN PRN
Status: DISCONTINUED | OUTPATIENT
Start: 2025-06-04 | End: 2025-06-04

## 2025-06-04 RX ORDER — FERROUS SULFATE 325(65) MG
325 TABLET, DELAYED RELEASE (ENTERIC COATED) ORAL
Status: DISCONTINUED | OUTPATIENT
Start: 2025-06-04 | End: 2025-06-10 | Stop reason: HOSPADM

## 2025-06-04 RX ORDER — ALBUTEROL SULFATE 90 UG/1
2 INHALANT RESPIRATORY (INHALATION) PRN
Status: CANCELLED | OUTPATIENT
Start: 2025-06-04 | End: 2025-06-04

## 2025-06-04 RX ORDER — SODIUM CHLORIDE 0.9 % (FLUSH) 0.9 %
5-40 SYRINGE (ML) INJECTION PRN
Status: DISCONTINUED | OUTPATIENT
Start: 2025-06-04 | End: 2025-06-04

## 2025-06-04 RX ORDER — SODIUM CHLORIDE 9 MG/ML
500 INJECTION, SOLUTION INTRAVENOUS CONTINUOUS PRN
Status: DISCONTINUED | OUTPATIENT
Start: 2025-06-04 | End: 2025-06-04

## 2025-06-04 RX ORDER — TETRAKIS(2-METHOXYISOBUTYLISOCYANIDE)COPPER(I) TETRAFLUOROBORATE 1 MG/ML
14 INJECTION, POWDER, LYOPHILIZED, FOR SOLUTION INTRAVENOUS
Status: COMPLETED | OUTPATIENT
Start: 2025-06-04 | End: 2025-06-04

## 2025-06-04 RX ORDER — ALBUTEROL SULFATE 90 UG/1
2 INHALANT RESPIRATORY (INHALATION) PRN
Status: DISCONTINUED | OUTPATIENT
Start: 2025-06-04 | End: 2025-06-04

## 2025-06-04 RX ORDER — NITROGLYCERIN 0.4 MG/1
0.4 TABLET SUBLINGUAL EVERY 5 MIN PRN
Status: CANCELLED | OUTPATIENT
Start: 2025-06-04 | End: 2025-06-04

## 2025-06-04 RX ORDER — AMINOPHYLLINE 25 MG/ML
50 INJECTION, SOLUTION INTRAVENOUS PRN
Status: CANCELLED | OUTPATIENT
Start: 2025-06-04 | End: 2025-06-04

## 2025-06-04 RX ORDER — NITROGLYCERIN 0.4 MG/1
0.4 TABLET SUBLINGUAL EVERY 5 MIN PRN
Status: DISCONTINUED | OUTPATIENT
Start: 2025-06-04 | End: 2025-06-04

## 2025-06-04 RX ORDER — POTASSIUM CHLORIDE 1500 MG/1
40 TABLET, EXTENDED RELEASE ORAL ONCE
Status: COMPLETED | OUTPATIENT
Start: 2025-06-04 | End: 2025-06-04

## 2025-06-04 RX ADMIN — PANTOPRAZOLE SODIUM 40 MG: 40 TABLET, DELAYED RELEASE ORAL at 18:35

## 2025-06-04 RX ADMIN — REGADENOSON 0.4 MG: 0.08 INJECTION, SOLUTION INTRAVENOUS at 11:04

## 2025-06-04 RX ADMIN — MEMANTINE 5 MG: 5 TABLET ORAL at 09:31

## 2025-06-04 RX ADMIN — TETRAKIS(2-METHOXYISOBUTYLISOCYANIDE)COPPER(I) TETRAFLUOROBORATE 37.7 MILLICURIE: 1 INJECTION, POWDER, LYOPHILIZED, FOR SOLUTION INTRAVENOUS at 11:01

## 2025-06-04 RX ADMIN — PIPERACILLIN AND TAZOBACTAM 3375 MG: 3; .375 INJECTION, POWDER, LYOPHILIZED, FOR SOLUTION INTRAVENOUS at 13:06

## 2025-06-04 RX ADMIN — SODIUM CHLORIDE, PRESERVATIVE FREE 10 ML: 5 INJECTION INTRAVENOUS at 09:46

## 2025-06-04 RX ADMIN — MEMANTINE 5 MG: 5 TABLET ORAL at 20:06

## 2025-06-04 RX ADMIN — SODIUM CHLORIDE, PRESERVATIVE FREE 10 ML: 5 INJECTION INTRAVENOUS at 11:01

## 2025-06-04 RX ADMIN — TETRAKIS(2-METHOXYISOBUTYLISOCYANIDE)COPPER(I) TETRAFLUOROBORATE 14 MILLICURIE: 1 INJECTION, POWDER, LYOPHILIZED, FOR SOLUTION INTRAVENOUS at 09:46

## 2025-06-04 RX ADMIN — DONEPEZIL HYDROCHLORIDE 10 MG: 10 TABLET, FILM COATED ORAL at 20:06

## 2025-06-04 RX ADMIN — FERROUS SULFATE TAB EC 325 MG (65 MG FE EQUIVALENT) 325 MG: 325 (65 FE) TABLET DELAYED RESPONSE at 13:20

## 2025-06-04 RX ADMIN — DESMOPRESSIN ACETATE 40 MG: 0.2 TABLET ORAL at 20:06

## 2025-06-04 RX ADMIN — ENOXAPARIN SODIUM 40 MG: 100 INJECTION SUBCUTANEOUS at 09:30

## 2025-06-04 RX ADMIN — PIPERACILLIN AND TAZOBACTAM 3375 MG: 3; .375 INJECTION, POWDER, LYOPHILIZED, FOR SOLUTION INTRAVENOUS at 20:10

## 2025-06-04 RX ADMIN — SODIUM CHLORIDE, PRESERVATIVE FREE 10 ML: 5 INJECTION INTRAVENOUS at 11:04

## 2025-06-04 RX ADMIN — POTASSIUM CHLORIDE 40 MEQ: 1500 TABLET, EXTENDED RELEASE ORAL at 09:30

## 2025-06-04 RX ADMIN — PIPERACILLIN AND TAZOBACTAM 3375 MG: 3; .375 INJECTION, POWDER, LYOPHILIZED, FOR SOLUTION INTRAVENOUS at 01:05

## 2025-06-04 RX ADMIN — SODIUM CHLORIDE, PRESERVATIVE FREE 10 ML: 5 INJECTION INTRAVENOUS at 10:55

## 2025-06-04 RX ADMIN — IRON SUCROSE 100 MG: 20 INJECTION, SOLUTION INTRAVENOUS at 09:30

## 2025-06-04 RX ADMIN — SODIUM CHLORIDE, PRESERVATIVE FREE 10 ML: 5 INJECTION INTRAVENOUS at 20:06

## 2025-06-04 NOTE — PROGRESS NOTES
[facility]  Internal Medicine Teaching Residency Program  Inpatient Daily Progress Note  ______________________________________________________________________________    Patient: Lyn Lombardi  YOB: 1940   MRN: [id number]    Acct: [id number]     Room: 0434/0434-01  Admit date: 5/29/2025  Today's date: 06/04/25  Number of days in the hospital: 6  Current Code Status: Full Code   SUBJECTIVE   Admitting Diagnosis: Hyponatremia    Patient seen and examined at bedside. Chart and results reviewed.  Hemodynamically stable, saturating well on room air, denies any concern.  N.p.o. awaiting stress test by cardiology, she is medically stable and cleared for stress test from medical standpoint.    Hemoglobin stable 8.9, No active signs of GIB  Sodium stable 139, potassium 3.5-replaced  FOBT positive, recommend outpatient follow-up with GI for possible colonoscopy  Right thigh hematoma not expanding aneurysm per vascular, pseudoaneurysm US negative.    BRIEF HISTORY     The patient is a 84 y.o. female who a PMHx of Alzheimer's, HTN, HLD, GERD, CAD w stent August 2012 who initially presented to the ICU on 5/29 after being found down unresponsive.  Patient initially arrived to [facility] with a GCS of 6 and was intubated for airway protection.  Initial lab work showed a sodium of 114 elevated CK, elevated troponin and elevated WBC.  Patient underwent trauma workup with CT head, CT C-spine, CT chest abdomen pelvis CT lumbar and thoracic spine all of which were unremarkable.     Pt transferred to [facility] for further evaluation. During her ICU stay her sodium was corrected, currently at 134. Pt was found to have elevated WBC, fever and elevated procal for which she was started on Zosyn to cover aspiration pneumonia. Pt had improvement in mentation and was extubated on 5/31 which she has been tolerating well.      Plan was for transfer out of the ICU on 6/1 however, pt  was noted to have a significant drop in hgb from 12 to 8. ASA and Heparin were held. Vascular duplex of R thigh ordered which did show R thigh hematoma. Vascular surgery was consulted who recommended compression stockings and monitor. CTA abdomen also done which showed large R thigh hematoma w/o evidence of active arterial hemorrhage. Small amount of free pelvic fluid. Pt did receive 1U PRBC yesterday w improvement in hgb from 7.2 to 9.8. Hgb slowly trending back down and was started on IV Protonix     OBJECTIVE     Vital Signs:  BP (!) 139/59   Pulse 67   Temp 98.2 °F (36.8 °C) (Oral)   Resp 12   Ht 1.49 m (4' 10.66\")   Wt 61.8 kg (136 lb 3.9 oz)   LMP  (LMP Unknown)   SpO2 99%   BMI 27.84 kg/m²     Temp (24hrs), Av.4 °F (36.9 °C), Min:98.2 °F (36.8 °C), Max:99.2 °F (37.3 °C)    In: 1256.6   Out: 1950 [Urine:1950]    Physical Exam:  Constitutional: This is a well developed, Well Nourished, 84 y.o. female who is A&Ox3, cooperative and in no apparent distress.    Head: normocephalic and atraumatic.    EENT:  PERRLA.  No conjunctival injections.   Septum was midline, mucosa was without erythema, exudates or cobblestoning.  No thrush was noted.   Neck: Supple without thyromegaly. No elevated JVP. Trachea was midline.  Respiratory: Chest was symmetrical without dullness to percussion.  Breath sounds bilaterally were clear to auscultation. There were no wheezes, rhonchi or rales. There is no intercostal retraction or use of accessory muscles.   Cardiovascular: Regular without murmur, clicks, gallops or rubs.   Abdomen: Slightly rounded and soft without organomegaly. No rebound, rigidity or guarding was appreciated.    Musculoskeletal: Normal curvature of the spine.  No gross muscle weakness.    Extremities:  No lower extremity edema, ulcerations, tenderness, varicosities or erythema.  Muscle size, tone and strength are normal.  No involuntary movements are noted.    Skin:  Warm and dry.  Good color, turgor

## 2025-06-04 NOTE — PROGRESS NOTES
Pharmacist Review and Automatic Dose Adjustment of Prophylactic Enoxaparin    Reviewed reason(s) for admission/hospital problem list    The reviewing pharmacist has made an adjustment to the ordered enoxaparin dose or converted to UFH per the approved [facility] protocol and table as identified below.        Lyn Lombardi is a 84 y.o. female.     Recent Labs     06/02/25  0415 06/03/25  0644 06/04/25  0613   CREATININE 0.8 0.6 0.6       Estimated Creatinine Clearance: 59 mL/min (based on SCr of 0.6 mg/dL).    Recent Labs     06/03/25  0644 06/03/25  1110 06/04/25  0129 06/04/25  0613   HGB 8.4*   < > 8.6* 8.9*   HCT 25.5*   < > 25.7* 27.0*     --   --  178    < > = values in this interval not displayed.     No results for input(s): \"INR\" in the last 72 hours.    Height:   Ht Readings from Last 1 Encounters:   05/30/25 1.49 m (4' 10.66\")     Weight:  Wt Readings from Last 1 Encounters:   06/04/25 61.8 kg (136 lb 3.9 oz)               Plan: Based upon the patient's weight and renal function    Ordered: Enoxaparin 30mg SUBQ Daily    Changed/converted to    New Order: Enoxaparin 40mg SUBQ Daily      Thank you,  Cary Khan, [facility]  6/4/2025, 8:12 AM

## 2025-06-04 NOTE — PROGRESS NOTES
Occupational Therapy  Occupational Therapy Daily Treatment Note  Facility/Department: [address] ONC/MED SURG   Patient Name: Lyn Lombardi        MRN: [id number]    : 1940    Date of Service: 2025    Chief Complaint   Patient presents with    Fall     Past Medical History:  has a past medical history of Abnormal uterine bleeding (AUB), Anxiety, Asteroid hyalosis of right eye, Bruises easily, Coronary artery disease, COVID-19, Diastolic dysfunction, Gastric ulcer with hemorrhage, Hyperlipidemia, Hypertension, Lives alone, Memory loss, Mild mitral regurgitation, Mild tricuspid regurgitation, Obesity, Patient in clinical research study, Poor intravenous access, and Vitreous floaters.  Past Surgical History:  has a past surgical history that includes Cholecystectomy (1978); Cardiac catheterization (Left, 2012); Endoscopy, colon, diagnostic (2015); Upper gastrointestinal endoscopy (2015); Dilation and curettage of uterus (N/A, 2021); Total abdominal hysterectomy w/ bilateral salpingoophorectomy (2021); and Hysterectomy (N/A, 2021).    Discharge Recommendations  Discharge Recommendations: Patient would benefit from continued therapy after discharge, Continue to assess pending progress       Assessment  Performance deficits / Impairments: Decreased functional mobility ;Decreased ADL status;Decreased ROM;Decreased strength;Decreased safe awareness;Decreased endurance;Decreased balance;Decreased high-level IADLs;Decreased coordination  Assessment: Pt limited by above deficits impacting ADL completion, ADL transfers and safe functional mobility to maximize pts potential and quality of life, continue with OT during hospital stay and post discharge.  Prognosis: Good  Activity Tolerance  Activity Tolerance: Patient Tolerated treatment well  Safety Devices  Type of Devices: Call light within reach;Gait belt;Left in chair;Chair alarm in place;Heels elevated for pressure

## 2025-06-04 NOTE — CONSULTS
Physical Medicine & Rehabilitation  Consult Note      Admitting Physician:   Thierry Hernandez MD    Primary Care Provider:   Tru Gould MD     Reason for Consult:  Acute Inpatient Rehabilitation    Chief Complaint: AMS    History of Present Illness:  Referring Provider is requesting an evaluation for appropriate placement upon discharge from acute care. History from chart review and patient, staff.    Lyn Lombardi is a 84 y.o. RHD female admitted to [facility] on 5/29/2025.      Patient was transferred from [facility] after being found down unresponsive. Initial GCS 6. She was intubated for airway protection and transferred to [place]. Hemoglobin stable. Sodium improved. FOBT positive - for outpatient follow up with GI for possible colonoscopy. IM treated aspiration pneumonia with Zosyn through 6/4/25. Failed swallow study due to impaired dentition - currently on dysphagia diet with outpatient dental follow up.     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 Na improved. Continue 1.5 L fluid restriction. Nephro signed off.    Cardiology: consulted for elevated troponin. Being attributed to type II NSTEMI from demand ischemia. TTE 5/30 showed EF 64%. Added ASA and lipitor. Held beta blocker for bradycardia. She had negative stress test for ischemia on 6/4.    Vascular: R thigh swelling with hematoma on diagnostic studies. No concern for pseudoaneurysm or active bleeding on initial exam with stable Hb. Pseudoaneurysm US was negative 6/2. Reconsulted 6/3 for anemia with no change on exam.     Urology: consulted for difficult catheterization. 16 Fr placed. Recommend void trial early am on day of discharge.     Discussed with speech therapy today- they are recommending video swallow study. Patient is exhibiting some memory impairment. She currently denies pain but is noting some generalized weakness.     Review of   < > 21 20 22   ANIONGAP 9   < > 9 9 9   LABGLOM 88  --  88  --  88    < > = values in this interval not displayed.     HbA1c:   Lab Results   Component Value Date    LABA1C 5.5 05/29/2025     BNP: No results for input(s): \"BNP\" in the last 72 hours.  PT/INR: No results for input(s): \"PROTIME\", \"INR\" in the last 72 hours.  APTT: No results for input(s): \"APTT\" in the last 72 hours.  CARDIAC ENZYMES: No results for input(s): \"CKMB\", \"CKMBINDEX\", \"TROPONINT\", \"TROPHS\", \"TROPII\" in the last 72 hours.    Invalid input(s): \"CKTOTAL;3\"   FASTING LIPID PANEL:  Lab Results   Component Value Date    CHOL 139 05/09/2025    HDL 79 05/09/2025    TRIG 102 05/09/2025     LIVER PROFILE: No results for input(s): \"AST\", \"ALT\", \"BILIDIR\", \"BILITOT\", \"ALKPHOS\" in the last 72 hours.    Invalid input(s): \"ALB\"     Radiology:      CTA ABDOMEN PELVIS W WO CONTRAST  Result Date: 6/1/2025  1. No evidence of active arterial gastrointestinal hemorrhage.  Large right medial thigh hematoma without evidence of active arterial hemorrhage. 2. Mucoid impaction in the right lower lobe with posterior lower lobe airspace disease consistent with atelectasis or pneumonia. 3. Small amount of free pelvic fluid which is nonspecific.     XR ABDOMEN FOR NG/OG/NE TUBE PLACEMENT  Result Date: 5/30/2025  Endo gastric tube tip projects over the distal stomach.     XR ABDOMEN FOR NG/OG/NE TUBE PLACEMENT  Result Date: 5/30/2025  1. Enteric tube tip and side hole project at the gastric body/antrum.     XR ABDOMEN FOR NG/OG/NE TUBE PLACEMENT  Result Date: 5/30/2025  1. Enteric tube tip and side hole project at the lower esophagus.     XR ABDOMEN FOR NG/OG/NE TUBE PLACEMENT  Result Date: 5/30/2025  Gastric tube with the tip in the distal esophagus.     XR ABDOMEN FOR NG/OG/NE TUBE PLACEMENT  Result Date: 5/30/2025  Enteric tube tip projects over the lower thorax in the region of the known hiatal hernia.     XR CHEST PORTABLE  Result Date: 5/29/2025  Lines and tubes

## 2025-06-04 NOTE — DISCHARGE INSTRUCTIONS
You came to the hospital after you were found down unresponsive and found to have significantly low sodium levels  as well as concern for aspiration pneumonia for which you were treated with antibiotics.  You were seen by nephrology and your sodium levels improved back to normal. Please follow up with nephrology team in 2-4 weeks.  You also had bleeding from your right thigh where you had the catheter, bleeding was controlled and the resulting hematoma will absorb slowly.  You are also seen by cardiology, underwent a stress test which was negative. You will be discharged on a holter monitor. Please follow up with cardiology outpatient as written. Please start taking 1 tablet of aspirin daily. While in the hospital, you did not require lisinopril to control your blood pressure. Stop taking it. Please discuss with your primary care provider regarding the medication.  You were found to have a small amount of stool in your blood. Please follow up with the gastroenterologist outpatient to undergo a colonscopy. Also, you had loose stools. Please take 1 tablet of culturelle daily.    You were found to have loose teeth that occurred secondary to your fall. Please follow up with your dentist outside of the hospital. I recommend a soft diet until you can be evaluated.      Please get the lab work in 1 week.    If you begin to experience any symptoms such as chest pain shortness of breath nausea vomiting dizziness drowsiness abdominal pain loss of consciousness or any other symptoms you find concerning please come to the ED for follow-up evaluation.    If you have been given medication please take them as prescribed. Do not take more medication than recommended at any given time.     Please follow-up with your primary care provider within 5 to 7 days for continued care.     Please feel free return to the hospital if your symptoms worsen or any new concerning symptoms develop.  Follow-up with your primary care physician as

## 2025-06-04 NOTE — PROGRESS NOTES
[facility]   Progress Note                   Date:   6/4/2025  Patient name: Lyn Lombardi  Date of admission:  5/29/2025 10:41 PM  MRN:   [id number]  YOB: 1940  PCP: Tru Gould MD    Reason for Admission: Acute hyponatremia [E87.1]  NSTEMI (non-ST elevated myocardial infarction) (HCC) [I21.4]    Subjective:       Clinical Changes / Abnormalities: Pt seen and examined in the stress room.  Patient resting in bed. Pt denies any CP or sob.  Labs, vitals and tele reviewed- SR 84       Medications:   Scheduled Meds:   ferrous sulfate  325 mg Oral Daily with breakfast    enoxaparin  40 mg SubCUTAneous Daily    pantoprazole  40 mg Oral BID AC    piperacillin-tazobactam  3,375 mg IntraVENous Q8H    atorvastatin  40 mg Oral Nightly    donepezil  10 mg Oral Nightly    memantine  5 mg Oral BID    [Held by provider] aspirin  81 mg Oral Daily     Continuous Infusions:   sodium chloride      sodium chloride      sodium chloride Stopped (06/02/25 1346)     CBC:   Recent Labs     06/02/25  0415 06/02/25  1125 06/03/25  0644 06/03/25  1110 06/04/25  0129 06/04/25  0613 06/04/25  1238   WBC 10.5  --  9.0  --   --  9.3  --    HGB 8.0*   < > 8.4*   < > 8.6* 8.9* 9.4*   PLT See Reflexed IPF Result  --  146  --   --  178  --     < > = values in this interval not displayed.     BMP:    Recent Labs     06/02/25  0415 06/02/25  0605 06/03/25  0644 06/03/25  1735 06/04/25  0613   *   < > 139 141 139   K 3.7   < > 3.7 3.4* 3.5*      < > 109* 109* 109*   CO2 21   < > 21 22 21   BUN 11  --  6*  --  7*   CREATININE 0.8  --  0.6  --  0.6   GLUCOSE 140*  --  112*  --  110*    < > = values in this interval not displayed.     Hepatic:   No results for input(s): \"AST\", \"ALT\", \"BILITOT\", \"ALKPHOS\" in the last 72 hours.    Invalid input(s): \"ALB\"    Troponin: No results for input(s): \"TROPHS\" in the last 72 hours.  BNP: No results for input(s): \"BNP\" in the last 72 hours.  Lipids: No results for input(s):  anemia, H/o gastric and esophageal ulcers    Patient Active Problem List:     Coronary artery disease involving native coronary artery of native heart without angina pectoris     Hyperlipidemia with target LDL less than 70     Chronic diastolic heart failure (HCC)     Anemia     GI bleed     Shingles     Esophageal ulcer     Gastric ulcer     Gastric ulcer with hemorrhage     Coronary artery disease involving native heart with angina pectoris     Diastolic dysfunction     Mixed hyperlipidemia     Obesity     Pneumonia due to COVID-19 virus     Postmenopausal bleeding     Endometrial polyp     Post-menopausal bleeding     Endometrial cancer (HCC)     Post-operative pain     Memory loss     Essential hypertension     Sinus bradycardia     Acute hyponatremia     NSTEMI (non-ST elevated myocardial infarction) (HCC)     Elevated troponin     Hematoma     Thigh hematoma, right, initial encounter      Plan of Treatment:     Keep K >4 and Mg >2   Aspiration pneumonia- on ATBs.   Right thigh/groin hematoma with vascular following. S/P negative pseudoaneurysm study.   Monitor H&Hs-   Stress test pending.      Electronically signed by MEÑO DIAZ CNP on 6/4/2025 at 2:33 PM  [facility]  [phone]

## 2025-06-04 NOTE — PLAN OF CARE
Problem: Safety - Adult  Goal: Free from fall injury  Outcome: Progressing     Problem: Discharge Planning  Goal: Discharge to home or other facility with appropriate resources  Outcome: Progressing     Problem: Nutrition Deficit:  Goal: Optimize nutritional status  Outcome: Progressing     Problem: Skin/Tissue Integrity  Goal: Skin integrity remains intact  Description: 1.  Monitor for areas of redness and/or skin breakdown2.  Assess vascular access sites hourly3.  Every 4-6 hours minimum:  Change oxygen saturation probe site4.  Every 4-6 hours:  If on nasal continuous positive airway pressure, respiratory therapy assess nares and determine need for appliance change or resting period  Outcome: Progressing     Problem: ABCDS Injury Assessment  Goal: Absence of physical injury  Outcome: Progressing

## 2025-06-05 ENCOUNTER — APPOINTMENT (OUTPATIENT)
Dept: GENERAL RADIOLOGY | Age: 85
DRG: 643 | End: 2025-06-05
Payer: MEDICARE

## 2025-06-05 PROBLEM — I48.0 PAROXYSMAL ATRIAL FIBRILLATION (HCC): Status: ACTIVE | Noted: 2025-06-05

## 2025-06-05 LAB
ANION GAP SERPL CALCULATED.3IONS-SCNC: 9 MMOL/L (ref 9–16)
BASOPHILS # BLD: 0.05 K/UL (ref 0–0.2)
BASOPHILS NFR BLD: 1 % (ref 0–2)
BUN SERPL-MCNC: 6 MG/DL (ref 8–23)
CALCIUM SERPL-MCNC: 8.1 MG/DL (ref 8.6–10.4)
CHLORIDE SERPL-SCNC: 109 MMOL/L (ref 98–107)
CO2 SERPL-SCNC: 22 MMOL/L (ref 20–31)
CREAT SERPL-MCNC: 0.6 MG/DL (ref 0.6–0.9)
EOSINOPHIL # BLD: 0.1 K/UL (ref 0–0.44)
EOSINOPHILS RELATIVE PERCENT: 1 % (ref 1–4)
ERYTHROCYTE [DISTWIDTH] IN BLOOD BY AUTOMATED COUNT: 14.8 % (ref 11.8–14.4)
GFR, ESTIMATED: 88 ML/MIN/1.73M2
GLUCOSE SERPL-MCNC: 113 MG/DL (ref 74–99)
HCT VFR BLD AUTO: 25.1 % (ref 36.3–47.1)
HCT VFR BLD AUTO: 25.6 % (ref 36.3–47.1)
HCT VFR BLD AUTO: 28.2 % (ref 36.3–47.1)
HGB BLD-MCNC: 8.1 G/DL (ref 11.9–15.1)
HGB BLD-MCNC: 8.4 G/DL (ref 11.9–15.1)
HGB BLD-MCNC: 9.1 G/DL (ref 11.9–15.1)
IMM GRANULOCYTES # BLD AUTO: 0.16 K/UL (ref 0–0.3)
IMM GRANULOCYTES NFR BLD: 2 %
LYMPHOCYTES NFR BLD: 2.08 K/UL (ref 1.1–3.7)
LYMPHOCYTES RELATIVE PERCENT: 23 % (ref 24–43)
MCH RBC QN AUTO: 32.1 PG (ref 25.2–33.5)
MCHC RBC AUTO-ENTMCNC: 32.8 G/DL (ref 28.4–34.8)
MCV RBC AUTO: 97.7 FL (ref 82.6–102.9)
MICROORGANISM SPEC CULT: NORMAL
MONOCYTES NFR BLD: 1.04 K/UL (ref 0.1–1.2)
MONOCYTES NFR BLD: 11 % (ref 3–12)
NEUTROPHILS NFR BLD: 62 % (ref 36–65)
NEUTS SEG NFR BLD: 5.76 K/UL (ref 1.5–8.1)
NRBC BLD-RTO: 0 PER 100 WBC
PLATELET # BLD AUTO: 209 K/UL (ref 138–453)
PMV BLD AUTO: 9.4 FL (ref 8.1–13.5)
POTASSIUM SERPL-SCNC: 3.7 MMOL/L (ref 3.7–5.3)
RBC # BLD AUTO: 2.62 M/UL (ref 3.95–5.11)
RBC # BLD: ABNORMAL 10*6/UL
SERVICE CMNT-IMP: NORMAL
SODIUM SERPL-SCNC: 140 MMOL/L (ref 136–145)
SPECIMEN DESCRIPTION: NORMAL
WBC OTHER # BLD: 9.2 K/UL (ref 3.5–11.3)

## 2025-06-05 PROCEDURE — 85014 HEMATOCRIT: CPT

## 2025-06-05 PROCEDURE — 97116 GAIT TRAINING THERAPY: CPT

## 2025-06-05 PROCEDURE — 80048 BASIC METABOLIC PNL TOTAL CA: CPT

## 2025-06-05 PROCEDURE — 2580000003 HC RX 258

## 2025-06-05 PROCEDURE — 85018 HEMOGLOBIN: CPT

## 2025-06-05 PROCEDURE — 85025 COMPLETE CBC W/AUTO DIFF WBC: CPT

## 2025-06-05 PROCEDURE — 6370000000 HC RX 637 (ALT 250 FOR IP)

## 2025-06-05 PROCEDURE — 99233 SBSQ HOSP IP/OBS HIGH 50: CPT | Performed by: NURSE PRACTITIONER

## 2025-06-05 PROCEDURE — 2060000000 HC ICU INTERMEDIATE R&B

## 2025-06-05 PROCEDURE — 92526 ORAL FUNCTION THERAPY: CPT

## 2025-06-05 PROCEDURE — 99233 SBSQ HOSP IP/OBS HIGH 50: CPT | Performed by: INTERNAL MEDICINE

## 2025-06-05 PROCEDURE — 6360000002 HC RX W HCPCS

## 2025-06-05 PROCEDURE — 92611 MOTION FLUOROSCOPY/SWALLOW: CPT

## 2025-06-05 PROCEDURE — 99223 1ST HOSP IP/OBS HIGH 75: CPT | Performed by: PHYSICAL MEDICINE & REHABILITATION

## 2025-06-05 PROCEDURE — 97110 THERAPEUTIC EXERCISES: CPT

## 2025-06-05 PROCEDURE — 74230 X-RAY XM SWLNG FUNCJ C+: CPT

## 2025-06-05 PROCEDURE — 36415 COLL VENOUS BLD VENIPUNCTURE: CPT

## 2025-06-05 RX ADMIN — MEMANTINE 5 MG: 5 TABLET ORAL at 20:05

## 2025-06-05 RX ADMIN — DESMOPRESSIN ACETATE 40 MG: 0.2 TABLET ORAL at 20:05

## 2025-06-05 RX ADMIN — FERROUS SULFATE TAB EC 325 MG (65 MG FE EQUIVALENT) 325 MG: 325 (65 FE) TABLET DELAYED RESPONSE at 09:48

## 2025-06-05 RX ADMIN — DONEPEZIL HYDROCHLORIDE 10 MG: 10 TABLET, FILM COATED ORAL at 20:05

## 2025-06-05 RX ADMIN — MEMANTINE 5 MG: 5 TABLET ORAL at 09:48

## 2025-06-05 RX ADMIN — ENOXAPARIN SODIUM 40 MG: 100 INJECTION SUBCUTANEOUS at 09:48

## 2025-06-05 RX ADMIN — PIPERACILLIN AND TAZOBACTAM 3375 MG: 3; .375 INJECTION, POWDER, LYOPHILIZED, FOR SOLUTION INTRAVENOUS at 04:00

## 2025-06-05 NOTE — PLAN OF CARE
Problem: Neurosensory - Adult  Goal: Achieves stable or improved neurological status  6/5/2025 1119 by Harriet Mata RN  Outcome: Progressing  6/5/2025 0355 by Tonya Onofre RN  Outcome: Progressing  Goal: Absence of seizures  6/5/2025 1119 by Harriet Mata RN  Outcome: Progressing  6/5/2025 0355 by Tonya Onofre RN  Outcome: Progressing  Goal: Remains free of injury related to seizures activity  6/5/2025 1119 by Harriet Mata RN  Outcome: Progressing  6/5/2025 0355 by Tonya Onofre RN  Outcome: Progressing  Goal: Achieves maximal functionality and self care  6/5/2025 1119 by Harriet Mata RN  Outcome: Progressing  6/5/2025 0355 by Tonya Onofre RN  Outcome: Progressing     Problem: Respiratory - Adult  Goal: Achieves optimal ventilation and oxygenation  6/5/2025 1119 by Harriet Mata RN  Outcome: Progressing  6/5/2025 0355 by Tonya Onofre RN  Outcome: Progressing     Problem: Cardiovascular - Adult  Goal: Maintains optimal cardiac output and hemodynamic stability  6/5/2025 1119 by Harriet Mata RN  Outcome: Progressing  6/5/2025 0355 by Tonya Onofre RN  Outcome: Progressing  Goal: Absence of cardiac dysrhythmias or at baseline  6/5/2025 1119 by Harriet Mata RN  Outcome: Progressing  6/5/2025 0355 by Tonya Onofre RN  Outcome: Progressing     Problem: Musculoskeletal - Adult  Goal: Return mobility to safest level of function  6/5/2025 1119 by Harriet Mata RN  Outcome: Progressing  6/5/2025 0355 by Toyna Onofre RN  Outcome: Progressing  Goal: Maintain proper alignment of affected body part  6/5/2025 1119 by Harriet Mata RN  Outcome: Progressing  6/5/2025 0355 by Tonya Onofre RN  Outcome: Progressing     Problem: Metabolic/Fluid and Electrolytes - Adult  Goal: Electrolytes maintained within normal limits  6/5/2025 1119 by Harriet Mata RN  Outcome: Progressing  6/5/2025 0355 by Tonya Onofre RN  Outcome: Progressing  Goal: Hemodynamic  of redness and/or skin breakdown     Problem: ABCDS Injury Assessment  Goal: Absence of physical injury  6/5/2025 1119 by Harriet Mata, RN  Outcome: Progressing  6/5/2025 0355 by Tonya Onofre, RN  Outcome: Progressing

## 2025-06-05 NOTE — PROGRESS NOTES
[facility]  Internal Medicine Teaching Residency Program  Inpatient Daily Progress Note  ______________________________________________________________________________    Patient: Lyn Lombardi  YOB: 1940   MRN: [id number]    Acct: [id number]     Room: 0434/0434-01  Admit date: 5/29/2025  Today's date: 06/05/25  Number of days in the hospital: 7  Current Code Status: Full Code   SUBJECTIVE   Admitting Diagnosis: Hyponatremia    Patient seen and examined at bedside. Chart and results reviewed.  Hemodynamically stable, saturating well on room air, denies any concern.  Pt underwent stress test which was negative for ischemia  H&H 8.1  CBC showing 8.4 this am  Sodium 140  Hematoma stable-no expansion    BRIEF HISTORY     The patient is a 84 y.o. female who a PMHx of Alzheimer's, HTN, HLD, GERD, CAD w stent August 2012 who initially presented to the ICU on 5/29 after being found down unresponsive.  Patient initially arrived to [facility] with a GCS of 6 and was intubated for airway protection.  Initial lab work showed a sodium of 114 elevated CK, elevated troponin and elevated WBC.  Patient underwent trauma workup with CT head, CT C-spine, CT chest abdomen pelvis CT lumbar and thoracic spine all of which were unremarkable.     Pt transferred to [facility] for further evaluation. During her ICU stay her sodium was corrected, currently at 134. Pt was found to have elevated WBC, fever and elevated procal for which she was started on Zosyn to cover aspiration pneumonia. Pt had improvement in mentation and was extubated on 5/31 which she has been tolerating well.      Plan was for transfer out of the ICU on 6/1 however, pt was noted to have a significant drop in hgb from 12 to 8. ASA and Heparin were held. Vascular duplex of R thigh ordered which did show R thigh hematoma. Vascular surgery was consulted who recommended compression stockings and monitor. CTA abdomen

## 2025-06-05 NOTE — PLAN OF CARE
Problem: Neurosensory - Adult  Goal: Achieves stable or improved neurological status  Outcome: Progressing  Goal: Absence of seizures  Outcome: Progressing  Goal: Remains free of injury related to seizures activity  Outcome: Progressing  Goal: Achieves maximal functionality and self care  Outcome: Progressing     Problem: Respiratory - Adult  Goal: Achieves optimal ventilation and oxygenation  Outcome: Progressing     Problem: Cardiovascular - Adult  Goal: Maintains optimal cardiac output and hemodynamic stability  Outcome: Progressing  Goal: Absence of cardiac dysrhythmias or at baseline  Outcome: Progressing     Problem: Musculoskeletal - Adult  Goal: Return mobility to safest level of function  Outcome: Progressing  Goal: Maintain proper alignment of affected body part  Outcome: Progressing     Problem: Metabolic/Fluid and Electrolytes - Adult  Goal: Electrolytes maintained within normal limits  Outcome: Progressing  Goal: Hemodynamic stability and optimal renal function maintained  Outcome: Progressing  Goal: Glucose maintained within prescribed range  Outcome: Progressing     Problem: Hematologic - Adult  Goal: Maintains hematologic stability  Outcome: Progressing     Problem: Safety - Adult  Goal: Free from fall injury  Outcome: Progressing     Problem: Discharge Planning  Goal: Discharge to home or other facility with appropriate resources  Outcome: Progressing     Problem: Pain  Goal: Verbalizes/displays adequate comfort level or baseline comfort level  Outcome: Progressing     Problem: Nutrition Deficit:  Goal: Optimize nutritional status  Outcome: Progressing     Problem: Skin/Tissue Integrity  Goal: Skin integrity remains intact  Description: 1.  Monitor for areas of redness and/or skin breakdown2.  Assess vascular access sites hourly3.  Every 4-6 hours minimum:  Change oxygen saturation probe site4.  Every 4-6 hours:  If on nasal continuous positive airway pressure, respiratory therapy assess nares and  stated

## 2025-06-05 NOTE — PROGRESS NOTES
SPEECH LANGUAGE PATHOLOGY  Speech Language Pathology  [facility] 4C ONC/MED SURG    Dysphagia Treatment Note    Date: 6/5/2025  Patient’s Name: Lyn Lombardi  MRN: [id number]  Diagnosis: Dysphagia  Patient Active Problem List   Diagnosis Code    Coronary artery disease involving native coronary artery of native heart without angina pectoris I25.10    Hyperlipidemia with target LDL less than 70 E78.5    Chronic diastolic heart failure (HCC) I50.32    Anemia D64.9    GI bleed K92.2    Shingles B02.9    Esophageal ulcer K22.10    Gastric ulcer K25.9    Gastric ulcer with hemorrhage K25.4    Coronary artery disease involving native heart with angina pectoris I25.119    Diastolic dysfunction I51.89    Mixed hyperlipidemia E78.2    Obesity E66.9    Pneumonia due to COVID-19 virus U07.1, J12.82    Postmenopausal bleeding N95.0    Endometrial polyp N84.0    Post-menopausal bleeding N95.0    Endometrial cancer (HCC) C54.1    Post-operative pain G89.18    Memory loss R41.3    Hypertension, essential I10    Sinus bradycardia R00.1    Hyponatremia E87.1    NSTEMI (non-ST elevated myocardial infarction) (HCC) I21.4    Elevated troponin R79.89    Hematoma T14.8XXA    Thigh hematoma, right, initial encounter S70.11XA    Altered mental status R41.82    Paroxysmal atrial fibrillation ([facility]) I48.0       Pain Rating: no c/o pain  Pain Location: N/A  Non-Pharmaceutical Pain Intervention: N/A    Dysphagia Treatment  Treatment time:    SLP Individual Minutes  Time In: 1048  Time Out: 1102  Minutes: 14     Subjective: [x] Alert [x] Cooperative     [] Confused     [] Agitated    [] Lethargic    Current Diet:  Solids: Puree per Epic (ST recommending soft and bite sized 06/01)  Liquids: Thin per Epic (ST recommending mildly thick 06/01)    Objective/Assessment:    Pt seen for dysphagia management.  Pt was seen for bedside swallow evaluation by ST on 06/01 with recommendation for Dysphagia Soft and Bite-Sized (Dysphagia III) (IDDSI Level 6) solids with

## 2025-06-05 NOTE — PROGRESS NOTES
[facility]   Progress Note                   Date:   6/5/2025  Patient name: Lyn Lombardi  Date of admission:  5/29/2025 10:41 PM  MRN:   [id number]  YOB: 1940  PCP: Tru Gould MD    Reason for Admission: Acute hyponatremia [E87.1]  NSTEMI (non-ST elevated myocardial infarction) (HCC) [I21.4]    Subjective:       Clinical Changes / Abnormalities: Pt seen and examined in the stress room.  Patient resting in bed. Pt denies any CP or sob.  Labs, vitals and tele reviewed- Pt had stress test yesterday.  Test was negative for ischemia, but did show Afib.        Medications:   Scheduled Meds:   ferrous sulfate  325 mg Oral Daily with breakfast    enoxaparin  40 mg SubCUTAneous Daily    pantoprazole  40 mg Oral BID AC    atorvastatin  40 mg Oral Nightly    donepezil  10 mg Oral Nightly    memantine  5 mg Oral BID    aspirin  81 mg Oral Daily     Continuous Infusions:   sodium chloride      sodium chloride      sodium chloride Stopped (06/02/25 1346)     CBC:   Recent Labs     06/03/25  0644 06/03/25  1110 06/04/25  0613 06/04/25  1238 06/05/25  0218 06/05/25  0611 06/05/25  1007   WBC 9.0  --  9.3  --   --  9.2  --    HGB 8.4*   < > 8.9*   < > 8.1* 8.4* 9.1*     --  178  --   --  209  --     < > = values in this interval not displayed.     BMP:    Recent Labs     06/03/25  0644 06/03/25  1735 06/04/25  0613 06/04/25  1803 06/05/25  0611      < > 139 139 140   K 3.7   < > 3.5* 3.9 3.7   *   < > 109* 110* 109*   CO2 21   < > 21 20 22   BUN 6*  --  7*  --  6*   CREATININE 0.6  --  0.6  --  0.6   GLUCOSE 112*  --  110*  --  113*    < > = values in this interval not displayed.     Hepatic:   No results for input(s): \"AST\", \"ALT\", \"BILITOT\", \"ALKPHOS\" in the last 72 hours.    Invalid input(s): \"ALB\"    Troponin: No results for input(s): \"TROPHS\" in the last 72 hours.  BNP: No results for input(s): \"BNP\" in the last 72 hours.  Lipids: No results for input(s): \"CHOL\", \"HDL\" in  and esophageal ulcers    Patient Active Problem List:     Coronary artery disease involving native coronary artery of native heart without angina pectoris     Hyperlipidemia with target LDL less than 70     Chronic diastolic heart failure (HCC)     Anemia     GI bleed     Shingles     Esophageal ulcer     Gastric ulcer     Gastric ulcer with hemorrhage     Coronary artery disease involving native heart with angina pectoris     Diastolic dysfunction     Mixed hyperlipidemia     Obesity     Pneumonia due to COVID-19 virus     Postmenopausal bleeding     Endometrial polyp     Post-menopausal bleeding     Endometrial cancer (HCC)     Post-operative pain     Memory loss     Essential hypertension     Sinus bradycardia     Acute hyponatremia     NSTEMI (non-ST elevated myocardial infarction) (HCC)     Elevated troponin     Hematoma     Thigh hematoma, right, initial encounter      Plan of Treatment:     Keep K >4 and Mg >2   Aspiration pneumonia- on ATBs.   Right thigh/groin hematoma with vascular following. S/P negative pseudoaneurysm study.   Monitor H&Hs.   Stress test negative for ischemia   Tele reviewed extensively.  Pt is SR with PACs. Due to anemia, do not recommend starting AC at this time.  Will order holter monitor on discharge.  Continue ASA.    No objection to d/c from cardiology standpoint when ok with others     Electronically signed by MEÑO DIAZ CNP on 6/5/2025 at 10:47 AM  [facility]  [phone]

## 2025-06-05 NOTE — PROGRESS NOTES
Physical Therapy  Facility/Department: [address] ONC/MED SURG   Physical Therapy Daily Treatment Note    Patient Name: Lyn Lombardi        MRN: [id number]    : 1940    Date of Service: 2025    Chief Complaint   Patient presents with    Fall     Past Medical History:  has a past medical history of Abnormal uterine bleeding (AUB), Anxiety, Asteroid hyalosis of right eye, Bruises easily, Coronary artery disease, COVID-19, Diastolic dysfunction, Gastric ulcer with hemorrhage, Hyperlipidemia, Hypertension, Lives alone, Memory loss, Mild mitral regurgitation, Mild tricuspid regurgitation, Obesity, Patient in clinical research study, Poor intravenous access, and Vitreous floaters.  Past Surgical History:  has a past surgical history that includes Cholecystectomy (1978); Cardiac catheterization (Left, 2012); Endoscopy, colon, diagnostic (2015); Upper gastrointestinal endoscopy (2015); Dilation and curettage of uterus (N/A, 2021); Total abdominal hysterectomy w/ bilateral salpingoophorectomy (2021); and Hysterectomy (N/A, 2021).    Discharge Recommendations  Discharge Recommendations: Therapy recommended at discharge  PT Equipment Recommendations  Equipment Needed: Yes  Mobility Devices: Walker  Walker: Rolling    Assessment  Body Structures, Functions, Activity Limitations Requiring Skilled Therapeutic Intervention: Decreased functional mobility ;Decreased strength;Decreased balance;Decreased cognition;Decreased safe awareness  Assessment: Pt able to ambulate 75 ft x 2 with rw, CGA for balance and safety.  Pt CGA for transfers, CGA for bed mobility. Pt is unsafe to return to previous living situation due to limited mobility and strength. Pt will benefit from continued therapy to improve deficits and progress function.  Therapy Prognosis: Good  Activity Tolerance  Activity Tolerance: Patient tolerated treatment well  Safety Devices  Type of Devices: Call light within  reach;Gait belt;Left in chair;Chair alarm in place;Heels elevated for pressure relief;Nurse notified  Restraints  Restraints Initially in Place: No    AM-PAC  AM-[facility] Basic Mobility - Inpatient   How much help is needed turning from your back to your side while in a flat bed without using bedrails?: A Little  How much help is needed moving from lying on your back to sitting on the side of a flat bed without using bedrails?: A Little  How much help is needed moving to and from a bed to a chair?: A Little  How much help is needed standing up from a chair using your arms?: A Little  How much help is needed walking in hospital room?: A Little  How much help is needed climbing 3-5 steps with a railing?: A Lot  AM-PAC Inpatient Mobility Raw Score : 17  AM-PAC Inpatient T-Scale Score : 42.13  Mobility Inpatient CMS 0-100% Score: 50.57  Mobility Inpatient CMS G-Code Modifier : CK    Restrictions/Precautions  Restrictions/Precautions  Restrictions/Precautions: Swallowing - Thickened Liquids;Modified Diet  Required Braces or Orthoses?: No       Subjective  General  Chart Reviewed: No  Patient assessed for rehabilitation services?: Yes  Family/Caregiver Present: No  Follows Commands: Within Functional Limits  General  General Comments: RN and pt agreeable to treatment session  Subjective  Subjective: Pt reports no pain, numbness or tingling.       Objective  Orientation  Orientation Level: Oriented to place;Oriented to situation;Disoriented to place;Disoriented to time  Cognition  Overall Cognitive Status: Exceptions  Arousal/Alertness: Appropriate responses to stimuli  Following Commands: Follows one step commands with increased time  Attention Span: Appears intact  Memory: Decreased short term memory  Safety Judgement: Decreased awareness of need for assistance  Problem Solving: Decreased awareness of errors;Assistance required to identify errors made;Assistance required to correct errors made  Insights: Decreased awareness  of deficits  Initiation: Requires cues for some  Sequencing: Requires cues for some  Cognition Comment: pt forgetful at times      Mobility   Bed mobility  Supine to Sit: Contact guard assistance  Scooting: Stand by assistance  Bed Mobility Comments: assist with bedding, use of bedrail.  Dizziness initially with sitting         Transfers  Sit to Stand: Contact guard assistance  Stand to Sit: Contact guard assistance  Comment: up to rw, no loss of balance    Ambulation  Surface: Level tile  Device: Rolling Walker  Assistance: Contact guard assistance  Quality of Gait: slow gait, improved over distance  Gait Deviations: Decreased step length  Distance: 75 ft x 2  Comments: standing rest  More Ambulation?: No    Stairs/Curb  Stairs?: No          Balance   Balance  Posture: Fair  Sitting - Static: Fair;+  Sitting - Dynamic: Fair  Standing - Static: Fair  Standing - Dynamic: Fair  Comments: standing balance with B UE support    Exercise  PT Exercises  A/AROM Exercises: Seated LAQ,  marches, ankle pumps,  hip abd/add  x 10 reps with assist.  Bicep curls, shoulder flexion, shoulder abduction x 10 reps  Pt set up for lunch, able to feed herself well, open silverware    Patient Education  Patient Education  Education Given To: Patient  Education Provided: Transfer Training;Mobility Training  Education Method: Demonstration;Verbal  Barriers to Learning: Cognition  Education Outcome: Verbalized understanding;Demonstrated understanding    Plan  Physical Therapy Plan  General Plan:  (5-6x/wk)  Current Treatment Recommendations: Strengthening, Balance training, Functional mobility training, Gait training, Stair training, Transfer training, Home exercise program, Safety education & training, Patient/Caregiver education & training, Equipment evaluation, education, & procurement, Therapeutic activities    Goals  Short Term Goals  Time Frame for Short Term Goals: 14 visits  Short Term Goal 1: Sit to/from stand with

## 2025-06-05 NOTE — CARE COORDINATION
Case Management   Daily Progress Note       Patient Name: Lyn Lombardi                   YOB: 1940  Diagnosis: Acute hyponatremia [E87.1]  NSTEMI (non-ST elevated myocardial infarction) (HCC) [I21.4]                       GMLOS: 5 days  Length of Stay: 7  days    Anticipated Discharge Date: To be determined    Readmission Risk (Low < 19, Mod (19-27), High > 27): Readmission Risk Score: 15.2        Current Transitional Plan    [] Home Independently    [] Home with HC    [] Skilled Nursing Facility    [x] Acute Rehabilitation    [] Long Term Acute Care (LTAC)    [] Other:     Plan for the Stay (Medical Management) :          Workflow Continuation (Additional Notes) :    Dr. Pfeiffer to unit, pt is appropriate for ARU, needs choices.     Spoke to pt at bedside, her plan is to go home.  ARU list left in room.           JOSELIN EARLY  June 5, 2025

## 2025-06-05 NOTE — PROCEDURES
INSTRUMENTAL SWALLOW REPORT  MODIFIED BARIUM SWALLOW    NAME: Lyn Lombardi   : 1940  MRN: [id number]       Date of Eval: 2025        Radiologist: Kvng     Referring Diagnosis(es):      Past Medical History:  has a past medical history of Abnormal uterine bleeding (AUB), Anxiety, Asteroid hyalosis of right eye, Bruises easily, Coronary artery disease, COVID-19, Diastolic dysfunction, Gastric ulcer with hemorrhage, Hyperlipidemia, Hypertension, Lives alone, Memory loss, Mild mitral regurgitation, Mild tricuspid regurgitation, Obesity, Patient in clinical research study, Poor intravenous access, and Vitreous floaters.  Past Surgical History:  has a past surgical history that includes Cholecystectomy (1978); Cardiac catheterization (Left, 2012); Endoscopy, colon, diagnostic (2015); Upper gastrointestinal endoscopy (2015); Dilation and curettage of uterus (N/A, 2021); Total abdominal hysterectomy w/ bilateral salpingoophorectomy (2021); and Hysterectomy (N/A, 2021).       Type of Study: Initial MBS       Recent CXR/CT of Chest:   1.  No acute traumatic injury identified in the chest, abdomen or pelvis.     2.  Pulmonary interstitial edema.    Patient Complaints/Reason for Referral:  Lyn Lombardi was referred for a MBS to assess the efficiency of his/her swallow function, assess for aspiration, and to make recommendations regarding safe dietary consistencies, effective compensatory strategies, and safe eating environment.       Onset of problem:    Lyn Lombardi is a female was transferred from [facility] after being found down for unknown time and noted to have decreased mental status. 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 No blood thinners. Was found down and unresponsive so EMS called. Has small amount of dried blood on face. Was intubated due to initial GCS of 6. Life flight stated that      Medication administration: PO    Safe Swallow Protocol:     Compensatory Swallowing Strategies : Alternate solids and liquids;Eat/Feed slowly;Small bites/sips      Recommendations/Treatment  Requires SLP Intervention: Yes   Frequency of Treatment: 1-2x week   D/C Recommendations: Ongoing speech therapy is recommended during this hospitalization         Recommended Exercises:   Therapeutic Interventions: Diet tolerance monitoring         Education:   Patient Education Response: Verbalizes understanding       Goals:    Long Term:   To Maximize safety with intake, optimize nutrition/hydration and minimize risk for aspiration.      Short Term:  Dysphagia Goals: The patient will tolerate recommended diet without observed clinical signs of aspiration      Oral Preparation / Oral Phase   + spill to vallecula with Thin Liquid with a straw         Pharyngeal Phase   Dysphagia Pureed (Dysphagia I) (IDDS1 Level 4): no penetration no aspiration no residual    Dysphagia Soft and Bite-Sized (Dysphagia III) (IDDSI Level 6): no penetration, no aspiration, no residual, spill to pyriform    Regular  (IDDSI Level 7): no penetration, no aspiration, no residual    Thin liquids (IDDSI Level 0): no penetration, no aspiration, no residual     Mildly Thick (Nectar) (IDDSI Level 2) cup: no penetration, no aspiration, min residual, cleared residual with a spontaneous swallow          Esophageal Phase  Esophageal Screen: WNL        Pain   0/10      Therapy Time:   Individual Concurrent Group Co-treatment   Time In 1410         Time Out 1420         Minutes 10                   Adele Mitchell M.A. CF-SLP

## 2025-06-06 LAB
ANION GAP SERPL CALCULATED.3IONS-SCNC: 10 MMOL/L (ref 9–16)
BASOPHILS # BLD: 0.05 K/UL (ref 0–0.2)
BASOPHILS NFR BLD: 1 % (ref 0–2)
BUN SERPL-MCNC: 8 MG/DL (ref 8–23)
CALCIUM SERPL-MCNC: 8.3 MG/DL (ref 8.6–10.4)
CHLORIDE SERPL-SCNC: 108 MMOL/L (ref 98–107)
CO2 SERPL-SCNC: 21 MMOL/L (ref 20–31)
CREAT SERPL-MCNC: 0.5 MG/DL (ref 0.6–0.9)
EOSINOPHIL # BLD: 0.1 K/UL (ref 0–0.44)
EOSINOPHILS RELATIVE PERCENT: 1 % (ref 1–4)
ERYTHROCYTE [DISTWIDTH] IN BLOOD BY AUTOMATED COUNT: 14.6 % (ref 11.8–14.4)
GFR, ESTIMATED: >90 ML/MIN/1.73M2
GLUCOSE SERPL-MCNC: 114 MG/DL (ref 74–99)
HCT VFR BLD AUTO: 28.4 % (ref 36.3–47.1)
HGB BLD-MCNC: 9.2 G/DL (ref 11.9–15.1)
IMM GRANULOCYTES # BLD AUTO: 0.15 K/UL (ref 0–0.3)
IMM GRANULOCYTES NFR BLD: 2 %
LYMPHOCYTES NFR BLD: 2.05 K/UL (ref 1.1–3.7)
LYMPHOCYTES RELATIVE PERCENT: 21 % (ref 24–43)
MAGNESIUM SERPL-MCNC: 2.1 MG/DL (ref 1.6–2.4)
MCH RBC QN AUTO: 31.7 PG (ref 25.2–33.5)
MCHC RBC AUTO-ENTMCNC: 32.4 G/DL (ref 28.4–34.8)
MCV RBC AUTO: 97.9 FL (ref 82.6–102.9)
MONOCYTES NFR BLD: 0.87 K/UL (ref 0.1–1.2)
MONOCYTES NFR BLD: 9 % (ref 3–12)
NEUTROPHILS NFR BLD: 66 % (ref 36–65)
NEUTS SEG NFR BLD: 6.59 K/UL (ref 1.5–8.1)
NRBC BLD-RTO: 0 PER 100 WBC
PLATELET # BLD AUTO: 252 K/UL (ref 138–453)
PMV BLD AUTO: 9.3 FL (ref 8.1–13.5)
POTASSIUM SERPL-SCNC: 3.2 MMOL/L (ref 3.7–5.3)
RBC # BLD AUTO: 2.9 M/UL (ref 3.95–5.11)
RBC # BLD: ABNORMAL 10*6/UL
SODIUM SERPL-SCNC: 139 MMOL/L (ref 136–145)
WBC OTHER # BLD: 9.8 K/UL (ref 3.5–11.3)

## 2025-06-06 PROCEDURE — 6370000000 HC RX 637 (ALT 250 FOR IP)

## 2025-06-06 PROCEDURE — 92526 ORAL FUNCTION THERAPY: CPT

## 2025-06-06 PROCEDURE — 85025 COMPLETE CBC W/AUTO DIFF WBC: CPT

## 2025-06-06 PROCEDURE — 87506 IADNA-DNA/RNA PROBE TQ 6-11: CPT

## 2025-06-06 PROCEDURE — 99233 SBSQ HOSP IP/OBS HIGH 50: CPT | Performed by: INTERNAL MEDICINE

## 2025-06-06 PROCEDURE — 36415 COLL VENOUS BLD VENIPUNCTURE: CPT

## 2025-06-06 PROCEDURE — 80048 BASIC METABOLIC PNL TOTAL CA: CPT

## 2025-06-06 PROCEDURE — 83735 ASSAY OF MAGNESIUM: CPT

## 2025-06-06 PROCEDURE — 6360000002 HC RX W HCPCS

## 2025-06-06 PROCEDURE — 2060000000 HC ICU INTERMEDIATE R&B

## 2025-06-06 RX ORDER — LACTOBACILLUS RHAMNOSUS GG 10B CELL
1 CAPSULE ORAL
Status: DISCONTINUED | OUTPATIENT
Start: 2025-06-07 | End: 2025-06-10 | Stop reason: HOSPADM

## 2025-06-06 RX ADMIN — FERROUS SULFATE TAB EC 325 MG (65 MG FE EQUIVALENT) 325 MG: 325 (65 FE) TABLET DELAYED RESPONSE at 07:45

## 2025-06-06 RX ADMIN — POTASSIUM CHLORIDE 40 MEQ: 1500 TABLET, EXTENDED RELEASE ORAL at 19:42

## 2025-06-06 RX ADMIN — MEMANTINE 5 MG: 5 TABLET ORAL at 07:44

## 2025-06-06 RX ADMIN — DONEPEZIL HYDROCHLORIDE 10 MG: 10 TABLET, FILM COATED ORAL at 19:43

## 2025-06-06 RX ADMIN — ENOXAPARIN SODIUM 40 MG: 100 INJECTION SUBCUTANEOUS at 07:45

## 2025-06-06 RX ADMIN — DESMOPRESSIN ACETATE 40 MG: 0.2 TABLET ORAL at 19:43

## 2025-06-06 RX ADMIN — MEMANTINE 5 MG: 5 TABLET ORAL at 19:43

## 2025-06-06 RX ADMIN — ASPIRIN 81 MG: 81 TABLET, CHEWABLE ORAL at 07:45

## 2025-06-06 NOTE — PROGRESS NOTES
Speech Language Pathology  [facility]    Dysphagia Treatment Note    Date: 6/6/2025  Patient’s Name: Lyn Lombardi  MRN: [id number]    Patient Active Problem List   Diagnosis Code    Coronary artery disease involving native coronary artery of native heart without angina pectoris I25.10    Hyperlipidemia with target LDL less than 70 E78.5    Chronic diastolic heart failure (HCC) I50.32    Anemia D64.9    GI bleed K92.2    Shingles B02.9    Esophageal ulcer K22.10    Gastric ulcer K25.9    Gastric ulcer with hemorrhage K25.4    Coronary artery disease involving native heart with angina pectoris I25.119    Diastolic dysfunction I51.89    Mixed hyperlipidemia E78.2    Obesity E66.9    Pneumonia due to COVID-19 virus U07.1, J12.82    Postmenopausal bleeding N95.0    Endometrial polyp N84.0    Post-menopausal bleeding N95.0    Endometrial cancer (HCC) C54.1    Post-operative pain G89.18    Memory loss R41.3    Hypertension, essential I10    Sinus bradycardia R00.1    Hyponatremia E87.1    NSTEMI (non-ST elevated myocardial infarction) ([facility]) I21.4    Elevated troponin R79.89    Hematoma T14.8XXA    Thigh hematoma, right, initial encounter S70.11XA    Altered mental status R41.82    Paroxysmal atrial fibrillation ([facility]) I48.0       Pain: 0/10    Dysphagia Treatment  Treatment time: 0086-8257    Subjective: [x] Alert [x] Cooperative     [] Confused     [] Agitated    [] Lethargic    Objective/Assessment:    Pt. Seen for O/M treatment program for dysphagia.  Pt. Completed O/M exercises 10x for 1 set with min cues.  Pt. Completed james maneuver 5x reps with min cues. Education provided and exercises left at bedside.    Pt with breakfast at bedside. Pt + cough with Thin Liquid with straw 1x, Pt with no other overt s/s of aspiration at bedside. Pt states that she is not hungry.     Continue with current diet at this time.       Plan:  [x] Continue ST services    [] Discharge from ST:        Discharge  recommendations: []  Further therapy recommended at discharge.The patient should be able to tolerate at least 3 hours of therapy per day over 5 days or 15 hours over 7 days. [] Further therapy recommended at discharge.   [x] No therapy recommended at discharge.         Adele Mitchell M.A. CF-SLP

## 2025-06-06 NOTE — PROGRESS NOTES
[facility]  Internal Medicine Teaching Residency Program  Inpatient Daily Progress Note  ______________________________________________________________________________    Patient: Lyn Lombardi  YOB: 1940   MRN: [id number]    Acct: [id number]     Room: 0434/0434-01  Admit date: 5/29/2025  Today's date: 06/06/25  Number of days in the hospital: 8  Current Code Status: Full Code   SUBJECTIVE   Admitting Diagnosis: Hyponatremia    Patient seen and examined at bedside. Chart and results reviewed.  Pt is HDS, maintaining saturations on RA  Barium swallow done recommending soft and bite side dysphagia diet w thin liquids  Evaluated by PM&R-appropriate for acute rehab  Pt's plan is to go home but lists left in room    BRIEF HISTORY     The patient is a 84 y.o. female who a PMHx of Alzheimer's, HTN, HLD, GERD, CAD w stent August 2012 who initially presented to the ICU on 5/29 after being found down unresponsive.  Patient initially arrived to [facility] with a GCS of 6 and was intubated for airway protection.  Initial lab work showed a sodium of 114 elevated CK, elevated troponin and elevated WBC.  Patient underwent trauma workup with CT head, CT C-spine, CT chest abdomen pelvis CT lumbar and thoracic spine all of which were unremarkable.     Pt transferred to [facility] for further evaluation. During her ICU stay her sodium was corrected, currently at 134. Pt was found to have elevated WBC, fever and elevated procal for which she was started on Zosyn to cover aspiration pneumonia. Pt had improvement in mentation and was extubated on 5/31 which she has been tolerating well.      Plan was for transfer out of the ICU on 6/1 however, pt was noted to have a significant drop in hgb from 12 to 8. ASA and Heparin were held. Vascular duplex of R thigh ordered which did show R thigh hematoma. Vascular surgery was consulted who recommended compression stockings and monitor.

## 2025-06-06 NOTE — PLAN OF CARE
Problem: Neurosensory - Adult  Goal: Achieves stable or improved neurological status  Outcome: Progressing  Goal: Absence of seizures  Outcome: Progressing  Goal: Remains free of injury related to seizures activity  Outcome: Progressing  Goal: Achieves maximal functionality and self care  Outcome: Progressing     Problem: Respiratory - Adult  Goal: Achieves optimal ventilation and oxygenation  Outcome: Progressing     Problem: Cardiovascular - Adult  Goal: Maintains optimal cardiac output and hemodynamic stability  Outcome: Progressing  Goal: Absence of cardiac dysrhythmias or at baseline  Outcome: Progressing     Problem: Musculoskeletal - Adult  Goal: Return mobility to safest level of function  Outcome: Progressing  Goal: Maintain proper alignment of affected body part  Outcome: Progressing     Problem: Metabolic/Fluid and Electrolytes - Adult  Goal: Electrolytes maintained within normal limits  Outcome: Progressing  Goal: Hemodynamic stability and optimal renal function maintained  Outcome: Progressing  Goal: Glucose maintained within prescribed range  Outcome: Progressing     Problem: Hematologic - Adult  Goal: Maintains hematologic stability  Outcome: Progressing     Problem: Safety - Adult  Goal: Free from fall injury  Outcome: Progressing     Problem: Discharge Planning  Goal: Discharge to home or other facility with appropriate resources  Outcome: Progressing     Problem: Pain  Goal: Verbalizes/displays adequate comfort level or baseline comfort level  Outcome: Progressing     Problem: Nutrition Deficit:  Goal: Optimize nutritional status  Outcome: Progressing     Problem: Skin/Tissue Integrity  Goal: Skin integrity remains intact  Description: 1.  Monitor for areas of redness and/or skin breakdown2.  Assess vascular access sites hourly3.  Every 4-6 hours minimum:  Change oxygen saturation probe site4.  Every 4-6 hours:  If on nasal continuous positive airway pressure, respiratory therapy assess nares and  determine need for appliance change or resting period  Outcome: Progressing     Problem: ABCDS Injury Assessment  Goal: Absence of physical injury  Outcome: Progressing

## 2025-06-06 NOTE — PLAN OF CARE
Problem: Neurosensory - Adult  Goal: Achieves stable or improved neurological status  6/6/2025 1501 by Harriet Mata RN  Outcome: Progressing  6/6/2025 0421 by Tonya Onofre RN  Outcome: Progressing  Goal: Absence of seizures  6/6/2025 1501 by Harriet Mata RN  Outcome: Progressing  6/6/2025 0421 by Tonya Onofre RN  Outcome: Progressing  Goal: Remains free of injury related to seizures activity  6/6/2025 1501 by Harriet Mata RN  Outcome: Progressing  6/6/2025 0421 by Tonya Onofre RN  Outcome: Progressing  Goal: Achieves maximal functionality and self care  6/6/2025 1501 by Harriet Mata RN  Outcome: Progressing  6/6/2025 0421 by Tonya Onofre RN  Outcome: Progressing     Problem: Respiratory - Adult  Goal: Achieves optimal ventilation and oxygenation  6/6/2025 1501 by Harriet Mata RN  Outcome: Progressing  6/6/2025 0421 by Tonya Onofre RN  Outcome: Progressing     Problem: Cardiovascular - Adult  Goal: Maintains optimal cardiac output and hemodynamic stability  6/6/2025 1501 by Harriet Mata RN  Outcome: Progressing  6/6/2025 0421 by Tonya Onofre RN  Outcome: Progressing  Goal: Absence of cardiac dysrhythmias or at baseline  6/6/2025 1501 by Harriet Mata RN  Outcome: Progressing  6/6/2025 0421 by Tonya Onofre RN  Outcome: Progressing     Problem: Musculoskeletal - Adult  Goal: Return mobility to safest level of function  6/6/2025 1501 by Harriet Mata RN  Outcome: Progressing  6/6/2025 0421 by Tonya Onofre RN  Outcome: Progressing  Goal: Maintain proper alignment of affected body part  6/6/2025 1501 by Harriet Mata RN  Outcome: Progressing  6/6/2025 0421 by Tonya Onofre RN  Outcome: Progressing     Problem: Metabolic/Fluid and Electrolytes - Adult  Goal: Electrolytes maintained within normal limits  6/6/2025 1501 by Harriet Mata RN  Outcome: Progressing  6/6/2025 0421 by Tonya Onofre RN  Outcome: Progressing  Goal: Hemodynamic  stability and optimal renal function maintained  6/6/2025 1501 by Harriet Mata RN  Outcome: Progressing  6/6/2025 0421 by Tonya Onofre RN  Outcome: Progressing  Goal: Glucose maintained within prescribed range  6/6/2025 1501 by Harriet Mata RN  Outcome: Progressing  6/6/2025 0421 by Tonya Onofre RN  Outcome: Progressing     Problem: Hematologic - Adult  Goal: Maintains hematologic stability  6/6/2025 1501 by Harriet Mata RN  Outcome: Progressing  6/6/2025 0421 by Tonya Onofre RN  Outcome: Progressing     Problem: Safety - Adult  Goal: Free from fall injury  6/6/2025 1501 by Harriet Mata RN  Outcome: Progressing  6/6/2025 0421 by Tonya Onofre RN  Outcome: Progressing     Problem: Discharge Planning  Goal: Discharge to home or other facility with appropriate resources  6/6/2025 1501 by Harriet Mata RN  Outcome: Progressing  6/6/2025 0421 by Tonya Onofre RN  Outcome: Progressing     Problem: Pain  Goal: Verbalizes/displays adequate comfort level or baseline comfort level  6/6/2025 1501 by Harriet Mata RN  Outcome: Progressing  6/6/2025 0421 by Tonya Onofre RN  Outcome: Progressing     Problem: Nutrition Deficit:  Goal: Optimize nutritional status  6/6/2025 1501 by Harriet Mata RN  Outcome: Progressing  6/6/2025 0421 by Tonya Onofre RN  Outcome: Progressing     Problem: Skin/Tissue Integrity  Goal: Skin integrity remains intact  Description: 1.  Monitor for areas of redness and/or skin breakdown2.  Assess vascular access sites hourly3.  Every 4-6 hours minimum:  Change oxygen saturation probe site4.  Every 4-6 hours:  If on nasal continuous positive airway pressure, respiratory therapy assess nares and determine need for appliance change or resting period  6/6/2025 1501 by Harriet Mata RN  Outcome: Progressing  6/6/2025 0421 by Tonya Onofre RN  Outcome: Progressing     Problem: ABCDS Injury Assessment  Goal: Absence of physical injury  6/6/2025 1501

## 2025-06-06 NOTE — PROGRESS NOTES
[facility]  PROGRESS NOTE    Shift date: 6/0/625  Shift day: Friday   Shift # 1    Room # 0434/0434-01   Name: Lyn Lombardi                Confucianist: Orthodox   Place of Zoroastrianism: unknown    Referral: Routine Visit    Admit Date & Time: 5/29/2025 10:41 PM    Assessment:  Lyn Lombardi is a 84 y.o. female in the hospital because of a fall. Upon entering the room writer observes pt is sitting upright in chair watching TV. Two daughters also present. Pt is calm and hopeful that she will recover after fall.       Intervention:  Writer introduced self and title as  and inquired about how pt was feeling.  introduced himself to daughters who were present.  offered spiritual support such as prayer or scripture. Pt politely declined. Daughter mentioned that she had received a packet from her [employer] already.      Outcome:   said that spiritual support would remain available if she would like to talk or needs any spiritual resources at any time.    Plan:  Chaplains will remain available to offer spiritual and emotional support as needed.      Electronically signed by Ovidio Mondragon, Intern, on 6/6/2025 at 2:42 PM.  [facility]  [facility]  [phone]

## 2025-06-06 NOTE — ADT AUTH CERT
Utilization Reviews       6.5.25  by Daksha Pacheco RN   Last updated by Daksha Pacheco, RN on 6/5/2025 1725     Review Status Created By   In Primary Daksha Pacheco RN       Review Type   --      Criteria Review   DATE: 6.5.25   TYPE OF BED: INTERMEDIATE           RELEVANT BASELINES: (lab values, vitals, o2 amount/delivery, etc.)  NO HOME O2        PERTINENT UPDATES:   Pt had stress test yesterday.  Test was negative for ischemia, but did show Afib.             VITALS:  36.8 14 84 124/61 100% RA        ABNL/PERTINENT LABS/RADIOLOGY/DIAGNOSTIC STUDIES:  Hemoglobin Quant: 8.1 (L)  Hematocrit: 25.1 (L)     Hemoglobin Quant: 8.4 (L)  Hematocrit: 25.6 (L)     Hemoglobin Quant: 9.1 (L)  Hematocrit: 28.2 (L)        FL MODIFIED BARIUM SWALLOW W VIDEO  Result Date: 6/5/2025  No penetration or aspiration with the above administered substances. Please see separate speech pathology report for full discussion of findings and recommendations.      Nuclear stress test with myocardial perfusion  Result Date: 6/4/2025  Please note exam is limited due to lack of prone stress imaging. No clear scintigraphic evidence for reversible ischemia or infarct. Left ventricular ejection fraction of 70%.  Please see report for EKG portion of the examination which will be performed separately by physician from cardiology.  Risk stratification:  [Low risk]         PHYSICAL EXAM:  WNL        MD CONSULTS/ASSESSMENT AND PLAN:  ===CARDIO  Keep K >4 and Mg >2   Aspiration pneumonia- on ATBs.   Right thigh/groin hematoma with vascular following. S/P negative pseudoaneurysm study.   Monitor H&Hs.   Stress test negative for ischemia   Tele reviewed extensively.  Pt is SR with PACs. Due to anemia, do not recommend starting AC at this time.  Will order holter monitor on discharge.  Continue ASA.       ===MEDICINE  NSTEMI  Cardio on board, appreciate recommendations  TTE 5/30: LVEF 64%, mild AR, mild MR, mild TR RVSP 32 mmHg  Pt underwent stress  fluorescence  135     Vascular US Duplex Pseudoaneurysm Check Groin Right    Right: Evidence of hematoma without any obvious evidence of pseudoaneurysm.     PHYSICAL EXAM:  Patient alert, oriented x 1  Sitting up and having breakfast   Vitals stable - not on pressors or oxygen   Small hematoma over the medial right thigh - no other obvious hematoma, bruising over left elbow - hematoma has been wrapped in ace bandage - no obvious expansion beyond the wrapping     MD CONSULTS/ASSESSMENT AND PLAN:  Critical care    ADULT DIET; Dysphagia - Soft and Bite Sized; Mildly Thick (Nectar); 1500 ml - SLT requested    EPCs, Heparin withheld due to drop in Hb   Glucose remaining normal, started on tube feeds   UOP at 1.4 L in 24 hrs Indwelling catheter/lines NG tube, godinez, r fem art line, R fem CVC      Concern for pt bit ET tube or had a dental injury during her fall. Does appear to have loose teeth - follow up with dentist as OP      Brief Plan   CTA abdo/pelvis - hematoma right thigh - likely source of dropping Hb   Vascular surgery consulted - recommends compression and monitoring   Monitor H&H 6 hourly  Received 1 x PRBC yesterday    Monitor sodium and continue management as per nephro              Swallow evaluation done yesterday - appreciate recs -started on soft diet   Continue zosyn for aspiration pneumonia   Remove CVC and art line     Neurologic:   H/o demenia  Concern for dental injury  Neuro intact  Neuro checks per protocol  Intubated and sedated on Fentanyl and Propofol  Pt doing well on CPAP  Will assess for extubation once she CPAP's for approximately 2 hrs.   Resume home medications of Donepezil and Memantine  Pt does have loose dentition. Concern for injury when she was found down.   Will attempt to reach out if there is dental surgery/dental here.   CT facial negative     Cardiovascular:  H/o HTN  Elevated troponin  CAD- CINDY to LAD and LCX 08/2012   HLD  Pt hypotensive overnight, required Levo  At home pt

## 2025-06-07 LAB
ANION GAP SERPL CALCULATED.3IONS-SCNC: 6 MMOL/L (ref 9–16)
BUN SERPL-MCNC: 6 MG/DL (ref 8–23)
CALCIUM SERPL-MCNC: 8.5 MG/DL (ref 8.6–10.4)
CAMPYLOBACTER DNA SPEC NAA+PROBE: NORMAL
CHLORIDE SERPL-SCNC: 109 MMOL/L (ref 98–107)
CO2 SERPL-SCNC: 24 MMOL/L (ref 20–31)
CREAT SERPL-MCNC: 0.5 MG/DL (ref 0.6–0.9)
ETEC ELTA+ESTB GENES STL QL NAA+PROBE: NORMAL
GFR, ESTIMATED: >90 ML/MIN/1.73M2
GLUCOSE SERPL-MCNC: 103 MG/DL (ref 74–99)
P SHIGELLOIDES DNA STL QL NAA+PROBE: NORMAL
POTASSIUM SERPL-SCNC: 4.5 MMOL/L (ref 3.7–5.3)
SALMONELLA DNA SPEC QL NAA+PROBE: NORMAL
SHIGA TOXIN STX GENE SPEC NAA+PROBE: NORMAL
SHIGELLA DNA SPEC QL NAA+PROBE: NORMAL
SODIUM SERPL-SCNC: 139 MMOL/L (ref 136–145)
SPECIMEN DESCRIPTION: NORMAL
V CHOL+PARA RFBL+TRKH+TNAA STL QL NAA+PR: NORMAL
Y ENTERO RECN STL QL NAA+PROBE: NORMAL

## 2025-06-07 PROCEDURE — 6370000000 HC RX 637 (ALT 250 FOR IP)

## 2025-06-07 PROCEDURE — 99233 SBSQ HOSP IP/OBS HIGH 50: CPT | Performed by: INTERNAL MEDICINE

## 2025-06-07 PROCEDURE — 36415 COLL VENOUS BLD VENIPUNCTURE: CPT

## 2025-06-07 PROCEDURE — 97110 THERAPEUTIC EXERCISES: CPT

## 2025-06-07 PROCEDURE — 6360000002 HC RX W HCPCS

## 2025-06-07 PROCEDURE — 97116 GAIT TRAINING THERAPY: CPT

## 2025-06-07 PROCEDURE — 80048 BASIC METABOLIC PNL TOTAL CA: CPT

## 2025-06-07 PROCEDURE — 2060000000 HC ICU INTERMEDIATE R&B

## 2025-06-07 RX ADMIN — ASPIRIN 81 MG: 81 TABLET, CHEWABLE ORAL at 08:29

## 2025-06-07 RX ADMIN — Medication 1 CAPSULE: at 08:29

## 2025-06-07 RX ADMIN — DONEPEZIL HYDROCHLORIDE 10 MG: 10 TABLET, FILM COATED ORAL at 19:29

## 2025-06-07 RX ADMIN — MEMANTINE 5 MG: 5 TABLET ORAL at 19:29

## 2025-06-07 RX ADMIN — ENOXAPARIN SODIUM 40 MG: 100 INJECTION SUBCUTANEOUS at 08:28

## 2025-06-07 RX ADMIN — MEMANTINE 5 MG: 5 TABLET ORAL at 08:29

## 2025-06-07 RX ADMIN — DESMOPRESSIN ACETATE 40 MG: 0.2 TABLET ORAL at 19:29

## 2025-06-07 RX ADMIN — FERROUS SULFATE TAB EC 325 MG (65 MG FE EQUIVALENT) 325 MG: 325 (65 FE) TABLET DELAYED RESPONSE at 08:29

## 2025-06-07 NOTE — PLAN OF CARE
Problem: Neurosensory - Adult  Goal: Achieves stable or improved neurological status  6/7/2025 1711 by Radha Brice RN  Outcome: Progressing  6/7/2025 0405 by Tonya Onofre RN  Outcome: Progressing  Goal: Absence of seizures  6/7/2025 1711 by Radha Brice RN  Outcome: Progressing  6/7/2025 0405 by Tonya Onofre RN  Outcome: Progressing  Goal: Remains free of injury related to seizures activity  6/7/2025 1711 by Radha Brice RN  Outcome: Progressing  6/7/2025 0405 by Tonya Onofre RN  Outcome: Progressing  Goal: Achieves maximal functionality and self care  6/7/2025 1711 by Radha Brice RN  Outcome: Progressing  6/7/2025 0405 by Tonya Onofre RN  Outcome: Progressing     Problem: Respiratory - Adult  Goal: Achieves optimal ventilation and oxygenation  6/7/2025 1711 by Radha Brice RN  Outcome: Progressing  6/7/2025 0405 by Tonya Onofre RN  Outcome: Progressing     Problem: Cardiovascular - Adult  Goal: Maintains optimal cardiac output and hemodynamic stability  6/7/2025 1711 by Radha Brice RN  Outcome: Progressing  6/7/2025 0405 by Tonya Onofre RN  Outcome: Progressing  Goal: Absence of cardiac dysrhythmias or at baseline  6/7/2025 1711 by Radha Brice RN  Outcome: Progressing  6/7/2025 0405 by Tonya Onofre RN  Outcome: Progressing     Problem: Musculoskeletal - Adult  Goal: Return mobility to safest level of function  6/7/2025 1711 by Radha Brice RN  Outcome: Progressing  6/7/2025 0405 by Tonya Onofre RN  Outcome: Progressing  Goal: Maintain proper alignment of affected body part  6/7/2025 1711 by Radha Brice RN  Outcome: Progressing  6/7/2025 0405 by Tonya Onofre RN  Outcome: Progressing     Problem: Metabolic/Fluid and Electrolytes - Adult  Goal: Electrolytes maintained within normal limits  6/7/2025 1711 by Radha Brice RN  Outcome: Progressing  6/7/2025 0405 by Tonya Onofre RN  Outcome: Progressing  Goal: Hemodynamic stability and optimal  renal function maintained  6/7/2025 1711 by Radha Brice RN  Outcome: Progressing  6/7/2025 0405 by Tonya Onofre RN  Outcome: Progressing  Goal: Glucose maintained within prescribed range  6/7/2025 1711 by Radha Brice RN  Outcome: Progressing  6/7/2025 0405 by Tonya Onofre RN  Outcome: Progressing     Problem: Hematologic - Adult  Goal: Maintains hematologic stability  6/7/2025 1711 by Radha Brice RN  Outcome: Progressing  6/7/2025 0405 by Tonya Onofre RN  Outcome: Progressing     Problem: Safety - Adult  Goal: Free from fall injury  6/7/2025 1711 by Radha Brice RN  Outcome: Progressing  6/7/2025 0405 by Tonya Onofre RN  Outcome: Progressing     Problem: Discharge Planning  Goal: Discharge to home or other facility with appropriate resources  6/7/2025 1711 by Radha Brice RN  Outcome: Progressing  6/7/2025 0405 by Tonya Onofre RN  Outcome: Progressing     Problem: Pain  Goal: Verbalizes/displays adequate comfort level or baseline comfort level  6/7/2025 1711 by Radha Brice RN  Outcome: Progressing  6/7/2025 0405 by Tonya Onofre RN  Outcome: Progressing     Problem: Nutrition Deficit:  Goal: Optimize nutritional status  6/7/2025 1711 by Radha Brice RN  Outcome: Progressing  6/7/2025 0405 by Tonya Onofre RN  Outcome: Progressing     Problem: Skin/Tissue Integrity  Goal: Skin integrity remains intact  Description: 1.  Monitor for areas of redness and/or skin breakdown2.  Assess vascular access sites hourly3.  Every 4-6 hours minimum:  Change oxygen saturation probe site4.  Every 4-6 hours:  If on nasal continuous positive airway pressure, respiratory therapy assess nares and determine need for appliance change or resting period  6/7/2025 1711 by Radha Brice RN  Outcome: Progressing  6/7/2025 0405 by Tonya Onofre RN  Outcome: Progressing     Problem: ABCDS Injury Assessment  Goal: Absence of physical injury  6/7/2025 1711 by Radha Brice RN  Outcome:

## 2025-06-07 NOTE — PROGRESS NOTES
[facility]  Internal Medicine Teaching Residency Program  Inpatient Daily Progress Note  ______________________________________________________________________________    Patient: Lyn Lombardi  YOB: 1940   MRN: [id number]    Acct: [id number]     Room: 0434/0434-01  Admit date: 5/29/2025  Today's date: 06/07/25  Number of days in the hospital: 9  Current Code Status: Full Code   SUBJECTIVE   Admitting Diagnosis: Hyponatremia    Patient seen and examined at bedside. Chart and results reviewed.  Referrals sent to ARU   Pt was complaining of loose stools.   GI panel ordered  Morning BMP pending    BRIEF HISTORY     The patient is a 84 y.o. female who a PMHx of Alzheimer's, HTN, HLD, GERD, CAD w stent August 2012 who initially presented to the ICU on 5/29 after being found down unresponsive.  Patient initially arrived to [facility] with a GCS of 6 and was intubated for airway protection.  Initial lab work showed a sodium of 114 elevated CK, elevated troponin and elevated WBC.  Patient underwent trauma workup with CT head, CT C-spine, CT chest abdomen pelvis CT lumbar and thoracic spine all of which were unremarkable.     Pt transferred to [facility] for further evaluation. During her ICU stay her sodium was corrected, currently at 134. Pt was found to have elevated WBC, fever and elevated procal for which she was started on Zosyn to cover aspiration pneumonia. Pt had improvement in mentation and was extubated on 5/31 which she has been tolerating well.      Plan was for transfer out of the ICU on 6/1 however, pt was noted to have a significant drop in hgb from 12 to 8. ASA and Heparin were held. Vascular duplex of R thigh ordered which did show R thigh hematoma. Vascular surgery was consulted who recommended compression stockings and monitor. CTA abdomen also done which showed large R thigh hematoma w/o evidence of active arterial hemorrhage. Small amount of  monitor     Concern for dental injury  Pt has failed swallow study x2 due to this  Recommend dental f/u as OP  SLP recommending dysphagia diet     H/o dementia   Continue Memantine and Donepezil     HLD  Continue Statin    Aspiration pneumonia  Completed course of Zosyn       Hyponatremia - RESOLVED  Na + improved  Nephro signed off  Continue fluid restriction 1.5 L    Diet: ADULT DIET; Dysphagia - Soft and Bite Sized; Low Fat/Low Chol/High Fiber/CARLOS   DVT Ppx: SCD cuffs, Lovenox  GI Ppx: Pantoprazole  Discharge planning: ARU options sent out for referral     Jennifer Wagoner MD  Internal Medicine Resident  [facility]; [place], [place]  6/7/2025, 6:07 AM

## 2025-06-07 NOTE — PLAN OF CARE
Problem: Neurosensory - Adult  Goal: Achieves stable or improved neurological status  6/7/2025 0405 by Tonya Onofre RN  Outcome: Progressing     Problem: Neurosensory - Adult  Goal: Absence of seizures  6/7/2025 0405 by Tonya Onofre RN  Outcome: Progressing     Problem: Neurosensory - Adult  Goal: Remains free of injury related to seizures activity  6/7/2025 0405 by Tonya Onofre RN  Outcome: Progressing     Problem: Neurosensory - Adult  Goal: Achieves maximal functionality and self care  6/7/2025 0405 by Tonya Onofre RN  Outcome: Progressing     Problem: Respiratory - Adult  Goal: Achieves optimal ventilation and oxygenation  6/7/2025 0405 by Tonya Onofre RN  Outcome: Progressing     Problem: Metabolic/Fluid and Electrolytes - Adult  Goal: Electrolytes maintained within normal limits  6/7/2025 0405 by Tonya Onofre RN  Outcome: Progressing     Problem: Musculoskeletal - Adult  Goal: Maintain proper alignment of affected body part  6/7/2025 0405 by Tonya Onofre RN  Outcome: Progressing

## 2025-06-07 NOTE — PROGRESS NOTES
Physical Therapy  Facility/Department: [address] ONC/MED SURG   Physical Therapy Daily Treatment Note    Patient Name: Lyn Lombardi        MRN: [id number]    : 1940    Date of Service: 2025    Chief Complaint   Patient presents with    Fall     Past Medical History:  has a past medical history of Abnormal uterine bleeding (AUB), Anxiety, Asteroid hyalosis of right eye, Bruises easily, Coronary artery disease, COVID-19, Diastolic dysfunction, Gastric ulcer with hemorrhage, Hyperlipidemia, Hypertension, Lives alone, Memory loss, Mild mitral regurgitation, Mild tricuspid regurgitation, Obesity, Patient in clinical research study, Poor intravenous access, and Vitreous floaters.  Past Surgical History:  has a past surgical history that includes Cholecystectomy (1978); Cardiac catheterization (Left, 2012); Endoscopy, colon, diagnostic (2015); Upper gastrointestinal endoscopy (2015); Dilation and curettage of uterus (N/A, 2021); Total abdominal hysterectomy w/ bilateral salpingoophorectomy (2021); and Hysterectomy (N/A, 2021).    Discharge Recommendations  Discharge Recommendations: Therapy recommended at discharge  PT Equipment Recommendations  Equipment Needed: Yes  Mobility Devices: Walker  Walker: Rolling    Assessment  Body Structures, Functions, Activity Limitations Requiring Skilled Therapeutic Intervention: Decreased functional mobility ;Decreased strength;Decreased balance;Decreased cognition;Decreased safe awareness  Assessment: Pt ambulated 300 ft X1 intermittently using hallway rails, requiring min-CGA from therapist providing safety cues.  Encourfaged continued therapy following discharge.  Progress as able.  Therapy Prognosis: Good  Decision Making: Medium Complexity  Activity Tolerance  Activity Tolerance: Patient tolerated treatment well  Activity Tolerance Comments: Pt is pleasantly confused throughout session, mild unsteadiness but pt able to self correct  education, & procurement, Therapeutic activities    Goals  Short Term Goals  Time Frame for Short Term Goals: 14 visits  Short Term Goal 1: Sit to/from stand with supervision.  Short Term Goal 2: Ambulate 200' with walker with SBA  Short Term Goal 3: Negotiate up and down 4 steps with one railing with CGA.  Short Term Goal 4: Patient will complete 15 reps supine R LE exercise for treatment of hematoma.    Minutes  PT Individual Minutes  Time In: 1500  Time Out: 1530  Minutes: 30    Electronically signed by JOSELIN SMYTH PTA on 6/7/25 at 3:50 PM EDT

## 2025-06-08 LAB
BASOPHILS # BLD: 0.07 K/UL (ref 0–0.2)
BASOPHILS NFR BLD: 1 % (ref 0–2)
EOSINOPHIL # BLD: 0.09 K/UL (ref 0–0.44)
EOSINOPHILS RELATIVE PERCENT: 1 % (ref 1–4)
ERYTHROCYTE [DISTWIDTH] IN BLOOD BY AUTOMATED COUNT: 15.3 % (ref 11.8–14.4)
GLUCOSE BLD-MCNC: 129 MG/DL (ref 65–105)
HCT VFR BLD AUTO: 32.6 % (ref 36.3–47.1)
HGB BLD-MCNC: 10.5 G/DL (ref 11.9–15.1)
IMM GRANULOCYTES # BLD AUTO: 0.13 K/UL (ref 0–0.3)
IMM GRANULOCYTES NFR BLD: 1 %
LYMPHOCYTES NFR BLD: 2.37 K/UL (ref 1.1–3.7)
LYMPHOCYTES RELATIVE PERCENT: 20 % (ref 24–43)
MCH RBC QN AUTO: 31.6 PG (ref 25.2–33.5)
MCHC RBC AUTO-ENTMCNC: 32.2 G/DL (ref 28.4–34.8)
MCV RBC AUTO: 98.2 FL (ref 82.6–102.9)
MONOCYTES NFR BLD: 0.75 K/UL (ref 0.1–1.2)
MONOCYTES NFR BLD: 6 % (ref 3–12)
NEUTROPHILS NFR BLD: 71 % (ref 36–65)
NEUTS SEG NFR BLD: 8.58 K/UL (ref 1.5–8.1)
NRBC BLD-RTO: 0 PER 100 WBC
PLATELET # BLD AUTO: 346 K/UL (ref 138–453)
PMV BLD AUTO: 9.5 FL (ref 8.1–13.5)
RBC # BLD AUTO: 3.32 M/UL (ref 3.95–5.11)
RBC # BLD: ABNORMAL 10*6/UL
WBC OTHER # BLD: 12 K/UL (ref 3.5–11.3)

## 2025-06-08 PROCEDURE — 6370000000 HC RX 637 (ALT 250 FOR IP)

## 2025-06-08 PROCEDURE — 99233 SBSQ HOSP IP/OBS HIGH 50: CPT | Performed by: INTERNAL MEDICINE

## 2025-06-08 PROCEDURE — 2060000000 HC ICU INTERMEDIATE R&B

## 2025-06-08 PROCEDURE — 85025 COMPLETE CBC W/AUTO DIFF WBC: CPT

## 2025-06-08 PROCEDURE — 6360000002 HC RX W HCPCS

## 2025-06-08 PROCEDURE — 36415 COLL VENOUS BLD VENIPUNCTURE: CPT

## 2025-06-08 PROCEDURE — 82947 ASSAY GLUCOSE BLOOD QUANT: CPT

## 2025-06-08 PROCEDURE — 97535 SELF CARE MNGMENT TRAINING: CPT

## 2025-06-08 RX ADMIN — MEMANTINE 5 MG: 5 TABLET ORAL at 07:57

## 2025-06-08 RX ADMIN — Medication 1 CAPSULE: at 07:57

## 2025-06-08 RX ADMIN — MEMANTINE 5 MG: 5 TABLET ORAL at 19:59

## 2025-06-08 RX ADMIN — ENOXAPARIN SODIUM 40 MG: 100 INJECTION SUBCUTANEOUS at 07:57

## 2025-06-08 RX ADMIN — DONEPEZIL HYDROCHLORIDE 10 MG: 10 TABLET, FILM COATED ORAL at 19:59

## 2025-06-08 RX ADMIN — FERROUS SULFATE TAB EC 325 MG (65 MG FE EQUIVALENT) 325 MG: 325 (65 FE) TABLET DELAYED RESPONSE at 07:57

## 2025-06-08 RX ADMIN — ASPIRIN 81 MG: 81 TABLET, CHEWABLE ORAL at 07:57

## 2025-06-08 RX ADMIN — DESMOPRESSIN ACETATE 40 MG: 0.2 TABLET ORAL at 19:59

## 2025-06-08 ASSESSMENT — PAIN SCALES - GENERAL
PAINLEVEL_OUTOF10: 0
PAINLEVEL_OUTOF10: 0

## 2025-06-08 NOTE — DISCHARGE INSTR - COC
Continuity of Care Form    Patient Name: Lyn Garza   :  1940  MRN:  [id number]    Admit date:  2025  Discharge date:  ***    Code Status Order: Full Code   Advance Directives:     Admitting Physician:  Judd Juan MD  PCP: Tru Gould MD    Discharging Nurse: ***  Discharging Hospital Unit/Room#: 0434/0434-01  Discharging Unit Phone Number: ***    Emergency Contact:   Extended Emergency Contact Information  Primary Emergency Contact: Terese Fontenot  Address: [address] [place] [address]  Home Phone: [phone]  Mobile Phone: [phone]  Relation: Child   needed? No  Secondary Emergency Contact: rakesh garza  Mobile Phone: [phone]  Relation: Child    Past Surgical History:  Past Surgical History:   Procedure Laterality Date    CARDIAC CATHETERIZATION Left 2012    left main artery normal, LAD with mid 60 to 70% stenosis, FFR was 0.76, left circumflex artery with mid 90% stenosis with thrombus, RCA with mild irregularities, preserved left ventricular systolic function, ejection fraction estimated around 50%, status post drug eluting stent placement in the LAD and 2 drug eluting stents in the left circumflex artery.     CHOLECYSTECTOMY  1978    DILATION AND CURETTAGE OF UTERUS N/A 2021    D & C HYSTEROSCOPY Polypectomy performed by Caryn Wilson MD at [facility] OR    ENDOSCOPY, COLON, DIAGNOSTIC  2015    esophageal/gastric ulcer; few diverticuli    HYSTERECTOMY (CERVIX STATUS UNKNOWN) N/A 2021    XI ROBOTIC LAPAROSCOPIC MODIFIED RADICAL  HYSTERECTOMY, BILATERAL SALPINGO-OOPHORECTOMY, CYTOLOGIC WASHINGS, SENTINEL LYMPHNODE MAPPINGS, SENTINEL LYMPHNODE BIOPSIES, ICG DYE  performed by Lonny Varghese MD at [facility] OR    [facility] AND [facility] (CERVIX REMOVED)  2021    ROBOTIC LAPAROSCOPIC MODIFIED RADICAL  HYSTERECTOMY, BILATERAL SALPINGO-OOPHORECTOMY, CYTOLOGIC WASHINGS, SENTINEL LYMPHNODE MAPPINGS, SENTINEL LYMPHNODE BIOPSIES    UPPER  R41.3    Hypertension, essential I10    Sinus bradycardia R00.1    Hyponatremia E87.1    NSTEMI (non-ST elevated myocardial infarction) ([facility]) I21.4    Elevated troponin R79.89    Hematoma T14.8XXA    Thigh hematoma, right, initial encounter S70.11XA    Altered mental status R41.82    Paroxysmal atrial fibrillation ([facility]) I48.0       Isolation/Infection:   Isolation            No Isolation          Patient Infection Status    No active infections.   Resolved       Infection Onset Added Last Indicated Last Indicated By Resolved Resolved By    COVID-19 06/15/20 06/18/20 06/30/20 COVID-19 20 Infection                          Nurse Assessment:  Last Vital Signs: BP (!) 148/72   Pulse 74   Temp 98.1 °F (36.7 °C) (Oral)   Resp 19   Ht 1.49 m (4' 10.66\")   Wt 58.3 kg (128 lb 8.5 oz)   LMP  (LMP Unknown)   SpO2 98%   BMI 26.26 kg/m²     Last documented pain score (0-10 scale): Pain Level: 0  Last Weight:   Wt Readings from Last 1 Encounters:   25 58.3 kg (128 lb 8.5 oz)     Mental Status:  {IP PT MENTAL STATUS:02305}    IV Access:  { KHADIJAH IV ACCESS:606013528}    Nursing Mobility/ADLs:  Walking   {CHP DME ADLs:118199882}  Transfer  {CHP DME ADLs:043865169}  Bathing  {CHP DME ADLs:809348506}  Dressing  {CHP DME ADLs:878549570}  Toileting  {CHP DME ADLs:652980242}  Feeding  {P DME ADLs:405367445}  Med Admin  {P DME ADLs:057591675}  Med Delivery   { KHADIJAH MED Delivery:669847276}    Wound Care Documentation and Therapy:  Wound 25 Sacrum (Active)   Wound Image   25 08   Wound Etiology Pressure Stage 1 25 08   Dressing Status Clean;Dry;Intact 25 08   Wound Cleansed Not Cleansed 25 08   Dressing/Treatment Foam;Zinc paste 25 08   Wound Assessment Other (Comment) 25 08   Drainage Amount None (dry) 25 0800   Odor None 25 08   Isa-wound Assessment Intact 25 08   Number of days: 9       Incision 21 Abdomen Lower;Medial

## 2025-06-08 NOTE — PLAN OF CARE
Problem: Neurosensory - Adult  Goal: Achieves stable or improved neurological status  6/8/2025 1612 by Radha Brice RN  Outcome: Progressing  6/8/2025 1612 by Radha Brice RN  Outcome: Progressing  6/8/2025 0417 by Tonya Onofre RN  Outcome: Progressing  Goal: Absence of seizures  6/8/2025 1612 by Radha Brice RN  Outcome: Progressing  6/8/2025 1612 by Radha Brice RN  Outcome: Progressing  6/8/2025 0417 by Tonya Onofre RN  Outcome: Progressing  Goal: Remains free of injury related to seizures activity  6/8/2025 1612 by Radha Brice RN  Outcome: Progressing  6/8/2025 1612 by Radha Brice RN  Outcome: Progressing  6/8/2025 0417 by Tonya Onofre RN  Outcome: Progressing  Goal: Achieves maximal functionality and self care  6/8/2025 1612 by Radha Brice RN  Outcome: Progressing  6/8/2025 1612 by Radha Brice RN  Outcome: Progressing  6/8/2025 0417 by Tonya Onofre RN  Outcome: Progressing     Problem: Respiratory - Adult  Goal: Achieves optimal ventilation and oxygenation  6/8/2025 1612 by Radha Brice RN  Outcome: Progressing  6/8/2025 1612 by Radha Brice RN  Outcome: Progressing  6/8/2025 0417 by Tonya Onofre RN  Outcome: Progressing     Problem: Cardiovascular - Adult  Goal: Maintains optimal cardiac output and hemodynamic stability  6/8/2025 1612 by Radha Brice RN  Outcome: Progressing  6/8/2025 1612 by Radha Brice RN  Outcome: Progressing  6/8/2025 0417 by Tonya Onofre RN  Outcome: Progressing  Goal: Absence of cardiac dysrhythmias or at baseline  6/8/2025 1612 by Radha Brice RN  Outcome: Progressing  6/8/2025 1612 by Radha Brice RN  Outcome: Progressing  6/8/2025 0417 by Tonya Onofre RN  Outcome: Progressing     Problem: Musculoskeletal - Adult  Goal: Return mobility to safest level of function  6/8/2025 1612 by Yuniel, Radha V, RN  Outcome: Progressing  6/8/2025 1612 by Radha Brice RN  Outcome: Progressing  6/8/2025 0417 by Jemima

## 2025-06-08 NOTE — PROGRESS NOTES
[facility]  Internal Medicine Teaching Residency Program  Inpatient Daily Progress Note  ______________________________________________________________________________    Patient: Lyn Lombardi  YOB: 1940   MRN: [id number]    Acct: [id number]     Room: 0434/0434-01  Admit date: 5/29/2025  Today's date: 06/08/25  Number of days in the hospital: 10  Current Code Status: Full Code   SUBJECTIVE   Admitting Diagnosis: Hyponatremia    Patient seen and examined at bedside. Chart and results reviewed.  Referrals sent to ARU, pending approval  GI panel negative, no more diarrhea  Labs has been stable  Medically stable for discharge, awaiting placement    BRIEF HISTORY     The patient is a 84 y.o. female who a PMHx of Alzheimer's, HTN, HLD, GERD, CAD w stent August 2012 who initially presented to the ICU on 5/29 after being found down unresponsive.  Patient initially arrived to [facility] with a GCS of 6 and was intubated for airway protection.  Initial lab work showed a sodium of 114 elevated CK, elevated troponin and elevated WBC.  Patient underwent trauma workup with CT head, CT C-spine, CT chest abdomen pelvis CT lumbar and thoracic spine all of which were unremarkable.     Pt transferred to [facility] for further evaluation. During her ICU stay her sodium was corrected, currently at 134. Pt was found to have elevated WBC, fever and elevated procal for which she was started on Zosyn to cover aspiration pneumonia. Pt had improvement in mentation and was extubated on 5/31 which she has been tolerating well.      Plan was for transfer out of the ICU on 6/1 however, pt was noted to have a significant drop in hgb from 12 to 8. ASA and Heparin were held. Vascular duplex of R thigh ordered which did show R thigh hematoma. Vascular surgery was consulted who recommended compression stockings and monitor. CTA abdomen also done which showed large R thigh hematoma w/o  sulfate  325 mg Oral Daily with breakfast    enoxaparin  40 mg SubCUTAneous Daily    atorvastatin  40 mg Oral Nightly    donepezil  10 mg Oral Nightly    memantine  5 mg Oral BID    aspirin  81 mg Oral Daily     Continuous Infusions:    sodium chloride Stopped (06/02/25 1346)     PRN Medicationssodium chloride flush, 10 mL, PRN  sodium chloride flush, 10 mL, PRN  potassium chloride, 40 mEq, PRN   Or  potassium alternative oral replacement, 40 mEq, PRN   Or  potassium chloride, 10 mEq, PRN  sodium chloride, , PRN  acetaminophen, 650 mg, Q4H PRN  racepinephrine HCl, 0.5 mL, Q4H PRN  sodium chloride nebulizer, 3 mL, Q4H PRN  benzocaine-menthol, 1 lozenge, Q2H PRN  ondansetron, 4 mg, Q8H PRN   Or  ondansetron, 4 mg, Q6H PRN        Diagnostic Labs:  CBC:   Recent Labs     06/05/25  1007 06/06/25  0653 06/08/25  0721   WBC  --  9.8 12.0*   RBC  --  2.90* 3.32*   HGB 9.1* 9.2* 10.5*   HCT 28.2* 28.4* 32.6*   MCV  --  97.9 98.2   RDW  --  14.6* 15.3*   PLT  --  252 346     BMP:   Recent Labs     06/06/25  0653 06/07/25  0628    139   K 3.2* 4.5   * 109*   CO2 21 24   BUN 8 6*   CREATININE 0.5* 0.5*     BNP: No results for input(s): \"BNP\" in the last 72 hours.  PT/INR: No results for input(s): \"PROTIME\", \"INR\" in the last 72 hours.  APTT: No results for input(s): \"APTT\" in the last 72 hours.  CARDIAC ENZYMES: No results for input(s): \"CKMB\", \"CKMBINDEX\", \"TROPONINI\" in the last 72 hours.    Invalid input(s): \"CKTOTAL;3\"  FASTING LIPID PANEL:  Lab Results   Component Value Date    CHOL 139 05/09/2025    HDL 79 05/09/2025    TRIG 102 05/09/2025     LIVER PROFILE:   No results for input(s): \"AST\", \"ALT\", \"BILIDIR\", \"BILITOT\", \"ALKPHOS\" in the last 72 hours.    Invalid input(s): \"ALB\"     MICROBIOLOGY:   Lab Results   Component Value Date/Time    CULTURE NO GROWTH 5 DAYS 05/31/2025 01:49 PM       Imaging:    CTA ABDOMEN PELVIS W WO CONTRAST  Result Date: 6/1/2025  1. No evidence of active arterial gastrointestinal  identified in the chest, abdomen or pelvis. 2.  Pulmonary interstitial edema.     CT FACIAL BONES WO CONTRAST  Result Date: 5/29/2025  No acute facial bone trauma.     CT HEAD WO CONTRAST  Result Date: 5/29/2025  No acute intracranial abnormality. No acute fracture or traumatic subluxation of the cervical spine.     CT CERVICAL SPINE WO CONTRAST  Result Date: 5/29/2025  No acute intracranial abnormality. No acute fracture or traumatic subluxation of the cervical spine.       ASSESSMENT & PLAN     ASSESSMENT / PLAN:   Principal Problem:    Hyponatremia  Active Problems:    Hypertension, essential    Elevated troponin    Paroxysmal atrial fibrillation (HCC)    NSTEMI (non-ST elevated myocardial infarction) (HCC)    Hematoma    Thigh hematoma, right, initial encounter    Altered mental status  Resolved Problems:    * No resolved hospital problems. *    NSTEMI  Cardio on board, appreciate recommendations  TTE 5/30: LVEF 64%, mild AR, mild MR, mild TR RVSP 32 mmHg  Pt underwent stress test which was negative for ischemia  No further work up from cardio side    R thigh hematoma  Acute on chronic anemia Likely secondary to thigh hematoma  Resume DVT PPx, continue to hold ASA  Transfuse if <7  Pt had worsening hgb 6/3 at 6.9  Required 1U PRBC  Vascular reviewed, no expanding hematoma, duplex pseudoaneurysm negative.  Iron panel consistent with iron deficiency, started on IV iron, will be discharged on p.o. iron  Continue Protonix IV twice daily  No active signs of GI bleed  FOBT positive however hemoglobin remained stable  Given no overt signs of GI bleed and stable hemoglobin, will defer workup with GI as outpatient    Loose stools  Pt started on Lactobacillus  GI panel ordered    Hypokalemia  Likely secondary to above  Replaced, cont to monitor     Concern for dental injury  Pt has failed swallow study x2 due to this  Recommend dental f/u as OP  SLP recommending dysphagia diet     H/o dementia   Continue Memantine and

## 2025-06-08 NOTE — DISCHARGE INSTR - DIET

## 2025-06-08 NOTE — PLAN OF CARE
Problem: Neurosensory - Adult  Goal: Achieves stable or improved neurological status  6/8/2025 0417 by Tonya Onofre RN  Outcome: Progressing     Problem: Neurosensory - Adult  Goal: Absence of seizures  6/8/2025 0417 by Tonya Onofre RN  Outcome: Progressing     Problem: Neurosensory - Adult  Goal: Remains free of injury related to seizures activity  6/8/2025 0417 by Tonya Onofre RN  Outcome: Progressing     Problem: Neurosensory - Adult  Goal: Achieves maximal functionality and self care  6/8/2025 0417 by Tonya Onofre RN  Outcome: Progressing     Problem: Respiratory - Adult  Goal: Achieves optimal ventilation and oxygenation  6/8/2025 0417 by Tonya Onofre RN  Outcome: Progressing     Problem: Cardiovascular - Adult  Goal: Maintains optimal cardiac output and hemodynamic stability  6/8/2025 0417 by Tonya Onofre RN  Outcome: Progressing     Problem: Cardiovascular - Adult  Goal: Absence of cardiac dysrhythmias or at baseline  6/8/2025 0417 by Tonya Onofre RN  Outcome: Progressing     Problem: Metabolic/Fluid and Electrolytes - Adult  Goal: Electrolytes maintained within normal limits  6/8/2025 0417 by Tonya Onofre RN  Outcome: Progressing     Problem: Safety - Adult  Goal: Free from fall injury  6/8/2025 0417 by Tonya Onofre RN  Outcome: Progressing     Problem: Discharge Planning  Goal: Discharge to home or other facility with appropriate resources  6/8/2025 0417 by Tonya Onofre RN  Outcome: Progressing

## 2025-06-08 NOTE — PROGRESS NOTES
Occupational Therapy  Occupational Therapy Daily Treatment Note  Facility/Department: [address] ONC/MED SURG   Patient Name: Lyn Lombardi        MRN: [id number]    : 1940    Date of Service: 2025    Chief Complaint   Patient presents with    Fall     Past Medical History:  has a past medical history of Abnormal uterine bleeding (AUB), Anxiety, Asteroid hyalosis of right eye, Bruises easily, Coronary artery disease, COVID-19, Diastolic dysfunction, Gastric ulcer with hemorrhage, Hyperlipidemia, Hypertension, Lives alone, Memory loss, Mild mitral regurgitation, Mild tricuspid regurgitation, Obesity, Patient in clinical research study, Poor intravenous access, and Vitreous floaters.  Past Surgical History:  has a past surgical history that includes Cholecystectomy (1978); Cardiac catheterization (Left, 2012); Endoscopy, colon, diagnostic (2015); Upper gastrointestinal endoscopy (2015); Dilation and curettage of uterus (N/A, 2021); Total abdominal hysterectomy w/ bilateral salpingoophorectomy (2021); and Hysterectomy (N/A, 2021).    Discharge Recommendations  Discharge Recommendations: Patient would benefit from continued therapy after discharge     Assessment  Performance deficits / Impairments: Decreased functional mobility ;Decreased ADL status;Decreased safe awareness;Decreased endurance;Decreased balance;Decreased cognition;Decreased high-level IADLs;Decreased vision/visual deficit  Assessment: Pt would benefit from continued acute care and post acute care OT to address above listed deficits.  Prognosis: Good  Activity Tolerance  Activity Tolerance: Patient Tolerated treatment well;Treatment limited secondary to decreased cognition  Safety Devices  Type of Devices: Call light within reach;Patient at risk for falls;Nurse notified;Left in chair;Chair alarm in place;Gait belt    AM-PAC  AM-PAC Daily Activity - Inpatient   How much help is needed for putting on and  toilet  Toilet Transfer: Contact guard assistance  Functional Mobility: Contact guard assistance;Setup;Increased time to complete;Adaptive equipment w/RW w/no LOB    Patient Education  Patient Education  Education Given To: Patient  Education Provided: Role of Therapy;Plan of Care;Precautions;ADL Adaptive Strategies;Transfer Training;Energy Conservation;IADL Safety;Equipment;Fall Prevention Strategies;Orientation  Education Method: Demonstration;Verbal  Barriers to Learning: Cognition  Education Outcome: Demonstrated understanding;Continued education needed    Goals  Short Term Goals  Time Frame for Short Term Goals: Prior to discharge  Short Term Goal 1: Patient to demonstrate dyanmic sitting balance with modified indep  Short Term Goal 2: Patient to demonstrate static/dynamic standing balance with SBA with 1-2 hand support  Short Term Goal 3: Patient to perform functional mobility household distances with LRAD with CGA  Short Term Goal 4: Patient to perform ther ex to imprve UB strength 4/5  Short Term Goal 5: Patient to demonstarte unsupported sitting/standing Grooming/UB ADLs with set up  Short Term Goal 6: Patient to demonstarte unsupported sitting/standing Toileting/LB ADLs with min A    Plan  Occupational Therapy Plan  Times Per Week: 3-5x/wk  Current Treatment Recommendations: Strengthening, Cognitive reorientation, ROM, Balance training, Functional mobility training, Endurance training, Patient/Caregiver education & training, Equipment evaluation, education, & procurement, Positioning, Self-Care / ADL, Home management training, Coordination training    Minutes  OT Individual Minutes  Time In: 0950  Time Out: 1045  Minutes: 55  Time Code Minutes   Timed Code Treatment Minutes: 55 Minutes    Electronically signed by MATT KHALIL on 6/8/25 at 11:05 AM EDT

## 2025-06-09 LAB
GLUCOSE BLD-MCNC: 103 MG/DL (ref 65–105)
GLUCOSE BLD-MCNC: 140 MG/DL (ref 65–105)

## 2025-06-09 PROCEDURE — 82947 ASSAY GLUCOSE BLOOD QUANT: CPT

## 2025-06-09 PROCEDURE — 6370000000 HC RX 637 (ALT 250 FOR IP)

## 2025-06-09 PROCEDURE — 2500000003 HC RX 250 WO HCPCS: Performed by: NURSE PRACTITIONER

## 2025-06-09 PROCEDURE — 97110 THERAPEUTIC EXERCISES: CPT

## 2025-06-09 PROCEDURE — 97530 THERAPEUTIC ACTIVITIES: CPT

## 2025-06-09 PROCEDURE — 99232 SBSQ HOSP IP/OBS MODERATE 35: CPT | Performed by: INTERNAL MEDICINE

## 2025-06-09 PROCEDURE — 6360000002 HC RX W HCPCS

## 2025-06-09 PROCEDURE — 2060000000 HC ICU INTERMEDIATE R&B

## 2025-06-09 RX ADMIN — DONEPEZIL HYDROCHLORIDE 10 MG: 10 TABLET, FILM COATED ORAL at 20:44

## 2025-06-09 RX ADMIN — SODIUM CHLORIDE, PRESERVATIVE FREE 10 ML: 5 INJECTION INTRAVENOUS at 08:02

## 2025-06-09 RX ADMIN — ASPIRIN 81 MG: 81 TABLET, CHEWABLE ORAL at 08:02

## 2025-06-09 RX ADMIN — MEMANTINE 5 MG: 5 TABLET ORAL at 20:44

## 2025-06-09 RX ADMIN — FERROUS SULFATE TAB EC 325 MG (65 MG FE EQUIVALENT) 325 MG: 325 (65 FE) TABLET DELAYED RESPONSE at 08:02

## 2025-06-09 RX ADMIN — DESMOPRESSIN ACETATE 40 MG: 0.2 TABLET ORAL at 20:44

## 2025-06-09 RX ADMIN — MEMANTINE 5 MG: 5 TABLET ORAL at 08:02

## 2025-06-09 RX ADMIN — Medication 1 CAPSULE: at 08:02

## 2025-06-09 RX ADMIN — ENOXAPARIN SODIUM 40 MG: 100 INJECTION SUBCUTANEOUS at 08:02

## 2025-06-09 NOTE — PLAN OF CARE
Problem: Neurosensory - Adult  Goal: Achieves stable or improved neurological status  6/9/2025 0510 by Nicolás Mancia RN  Outcome: Progressing  6/8/2025 1612 by Radha Brice RN  Outcome: Progressing  Goal: Absence of seizures  6/9/2025 0510 by Nicolás Mancia RN  Outcome: Progressing  6/8/2025 1612 by Radha Brice RN  Outcome: Progressing  Goal: Remains free of injury related to seizures activity  6/9/2025 0510 by Nicolás Mancia RN  Outcome: Progressing  6/8/2025 1612 by Radha Brice RN  Outcome: Progressing  Goal: Achieves maximal functionality and self care  6/9/2025 0510 by Nicolás Mancia RN  Outcome: Progressing  6/8/2025 1612 by Radha Brice RN  Outcome: Progressing     Problem: Respiratory - Adult  Goal: Achieves optimal ventilation and oxygenation  6/9/2025 0510 by Nicolás Mancia RN  Outcome: Progressing  6/8/2025 1612 by Radha Brice RN  Outcome: Progressing     Problem: Cardiovascular - Adult  Goal: Maintains optimal cardiac output and hemodynamic stability  6/9/2025 0510 by Nicolás Mancia RN  Outcome: Progressing  6/8/2025 1612 by Radha Brice RN  Outcome: Progressing  Goal: Absence of cardiac dysrhythmias or at baseline  6/9/2025 0510 by Nicolás Mancia RN  Outcome: Progressing  6/8/2025 1612 by Radha Brice RN  Outcome: Progressing     Problem: Musculoskeletal - Adult  Goal: Return mobility to safest level of function  6/9/2025 0510 by Nicolás Mancia RN  Outcome: Progressing  6/8/2025 1612 by Radha Brice RN  Outcome: Progressing  Goal: Maintain proper alignment of affected body part  6/9/2025 0510 by Nicolás Mancia RN  Outcome: Progressing  6/8/2025 1612 by Radha Brice RN  Outcome: Progressing     Problem: Metabolic/Fluid and Electrolytes - Adult  Goal: Electrolytes maintained within normal limits  6/9/2025 0510 by Nicolás Mancia RN  Outcome: Progressing  Flowsheets (Taken 6/8/2025 2000)  Electrolytes maintained within normal limits:

## 2025-06-09 NOTE — PROGRESS NOTES
[facility]  Internal Medicine Teaching Residency Program  Inpatient Daily Progress Note  ______________________________________________________________________________    Patient: Lyn Lombardi  YOB: 1940   MRN: [id number]    Acct: [id number]     Room: 0434/0434-01  Admit date: 5/29/2025  Today's date: 06/09/25  Number of days in the hospital: 11  Current Code Status: Full Code   SUBJECTIVE   Admitting Diagnosis: Hyponatremia    Patient seen and examined at bedside. Chart and results reviewed.  Case mgmt noting that the pt was able to ambulate 300 ft and might not meet criteria for SNF/ARU  Pt is medically discharged    BRIEF HISTORY     The patient is a 84 y.o. female who a PMHx of Alzheimer's, HTN, HLD, GERD, CAD w stent August 2012 who initially presented to the ICU on 5/29 after being found down unresponsive.  Patient initially arrived to [facility] with a GCS of 6 and was intubated for airway protection.  Initial lab work showed a sodium of 114 elevated CK, elevated troponin and elevated WBC.  Patient underwent trauma workup with CT head, CT C-spine, CT chest abdomen pelvis CT lumbar and thoracic spine all of which were unremarkable.     Pt transferred to [facility] for further evaluation. During her ICU stay her sodium was corrected, currently at 134. Pt was found to have elevated WBC, fever and elevated procal for which she was started on Zosyn to cover aspiration pneumonia. Pt had improvement in mentation and was extubated on 5/31 which she has been tolerating well.      Plan was for transfer out of the ICU on 6/1 however, pt was noted to have a significant drop in hgb from 12 to 8. ASA and Heparin were held. Vascular duplex of R thigh ordered which did show R thigh hematoma. Vascular surgery was consulted who recommended compression stockings and monitor. CTA abdomen also done which showed large R thigh hematoma w/o evidence of active arterial  disease consistent with atelectasis or pneumonia. 3. Small amount of free pelvic fluid which is nonspecific.     XR ABDOMEN FOR NG/OG/NE TUBE PLACEMENT  Result Date: 5/30/2025  Endo gastric tube tip projects over the distal stomach.     XR ABDOMEN FOR NG/OG/NE TUBE PLACEMENT  Result Date: 5/30/2025  1. Enteric tube tip and side hole project at the gastric body/antrum.     XR ABDOMEN FOR NG/OG/NE TUBE PLACEMENT  Result Date: 5/30/2025  1. Enteric tube tip and side hole project at the lower esophagus.     XR ABDOMEN FOR NG/OG/NE TUBE PLACEMENT  Result Date: 5/30/2025  Gastric tube with the tip in the distal esophagus.     XR ABDOMEN FOR NG/OG/NE TUBE PLACEMENT  Result Date: 5/30/2025  Enteric tube tip projects over the lower thorax in the region of the known hiatal hernia.     XR CHEST PORTABLE  Result Date: 5/29/2025  Lines and tubes as above. Mild pulmonary vascular congestion.  No focal consolidation.     CT THORACIC SPINE BONY RECONSTRUCTION  Result Date: 5/29/2025  1. No acute abnormality of the thoracic or lumbar spine related to the fall. 2. Mild-to-moderate multilevel degenerative disc disease and osteophyte formation in the thoracic spine. 3. Mild degenerative disc disease and facet arthropathy in the lower lumbar spine. No significant focal canal stenosis.     CT LUMBAR SPINE BONY RECONSTRUCTION  Result Date: 5/29/2025  1. No acute abnormality of the thoracic or lumbar spine related to the fall. 2. Mild-to-moderate multilevel degenerative disc disease and osteophyte formation in the thoracic spine. 3. Mild degenerative disc disease and facet arthropathy in the lower lumbar spine. No significant focal canal stenosis.     CT CHEST ABDOMEN PELVIS WO CONTRAST Additional Contrast? None  Result Date: 5/29/2025  1.  No acute traumatic injury identified in the chest, abdomen or pelvis. 2.  Pulmonary interstitial edema.     CT FACIAL BONES WO CONTRAST  Result Date: 5/29/2025  No acute facial bone trauma.     CT HEAD  MD  Internal Medicine Resident  [facility]; [place], [place]  6/9/2025, 7:57 AM

## 2025-06-09 NOTE — PLAN OF CARE
Problem: Neurosensory - Adult  Goal: Achieves stable or improved neurological status  6/9/2025 1618 by Nathaly Perkins RN  Outcome: Progressing  6/9/2025 0510 by Nicolás Mancia RN  Outcome: Progressing  Goal: Absence of seizures  6/9/2025 1618 by Nathaly Perkins RN  Outcome: Progressing  6/9/2025 0510 by Nicolás Mancia RN  Outcome: Progressing  Goal: Remains free of injury related to seizures activity  6/9/2025 1618 by Nathaly Perkins RN  Outcome: Progressing  6/9/2025 0510 by Nicolás Mancia RN  Outcome: Progressing  Goal: Achieves maximal functionality and self care  6/9/2025 1618 by Nathaly Perkins RN  Outcome: Progressing  6/9/2025 0510 by Nicolás Mancia RN  Outcome: Progressing     Problem: Respiratory - Adult  Goal: Achieves optimal ventilation and oxygenation  6/9/2025 1618 by Nathaly Perkins RN  Outcome: Progressing  6/9/2025 0510 by Nicolás Mancia RN  Outcome: Progressing     Problem: Cardiovascular - Adult  Goal: Maintains optimal cardiac output and hemodynamic stability  6/9/2025 1618 by Nathaly Perkins RN  Outcome: Progressing  6/9/2025 0510 by Nicolás Mancia RN  Outcome: Progressing  Goal: Absence of cardiac dysrhythmias or at baseline  6/9/2025 1618 by Nathaly Perkins RN  Outcome: Progressing  6/9/2025 0510 by Nicolás Mancia RN  Outcome: Progressing     Problem: Musculoskeletal - Adult  Goal: Return mobility to safest level of function  6/9/2025 1618 by Nathaly Perkins RN  Outcome: Progressing  6/9/2025 0510 by Nicolás Mancia RN  Outcome: Progressing  Goal: Maintain proper alignment of affected body part  6/9/2025 1618 by Nathaly Perkins RN  Outcome: Progressing  6/9/2025 0510 by Nicolás Mancia RN  Outcome: Progressing     Problem: Metabolic/Fluid and Electrolytes - Adult  Goal: Electrolytes maintained within normal limits  6/9/2025 1618 by Maddie, Nathaly, RN  Outcome: Progressing  6/9/2025 0510 by Nicolás Mancia RN  Outcome: Progressing  Goal: Hemodynamic  by Nathaly Perkins, RN  Outcome: Progressing  6/9/2025 0510 by Nicolás Mancia, RN  Outcome: Progressing

## 2025-06-09 NOTE — PROGRESS NOTES
Physical Therapy  Facility/Department: [address] ONC/MED SURG   Physical Therapy Daily Treatment Note    Patient Name: Lyn Lombardi        MRN: [id number]    : 1940    Date of Service: 2025    Chief Complaint   Patient presents with    Fall       Past Medical History:  has a past medical history of Abnormal uterine bleeding (AUB), Anxiety, Asteroid hyalosis of right eye, Bruises easily, Coronary artery disease, COVID-19, Diastolic dysfunction, Gastric ulcer with hemorrhage, Hyperlipidemia, Hypertension, Lives alone, Memory loss, Mild mitral regurgitation, Mild tricuspid regurgitation, Obesity, Patient in clinical research study, Poor intravenous access, and Vitreous floaters.  Past Surgical History:  has a past surgical history that includes Cholecystectomy (1978); Cardiac catheterization (Left, 2012); Endoscopy, colon, diagnostic (2015); Upper gastrointestinal endoscopy (2015); Dilation and curettage of uterus (N/A, 2021); Total abdominal hysterectomy w/ bilateral salpingoophorectomy (2021); and Hysterectomy (N/A, 2021).    Discharge Recommendations  Discharge Recommendations: Therapy recommended at discharge  PT Equipment Recommendations  Equipment Needed: Yes  Mobility Devices: Walker  Walker: Rolling    Assessment  Body Structures, Functions, Activity Limitations Requiring Skilled Therapeutic Intervention: Decreased functional mobility ;Decreased strength;Decreased balance;Decreased cognition;Decreased safe awareness  Assessment: Pt ambulates 150 ft w/ RW and CGA, requiring one rest break x20 s for endurance. Pt negotiates 10 stairs with R rail and grossly CGA, with min A d/t one minor LOB. Pt currently is a high fall risk d/t decreased balance, endurance, and safety awareness, benefitting from 24 hr support for safety with functional mobility. pt would benefit from continued therapy to promote endurance, balance, and strengthening.  Therapy Prognosis:  Education  Education Given To: Patient  Education Provided: Role of Therapy;Plan of Care  Education Method: Verbal  Barriers to Learning: None  Education Outcome: Verbalized understanding;Demonstrated understanding    Plan  Physical Therapy Plan  General Plan:  (5-6x/wk)  Current Treatment Recommendations: Strengthening, Balance training, Functional mobility training, Gait training, Stair training, Transfer training, Home exercise program, Safety education & training, Patient/Caregiver education & training, Equipment evaluation, education, & procurement, Therapeutic activities    Goals  Short Term Goals  Time Frame for Short Term Goals: 14 visits  Short Term Goal 1: Sit to/from stand with supervision.  Short Term Goal 2: Ambulate 200' with walker with SBA  Short Term Goal 3: Negotiate full flight of stairs with one railing with CGA.  Short Term Goal 4: Patient will complete 15 reps supine R LE exercise for treatment of hematoma.    Minutes  PT Individual Minutes  Time In: 0818  Time Out: 0842  Minutes: 24  Time Code Minutes  Timed Code Treatment Minutes: 23 Minutes    Electronically signed by Gina Lee PT on 6/9/25 at 10:31 AM EDT

## 2025-06-09 NOTE — CARE COORDINATION
Case Management   Daily Progress Note       Patient Name: Lyn Lombardi                   YOB: 1940  Diagnosis: Acute hyponatremia [E87.1]  NSTEMI (non-ST elevated myocardial infarction) (HCC) [I21.4]                       GMLOS: 5 days  Length of Stay: 11  days    Anticipated Discharge Date: Ready for discharge    Readmission Risk (Low < 19, Mod (19-27), High > 27): Readmission Risk Score: 14.6        Current Transitional Plan    [] Home Independently    [x] Home with HC    [] Skilled Nursing Facility    [] Acute Rehabilitation    [] Long Term Acute Care (LTAC)    [] Other:     Plan for the Stay (Medical Management) :          Workflow Continuation (Additional Notes) :    Per PT notes pt ambulated 300ft in [place], likely will not meet criteria for snf/ARU admission.      1445 PC to [facility], spoke to Meg, likely too high level to get approved for rehab.      1455 PC to pt's daughter Terese, updated on progress with PT/OT and likely would not meet criteria for rehab.  Verbalized understanding, plan is home with support from family.        JOSELIN EARLY  June 9, 2025

## 2025-06-10 ENCOUNTER — APPOINTMENT (OUTPATIENT)
Age: 85
DRG: 643 | End: 2025-06-10
Payer: MEDICARE

## 2025-06-10 VITALS
TEMPERATURE: 97.6 F | DIASTOLIC BLOOD PRESSURE: 64 MMHG | WEIGHT: 128.53 LBS | HEART RATE: 68 BPM | RESPIRATION RATE: 15 BRPM | SYSTOLIC BLOOD PRESSURE: 138 MMHG | OXYGEN SATURATION: 100 % | BODY MASS INDEX: 25.91 KG/M2 | HEIGHT: 59 IN

## 2025-06-10 LAB
BASOPHILS # BLD: 0.07 K/UL (ref 0–0.2)
BASOPHILS NFR BLD: 1 % (ref 0–2)
ECHO BSA: 1.56 M2
EOSINOPHIL # BLD: 0.07 K/UL (ref 0–0.44)
EOSINOPHILS RELATIVE PERCENT: 1 % (ref 1–4)
ERYTHROCYTE [DISTWIDTH] IN BLOOD BY AUTOMATED COUNT: 15.6 % (ref 11.8–14.4)
GLUCOSE BLD-MCNC: 95 MG/DL (ref 65–105)
HCT VFR BLD AUTO: 34.2 % (ref 36.3–47.1)
HGB BLD-MCNC: 11.3 G/DL (ref 11.9–15.1)
IMM GRANULOCYTES # BLD AUTO: 0.09 K/UL (ref 0–0.3)
IMM GRANULOCYTES NFR BLD: 1 %
LYMPHOCYTES NFR BLD: 1.82 K/UL (ref 1.1–3.7)
LYMPHOCYTES RELATIVE PERCENT: 15 % (ref 24–43)
MCH RBC QN AUTO: 31.8 PG (ref 25.2–33.5)
MCHC RBC AUTO-ENTMCNC: 33 G/DL (ref 28.4–34.8)
MCV RBC AUTO: 96.3 FL (ref 82.6–102.9)
MONOCYTES NFR BLD: 0.79 K/UL (ref 0.1–1.2)
MONOCYTES NFR BLD: 7 % (ref 3–12)
NEUTROPHILS NFR BLD: 75 % (ref 36–65)
NEUTS SEG NFR BLD: 9.37 K/UL (ref 1.5–8.1)
NRBC BLD-RTO: 0 PER 100 WBC
PLATELET # BLD AUTO: 268 K/UL (ref 138–453)
PMV BLD AUTO: 9.5 FL (ref 8.1–13.5)
RBC # BLD AUTO: 3.55 M/UL (ref 3.95–5.11)
RBC # BLD: ABNORMAL 10*6/UL
WBC OTHER # BLD: 12.2 K/UL (ref 3.5–11.3)

## 2025-06-10 PROCEDURE — 93244 EXT ECG>48HR<7D REV&INTERPJ: CPT | Performed by: INTERNAL MEDICINE

## 2025-06-10 PROCEDURE — 85025 COMPLETE CBC W/AUTO DIFF WBC: CPT

## 2025-06-10 PROCEDURE — 93242 EXT ECG>48HR<7D RECORDING: CPT

## 2025-06-10 PROCEDURE — 6370000000 HC RX 637 (ALT 250 FOR IP)

## 2025-06-10 PROCEDURE — 6360000002 HC RX W HCPCS

## 2025-06-10 PROCEDURE — 99232 SBSQ HOSP IP/OBS MODERATE 35: CPT | Performed by: INTERNAL MEDICINE

## 2025-06-10 PROCEDURE — 36415 COLL VENOUS BLD VENIPUNCTURE: CPT

## 2025-06-10 PROCEDURE — 82947 ASSAY GLUCOSE BLOOD QUANT: CPT

## 2025-06-10 PROCEDURE — 2500000003 HC RX 250 WO HCPCS: Performed by: NURSE PRACTITIONER

## 2025-06-10 RX ORDER — ASPIRIN 81 MG/1
81 TABLET, CHEWABLE ORAL DAILY
Qty: 30 TABLET | Refills: 3 | Status: SHIPPED | OUTPATIENT
Start: 2025-06-11

## 2025-06-10 RX ORDER — LACTOBACILLUS RHAMNOSUS GG 10B CELL
1 CAPSULE ORAL
Qty: 30 CAPSULE | Refills: 2 | Status: SHIPPED | OUTPATIENT
Start: 2025-06-11 | End: 2025-09-09

## 2025-06-10 RX ADMIN — MEMANTINE 5 MG: 5 TABLET ORAL at 08:31

## 2025-06-10 RX ADMIN — FERROUS SULFATE TAB EC 325 MG (65 MG FE EQUIVALENT) 325 MG: 325 (65 FE) TABLET DELAYED RESPONSE at 08:31

## 2025-06-10 RX ADMIN — ENOXAPARIN SODIUM 40 MG: 100 INJECTION SUBCUTANEOUS at 08:31

## 2025-06-10 RX ADMIN — Medication 1 CAPSULE: at 08:31

## 2025-06-10 RX ADMIN — SODIUM CHLORIDE, PRESERVATIVE FREE 10 ML: 5 INJECTION INTRAVENOUS at 08:32

## 2025-06-10 RX ADMIN — ASPIRIN 81 MG: 81 TABLET, CHEWABLE ORAL at 08:31

## 2025-06-10 ASSESSMENT — PAIN SCALES - GENERAL: PAINLEVEL_OUTOF10: 0

## 2025-06-10 NOTE — PROGRESS NOTES
[facility]  Internal Medicine Teaching Residency Program  Inpatient Daily Progress Note  ______________________________________________________________________________    Patient: Lyn Lombardi  YOB: 1940   MRN: [id number]    Acct: [id number]     Room: 0434/0434-01  Admit date: 5/29/2025  Today's date: 06/10/25  Number of days in the hospital: 12  Current Code Status: Full Code   SUBJECTIVE   Admitting Diagnosis: Hyponatremia    Patient seen and examined at bedside. Chart and results reviewed.  Pt does not meet criteria for SNF. Plan is home with family w assistance.     BRIEF HISTORY     The patient is a 84 y.o. female who a PMHx of Alzheimer's, HTN, HLD, GERD, CAD w stent August 2012 who initially presented to the ICU on 5/29 after being found down unresponsive.  Patient initially arrived to [facility] with a GCS of 6 and was intubated for airway protection.  Initial lab work showed a sodium of 114 elevated CK, elevated troponin and elevated WBC.  Patient underwent trauma workup with CT head, CT C-spine, CT chest abdomen pelvis CT lumbar and thoracic spine all of which were unremarkable.     Pt transferred to [facility] for further evaluation. During her ICU stay her sodium was corrected, currently at 134. Pt was found to have elevated WBC, fever and elevated procal for which she was started on Zosyn to cover aspiration pneumonia. Pt had improvement in mentation and was extubated on 5/31 which she has been tolerating well.      Plan was for transfer out of the ICU on 6/1 however, pt was noted to have a significant drop in hgb from 12 to 8. ASA and Heparin were held. Vascular duplex of R thigh ordered which did show R thigh hematoma. Vascular surgery was consulted who recommended compression stockings and monitor. CTA abdomen also done which showed large R thigh hematoma w/o evidence of active arterial hemorrhage. Small amount of free pelvic fluid. Pt

## 2025-06-10 NOTE — PROGRESS NOTES
Comprehensive Nutrition Assessment    Type and Reason for Visit:  Reassess    Nutrition Recommendations/Plan:   Diet as tolerated  Start Ensure ONS (chocolate) once a day     Malnutrition Assessment:  Malnutrition Status:  Moderate malnutrition (06/03/25 1454)  suspect  Context:  Acute Illness     Findings of the 6 clinical characteristics of malnutrition:  Energy Intake:  Mild decrease in energy intake  Weight Loss:  Greater than 5% over 1 month     Body Fat Loss:  Unable to assess     Muscle Mass Loss:  Mild muscle mass loss Clavicles (pectoralis & deltoids), Temples (temporalis)  Fluid Accumulation:  No fluid accumulation     Strength:  Not Performed    Nutrition Assessment:    Follow up. Currently on a soft and bite sized diet, cardiac CARLOS with thin liquids. Diet textures per SLP recommendations, f/u MBSS. Consuming % at meals per nursing documentation. Pt reports having a good appetite and eating at her baseline. Discussed intake with RN, RN also reporting pt is eating okay at meal and drinking okay. Liked Ensure chocolate previously, requested per pt.    Nutrition Related Findings:    Meds/labs reviewed. LBM 6/10. Wound Type: Surgical Incision, Pressure Injury, Stage I (Stage 1 PI to sacrum)       Current Nutrition Intake & Therapies:    Average Meal Intake: 26-50%, 51-75%, %  Average Supplements Intake: None Ordered  ADULT DIET; Dysphagia - Soft and Bite Sized; Low Fat/Low Chol/High Fiber/CARLOS  ADULT ORAL NUTRITION SUPPLEMENT; Dinner; Standard High Calorie/High Protein Oral Supplement  Additional Calorie Sources:  None    Anthropometric Measures:  Height: 149 cm (4' 10.66\")  Ideal Body Weight (IBW): 93 lbs (42 kg)    Admission Body Weight: 55.8 kg (123 lb 0.3 oz)  Current Body Weight: 58.3 kg (128 lb 8.5 oz), 137.6 % IBW. Weight Source: Bed scale  Current BMI (kg/m2): 26.3  Weight Adjustment For: No Adjustment  BMI Categories: Overweight (BMI 25.0-29.9)    Estimated Daily Nutrient

## 2025-06-10 NOTE — PLAN OF CARE
Problem: Neurosensory - Adult  Goal: Achieves stable or improved neurological status  6/10/2025 1336 by Angeline Childs RN  Outcome: Adequate for Discharge  6/10/2025 0419 by Nicolás Mancia RN  Outcome: Progressing  Goal: Absence of seizures  6/10/2025 1336 by Angeline Childs RN  Outcome: Adequate for Discharge  6/10/2025 0419 by Nicolás Mancia RN  Outcome: Progressing  Goal: Remains free of injury related to seizures activity  6/10/2025 1336 by Angeline Childs RN  Outcome: Adequate for Discharge  6/10/2025 0419 by Nicolás Mancia RN  Outcome: Progressing  Goal: Achieves maximal functionality and self care  6/10/2025 1336 by Angeline Childs RN  Outcome: Adequate for Discharge  6/10/2025 0419 by Nicolás Mancia RN  Outcome: Progressing     Problem: Respiratory - Adult  Goal: Achieves optimal ventilation and oxygenation  6/10/2025 1336 by Angeline Childs RN  Outcome: Adequate for Discharge  6/10/2025 0419 by Nicolás Mancia RN  Outcome: Progressing     Problem: Cardiovascular - Adult  Goal: Maintains optimal cardiac output and hemodynamic stability  6/10/2025 1336 by Angeline Childs RN  Outcome: Adequate for Discharge  6/10/2025 0419 by Nicolás Mancia RN  Outcome: Progressing  Goal: Absence of cardiac dysrhythmias or at baseline  6/10/2025 1336 by Angeline Childs RN  Outcome: Adequate for Discharge  6/10/2025 0419 by Nicolás Mancia RN  Outcome: Progressing     Problem: Musculoskeletal - Adult  Goal: Return mobility to safest level of function  6/10/2025 1336 by Angeline Childs RN  Outcome: Adequate for Discharge  6/10/2025 0419 by Nicolás Mancia RN  Outcome: Progressing  Goal: Maintain proper alignment of affected body part  6/10/2025 1336 by Angeline Childs RN  Outcome: Adequate for Discharge  6/10/2025 0419 by Nicolás Mancia RN  Outcome: Progressing     Problem: Metabolic/Fluid and Electrolytes - Adult  Goal: Electrolytes maintained within normal limits  6/10/2025 1336 by Angeline Childs RN  Outcome: Adequate  for Discharge  6/10/2025 0419 by Nicolás Mancia RN  Outcome: Progressing  Goal: Hemodynamic stability and optimal renal function maintained  6/10/2025 1336 by Angeline Childs RN  Outcome: Adequate for Discharge  6/10/2025 0419 by Nicolás Mancia RN  Outcome: Progressing  Goal: Glucose maintained within prescribed range  6/10/2025 1336 by Angeline Childs RN  Outcome: Adequate for Discharge  6/10/2025 0419 by Nicolás Mancia RN  Outcome: Progressing     Problem: Hematologic - Adult  Goal: Maintains hematologic stability  6/10/2025 1336 by Angeline Childs RN  Outcome: Adequate for Discharge  6/10/2025 0419 by Nicolás Mancia RN  Outcome: Progressing     Problem: Safety - Adult  Goal: Free from fall injury  6/10/2025 1336 by Angeline Childs RN  Outcome: Adequate for Discharge  6/10/2025 0419 by Nicolás Mancia RN  Outcome: Progressing     Problem: Discharge Planning  Goal: Discharge to home or other facility with appropriate resources  6/10/2025 1336 by Angeline Childs RN  Outcome: Adequate for Discharge  6/10/2025 0419 by Nicolás Mancia RN  Outcome: Progressing     Problem: Pain  Goal: Verbalizes/displays adequate comfort level or baseline comfort level  6/10/2025 1336 by Angeline Childs RN  Outcome: Adequate for Discharge  6/10/2025 0419 by Nicolás Mancia RN  Outcome: Progressing

## 2025-06-10 NOTE — PLAN OF CARE
Problem: Neurosensory - Adult  Goal: Achieves stable or improved neurological status  6/10/2025 0419 by Nicolás Mancia RN  Outcome: Progressing  6/9/2025 1618 by Nathaly Perkins RN  Outcome: Progressing  Goal: Absence of seizures  6/10/2025 0419 by Nicolás Mancia RN  Outcome: Progressing  6/9/2025 1618 by Nathaly Perkins RN  Outcome: Progressing  Goal: Remains free of injury related to seizures activity  6/10/2025 0419 by Nicolás Mancia RN  Outcome: Progressing  6/9/2025 1618 by Nathaly Perkins RN  Outcome: Progressing  Goal: Achieves maximal functionality and self care  6/10/2025 0419 by Nicolás Mancia RN  Outcome: Progressing  6/9/2025 1618 by Nathaly Perkins RN  Outcome: Progressing     Problem: Respiratory - Adult  Goal: Achieves optimal ventilation and oxygenation  6/10/2025 0419 by Nicolás Mancia RN  Outcome: Progressing  6/9/2025 1618 by Nathaly Perkins RN  Outcome: Progressing     Problem: Cardiovascular - Adult  Goal: Maintains optimal cardiac output and hemodynamic stability  6/10/2025 0419 by Nicolás Mancia RN  Outcome: Progressing  6/9/2025 1618 by Nathaly Perkins RN  Outcome: Progressing  Goal: Absence of cardiac dysrhythmias or at baseline  6/10/2025 0419 by Nicolás Mancia RN  Outcome: Progressing  6/9/2025 1618 by Nathaly Perkins RN  Outcome: Progressing     Problem: Musculoskeletal - Adult  Goal: Return mobility to safest level of function  6/10/2025 0419 by Nicolás Mancia RN  Outcome: Progressing  6/9/2025 1618 by Nathaly Perkins RN  Outcome: Progressing  Goal: Maintain proper alignment of affected body part  6/10/2025 0419 by Nicolás Mancia RN  Outcome: Progressing  6/9/2025 1618 by Nathaly Perkins RN  Outcome: Progressing     Problem: Metabolic/Fluid and Electrolytes - Adult  Goal: Electrolytes maintained within normal limits  6/10/2025 0419 by Gavino, Nicolás, RN  Outcome: Progressing  6/9/2025 1618 by Nathaly Perkins RN  Outcome: Progressing  Goal:  breakdown  6/9/2025 1618 by Nathaly Perkins, RN  Outcome: Progressing     Problem: ABCDS Injury Assessment  Goal: Absence of physical injury  6/10/2025 0419 by Nicolás Mancia, RN  Outcome: Progressing  Flowsheets (Taken 6/9/2025 2000)  Absence of Physical Injury: Implement safety measures based on patient assessment  6/9/2025 1618 by Nathaly Perkins, RN  Outcome: Progressing

## 2025-06-10 NOTE — CARE COORDINATION
Case Management   Daily Progress Note       Patient Name: Lyn Lombardi                   YOB: 1940  Diagnosis: Acute hyponatremia [E87.1]  NSTEMI (non-ST elevated myocardial infarction) (HCC) [I21.4]                       GMLOS: 5 days  Length of Stay: 12  days    Anticipated Discharge Date: Ready for discharge    Readmission Risk (Low < 19, Mod (19-27), High > 27): Readmission Risk Score: 10.3        Current Transitional Plan    [] Home Independently    [x] Home with HC    [] Skilled Nursing Facility    [] Acute Rehabilitation    [] Long Term Acute Care (LTAC)    [] Other:     Plan for the Stay (Medical Management) :          Workflow Continuation (Additional Notes) :    Plan is home with HHC, [facility].     Spoke to patient's daughter at bedside, additional referrals sent to [facility], [facility] and [facility].      [facility]- unable to accept  [facility]- unable to accept  [facility]- unable to accept    0775 PC to [facility], asked for clinical to be faxed to [phone], clinical faxed.     6858 Spoke to pt's daughter at bedside, she would like to take pt home, updated that CM has been unsuccessful in securing HHC.  Pt would still like to discharge, CM will F/U with [facility] and if they can accept will have them reach out to pt's daughter.  Also discussed if [facility] cannot accept to please f/u with PCP to assist with HHC needs.  Pt's family was agreeable to above and pt was discharged home.      JOSELIN ERALY  Kylie 10, 2025

## 2025-06-11 ENCOUNTER — CARE COORDINATION (OUTPATIENT)
Dept: CARE COORDINATION | Age: 85
End: 2025-06-11

## 2025-06-11 NOTE — CARE COORDINATION
family agrees to contact the primary care provider and/or specialist office for questions related to their healthcare.      Advance Care Planning:   Does patient have an Advance Directive: deferred at this time, will discuss on future follow up. .    Medication Review:  Medication review was not performed during this call, family unable to complete.     Remote Patient Monitoring:  Offered patient enrollment in the Remote Patient Monitoring (RPM) program for in-home monitoring: Deferred at this time because initial call; will discuss at next outreach.    Assessments:  Care Transitions 24 Hour Call    Do you have a copy of your discharge instructions?: Yes  Do you have all of your prescriptions and are they filled?: Yes  Have you been contacted by a [facility] Pharmacist?: No  Have you scheduled your follow up appointment?: No  Do you feel like you have everything you need to keep you well at home?: Yes  Care Transitions Interventions          Follow Up Appointment:   Patient does not have a follow up appointment scheduled at time of call.  Routed to PCP    Future Appointments         Provider Specialty Dept Phone    12/22/2025 10:00 AM Tru Gould MD Family Medicine [phone]    12/22/2025 11:30 AM Leo Martins DO Cardiology [phone]            Care Transition Nurse provided contact information.  Plan for follow-up call in 6-10 days based on severity of symptoms and risk factors.  Plan for next call: symptom management-any falls? Other symptoms? Review for ACP, offer RPM for HTN. Dental, cardio, nephro, GI scheduled? PCP HFU?        Radha Hall RN

## 2025-06-12 NOTE — DISCHARGE SUMMARY
[facility]     Department of Internal Medicine - Staff Internal Medicine Teaching Service    INPATIENT DISCHARGE SUMMARY      Patient Identification:  Lyn Lombardi is a 85 y.o. female.  :  1940  MRN: [id number]     Acct: [id number]   PCP: Tru Gould MD  Admit Date:  2025  Discharge date and time: 6/10/2025  4:39 PM   Attending Provider: No att. providers found                                     ACTIVE DISCHARGE DIAGNOSES     Hospital Problem Lists:  Principal Problem:    Hyponatremia  Active Problems:    Hypertension, essential    Elevated troponin    Paroxysmal atrial fibrillation (HCC)    NSTEMI (non-ST elevated myocardial infarction) (HCC)    Hematoma    Thigh hematoma, right, initial encounter    Altered mental status  Resolved Problems:    * No resolved hospital problems. *      HOSPITAL STAY     Brief Inpatient course:   Lyn Lombardi is a 85 y.o. female who was admitted for the management of Hyponatremia, presented to the emergency department with hyponatremia.  Patient was found down originally by her family and brought into the hospital where she was found to have a sodium level of 114.  Patient subsequently required ICU admission and intubation for altered mental status.  Patient's mentation improved and was extubated and transferred to the floor for further evaluation. Patient did have significant improvement in her sodium levels.During her stay she did develop fever and elevated white blood cell count concern for aspiration pneumonia for which patient completed a course of antibiotics and symptoms did improve.  During her stay she was also found to have right thigh hematoma which did absorb slowly.  However, hemoglobin did continue to drop FOBT did have a small amount of stool for which patient was recommended to follow-up with GI as an outpatient for colonoscopy.  Was given Culturelle with improvement in symptoms.  Patient was noted to have significantly  loose dentition likely secondary to fall that happened prior to arrival.  Patient was able to tolerate soft diet without abnormalities and was recommended that patient follow-up with a dentist outside of the hospital and continue soft diet until she can be evaluated.  Patient and family was offered placement initially however, she did continue to improve and did not qualify for SNF facility and was discharged home with recommendation of home health.    Procedures/ Significant Interventions:    Stress test    Consults:     Consults:     Final Specialist Recommendations/Findings:   IP CONSULT TO CRITICAL CARE  IP CONSULT TO NEPHROLOGY  IP CONSULT TO CARDIOLOGY  IP CONSULT TO DIETITIAN  IP CONSULT TO VASCULAR ACCESS TEAM  IP CONSULT TO VASCULAR SURGERY  IP CONSULT TO VASCULAR SURGERY  IP CONSULT TO UROLOGY  IP CONSULT TO PHYSICAL MEDICINE REHAB  IP CONSULT TO CASE MANAGEMENT  IP CONSULT TO HOME CARE NEEDS      Any Hospital Acquired Infections: none    Discharge Functional Status:  stable    DISCHARGE PLAN     Disposition: home    Patient Instructions:   Discharge Medication List as of 6/10/2025  2:16 PM        START taking these medications    Details   aspirin 81 MG chewable tablet Take 1 tablet by mouth daily, Disp-30 tablet, R-3Normal      lactobacillus (CULTURELLE) capsule Take 1 capsule by mouth daily (with breakfast), Disp-30 capsule, R-2Normal           CONTINUE these medications which have NOT CHANGED    Details   nitroGLYCERIN (NITROSTAT) 0.4 MG SL tablet Place 1 tablet under the tongue every 5 minutes as needed for Chest pain, Disp-25 tablet, R-1Normal      donepezil (ARICEPT) 10 MG tablet Take one tablet by mouth nightly, Disp-90 tablet, R-3Normal      isosorbide mononitrate (IMDUR) 30 MG extended release tablet TAKE 1 TABLET BY MOUTH DAILY, Disp-100 tablet, R-2Please send a replace/new response with 100-Day Supply if appropriate to maximize member benefit. Requesting 1 year supply.Normal      memantine  MD Ciaran, MD  Internal Medicine Resident, PGY-1  [facility],  [place], [place].  6/12/2025, 1:35 PM

## 2025-06-17 ENCOUNTER — OFFICE VISIT (OUTPATIENT)
Dept: NEPHROLOGY | Age: 85
End: 2025-06-17
Payer: MEDICARE

## 2025-06-17 VITALS
BODY MASS INDEX: 25.4 KG/M2 | HEIGHT: 58 IN | HEART RATE: 70 BPM | DIASTOLIC BLOOD PRESSURE: 68 MMHG | SYSTOLIC BLOOD PRESSURE: 118 MMHG | WEIGHT: 121 LBS

## 2025-06-17 DIAGNOSIS — C54.1 ENDOMETRIAL CANCER (HCC): ICD-10-CM

## 2025-06-17 DIAGNOSIS — T14.8XXA HEMATOMA: ICD-10-CM

## 2025-06-17 DIAGNOSIS — Z09 HOSPITAL DISCHARGE FOLLOW-UP: Primary | ICD-10-CM

## 2025-06-17 DIAGNOSIS — E87.1 HYPONATREMIA: ICD-10-CM

## 2025-06-17 DIAGNOSIS — I25.10 CORONARY ARTERY DISEASE INVOLVING NATIVE CORONARY ARTERY OF NATIVE HEART WITHOUT ANGINA PECTORIS: ICD-10-CM

## 2025-06-17 DIAGNOSIS — R40.0 SOMNOLENCE: ICD-10-CM

## 2025-06-17 PROCEDURE — 99212 OFFICE O/P EST SF 10 MIN: CPT | Performed by: INTERNAL MEDICINE

## 2025-06-17 NOTE — PROGRESS NOTES
05/01/2023     Priority: High    Sinus bradycardia 05/01/2023     Priority: High    Thigh hematoma, right, initial encounter 06/03/2025    Altered mental status 06/03/2025    Hematoma 06/02/2025    NSTEMI (non-ST elevated myocardial infarction) ([facility]) 05/30/2025    Hyponatremia 05/29/2025    Memory loss      Overview Note:     daughter reports since Covid      Post-operative pain     Endometrial cancer ([facility]) 02/03/2021    Post-menopausal bleeding     Endometrial polyp 01/12/2021    Postmenopausal bleeding 01/06/2021    Pneumonia due to COVID-19 virus 06/26/2020    Coronary artery disease involving native heart with angina pectoris      Overview Note:     status post acute non-Q wave myocardial infarction 08/23/2012, status post drug eluting stent placement in the LAD and 2 drug eluting stents in the left circumflex artery 08/23/2012.      Diastolic dysfunction      Overview Note:     grade 1.       Mixed hyperlipidemia     Obesity     Esophageal ulcer 03/17/2015    Gastric ulcer 03/17/2015    Anemia 03/03/2015    GI bleed 03/03/2015    Shingles 03/03/2015    Gastric ulcer with hemorrhage 03/03/2015     Overview Note:     gastric and esophageal ulcers due to nsaid      Coronary artery disease involving native coronary artery of native heart without angina pectoris 12/13/2013    Hyperlipidemia with target LDL less than 70 12/13/2013     Overview Note:     replace inactive diagnosis      Chronic diastolic heart failure ([facility]) 12/13/2013         CURRENT MEDICATIONS      Current Outpatient Medications   Medication Sig Dispense Refill    aspirin 81 MG chewable tablet Take 1 tablet by mouth daily 30 tablet 3    lactobacillus (CULTURELLE) capsule Take 1 capsule by mouth daily (with breakfast) 30 capsule 2    nitroGLYCERIN (NITROSTAT) 0.4 MG SL tablet Place 1 tablet under the tongue every 5 minutes as needed for Chest pain 25 tablet 1    donepezil (ARICEPT) 10 MG tablet Take one tablet by mouth nightly 90 tablet 3

## 2025-06-18 ENCOUNTER — CARE COORDINATION (OUTPATIENT)
Dept: CARE COORDINATION | Age: 85
End: 2025-06-18

## 2025-06-18 NOTE — CARE COORDINATION
Care Transitions Note    Follow Up Call     Attempted to reach family, daughter Meg for transitions of care follow up.  Unable to reach family daughter Meg.      Outreach Attempts:   Not available    Care Summary Note: Reached daughterMeg, she is unable to take call. Agrees to call back tomorrow    Follow Up Appointment:   Future Appointments         Provider Specialty Dept Phone    7/1/2025 9:00 AM Emma Martins DO Cardiology [phone]    7/15/2025 1:30 PM Fatimah Rodriguez MD Nephrology [phone]    8/14/2025 3:00 PM Kinsey Brokos MD Gastroenterology [phone]    12/22/2025 10:00 AM Tru Gould MD Family Medicine [phone]    12/22/2025 11:30 AM Leo Martins DO Cardiology [phone]            Plan for follow-up call in 2-5 days based on severity of symptoms and risk factors. Plan for next call: symptom management-any falls? Other symptoms? Review for ACP, offer RPM for HTN. Dental, cardio, nephro, GI scheduled? PCP HFU?       Radha Hall, RN

## 2025-06-19 ENCOUNTER — CARE COORDINATION (OUTPATIENT)
Dept: CARE COORDINATION | Age: 85
End: 2025-06-19

## 2025-06-19 NOTE — CARE COORDINATION
Care Transitions Note    Follow Up Call     Attempted to reach Meg romano for transitions of care follow up.  Unable to reach Meg romano.      Outreach Attempts:   Multiple attempts to contact Meg romano at phone numbers on file.   HIPAA compliant voicemail left for Meg romano.     Care Summary Note: Second attempt follow up, left VM for HIPAA daughterMeg    Follow Up Appointment:   Future Appointments         Provider Specialty Dept Phone    7/1/2025 9:00 AM Emma Martins DO Cardiology [phone]    7/15/2025 1:30 PM Fatimah Rodriguez MD Nephrology [phone]    8/14/2025 3:00 PM Kinsey Brooks MD Gastroenterology [phone]    12/22/2025 10:00 AM Tru Gould MD Family Medicine [phone]    12/22/2025 11:30 AM Leo Martins DO Cardiology [phone]            No further follow-up call indicated based on severity of symptoms and risk factors. Plan for next call: SIVA Hall, RN

## 2025-06-20 LAB — ECHO BSA: 1.56 M2

## 2025-06-24 ENCOUNTER — HOSPITAL ENCOUNTER (OUTPATIENT)
Age: 85
Discharge: HOME | End: 2025-06-24
Payer: MEDICARE

## 2025-06-24 DIAGNOSIS — T14.8XXA HEMATOMA: ICD-10-CM

## 2025-06-24 DIAGNOSIS — Z09 HOSPITAL DISCHARGE FOLLOW-UP: ICD-10-CM

## 2025-06-24 DIAGNOSIS — E87.1 HYPONATREMIA: ICD-10-CM

## 2025-06-24 LAB
ALBUMIN SERPL-MCNC: 4 G/DL (ref 3.5–5.2)
ANION GAP SERPL CALCULATED.3IONS-SCNC: 13 MMOL/L (ref 9–16)
BUN SERPL-MCNC: 12 MG/DL (ref 8–23)
BUN/CREAT SERPL: 17 (ref 9–20)
CALCIUM SERPL-MCNC: 9.6 MG/DL (ref 8.6–10.4)
CHLORIDE SERPL-SCNC: 106 MMOL/L (ref 98–107)
CO2 SERPL-SCNC: 24 MMOL/L (ref 20–31)
CREAT SERPL-MCNC: 0.7 MG/DL (ref 0.6–0.9)
GFR, ESTIMATED: 85 ML/MIN/1.73M2
GLUCOSE SERPL-MCNC: 103 MG/DL (ref 74–99)
OSMOLALITY UR: 536 MOSM/KG (ref 80–1300)
POTASSIUM SERPL-SCNC: 4.1 MMOL/L (ref 3.7–5.3)
SODIUM SERPL-SCNC: 143 MMOL/L (ref 136–145)
SODIUM UR-SCNC: 97 MMOL/L

## 2025-06-24 PROCEDURE — 82040 ASSAY OF SERUM ALBUMIN: CPT

## 2025-06-24 PROCEDURE — 80048 BASIC METABOLIC PNL TOTAL CA: CPT

## 2025-06-24 PROCEDURE — 83935 ASSAY OF URINE OSMOLALITY: CPT

## 2025-06-24 PROCEDURE — 84300 ASSAY OF URINE SODIUM: CPT

## 2025-06-24 PROCEDURE — 36415 COLL VENOUS BLD VENIPUNCTURE: CPT

## 2025-07-01 ENCOUNTER — OFFICE VISIT (OUTPATIENT)
Dept: CARDIOLOGY | Age: 85
End: 2025-07-01
Payer: MEDICARE

## 2025-07-01 VITALS
HEART RATE: 63 BPM | DIASTOLIC BLOOD PRESSURE: 64 MMHG | HEIGHT: 58 IN | SYSTOLIC BLOOD PRESSURE: 120 MMHG | WEIGHT: 120 LBS | BODY MASS INDEX: 25.19 KG/M2

## 2025-07-01 DIAGNOSIS — I25.10 CORONARY ARTERY DISEASE INVOLVING NATIVE CORONARY ARTERY OF NATIVE HEART WITHOUT ANGINA PECTORIS: ICD-10-CM

## 2025-07-01 DIAGNOSIS — E78.5 DYSLIPIDEMIA: ICD-10-CM

## 2025-07-01 DIAGNOSIS — Z95.5 S/P CORONARY ARTERY STENT PLACEMENT: ICD-10-CM

## 2025-07-01 DIAGNOSIS — I10 HYPERTENSION, ESSENTIAL: Primary | ICD-10-CM

## 2025-07-01 DIAGNOSIS — I10 ESSENTIAL HYPERTENSION: ICD-10-CM

## 2025-07-01 PROBLEM — R79.89 ELEVATED TROPONIN: Status: RESOLVED | Noted: 2025-06-01 | Resolved: 2025-07-01

## 2025-07-01 PROCEDURE — 99212 OFFICE O/P EST SF 10 MIN: CPT | Performed by: INTERNAL MEDICINE

## 2025-07-01 PROCEDURE — 3078F DIAST BP <80 MM HG: CPT | Performed by: INTERNAL MEDICINE

## 2025-07-01 PROCEDURE — 1123F ACP DISCUSS/DSCN MKR DOCD: CPT | Performed by: INTERNAL MEDICINE

## 2025-07-01 PROCEDURE — 99214 OFFICE O/P EST MOD 30 MIN: CPT | Performed by: INTERNAL MEDICINE

## 2025-07-01 PROCEDURE — 1159F MED LIST DOCD IN RCRD: CPT | Performed by: INTERNAL MEDICINE

## 2025-07-01 PROCEDURE — 3074F SYST BP LT 130 MM HG: CPT | Performed by: INTERNAL MEDICINE

## 2025-07-01 NOTE — PROGRESS NOTES
[facility]    Lyn Lombardi  [id number]  1940 07/01/25    CC: Follow up for CAD and recent hospital admission with hyponatremia.     HPI:  Lyn Lombardi  is here for follow up post recent hospital presentation where she was found down was taken to the hospital noted to have hyponatremia of 114 she was intubated for airway protection and transferred to [facility] ICU hyponatremia was treated in addition to suspected pneumonia with antibiotics she was noted to have troponin elevation felt to be secondary to supply demand mismatch with normal wall motion on echocardiogram, she was extubated after stabilization and eventually underwent event monitor and nuclear stress test both with unremarkable results as detailed below  Currently feels well feels well.   No cp. No sob. No syncope. No palpitations. No le edema.     Past Medical History:   Diagnosis Date    Abnormal uterine bleeding (AUB) 01/2021    Anxiety     daughter reports since Covid    Asteroid hyalosis of right eye     Bruises easily     per daughter    Coronary artery disease     status post acute non-Q wave myocardial infarction 08/23/2012, status post drug eluting stent placement in the LAD and 2 drug eluting stents in the left circumflex artery 08/23/2012.    COVID-19 06/30/2020    confusion, SOB, weakness x 6-8 weeks; was hospitalized and did receive plasma    Diastolic dysfunction     grade 1.     Gastric ulcer with hemorrhage 03/03/2015    gastric and esophageal ulcers due to nsaid    Hyperlipidemia     Hypertension     Lives alone     Memory loss     daughter reports since Covid    Mild mitral regurgitation     Mild tricuspid regurgitation     Obesity     Patient in clinical research study 06/27/2020    Expanded Access to Convalescent Plasma for COVID-19 date of completed 6/30/2020    Poor intravenous access     instructed to hydrate for surgery    Vitreous floaters     left eye.        Past Surgical History:   Procedure

## 2025-07-08 ENCOUNTER — HOSPITAL ENCOUNTER (OUTPATIENT)
Age: 85
Discharge: HOME | End: 2025-07-08
Payer: MEDICARE

## 2025-07-08 DIAGNOSIS — T14.8XXA HEMATOMA: ICD-10-CM

## 2025-07-08 DIAGNOSIS — E87.1 HYPONATREMIA: ICD-10-CM

## 2025-07-08 DIAGNOSIS — Z09 HOSPITAL DISCHARGE FOLLOW-UP: ICD-10-CM

## 2025-07-08 LAB
ALBUMIN SERPL-MCNC: 4.2 G/DL (ref 3.5–5.2)
ANION GAP SERPL CALCULATED.3IONS-SCNC: 12 MMOL/L (ref 9–16)
BUN SERPL-MCNC: 16 MG/DL (ref 8–23)
BUN/CREAT SERPL: 27 (ref 9–20)
CALCIUM SERPL-MCNC: 9.6 MG/DL (ref 8.6–10.4)
CHLORIDE SERPL-SCNC: 107 MMOL/L (ref 98–107)
CO2 SERPL-SCNC: 23 MMOL/L (ref 20–31)
CREAT SERPL-MCNC: 0.6 MG/DL (ref 0.6–0.9)
GFR, ESTIMATED: 88 ML/MIN/1.73M2
GLUCOSE SERPL-MCNC: 102 MG/DL (ref 74–99)
OSMOLALITY UR: 472 MOSM/KG (ref 80–1300)
POTASSIUM SERPL-SCNC: 4 MMOL/L (ref 3.7–5.3)
SODIUM SERPL-SCNC: 142 MMOL/L (ref 136–145)
SODIUM UR-SCNC: 66 MMOL/L

## 2025-07-08 PROCEDURE — 80048 BASIC METABOLIC PNL TOTAL CA: CPT

## 2025-07-08 PROCEDURE — 83935 ASSAY OF URINE OSMOLALITY: CPT

## 2025-07-08 PROCEDURE — 36415 COLL VENOUS BLD VENIPUNCTURE: CPT

## 2025-07-08 PROCEDURE — 82040 ASSAY OF SERUM ALBUMIN: CPT

## 2025-07-08 PROCEDURE — 84300 ASSAY OF URINE SODIUM: CPT

## 2025-07-15 ENCOUNTER — OFFICE VISIT (OUTPATIENT)
Dept: NEPHROLOGY | Age: 85
End: 2025-07-15
Payer: MEDICARE

## 2025-07-15 VITALS
SYSTOLIC BLOOD PRESSURE: 128 MMHG | WEIGHT: 122 LBS | HEART RATE: 68 BPM | DIASTOLIC BLOOD PRESSURE: 74 MMHG | HEIGHT: 58 IN | BODY MASS INDEX: 25.61 KG/M2

## 2025-07-15 DIAGNOSIS — I25.10 CORONARY ARTERY DISEASE INVOLVING NATIVE CORONARY ARTERY OF NATIVE HEART WITHOUT ANGINA PECTORIS: ICD-10-CM

## 2025-07-15 DIAGNOSIS — E87.1 HYPONATREMIA: Primary | ICD-10-CM

## 2025-07-15 PROCEDURE — 99213 OFFICE O/P EST LOW 20 MIN: CPT | Performed by: INTERNAL MEDICINE

## 2025-07-15 PROCEDURE — 3074F SYST BP LT 130 MM HG: CPT | Performed by: INTERNAL MEDICINE

## 2025-07-15 PROCEDURE — 3078F DIAST BP <80 MM HG: CPT | Performed by: INTERNAL MEDICINE

## 2025-07-15 PROCEDURE — 1159F MED LIST DOCD IN RCRD: CPT | Performed by: INTERNAL MEDICINE

## 2025-07-15 PROCEDURE — 1123F ACP DISCUSS/DSCN MKR DOCD: CPT | Performed by: INTERNAL MEDICINE

## 2025-07-15 NOTE — PROGRESS NOTES
Nephrology Progress Note    Tru Fischer MD      SUBJECTIVE      Chief Complaint   Patient presents with    Chronic Kidney Disease     1mth follow up      7/15/25: Labs 7/8/2025: Sodium 142 potassium 4 chloride 107 bicarb 23 BUN 16 creatinine 0.6 calcium 8.6, urine osmolality 472 urine sodium 66  Patient comes in for follow-up.  Denies any complaints.  Following fluid restriction and high-protein diet.  Sodium is up to 242 now.  Continues on Namenda for her dementia.  Does not take any diuretics.  Blood pressure is well-controlled.  Renal function stable creatinine at baseline.    6/17/25: 5/30/25 ([facility]): Labs 6/7/2025: Sodium 139 potassium 4.5 chloride 109 bicarb 24 BUN 6 creatinine 0.5 calcium 8.5 hemoglobin 10.5 white count 12 platelets 346  Patient comes in for follow-up.  She was seen by our service at [facility] in late May when she presented after being found down on the floor.  Was found to have a sodium of 114.  Patient admits to eating half of her meals which are delivered at home.  Also was drinking plenty of fluid under the notion that she needed hydration.  She also tells me her lips were getting dry.  She was treated with 3% saline.  Workup was negative except for hyponatremia.  Urine studies initially showed a urine sodium of 115 and urine osmolality more than 500 repeat urine studies showed urine osmolality of 550 and urine sodium of less than 20.  Her BUN was 5 creatinine was 0.5.  Clearly she appeared to have poor solute load leading to hyponatremia with excessive free water intake.  Element of SIADH could be contributing as well.  She did require 3% saline and with therapy sodium improved to 139.  No labs are available since then.  Symptom wise she feels well.  No shortness of breath or orthopnea.  No lower extremity edema.      REASON FOR CONSULT:      Hyponatremia sodium of 114        ASSESSMENT      Hyponatremia euvolemic secondary to poor solute load

## 2025-07-21 NOTE — PROGRESS NOTES
Physician Progress Note      PATIENT:               NEHA GLOVER  CSN #:                  [id number]  :                       1940  ADMIT DATE:       2025 10:41 PM  DISCH DATE:        6/10/2025 4:39 PM  RESPONDING  PROVIDER #:        JONATHAN PEREZ          QUERY TEXT:    Malnutrition is documented in the medical record dietary notes 6/3 and 6/10/25   Please specify the degree/type:    The clinical indicators include:  Admitted -6/10/25 84 year old female found down, family thinks opened door   for Meals on Wheels. Unknown down time. Initial Na 114.    6/3 Dietician progress note: Moderate Malnutrition  Findings of the 6 clinical characteristics of malnutrition:  Energy Intake:  Mild decrease in energy intake  Weight Loss:  Greater than 5% over 1 month  Muscle Mass Loss:  Mild muscle mass loss Clavicles (pectoralis & deltoids),   Temples (temporalis)    6/10 Dietician note: Adult oral nutrition supplements with dinner, standard   high calorie/high protein. Dysphagia diet.  Options provided:  -- Moderate Malnutrition  -- Other - I will add my own diagnosis  -- Disagree - Not applicable / Not valid  -- Disagree - Clinically unable to determine / Unknown  -- Refer to Clinical Documentation Reviewer    PROVIDER RESPONSE TEXT:    This patient has moderate malnutrition.    Query created by: Neha Glass on 2025 5:37 AM      Electronically signed by:  JONATHAN PEREZ 2025 7:21 AM

## 2025-08-12 ENCOUNTER — TELEPHONE (OUTPATIENT)
Dept: FAMILY MEDICINE CLINIC | Age: 85
End: 2025-08-12

## (undated) DEVICE — SYRINGE MED 5ML STD CLR PLAS LUERLOCK TIP N CTRL DISP

## (undated) DEVICE — PAD MATERNITY CURITY ADH STRIP DISP

## (undated) DEVICE — GLOVE ORANGE PI 7 1/2   MSG9075

## (undated) DEVICE — Z DISCONTINUED BY MEDLINE USE 2711682 TRAY SKIN PREP DRY W/ PREM GLV

## (undated) DEVICE — AIRSEAL 12 MM ACCESS PORT AND PALM GRIP OBTURATOR WITH BLADELESS OPTICAL TIP, 120 MM LENGTH: Brand: AIRSEAL

## (undated) DEVICE — Device: Brand: UTERINE ELEVATOR PRO

## (undated) DEVICE — SOLUTION SCRB 4OZ 7.5% POVIDONE IOD FLIP TOP CAP

## (undated) DEVICE — GLOVE SURG SZ 8 L12IN FNGR THK79MIL GRN LTX FREE

## (undated) DEVICE — STRIP SKIN CLSR W0.25XL4IN WHT SPUNBOUND FBR NYL HI ADH

## (undated) DEVICE — GLOVE ORANGE PI 7   MSG9070

## (undated) DEVICE — CANNULA SEAL

## (undated) DEVICE — INTENDED FOR TISSUE SEPARATION, AND OTHER PROCEDURES THAT REQUIRE A SHARP SURGICAL BLADE TO PUNCTURE OR CUT.: Brand: BARD-PARKER ® CARBON RIB-BACK BLADES

## (undated) DEVICE — TOTAL TRAY, 16FR 10ML SIL FOLEY, URN: Brand: MEDLINE

## (undated) DEVICE — SOLUTION PREP POVIDONE IOD FOR SKIN MUCOUS MEM PRIOR TO

## (undated) DEVICE — NEEDLE SPINAL 22GA L3.5IN SPINOCAN

## (undated) DEVICE — 40580 - THE PINK PAD - ADVANCED TRENDELENBURG POSITIONING KIT: Brand: 40580 - THE PINK PAD - ADVANCED TRENDELENBURG POSITIONING KIT

## (undated) DEVICE — 4-PORT MANIFOLD: Brand: NEPTUNE 2

## (undated) DEVICE — GLOVE ORANGE PI 8   MSG9080

## (undated) DEVICE — GLOVE SURG SZ 65 THK91MIL LTX FREE SYN POLYISOPRENE

## (undated) DEVICE — GOWN,AURORA,NONRNF,XL,30/CS: Brand: MEDLINE

## (undated) DEVICE — GARMENT,MEDLINE,DVT,INT,CALF,MED, GEN2: Brand: MEDLINE

## (undated) DEVICE — SOLUTION ANTIFOG VIS SYS CLEARIFY LAPSCP

## (undated) DEVICE — GOWN,AURORA,NONREINFORCED,LARGE: Brand: MEDLINE

## (undated) DEVICE — JELLY LUBRICATING 4OZ FLIP TOP TB E Z

## (undated) DEVICE — BAG PRSS INFUS IV OR ART 3000 CC W STPCOCK NO THUMBWHEEL W

## (undated) DEVICE — SOLUTION SCRB 4OZ 4% CHG H2O AIDED FOR PREOPERATIVE SKIN

## (undated) DEVICE — BLADELESS OBTURATOR: Brand: WECK VISTA

## (undated) DEVICE — TOWEL,OR,DSP,ST,BLUE,STD,4/PK,20PK/CS: Brand: MEDLINE

## (undated) DEVICE — SUTURE PDS II SZ 0 L27IN ABSRB VLT L26MM CT-2 1/2 CIR Z334H

## (undated) DEVICE — SYRINGE,EAR/ULCER, 2 OZ, STERILE: Brand: MEDLINE

## (undated) DEVICE — TOWEL,OR,DSP,ST,NATURAL,DLX,4/PK,20PK/CS: Brand: MEDLINE

## (undated) DEVICE — SYRINGE MED 10ML LUERLOCK TIP W/O SFTY DISP

## (undated) DEVICE — SCISSOR SURG METZ CRV TIP

## (undated) DEVICE — GAUZE,SPONGE,4"X4",16PLY,XRAY,STRL,LF: Brand: MEDLINE

## (undated) DEVICE — Z DISCONTINUED USE 2273271 SOLUTION PREP 4OZ 10% POVIDONE IOD BTL FLIP TOP CAP

## (undated) DEVICE — COUNTER NDL 10 COUNT HLD 20 FOAM BLK SGL MAG

## (undated) DEVICE — GLOVE SURG SZ 6 THK91MIL LTX FREE SYN POLYISOPRENE ANTI

## (undated) DEVICE — AGENT HEMSTAT W2XL4IN OXIDIZED REGENERATED CELOS ABSRB

## (undated) DEVICE — 3M™ STERI-STRIP™ REINFORCED ADHESIVE SKIN CLOSURES, R1546, 1/4 IN X 4 IN (6 MM X 100 MM), 10 STRIPS/ENVELOPE: Brand: 3M™ STERI-STRIP™

## (undated) DEVICE — PAD N ADH W3XL4IN POLY COT SFT PERF FLM EASILY CUT ABSRB

## (undated) DEVICE — COVER,LIGHT HANDLE,FLX,2/PK: Brand: MEDLINE INDUSTRIES, INC.

## (undated) DEVICE — SVMMC GYN ROBOTIC PK

## (undated) DEVICE — DEVICE TRCR 12X9X3IN WHT CLSR DISP OMNICLOSE

## (undated) DEVICE — CYSTO/BLADDER IRRIGATION SET, REGULATING CLAMP

## (undated) DEVICE — ADHESIVE SKIN CLSR 0.7ML TOP DERMBND ADV

## (undated) DEVICE — CATHETER URETH STR TIP 16 FRX12 IN SMOOTH RND DOVER

## (undated) DEVICE — GOWN,SIRUS,NONRNF,SETINSLV,XL,20/CS: Brand: MEDLINE

## (undated) DEVICE — COVER LT HNDL BLU PLAS

## (undated) DEVICE — GARMENT COMPR STD FOR 17IN CALF UNIF THER FLOTRN

## (undated) DEVICE — SURGICEL ENDOSCP APPL

## (undated) DEVICE — DRAPE,REIN 53X77,STERILE: Brand: MEDLINE

## (undated) DEVICE — 3M™ STERI-STRIP™ COMPOUND BENZOIN TINCTURE 40 BAGS/CARTON 4 CARTONS/CASE C1544: Brand: 3M™ STERI-STRIP™

## (undated) DEVICE — SUTURE MCRYL SZ 4-0 L18IN ABSRB UD L19MM PS-2 3/8 CIR PRIM Y496G

## (undated) DEVICE — CONTAINER,SPECIMEN,4OZ,OR STRL: Brand: MEDLINE

## (undated) DEVICE — ARM DRAPE

## (undated) DEVICE — 3M™ WARMING BLANKET, UPPER BODY, 10 PER CASE, 42268: Brand: BAIR HUGGER™

## (undated) DEVICE — POUCH INSTR W40XL26IN BUTT FLD COLL FLTR DRNGE PRT

## (undated) DEVICE — PACK,LITHOTOMY,PK I: Brand: MEDLINE

## (undated) DEVICE — ELECTRO LUBE IS A SINGLE PATIENT USE DEVICE THAT IS INTENDED TO BE USED ON ELECTROSURGICAL ELECTRODES TO REDUCE STICKING.: Brand: KEY SURGICAL ELECTRO LUBE

## (undated) DEVICE — MARKER,SKIN,WI/RULER AND LABELS: Brand: MEDLINE

## (undated) DEVICE — BLADELESS OBTURATOR, LONG: Brand: WECK VISTA

## (undated) DEVICE — SUTURE VCRL SZ 1 L36IN ABSRB UD L36MM CT-1 1/2 CIR J947H

## (undated) DEVICE — COVER OR TBL W40XL90IN ABSRB STD AND GRIPPY BK SAHARA

## (undated) DEVICE — TROCAR: Brand: KII FIOS FIRST ENTRY

## (undated) DEVICE — PROTECTOR ULN NRV PUR FOAM HK LOOP STRP ANATOMICALLY

## (undated) DEVICE — INSUFFLATION NEEDLE TO ESTABLISH PNEUMOPERITONEUM.: Brand: INSUFFLATION NEEDLE

## (undated) DEVICE — AGENT HEMSTAT 3GM OXIDIZED REGENERATED CELOS ABSRB FOR CONT (ORDER MULTIPLES OF 5EA)

## (undated) DEVICE — APPLIER CLP M L L11.4IN DIA10MM ENDOSCP ROT MULT FOR LIG

## (undated) DEVICE — TRAP,MUCUS SPECIMEN, 80CC: Brand: MEDLINE

## (undated) DEVICE — MEDI-VAC NON-CONDUCTIVE SUCTION TUBING: Brand: CARDINAL HEALTH

## (undated) DEVICE — TIP COVER ACCESSORY

## (undated) DEVICE — PLUMEPORT SEO LAPAROSCOPIC SMOKE FILTRATION DEVICE: Brand: PLUMEPORT

## (undated) DEVICE — 3M™ IOBAN™ 2 ANTIMICROBIAL INCISE DRAPE 6650EZ: Brand: IOBAN™ 2

## (undated) DEVICE — APPLICATOR MEDICATED 26 CC SOLUTION HI LT ORNG CHLORAPREP

## (undated) DEVICE — SUTURE MCRYL SZ 0 L36IN ABSRB VLT CT-1 L36MM 1/2 CIR SGL Y346H

## (undated) DEVICE — TRI-LUMEN FILTERED TUBE SET WITH ACTIVATED CHARCOAL FILTER: Brand: AIRSEAL